# Patient Record
Sex: FEMALE | Race: WHITE | NOT HISPANIC OR LATINO | Employment: OTHER | URBAN - METROPOLITAN AREA
[De-identification: names, ages, dates, MRNs, and addresses within clinical notes are randomized per-mention and may not be internally consistent; named-entity substitution may affect disease eponyms.]

---

## 2019-07-12 ENCOUNTER — TRANSFERRED RECORDS (OUTPATIENT)
Dept: HEALTH INFORMATION MANAGEMENT | Facility: CLINIC | Age: 69
End: 2019-07-12

## 2019-07-18 ENCOUNTER — TRANSFERRED RECORDS (OUTPATIENT)
Dept: HEALTH INFORMATION MANAGEMENT | Facility: CLINIC | Age: 69
End: 2019-07-18

## 2019-07-20 ENCOUNTER — TRANSFERRED RECORDS (OUTPATIENT)
Dept: HEALTH INFORMATION MANAGEMENT | Facility: CLINIC | Age: 69
End: 2019-07-20

## 2019-07-24 ENCOUNTER — TRANSFERRED RECORDS (OUTPATIENT)
Dept: HEALTH INFORMATION MANAGEMENT | Facility: CLINIC | Age: 69
End: 2019-07-24

## 2019-07-26 ENCOUNTER — TRANSFERRED RECORDS (OUTPATIENT)
Dept: HEALTH INFORMATION MANAGEMENT | Facility: CLINIC | Age: 69
End: 2019-07-26

## 2019-07-26 ENCOUNTER — MEDICAL CORRESPONDENCE (OUTPATIENT)
Dept: HEALTH INFORMATION MANAGEMENT | Facility: CLINIC | Age: 69
End: 2019-07-26

## 2019-07-26 NOTE — TELEPHONE ENCOUNTER
ONCOLOGY INTAKE: Records Information      APPT INFORMATION:  Referring provider:  Dr. Nadeen Murphy MD  Referring provider s clinic:  [facility]  Reason for visit/diagnosis:  squamous cell carcinoma of anus  Has patient been notified of appointment date and time?: yes, per pt    RECORDS INFORMATION:  Were the records received with the referral (via [employer])? yes    Has patient been seen for any external appt for this diagnosis? yes    If yes, where? [facility] and [facility]    Has patient had any imaging or procedures outside of [facility] for this condition? yes      If Yes, where? [facility] and [facility]    ADDITIONAL INFORMATION:   [facility] referral faxed to [facility] team.  Notes also  in care everywhere

## 2019-07-29 NOTE — TELEPHONE ENCOUNTER
RECORDS STATUS - ALL OTHER DIAGNOSIS      RECORDS RECEIVED FROM: Epic/ [facility] and [facility]   DATE RECEIVED: 8/1/2019   NOTES STATUS DETAILS   OFFICE NOTE from referring provider Complete  Dr. Nadeen Murphy MD   OFFICE NOTE from medical oncologist N/A    DISCHARGE SUMMARY from hospital Complete  [facility] 7/24/2019 7/20/2019   DISCHARGE REPORT from the ER N/A    OPERATIVE REPORT N/A    MEDICATION LIST Complete     CLINICAL TRIAL TREATMENTS TO DATE N/A    LABS     PATHOLOGY REPORTS Request  [facility] 7/15/2019  Report in EPIC   ANYTHING RELATED TO DIAGNOSIS Complete  Recent Labs in [facility]/   GENONOMIC TESTING     TYPE:     IMAGING (NEED IMAGES & REPORT)     CT SCANS Request  [facility] 7/21/2019   MRI Request  [facility] 7/23/2019   MAMMO     ULTRASOUND     PET       Action    Action Taken 7/29/2019:43am     Urgently faxed MR ([facility] to [facility].     9:56am   Called [facility] to have IMG pushed to PACS. Sent a pathology request to [facility].     3:15pm   Resolved IMG in PACS.

## 2019-08-01 ENCOUNTER — PRE VISIT (OUTPATIENT)
Dept: ONCOLOGY | Facility: CLINIC | Age: 69
End: 2019-08-01

## 2019-08-01 ENCOUNTER — ONCOLOGY VISIT (OUTPATIENT)
Dept: ONCOLOGY | Facility: CLINIC | Age: 69
End: 2019-08-01
Payer: COMMERCIAL

## 2019-08-01 VITALS
SYSTOLIC BLOOD PRESSURE: 143 MMHG | DIASTOLIC BLOOD PRESSURE: 68 MMHG | HEART RATE: 72 BPM | WEIGHT: 188.3 LBS | RESPIRATION RATE: 18 BRPM | OXYGEN SATURATION: 97 % | TEMPERATURE: 97 F

## 2019-08-01 DIAGNOSIS — C21.0 SQUAMOUS CELL CARCINOMA OF ANUS (H): Primary | ICD-10-CM

## 2019-08-01 PROCEDURE — 99205 OFFICE O/P NEW HI 60 MIN: CPT | Performed by: INTERNAL MEDICINE

## 2019-08-01 PROCEDURE — G0463 HOSPITAL OUTPT CLINIC VISIT: HCPCS

## 2019-08-01 PROCEDURE — 00000346 ZZHCL STATISTIC REVIEW OUTSIDE SLIDES TC 88321: Performed by: INTERNAL MEDICINE

## 2019-08-01 RX ORDER — LISINOPRIL 40 MG/1
TABLET ORAL
Refills: 2 | COMMUNITY
Start: 2019-06-10 | End: 2020-09-08

## 2019-08-01 RX ORDER — OXYCODONE AND ACETAMINOPHEN 5; 325 MG/1; MG/1
1 TABLET ORAL EVERY 4 HOURS PRN
Refills: 0 | COMMUNITY
Start: 2019-07-18 | End: 2019-08-01

## 2019-08-01 RX ORDER — HYDROCHLOROTHIAZIDE 25 MG/1
25 TABLET ORAL DAILY
Refills: 1 | COMMUNITY
Start: 2019-06-25 | End: 2022-02-09

## 2019-08-01 RX ORDER — PREDNISONE 10 MG/1
TABLET ORAL
Refills: 0 | COMMUNITY
Start: 2019-06-20 | End: 2019-08-29

## 2019-08-01 RX ORDER — ATORVASTATIN CALCIUM 10 MG/1
10 TABLET, FILM COATED ORAL DAILY
Refills: 1 | COMMUNITY
Start: 2019-07-17

## 2019-08-01 RX ORDER — VENLAFAXINE HYDROCHLORIDE 75 MG/1
1 CAPSULE, EXTENDED RELEASE ORAL EVERY 24 HOURS
COMMUNITY
Start: 2019-06-27 | End: 2020-09-08

## 2019-08-01 RX ORDER — OXYCODONE AND ACETAMINOPHEN 5; 325 MG/1; MG/1
1 TABLET ORAL EVERY 4 HOURS PRN
Qty: 30 TABLET | Refills: 0 | Status: SHIPPED | OUTPATIENT
Start: 2019-08-01 | End: 2019-08-08

## 2019-08-01 ASSESSMENT — PAIN SCALES - GENERAL: PAINLEVEL: SEVERE PAIN (7)

## 2019-08-01 NOTE — PROGRESS NOTES
NEW PATIENT ONCOLOGY CONSULT      REQUESTING PROVIDER: Nadya Reid      REASON FOR CONSULTATION:    July 2019 at age 68 diagnosed with anal cancer      HISTORY OF PRESENT ILLNESS:    In 7/2019 at age 68, she presented to her PMD Dr. Reid's office for rectal pain 4/10  for a few weeks, she reports she felt a mass in her rectum, increase in rectal pressure after eating. She makes her self have a BM to relieve the pressure. She notes one instance of bright red blood in her stool. She reports progressive difficulty in moving her bowel.   She then saw GI Dr. Aguirre, who found perianal skin examination was normal. Digital examination revealed a mass at the anterior wall of rectum. It was a hard mass approximately 3-4 cm.   Colonoscopy 7/12/2019 found 8 mm mucosal abnormality just above the dentate line. This was at the anterior wall facing her vagina where the large mass was felt underneath. 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  She then saw Gyn Dr. Eduardo, 7/19/2019, exam found normal vulva. No vaginal polyp/growth appreciated arising from vaginal mucosa (which appears to be smooth) but there is a nodular 3-4cm mass palpated at rectovaginal area, which is not as well appreciated by digital rectal exam. Fixed mass, mildly tender to touch. Cervix grossly normal. Uterus 6 weeks size, non-tender. No adnexal mass/tenderness bilaterally.     7/2013/2019 pelvic MRI found 2.5 x 2.5 x 2.6 cm mass located between the distal rectum and vagina which appears to arise exophytically from the distal rectum. Single mildly enlarged mesorectal lymph node measuring 8 mm in short axis. No pelvic sidewall adenopathy. No other bony or soft tissue abnormalities identified.     She then saw colon rectal surgeon Dr. Nadeen Murphy 7/26/2019, who informed pt that she has anal cancer, not tx by surgery.       PAST MEDICAL HISTORY  HTN,  hyperlipidemia  depression      MEDICATIONS/ALLERY:  Reviewed in Epic system.        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       REVIEW OF SYSTEMS:   GENERAL: pt is in usual state of health.  No B symptoms  NEURO:   No headache, double vision, or focal weakness.  No neuropathy.   SKIN:  No chronic skin rash or skin infection.   CARDIOVASCULAR:  HTN, hyperlipidemia  PULMONARY:  No shortness of breath, no pleurisy, no cough, no hemoptysis.   GI: rectal pain 4/10  for a few weeks, she reports she felt a mass in her rectum, increase in rectal pressure after eating. She makes her self have a BM to relieve the pressure. She notes one instance of bright red blood in her stool. She reports progressive difficulty in moving her bowel.   :  No urgency, frequency.  No recurrent urinary tract infection.  No kidney problems.   RHEUMATOLOGY/MUSCULOSKELETAL SYSTEM:  no arthritic pain, or muscle pain. No muscle ache.   ENDOCRINE:  No history of diabetes or thyroid problem.  No complaints of hot flashes.   HEMATOLOGY:  No history of bleeding or thrombosis episode.   Oncology: no hx of cancers  IMMUNOLOGY:  No recurrent fever or chills episode.  No recurrent infectious episode.   BREASTS/GYN: No breast complains or vaginal dryness  PSYCHIATRY:  + depression.          PHYSICAL EXAMINATION:   VITAL SIGNS:Blood pressure (!) 143/68, pulse 72, temperature 97  F (36.1  C), temperature source Tympanic, resp. rate 18, weight 85.4 kg (188 lb 4.8 oz), SpO2 97 %.    ECO    GENERAL APPEARANCE:  looks like her stated age, very pleasant, not in acute distress.   HEENT: The patient is normocephalic, atraumatic. Pupils are equally reactive to light.  Sclerae are anicteric.  Moist oral mucosa.  Negative pharynx.  No oral thrush.   NECK:  Supple.  No jugular venous distention.   Thyroid is not palpable.   LYMPH NODES:  Superficial lymphadenopathy is not appreciable in the bilateral cervical, supraclavicular, axillary or inguinal areas.   CARDIOVASCULAR:  S1, S2 regular with no murmurs or gallops.  No carotid or abdominal bruits.   PULMONARY:  Lungs are clear to auscultation and percussion bilaterally.  There is no wheezing or rhonchi.   GASTROINTESTINAL:  Abdomen is soft, nontender.  No hepatosplenomegaly.  No signs of ascites.  No mass appreciable.   AL:  a mass at the anterior wall of rectum. It was a hard mass approximately 3-4 cm.   MUSCULOSKELETAL/EXTREMITIES:  No edema.  No cyanotic changes.  No signs of joint deformity.  No lymphedema.   NEUROLOGIC:  Cranial nerves II-XII are grossly intact.  Sensation intact.  Muscle strength and muscle tone symmetrical, 5/5 throughout.   BACK:  No spinal or paraspinal tenderness.  No CVA tenderness.   SKIN:  No petechiae.  No rash.  No signs of cellulitis.             CURRENT LAB DATA REVIEWED  7/2019 Baseline CMP is fine, cr 1.15  7/2019 Baseline cbc is fine with hb 11.7    7/2019 8 mm mucosal abnormality just above the dentate line. 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        CURRENT IMAGING REVIEWED  7/2013/2019 pelvic MRI found 2.5 x 2.5 x 2.6 cm mass located between the distal rectum and vagina which appears to arise exophytically from the distal rectum. Single mildly enlarged mesorectal lymph node measuring 8 mm in short axis. No pelvic sidewall adenopathy. No other bony or soft tissue abnormalities identified.          OLD DATA REVIEWED TODAY WITH SUMMARY:   Cr 1.03 in 7/2019    No baseline image        ASSESSMENT AND PLAN:    At age 68 in July 2019, he is diagnosed with low rectum/anal squamous cell carcinoma, months duration of anal pain pressure.    7/2013/2019 pelvic MRI found 2.5 x 2.5 x 2.6 cm mass located between the distal rectum and vagina which  appears to arise exophytically from the distal rectum. Single mildly enlarged mesorectal lymph node measuring 8 mm in short axis. No pelvic sidewall adenopathy. No other bony or soft tissue abnormalities identified.     We need to obtain colorectal surgeon Dr. Nadeen Murphy's clinic visit records.  She may perform another endoscope.    Recommend further PET scan to rule out distal disease.    Recommend radiation oncology consultation.    We discussed the multimodality treatment for anal cancer M0 disease.  The need of 5-FU and  mitomycin as chemo portion of the concurrent regimen.  We discussed the chemotherapy side effect, which include but not limited to GI toxicity nausea vomiting, lower immunity and bleeding risk from cytopenia, neurotoxicity, cardica toxicity,  infusion related reaction, mortality, etc.  We also discussed the slight possibility of diverting colostomy depending on the toxicity she may get from concurrent chemoradiation treatment.    She is informed this is a toxic regimen. has high complication rates she needs to be monitored closely during the whole course.    She will need a port placement for the treatment.  He has no prior history of adverse reaction to anesthesia or bleeding complications, carry average risk for port placement complications general population.    The patient has acknowledged the risks and consented to therapy, and work-up plan      All Qs are answered to pt's satisfaction.

## 2019-08-01 NOTE — PROGRESS NOTES
Oncology Rooming Note    August 1, 2019 10:18 AM   Sara Dominguez is a 68 year old female who presents for:    Chief Complaint   Patient presents with     Oncology Clinic Visit     New patient-Squamous cell carcinoma of anus     Initial Vitals: BP (!) 143/68 (BP Location: Right arm, Patient Position: Sitting, Cuff Size: Adult Regular)   Pulse 72   Temp 97  F (36.1  C) (Tympanic)   Resp 18   Wt 85.4 kg (188 lb 4.8 oz)   SpO2 97%  There is no height or weight on file to calculate BMI. There is no height or weight on file to calculate BSA.  Severe Pain (7) Comment: Data Unavailable   No LMP recorded.  Allergies reviewed: Yes  Medications reviewed: Yes    Medications: MEDICATION REFILLS NEEDED TODAY. Provider was notified.  Pharmacy name entered into [employer]: [facility] [address] [place] [address], [place] - [address]    Clinical concerns: New Pt, Anal Cancer. Pt requesting a refill of the Percocet.      Agata Renteria RN

## 2019-08-02 DIAGNOSIS — C21.0 SQUAMOUS CELL CARCINOMA OF ANUS (H): ICD-10-CM

## 2019-08-02 DIAGNOSIS — C21.1 MALIGNANT NEOPLASM OF ANAL CANAL (H): ICD-10-CM

## 2019-08-03 ENCOUNTER — HOSPITAL ENCOUNTER (OUTPATIENT)
Dept: PET IMAGING | Facility: CLINIC | Age: 69
Discharge: HOME | End: 2019-08-03
Attending: INTERNAL MEDICINE | Admitting: INTERNAL MEDICINE
Payer: COMMERCIAL

## 2019-08-03 DIAGNOSIS — C21.0 SQUAMOUS CELL CARCINOMA OF ANUS (H): ICD-10-CM

## 2019-08-03 PROCEDURE — A9552 F18 FDG: HCPCS | Performed by: INTERNAL MEDICINE

## 2019-08-03 PROCEDURE — 78816 PET IMAGE W/CT FULL BODY: CPT | Mod: PI

## 2019-08-03 PROCEDURE — 34300033 ZZH RX 343: Performed by: INTERNAL MEDICINE

## 2019-08-03 RX ADMIN — FLUDEOXYGLUCOSE F-18 10.3 MCI.: 500 INJECTION, SOLUTION INTRAVENOUS at 12:16

## 2019-08-05 LAB — COPATH REPORT: NORMAL

## 2019-08-08 ENCOUNTER — OFFICE VISIT (OUTPATIENT)
Dept: RADIATION THERAPY | Facility: OUTPATIENT CENTER | Age: 69
End: 2019-08-08
Payer: COMMERCIAL

## 2019-08-08 ENCOUNTER — PATIENT OUTREACH (OUTPATIENT)
Dept: ONCOLOGY | Facility: CLINIC | Age: 69
End: 2019-08-08

## 2019-08-08 VITALS
SYSTOLIC BLOOD PRESSURE: 122 MMHG | RESPIRATION RATE: 16 BRPM | DIASTOLIC BLOOD PRESSURE: 72 MMHG | BODY MASS INDEX: 31.25 KG/M2 | WEIGHT: 187.8 LBS | OXYGEN SATURATION: 99 % | HEART RATE: 75 BPM

## 2019-08-08 VITALS — HEIGHT: 65 IN | BODY MASS INDEX: 31.33 KG/M2

## 2019-08-08 DIAGNOSIS — C21.0 SQUAMOUS CELL CARCINOMA OF ANUS (H): ICD-10-CM

## 2019-08-08 DIAGNOSIS — C21.1 MALIGNANT NEOPLASM OF ANAL CANAL (H): Primary | ICD-10-CM

## 2019-08-08 DIAGNOSIS — C21.0 SQUAMOUS CELL CARCINOMA OF ANUS (H): Primary | ICD-10-CM

## 2019-08-08 PROBLEM — K21.00 REFLUX ESOPHAGITIS: Status: ACTIVE | Noted: 2019-08-08

## 2019-08-08 PROBLEM — N39.3 URINE, INCONTINENCE, STRESS FEMALE: Status: ACTIVE | Noted: 2019-08-08

## 2019-08-08 PROBLEM — E78.2 MIXED HYPERLIPIDEMIA: Status: ACTIVE | Noted: 2019-08-08

## 2019-08-08 PROBLEM — F33.1 MAJOR DEPRESSIVE DISORDER, RECURRENT EPISODE, MODERATE (H): Status: ACTIVE | Noted: 2017-11-24

## 2019-08-08 RX ORDER — OXYCODONE AND ACETAMINOPHEN 5; 325 MG/1; MG/1
1 TABLET ORAL EVERY 4 HOURS PRN
Qty: 60 TABLET | Refills: 0 | Status: SHIPPED | OUTPATIENT
Start: 2019-08-08 | End: 2019-08-22

## 2019-08-08 NOTE — PROGRESS NOTES
Department of Radiation Oncology  Radiation Therapy Center  [facility]  [address], [address]  [place] [address]  [phone]       Consultation Note    Name: Sara Dominguez MRN: [id number]   : 1950   Date of Service: 2019  Referring: Dr. Aquino     Reason for consultation: Squamous cell carcinoma of the anal canal, clinical [id number] (anna-rectal nodes). Evaluate role of definitive chemoradiation.    History of Present Illness   Ms. Dominguez is a 68 year old female diagnosed wquamous cell carcinoma of the anal canal, clinical [id number] (anna-rectal nodes).  She presents today to discuss the potential role of radiation therapy as a part of her treatment strategy.    The patient initially presented with symptoms of rectal pain pressure prompting further evaluation.  She was seen by gastroenterologist Dr. Aguirre who on digital rectal exam noticed a mass in the anterior wall of the anal rectal region, measuring 3.4 cm in size.  Patient eventually underwent colonoscopy on 2019 which demonstrated an 8 mm mucosal abnormality just above the dentate line.  The mass was noted to be in the anterior wall facing the vagina.  Biopsy of the mass demonstrated moderately differentiated squamous cell carcinoma.  The patient was referred to Gyn/onc who on exam palpated a 3 to 4 cm rectovaginal mass.  No vaginal or cervical mass was noted.  [id number] the patient underwent MRI of the pelvis.  Imaging demonstrated 2.6 2.5 x 2.5 cm mass located in the distal rectum/anal canal region.  The mass was noted to arise exophytically from the distal rectum.  There were suspicious mesorectal lymph nodes measuring subcentimeter in size.  No pelvic adenopathy was noted.  On 8/3/2019 the patient underwent a PET scan.  Imaging did not demonstrate any evidence of distant disease.  He did notice the known anal mass.  Per my review it also noted suspicious perirectal adenopathy.  The patient was  subsequent seen by Dr. Miles sutton we discussed definitive treatment with chemoradiation.f medical oncology patient subsequent presents today to discuss potential role of radiation therapy as a part of treatment strategy.    Patient is overall doing well.  He does have mild pelvic pain pressure for which she takes oral narcotic pain medication. She denies any incontinence.   No bleeding per rectum.  No vaginal bleeding.  No history of radiation.  No pacemaker.  She denies any incontinence.         Past Medical History:   Past Medical History:   Diagnosis Date     Disorder of bone and cartilage     osteopenia     Diverticulosis of colon     ,seen at colonoscopy     Major depressive disorder, recurrent episode, moderate (H)     No Comments Provided     Reflux esophagitis     No Comments Provided     Sciatica     right     Urge incontinence     No Comments Provided       Past Surgical History:   Past Surgical History:   Procedure Laterality Date     COLONOSCOPY      ,due 2018     EXTRACTION(S) DENTAL      No Comments Provided     OTHER SURGICAL HISTORY      21576.0,VT  DELIVERY ONLY     OTHER SURGICAL HISTORY      ,ENDOSCOPY ESOPHAGUS       Chemotherapy History:  none    Radiation History:  none    Pregnant: No  Implanted Cardiac Devices: No    Medications:  Current Outpatient Medications   Medication     aspirin (ASA) 81 MG tablet     atorvastatin (LIPITOR) 10 MG tablet     hydrochlorothiazide (HYDRODIURIL) 25 MG tablet     lisinopril (PRINIVIL/ZESTRIL) 40 MG tablet     oxyCODONE-acetaminophen (PERCOCET) 5-325 MG tablet     predniSONE (DELTASONE) 10 MG tablet     venlafaxine (EFFEXOR XR) 75 MG 24 hr capsule     No current facility-administered medications for this visit.          Allergies:   No Known Allergies      Family History:  Family History   Problem Relation Age of Onset     Other - See Comments Father         Stroke     Cancer Mother         Cancer,cervical-mets to brain     Heart Disease  Brother         Heart Disease,MI  at 59     Heart Disease Maternal Grandmother         Heart Disease,MI  at 58     Asthma Brother         Asthma     Asthma Brother         Asthma     Diabetes Paternal Grandmother         Diabetes     Osteoporosis Mother         Osteoporosis       Review of Systems   A 10-point review of systems was performed. Pertinent findings are noted in the HPI.    Physical Exam   ECOG Status: 1    Vitals:  There were no vitals taken for this visit.    Gen: Alert, in NAD  Head: NC/AT  Eyes: PERRL, EOMI, sclera anicteric  Ears: No external auricular lesions  Nose/sinus: No rhinorrhea or epistaxis  Oral cavity/oropharynx: MMM, no visible oral cavity lesions, FOM and BOT are soft to palpation  Neck: Full ROM, supple, no palpable adenopathy  Pulm: No wheezing, stridor or respiratory distress  CV: Extremities are warm and well-perfused, no cyanosis, no pedal edema  Abdominal: Normal bowel sounds, soft, nontender, no masses  Musculoskeletal: Normal bulk and tone  Skin: Normal color and turgor  Neuro: A/Ox3, CN II-XII intact, normal gait    Imaging/Path/Labs   Imagin/23/19  MRI pelvis  FINDINGS:  2.5 x 2.5 x 2.6 cm mass located between the distal rectum and vagina which appears to arise exophytically from the distal rectum. Single we were merged mesorectal lymph node measuring 8 mm in short axis.   No pelvic sidewall adenopathy. No other bony or soft tissue abnormalities identified.     Impression :  1. 2.6 cm enhancing mass which appears to arise from the distal rectum and is located between the distal rectum and vagina and is suspicious for a rectal cancer.   2. Single mildly enlarged mesorectal lymph node may represent yong metastatic disease.             8/3/19  PET  IMPRESSION:  1. Hypermetabolic 3.3 x 4.1 cm mass seen in the distal rectum/anus  compatible with underlying malignancy.   2. No evidence of distant hypermetabolic metastatic disease seen in  the chest, abdomen or  pelvis.      Path:   7/12/19    A) RECTUM, MUCOSAL ABNORMALITY, BIOPSY:  1. 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 Separate fragment of squamous mucosa with carcinoma in-situ (see comment)     Comment A) There is invasive squamous cell carcinoma involving rectal mucosa. Additionally, it appears that there is background squamous mucosa with probable in-situ disease. We understand that the patient has a vaginal mass, but given the in-situ disease, cannot exclude an origin in the anal canal. Therefore, we defer to the clinical impression regarding this tumor's site of origin: anal canal vs GYN tract (vulva, cervix, or vagina).    Dr. Curtis discussed the findings with Dr. Aguirre's nurse (Cindi) on 7/15/2019. Case seen in consultation with Dr. Enciso. Please contact us with any questions ([facility] GI Pathology Service [phone]).         Assessment      Ms. Dominguez is a 68 year old female diagnosed wquamous cell carcinoma of the anal canal, clinical [id number] (anna-rectal nodes).  She presents today to discuss the potential role of radiation therapy as a part of her treatment strategy.      Plan   1. Today we discussed the natural history of squamous cell carcinoma of the anal canal. There is some uncertainty in terms of origin of the tumor, but based upon imaging and examination the tumor appears to be originating from the anal canal with extension into the anna-vaginal tissues anteriorly.      2. Today we discussed the treatment treatment options with the patient including surgical resection with APR vs. Definitive chemo-radiation.  Definitive chemo-radiation is the standard of care per NCCN guidelines for anal carcinoma.  We are in agreement with pursuing concurrent chemoradiation to preserve sphincter function.      3. We discussed with the patient that treatment would consist of concurrent chemoradiation using 5-FU and mitomycin-C. Radiation treatment would  target the primary tumor and draining lymphatic regions including the groin and pelvic lymph nodes. Radiation treatment would consist of 30-33 fractions to 54 Gy to 60 Gy.     4. We plan to treat according to RTOG 0529 which used IMRT concurrently with 5-FU and mitomycin-C where they found that IMRT significantly reduced grade 2 hematologic and grade 3 dermatologic and GI toxicity.     5. We discussed that the risks from radiation therapy include, but are not limited to skin desquamation, fatigue, loose stools, abdominal cramping, painful urination, urinary frequency, decreased blood counts, bowel damage, bladder damage, rectal damage, rectal bleeding, bone necrosis, early menopause, vaginal stenosis, and secondary malignancies were explained to the patient. The patient has acknowledged.      6. Consent obtained. CT simulation will be scheduled. We will coordinate start of RT with chemotherapy.     Justin Khan MD  Department of Radiation Oncology  [facility]

## 2019-08-08 NOTE — NURSING NOTE
REASON FOR APPOINTMENT   Newly diagnosed anal cancer. Staging complete, seen by gyn, oncology and colorectal surgery  Here to discuss radiation therapy. See epic for details.     PERSONAL HISTORY OF CANCER   Previous Cancer ? no   Prior Radiation ? no   Prior Chemotherapy ? no   Prior Hormonal Therapy ? no     REFERRALS NEEDED  dietary    VITALS  /72 (BP Location: Left arm, Cuff Size: Adult Large)   Pulse 75   Resp 16   Wt 85.2 kg (187 lb 12.8 oz)   SpO2 99%   BMI 31.25 kg/m      PACEMAKER/IMPLANTED CARDIAC DEVICE : No    PAIN  Rectal pain/pressure. Takes percocet 4 tabs per day    PSYCHOSOCIAL  Marital Status:   Patient lives in [place], [place] with  Jonatan.  Working status: reception/registration [employer]  Do you feel safe in your home? Yes    REVIEW OF SYSTEMS  Skin: negative  Eyes: glasses  Ears/Nose/Throat: negative  Respiratory: No shortness of breath, dyspnea on exertion, cough, or hemoptysis  Cardiovascular: negative  Gastrointestinal: some constipation, on stool softener. Feels pain and fullness/pressure, worse w/BM's. No bleeding.  Genitourinary: more urinary frequency, but drinking more water, regular female incontinence.   Musculoskeletal: negative  Neurologic: negative  Psychiatric: depression stable  Hematologic/Lymphatic/Immunologic: negative  Endocrine: negative    WOMEN ONLY  Any chance you may be pregnant: No    Radiation Oncology Patient Teaching    Person involved with teaching: Patient  Patient asked Questions: Yes  Patient was cooperative: Yes  Patient was receptive (willing to accept information given): Yes    Education Assessment  Comprehension ability: High  Knowledge level: High  Factors affecting teaching: None    Education Materials Given  Radiation Therapy and You  Caring for Your Skin During Radiation ...  Disease Specific Booklet  Eating Hints  Diet and Nutrition Information    Educational Topics Discussed  Side effects, Medications, Pain management, Activity,  Nutrition and Community resources    Response To Teaching  Verbalizes understanding    GYN Only  Vaginal Dilator-given and educated: N/A    Do you have an advanced directive or living will? no  Are you DNR/DNI? no

## 2019-08-08 NOTE — PROGRESS NOTES
"Chemotherapy Education    Patient is a 68 year old female here today for chemotherapy education, accompanied by self.  Pt has a cancer diagnosis of   Encounter Diagnosis   Name Primary?     Squamous cell carcinoma of anus (H) Yes   .  Their Oncologist is Dr. Aquino, and PCP is No Ref-Primary, Physician  .    Reviewed the following with the patient and their support person:    Treatment Goal: Curative    Regimen: Mitomycin/5FU with RT    Duration: x 2 Cycles and rationale for strict adherence, specific supportive medication Compazine and Ativan and side effects (including long-term and short-term) and management; skin changes/hand-foot syndrome, anemia, neutropenia, thrombocytopenia, diarrhea/constipation, hair loss syndrome, memory changes/ \"chemobrain\", mouth sores, taste changes, neuropathy, fatigue, infertility, myelosuppression, and risk of extravasation or infiltration.    Infection prevention and monitoring of lab values, what lab tests and what changes of these values meant, along with the possibility of hydration or blood product transfusion, or the need to defer or hold treatment.      General Chemotherapy Information, including ways it is excreted from the body and cleaning and containment of vomitus or other bodily fluid, use of the bathroom, sexual health and intimacy, what to do if needing to miss a treatment, when to call a provider and the need for staff to wear protective equipment.    Oral Chemotherapy No  If Yes:  Add handling and waste.      No      Importance of Central line care (port) or IV site care.    Written Information: written information including the \"My Cancer Guidebook\" including \"Getting Ready for Chemotherapy: What to Expect, Before, During, and After your Treatment\" booklet, specific drug information guides printed from [employer], information on when to contact the provider, various programs offered at [facility], and our business card with contact information given; Oncology " Clinic, RN Case Manager, and the after-hours Nurse Advise Line.  No barriers to learning identified. Patient and family verbalized understanding of all written and verbal information. All questions answered to patients satisfaction.   General Orientation to the Medical Oncology department, Infusion Services department, Huc/scheduling, bathrooms and usual flow of the treatment day provided as well as introduction to the Infusion nurses.    Bottle Pump Infuser: N/A    Other Concerns: Pt wants to start ASAP.  Comments:will await RT scheudle to set up the start.      Pt instructed to call with further questions or concerns.  Patient states understanding and is in agreement with this plan.  Copy of appointments, and after visit summary (AVS) given to patient. Patient discharged 08.08.19.    Agata Renteria RN, BSN, OCN  Oncology Hematology   [facility]  Phone [phone]

## 2019-08-08 NOTE — PROGRESS NOTES
Pt also requesting a refill of her Percocet. This has been signed by Dr. Cosby on behalf of Dr. Aquino. Pt is taking 4 pills per day, increased quantity to #60. Plan to start Chemo RT in the next week or 2.     Agata Renteria RN on 8/8/2019 at 10:37 AM

## 2019-08-08 NOTE — LETTER
2019      RE: Sara Dominguez  [address]  [facility] [address]          Department of Radiation Oncology  Radiation Therapy Center  [facility]  [address], [address]  [place] [address] [phone]       Consultation Note    Name: Sara Dominguez MRN: [id number]   : 1950   Date of Service: 2019  Referring: Dr. Aquino     Reason for consultation: Squamous cell carcinoma of the anal canal, clinical [id number] (anna-rectal nodes). Evaluate role of definitive chemoradiation.    History of Present Illness   Ms. 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  She presents today to discuss the potential role of radiation therapy as a part of her treatment strategy.    The patient initially presented with symptoms of rectal pain pressure prompting further evaluation.  She was seen by gastroenterologist Dr. Aguirre who on digital rectal exam noticed a mass in the anterior wall of the anal rectal region, measuring 3.4 cm in size.  Patient eventually underwent colonoscopy on 2019 which demonstrated an 8 mm mucosal abnormality just above the dentate line.  The mass was noted to be in the anterior wall facing the vagina.  Biopsy of the mass demonstrated moderately differentiated squamous cell carcinoma.  The patient was referred to Gyn/onc who on exam palpated a 3 to 4 cm rectovaginal mass.  No vaginal or cervical mass was noted.  [id number] the patient underwent MRI of the pelvis.  Imaging demonstrated 2.6 2.5 x 2.5 cm mass located in the distal rectum/anal canal region.  The mass was noted to arise exophytically from the distal rectum.  There were suspicious mesorectal lymph nodes measuring subcentimeter in size.  No pelvic adenopathy was noted.  On 8/3/2019 the patient underwent a PET scan.  Imaging did not demonstrate any evidence of distant disease.  He did notice the known anal mass.   Per my review it also noted suspicious perirectal adenopathy.  The patient was subsequent seen by Dr. Miles sutton we discussed definitive treatment with chemoradiation.f medical oncology patient subsequent presents today to discuss potential role of radiation therapy as a part of treatment strategy.    Patient is overall doing well.  He does have mild pelvic pain pressure for which she takes oral narcotic pain medication. She denies any incontinence.   No bleeding per rectum.  No vaginal bleeding.  No history of radiation.  No pacemaker.  She denies any incontinence.         Past Medical History:   Past Medical History:   Diagnosis Date     Disorder of bone and cartilage     osteopenia     Diverticulosis of colon     ,seen at colonoscopy     Major depressive disorder, recurrent episode, moderate (H)     No Comments Provided     Reflux esophagitis     No Comments Provided     Sciatica     right     Urge incontinence     No Comments Provided       Past Surgical History:   Past Surgical History:   Procedure Laterality Date     COLONOSCOPY      ,due 2018     EXTRACTION(S) DENTAL      No Comments Provided     OTHER SURGICAL HISTORY      45377.0,AL  DELIVERY ONLY     OTHER SURGICAL HISTORY      ,ENDOSCOPY ESOPHAGUS       Chemotherapy History:  none    Radiation History:  none    Pregnant: No  Implanted Cardiac Devices: No    Medications:  Current Outpatient Medications   Medication     aspirin (ASA) 81 MG tablet     atorvastatin (LIPITOR) 10 MG tablet     hydrochlorothiazide (HYDRODIURIL) 25 MG tablet     lisinopril (PRINIVIL/ZESTRIL) 40 MG tablet     oxyCODONE-acetaminophen (PERCOCET) 5-325 MG tablet     predniSONE (DELTASONE) 10 MG tablet     venlafaxine (EFFEXOR XR) 75 MG 24 hr capsule     No current facility-administered medications for this visit.          Allergies:   No Known Allergies      Family History:  Family History   Problem Relation Age of Onset     Other - See Comments Father          Stroke     Cancer Mother         Cancer,cervical-mets to brain     Heart Disease Brother         Heart Disease,MI  at 59     Heart Disease Maternal Grandmother         Heart Disease,MI  at 58     Asthma Brother         Asthma     Asthma Brother         Asthma     Diabetes Paternal Grandmother         Diabetes     Osteoporosis Mother         Osteoporosis       Review of Systems   A 10-point review of systems was performed. Pertinent findings are noted in the HPI.    Physical Exam   ECOG Status: 1    Vitals:  There were no vitals taken for this visit.    Gen: Alert, in NAD  Head: NC/AT  Eyes: PERRL, EOMI, sclera anicteric  Ears: No external auricular lesions  Nose/sinus: No rhinorrhea or epistaxis  Oral cavity/oropharynx: MMM, no visible oral cavity lesions, FOM and BOT are soft to palpation  Neck: Full ROM, supple, no palpable adenopathy  Pulm: No wheezing, stridor or respiratory distress  CV: Extremities are warm and well-perfused, no cyanosis, no pedal edema  Abdominal: Normal bowel sounds, soft, nontender, no masses  Musculoskeletal: Normal bulk and tone  Skin: Normal color and turgor  Neuro: A/Ox3, CN II-XII intact, normal gait    Imaging/Path/Labs   Imagin/23/19  MRI pelvis  FINDINGS:  2.5 x 2.5 x 2.6 cm mass located between the distal rectum and vagina which appears to arise exophytically from the distal rectum. Single we were merged mesorectal lymph node measuring 8 mm in short axis.   No pelvic sidewall adenopathy. No other bony or soft tissue abnormalities identified.     Impression :  1. 2.6 cm enhancing mass which appears to arise from the distal rectum and is located between the distal rectum and vagina and is suspicious for a rectal cancer.   2. Single mildly enlarged mesorectal lymph node may represent yong metastatic disease.             8/3/19  PET  IMPRESSION:  1. Hypermetabolic 3.3 x 4.1 cm mass seen in the distal rectum/anus  compatible with underlying malignancy.   2. No evidence  of distant hypermetabolic metastatic disease seen in  the chest, abdomen or pelvis.      Path:   7/12/19    A) RECTUM, MUCOSAL ABNORMALITY, BIOPSY:  1. 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 Separate fragment of squamous mucosa with carcinoma in-situ (see comment)     Comment A) There is invasive squamous cell carcinoma involving rectal mucosa. Additionally, it appears that there is background squamous mucosa with probable in-situ disease. We understand that the patient has a vaginal mass, but given the in-situ disease, cannot exclude an origin in the anal canal. Therefore, we defer to the clinical impression regarding this tumor's site of origin: anal canal vs GYN tract (vulva, cervix, or vagina).    Dr. Curtis discussed the findings with Dr. Aguirre's nurse (Cindi) on 7/15/2019. Case seen in consultation with Dr. Enciso. Please contact us with any questions ([facility] GI Pathology Service [phone]).         Assessment      Ms. 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  She presents today to discuss the potential role of radiation therapy as a part of her treatment strategy.      Plan   1. Today we discussed the natural history of squamous cell carcinoma of the anal canal. There is some uncertainty in terms of origin of the tumor, but based upon imaging and examination the tumor appears to be originating from the anal canal with extension into the anna-vaginal tissues anteriorly.      2. Today we discussed the treatment treatment options with the patient including surgical resection with APR vs. Definitive chemo-radiation.  Definitive chemo-radiation is the standard of care per NCCN guidelines for anal carcinoma.  We are in agreement with pursuing concurrent chemoradiation to preserve sphincter function.      3. We discussed with the patient that treatment would consist of  concurrent chemoradiation using 5-FU and mitomycin-C. Radiation treatment would target the primary tumor and draining lymphatic regions including the groin and pelvic lymph nodes. Radiation treatment would consist of 30-33 fractions to 54 Gy to 60 Gy.     4. We plan to treat according to RTOG 0529 which used IMRT concurrently with 5-FU and mitomycin-C where they found that IMRT significantly reduced grade 2 hematologic and grade 3 dermatologic and GI toxicity.     5. We discussed that the risks from radiation therapy include, but are not limited to skin desquamation, fatigue, loose stools, abdominal cramping, painful urination, urinary frequency, decreased blood counts, bowel damage, bladder damage, rectal damage, rectal bleeding, bone necrosis, early menopause, vaginal stenosis, and secondary malignancies were explained to the patient. The patient has acknowledged.      6. Consent obtained. CT simulation will be scheduled. We will coordinate start of RT with chemotherapy.     Justin Khan MD  Department of Radiation Oncology  [facility]

## 2019-08-13 ENCOUNTER — APPOINTMENT (OUTPATIENT)
Dept: RADIATION THERAPY | Facility: OUTPATIENT CENTER | Age: 69
End: 2019-08-13
Payer: COMMERCIAL

## 2019-08-15 DIAGNOSIS — C21.0 SQUAMOUS CELL CARCINOMA OF ANUS (H): Primary | ICD-10-CM

## 2019-08-15 RX ORDER — SODIUM CHLORIDE 9 MG/ML
1000 INJECTION, SOLUTION INTRAVENOUS CONTINUOUS PRN
Status: CANCELLED
Start: 2019-08-22

## 2019-08-15 RX ORDER — ALBUTEROL SULFATE 0.83 MG/ML
2.5 SOLUTION RESPIRATORY (INHALATION)
Status: CANCELLED | OUTPATIENT
Start: 2019-08-22

## 2019-08-15 RX ORDER — MITOMYCIN 5 MG/10ML
10 INJECTION, POWDER, LYOPHILIZED, FOR SOLUTION INTRAVENOUS ONCE
Status: CANCELLED
Start: 2019-08-22

## 2019-08-15 RX ORDER — EPINEPHRINE 0.3 MG/.3ML
0.3 INJECTION SUBCUTANEOUS EVERY 5 MIN PRN
Status: CANCELLED | OUTPATIENT
Start: 2019-08-22

## 2019-08-15 RX ORDER — NALOXONE HYDROCHLORIDE 0.4 MG/ML
.1-.4 INJECTION, SOLUTION INTRAMUSCULAR; INTRAVENOUS; SUBCUTANEOUS
Status: CANCELLED | OUTPATIENT
Start: 2019-08-22

## 2019-08-15 RX ORDER — ALBUTEROL SULFATE 90 UG/1
1-2 AEROSOL, METERED RESPIRATORY (INHALATION)
Status: CANCELLED
Start: 2019-08-22

## 2019-08-15 RX ORDER — MEPERIDINE HYDROCHLORIDE 25 MG/ML
25 INJECTION INTRAMUSCULAR; INTRAVENOUS; SUBCUTANEOUS EVERY 30 MIN PRN
Status: CANCELLED | OUTPATIENT
Start: 2019-08-22

## 2019-08-15 RX ORDER — DIPHENHYDRAMINE HYDROCHLORIDE 50 MG/ML
50 INJECTION INTRAMUSCULAR; INTRAVENOUS
Status: CANCELLED
Start: 2019-08-22

## 2019-08-15 RX ORDER — EPINEPHRINE 1 MG/ML
0.3 INJECTION, SOLUTION, CONCENTRATE INTRAVENOUS EVERY 5 MIN PRN
Status: CANCELLED | OUTPATIENT
Start: 2019-08-22

## 2019-08-15 RX ORDER — METHYLPREDNISOLONE SODIUM SUCCINATE 125 MG/2ML
125 INJECTION, POWDER, LYOPHILIZED, FOR SOLUTION INTRAMUSCULAR; INTRAVENOUS
Status: CANCELLED
Start: 2019-08-22

## 2019-08-15 RX ORDER — LORAZEPAM 2 MG/ML
0.5 INJECTION INTRAMUSCULAR EVERY 4 HOURS PRN
Status: CANCELLED
Start: 2019-08-22

## 2019-08-16 RX ORDER — PROCHLORPERAZINE MALEATE 10 MG
5 TABLET ORAL EVERY 6 HOURS PRN
Qty: 30 TABLET | Refills: 1 | Status: SHIPPED | OUTPATIENT
Start: 2019-08-16 | End: 2019-10-03

## 2019-08-16 RX ORDER — LORAZEPAM 0.5 MG/1
0.5 TABLET ORAL EVERY 4 HOURS PRN
Qty: 30 TABLET | Refills: 1 | Status: SHIPPED | OUTPATIENT
Start: 2019-08-16 | End: 2019-08-29

## 2019-08-16 NOTE — PROGRESS NOTES
Called to update pt with schedule. Plan to start chemo on 08.22. PRN meds sent to the pharmacy and Called Ativan in.     LM for a return call.     Agata Renteria RN on 8/16/2019 at 12:27 PM

## 2019-08-21 ENCOUNTER — HOME INFUSION (PRE-WILLOW HOME INFUSION) (OUTPATIENT)
Dept: PHARMACY | Facility: CLINIC | Age: 69
End: 2019-08-21

## 2019-08-21 ENCOUNTER — APPOINTMENT (OUTPATIENT)
Dept: RADIATION THERAPY | Facility: OUTPATIENT CENTER | Age: 69
End: 2019-08-21
Payer: COMMERCIAL

## 2019-08-21 NOTE — PROGRESS NOTES
Therapy 5FU   Insurance: MEDICA   Ded: $2700  Met: $2700    Co-Insurance: 0  Max Out of Pocket: $0  Met: $0    Please contact Intake with any questions, [phone] or In Basket pool, [facility] ([phone]).  IN REFERENCE TO REFERRAL MADE ON 08/21/2019 TO CHECK 5FU COVERAGE

## 2019-08-22 ENCOUNTER — APPOINTMENT (OUTPATIENT)
Dept: RADIATION THERAPY | Facility: OUTPATIENT CENTER | Age: 69
End: 2019-08-22
Payer: COMMERCIAL

## 2019-08-22 ENCOUNTER — INFUSION THERAPY VISIT (OUTPATIENT)
Dept: INFUSION THERAPY | Facility: CLINIC | Age: 69
End: 2019-08-22
Attending: INTERNAL MEDICINE
Payer: COMMERCIAL

## 2019-08-22 ENCOUNTER — HOME INFUSION (PRE-WILLOW HOME INFUSION) (OUTPATIENT)
Dept: PHARMACY | Facility: CLINIC | Age: 69
End: 2019-08-22

## 2019-08-22 ENCOUNTER — HOSPITAL ENCOUNTER (OUTPATIENT)
Facility: CLINIC | Age: 69
Discharge: HOME | End: 2019-08-22
Attending: INTERNAL MEDICINE | Admitting: INTERNAL MEDICINE
Payer: COMMERCIAL

## 2019-08-22 VITALS
TEMPERATURE: 97.1 F | RESPIRATION RATE: 16 BRPM | HEART RATE: 68 BPM | DIASTOLIC BLOOD PRESSURE: 60 MMHG | SYSTOLIC BLOOD PRESSURE: 121 MMHG

## 2019-08-22 DIAGNOSIS — C21.0 SQUAMOUS CELL CARCINOMA OF ANUS (H): Primary | ICD-10-CM

## 2019-08-22 DIAGNOSIS — C21.0 SQUAMOUS CELL CARCINOMA OF ANUS (H): ICD-10-CM

## 2019-08-22 LAB
ALBUMIN SERPL-MCNC: 3.9 G/DL (ref 3.4–5)
ALP SERPL-CCNC: 102 U/L (ref 40–150)
ALT SERPL W P-5'-P-CCNC: 22 U/L (ref 0–50)
ANION GAP SERPL CALCULATED.3IONS-SCNC: 7 MMOL/L (ref 3–14)
AST SERPL W P-5'-P-CCNC: 19 U/L (ref 0–45)
BASOPHILS # BLD AUTO: 0 10E9/L (ref 0–0.2)
BASOPHILS NFR BLD AUTO: 0.4 %
BILIRUB SERPL-MCNC: 0.3 MG/DL (ref 0.2–1.3)
BUN SERPL-MCNC: 16 MG/DL (ref 7–30)
CALCIUM SERPL-MCNC: 8.7 MG/DL (ref 8.5–10.1)
CHLORIDE SERPL-SCNC: 108 MMOL/L (ref 94–109)
CO2 SERPL-SCNC: 24 MMOL/L (ref 20–32)
CREAT SERPL-MCNC: 0.89 MG/DL (ref 0.52–1.04)
DIFFERENTIAL METHOD BLD: ABNORMAL
EOSINOPHIL # BLD AUTO: 0.2 10E9/L (ref 0–0.7)
EOSINOPHIL NFR BLD AUTO: 2.1 %
ERYTHROCYTE [DISTWIDTH] IN BLOOD BY AUTOMATED COUNT: 13.2 % (ref 10–15)
GFR SERPL CREATININE-BSD FRML MDRD: 66 ML/MIN/{1.73_M2}
GLUCOSE SERPL-MCNC: 88 MG/DL (ref 70–99)
HCT VFR BLD AUTO: 35.6 % (ref 35–47)
HGB BLD-MCNC: 11.1 G/DL (ref 11.7–15.7)
IMM GRANULOCYTES # BLD: 0 10E9/L (ref 0–0.4)
IMM GRANULOCYTES NFR BLD: 0.4 %
LYMPHOCYTES # BLD AUTO: 1.6 10E9/L (ref 0.8–5.3)
LYMPHOCYTES NFR BLD AUTO: 21.1 %
MCH RBC QN AUTO: 28.8 PG (ref 26.5–33)
MCHC RBC AUTO-ENTMCNC: 31.2 G/DL (ref 31.5–36.5)
MCV RBC AUTO: 93 FL (ref 78–100)
MONOCYTES # BLD AUTO: 0.7 10E9/L (ref 0–1.3)
MONOCYTES NFR BLD AUTO: 8.9 %
NEUTROPHILS # BLD AUTO: 5.1 10E9/L (ref 1.6–8.3)
NEUTROPHILS NFR BLD AUTO: 67.1 %
NRBC # BLD AUTO: 0 10*3/UL
NRBC BLD AUTO-RTO: 0 /100
PLATELET # BLD AUTO: 241 10E9/L (ref 150–450)
POTASSIUM SERPL-SCNC: 4.1 MMOL/L (ref 3.4–5.3)
PROT SERPL-MCNC: 7.3 G/DL (ref 6.8–8.8)
RBC # BLD AUTO: 3.85 10E12/L (ref 3.8–5.2)
SODIUM SERPL-SCNC: 139 MMOL/L (ref 133–144)
WBC # BLD AUTO: 7.6 10E9/L (ref 4–11)

## 2019-08-22 PROCEDURE — 80053 COMPREHEN METABOLIC PANEL: CPT | Performed by: INTERNAL MEDICINE

## 2019-08-22 PROCEDURE — 25000128 H RX IP 250 OP 636: Performed by: INTERNAL MEDICINE

## 2019-08-22 PROCEDURE — 96409 CHEMO IV PUSH SNGL DRUG: CPT

## 2019-08-22 PROCEDURE — 85025 COMPLETE CBC W/AUTO DIFF WBC: CPT | Performed by: INTERNAL MEDICINE

## 2019-08-22 PROCEDURE — G0498 CHEMO EXTEND IV INFUS W/PUMP: HCPCS

## 2019-08-22 PROCEDURE — 25800030 ZZH RX IP 258 OP 636: Performed by: INTERNAL MEDICINE

## 2019-08-22 PROCEDURE — 96375 TX/PRO/DX INJ NEW DRUG ADDON: CPT

## 2019-08-22 RX ORDER — MITOMYCIN 5 MG/10ML
10 INJECTION, POWDER, LYOPHILIZED, FOR SOLUTION INTRAVENOUS ONCE
Status: COMPLETED | OUTPATIENT
Start: 2019-08-22 | End: 2019-08-22

## 2019-08-22 RX ORDER — OXYCODONE AND ACETAMINOPHEN 5; 325 MG/1; MG/1
1 TABLET ORAL EVERY 4 HOURS PRN
Qty: 60 TABLET | Refills: 0 | Status: SHIPPED | OUTPATIENT
Start: 2019-08-22 | End: 2019-09-04

## 2019-08-22 RX ADMIN — FAMOTIDINE 20 MG: 20 INJECTION, SOLUTION INTRAVENOUS at 11:53

## 2019-08-22 RX ADMIN — MITOMYCIN 20 MG: 20 INJECTION, POWDER, LYOPHILIZED, FOR SOLUTION INTRAVENOUS at 12:21

## 2019-08-22 RX ADMIN — DEXAMETHASONE SODIUM PHOSPHATE 12 MG: 10 INJECTION, SOLUTION INTRAMUSCULAR; INTRAVENOUS at 12:06

## 2019-08-22 RX ADMIN — SODIUM CHLORIDE 250 ML: 9 INJECTION, SOLUTION INTRAVENOUS at 11:45

## 2019-08-22 NOTE — PROGRESS NOTES
Infusion Nursing Note:  Sara Dominguez presents today for her initial Mitomycin /  5-FU.    Patient seen by provider today: No   present during visit today: Not Applicable.    Note: N/A.    Intravenous Access:  Implanted Port.    Treatment Conditions:  Results reviewed, labs MET treatment parameters, ok to proceed with treatment.      Post Infusion Assessment:  Patient tolerated infusion without incident.       Discharge Plan:   Patient discharged in stable condition accompanied by: . Continuous chemo infusing at time of discharge. [facility] to disconnect noon Mon. Pt has [facility] take home supply bag, their phone number, etc.     Chun Arrington, RN, RN

## 2019-08-22 NOTE — PROGRESS NOTES
This is a recent snapshot of the patient's [facility] medical record.  For current drug dose and complete information and questions, call [phone] or In Basket pool, [facility] ([id number])  CSN Number:  [id number]

## 2019-08-23 ENCOUNTER — APPOINTMENT (OUTPATIENT)
Dept: RADIATION THERAPY | Facility: OUTPATIENT CENTER | Age: 69
End: 2019-08-23
Payer: COMMERCIAL

## 2019-08-23 NOTE — PROGRESS NOTES
This is a recent snapshot of the patient's [facility] medical record.  For current drug dose and complete information and questions, call [phone] or In Basket pool, [facility] ([id number])  CSN Number:  [id number]

## 2019-08-26 ENCOUNTER — PATIENT OUTREACH (OUTPATIENT)
Dept: ONCOLOGY | Facility: CLINIC | Age: 69
End: 2019-08-26

## 2019-08-26 ENCOUNTER — APPOINTMENT (OUTPATIENT)
Dept: RADIATION THERAPY | Facility: OUTPATIENT CENTER | Age: 69
End: 2019-08-26
Payer: COMMERCIAL

## 2019-08-26 DIAGNOSIS — C21.0 SQUAMOUS CELL CARCINOMA OF ANUS (H): Primary | ICD-10-CM

## 2019-08-26 NOTE — PROGRESS NOTES
Called to complete a status check.  for a return call.     Agata Renteria RN on 8/26/2019 at 3:20 PM

## 2019-08-27 ENCOUNTER — APPOINTMENT (OUTPATIENT)
Dept: RADIATION THERAPY | Facility: OUTPATIENT CENTER | Age: 69
End: 2019-08-27
Payer: COMMERCIAL

## 2019-08-28 ENCOUNTER — OFFICE VISIT (OUTPATIENT)
Dept: RADIATION THERAPY | Facility: OUTPATIENT CENTER | Age: 69
End: 2019-08-28
Payer: COMMERCIAL

## 2019-08-28 ENCOUNTER — APPOINTMENT (OUTPATIENT)
Dept: RADIATION THERAPY | Facility: OUTPATIENT CENTER | Age: 69
End: 2019-08-28
Payer: COMMERCIAL

## 2019-08-28 VITALS
OXYGEN SATURATION: 97 % | BODY MASS INDEX: 30.39 KG/M2 | WEIGHT: 182.6 LBS | HEART RATE: 84 BPM | RESPIRATION RATE: 18 BRPM | DIASTOLIC BLOOD PRESSURE: 70 MMHG | SYSTOLIC BLOOD PRESSURE: 107 MMHG

## 2019-08-28 DIAGNOSIS — K12.33 MUCOSITIS DUE TO RADIATION THERAPY: Primary | ICD-10-CM

## 2019-08-28 RX ORDER — OXYCODONE HYDROCHLORIDE 5 MG/1
5 TABLET ORAL EVERY 4 HOURS PRN
COMMUNITY
Start: 2019-08-09 | End: 2019-08-29

## 2019-08-28 NOTE — LETTER
2019      RE: Sara Dominguez  [address]  [facility] [address]       [facility]  SPECIALIZING IN BREAKTHROUGHS  Radiation Oncology    On Treatment Visit Note      Sara Dominguez      Date: 2019   MRN: [id number]   : 1950  Diagnosis: Anal ca      Reason for Visit:  On Radiation Treatment Visit     Treatment Summary to Date  Treatment Site: anal/pelvis Current Dose: 900/5400 cGy Fractions: 30      Chemotherapy  Chemo concurrent with radx?: Yes  Oncologist: Dr. Aquino  Drug Name/Frequency 1: 5 FU  Drug Name/Frequency 1: mitomycin    Subjective:   Doing overall well. Tolerating chemo. Some increase in perianal pain since start of treatment. Managed with percocet x3-4 per day. No  trouble. Continuing skin care. Some oral sores since starting 5FU.    Nursing ROS:   Nutrition Alteration  Diet Type: Patient's Preference  Skin  Skin Reaction: 1 - Faint erythema or dry desquamation  Skin Note: reviewed options of aquaphor, proshield, sitz baths.     ENT and Mouth Exam  Mucositis - Current: 1 - Generalized erythema(secondary to chemo )  Cardiovascular  Respiratory effort: 1 - Normal - without distress  Gastrointestinal  Nausea: 1 - One to two episodes of nausea/24  Genitourinary  Urinary Status: 0 - Normal     Pain Assessment  0-10 Pain Scale: 3  Pain Note: has percocet uses occassionally/PRN in am and pm      Objective:   /70   Pulse 84   Resp 18   Wt 82.8 kg (182 lb 9.6 oz)   SpO2 97%   BMI 30.39 kg/m      Mild oral mucositis from chemo.   No skin erythema or desquamation.     Labs:  CBC RESULTS:   Recent Labs   Lab Test 19  1100   WBC 7.6   RBC 3.85   HGB 11.1*   HCT 35.6   MCV 93   MCH 28.8   MCHC 31.2*   RDW 13.2        ELECTROLYTES:  Recent Labs   Lab Test 19  1100      POTASSIUM 4.1   CHLORIDE 108   EMERALD 8.7   CO2 24   BUN 16   CR 0.89   GLC 88       Assessment:  Ms. Dominguez is a 68 year old female diagnosed wquamous cell  carcinoma of the anal canal, clinical [id number] (anna-rectal nodes).  She  is undergoing definitive CRT.    Tolerating radiation therapy well.  All questions and concerns addressed.    Plan:   1. Continue current therapy.    2. Cancer related pain. Percocet 5-325 PRN.   3. Skin care. Aquaphor, sitz baths, perianal squirt bottle, baby wipes.       Mosaiq chart and setup information reviewed  Ports checked    Medication Review  Med list reviewed with patient?: Yes    Educational Topic Discussed  Education Instructions: reviewed skin care      MD Justin Khan MD

## 2019-08-28 NOTE — PROGRESS NOTES
Called and left another message for a return call to complete status check.     Agata Renteria RN on 8/28/2019 at 9:01 AM

## 2019-08-28 NOTE — LETTER
2019      RE: Sara Dominguez  [address]  [facility] [address]       [facility] PHYSICIANS  SPECIALIZING IN BREAKTHROUGHS  Radiation Oncology    On Treatment Visit Note      Sara Dominguez      Date: 2019   MRN: [id number]   : 1950  Diagnosis: Anal ca      Reason for Visit:  On Radiation Treatment Visit     Treatment Summary to Date  Treatment Site: anal/pelvis Current Dose: 900/5400 cGy Fractions: 30      Chemotherapy  Chemo concurrent with radx?: Yes  Oncologist: Dr. Aquino  Drug Name/Frequency 1: 5 FU  Drug Name/Frequency 1: mitomycin    Subjective:   Doing overall well. Tolerating chemo. Some increase in perianal pain since start of treatment. Managed with percocet x3-4 per day. No  trouble. Continuing skin care. Some oral sores since starting 5FU.    Nursing ROS:   Nutrition Alteration  Diet Type: Patient's Preference  Skin  Skin Reaction: 1 - Faint erythema or dry desquamation  Skin Note: reviewed options of aquaphor, proshield, sitz baths.     ENT and Mouth Exam  Mucositis - Current: 1 - Generalized erythema(secondary to chemo )  Cardiovascular  Respiratory effort: 1 - Normal - without distress  Gastrointestinal  Nausea: 1 - One to two episodes of nausea/24  Genitourinary  Urinary Status: 0 - Normal     Pain Assessment  0-10 Pain Scale: 3  Pain Note: has percocet uses occassionally/PRN in am and pm      Objective:   /70   Pulse 84   Resp 18   Wt 82.8 kg (182 lb 9.6 oz)   SpO2 97%   BMI 30.39 kg/m      Mild oral mucositis from chemo.   No skin erythema or desquamation.     Labs:  CBC RESULTS:   Recent Labs   Lab Test 19  1100   WBC 7.6   RBC 3.85   HGB 11.1*   HCT 35.6   MCV 93   MCH 28.8   MCHC 31.2*   RDW 13.2        ELECTROLYTES:  Recent Labs   Lab Test 19  1100      POTASSIUM 4.1   CHLORIDE 108   EMERALD 8.7   CO2 24   BUN 16   CR 0.89   GLC 88       Assessment:  Ms. Dominguez is a 68 year old female diagnosed wquamous cell  carcinoma of the anal canal, clinical [id number] (anna-rectal nodes).  She  is undergoing definitive CRT.    Tolerating radiation therapy well.  All questions and concerns addressed.    Plan:   1. Continue current therapy.    2. Cancer related pain. Percocet 5-325 PRN.   3. Skin care. Aquaphor, sitz baths, perianal squirt bottle, baby wipes.       Mosaiq chart and setup information reviewed  Ports checked    Medication Review  Med list reviewed with patient?: Yes    Educational Topic Discussed  Education Instructions: reviewed skin care      Justin Khan MD

## 2019-08-28 NOTE — PROGRESS NOTES
[facility] PHYSICIANS  SPECIALIZING IN BREAKTHROUGHS  Radiation Oncology    On Treatment Visit Note      Sara Dominguez      Date: 2019   MRN: [id number]   : 1950  Diagnosis: Anal ca      Reason for Visit:  On Radiation Treatment Visit     Treatment Summary to Date  Treatment Site: anal/pelvis Current Dose: 900/5400 cGy Fractions:       Chemotherapy  Chemo concurrent with radx?: Yes  Oncologist: Dr. Aquino  Drug Name/Frequency 1: 5 FU  Drug Name/Frequency 1: mitomycin    Subjective:   Doing overall well. Tolerating chemo. Some increase in perianal pain since start of treatment. Managed with percocet x3-4 per day. No  trouble. Continuing skin care. Some oral sores since starting 5FU.    Nursing ROS:   Nutrition Alteration  Diet Type: Patient's Preference  Skin  Skin Reaction: 1 - Faint erythema or dry desquamation  Skin Note: reviewed options of aquaphor, proshield, sitz baths.     ENT and Mouth Exam  Mucositis - Current: 1 - Generalized erythema(secondary to chemo )  Cardiovascular  Respiratory effort: 1 - Normal - without distress  Gastrointestinal  Nausea: 1 - One to two episodes of nausea/24  Genitourinary  Urinary Status: 0 - Normal     Pain Assessment  0-10 Pain Scale: 3  Pain Note: has percocet uses occassionally/PRN in am and pm      Objective:   /70   Pulse 84   Resp 18   Wt 82.8 kg (182 lb 9.6 oz)   SpO2 97%   BMI 30.39 kg/m     Mild oral mucositis from chemo.   No skin erythema or desquamation.     Labs:  CBC RESULTS:   Recent Labs   Lab Test 19  1100   WBC 7.6   RBC 3.85   HGB 11.1*   HCT 35.6   MCV 93   MCH 28.8   MCHC 31.2*   RDW 13.2        ELECTROLYTES:  Recent Labs   Lab Test 19  1100      POTASSIUM 4.1   CHLORIDE 108   EMERALD 8.7   CO2 24   BUN 16   CR 0.89   GLC 88       Assessment:  Ms. 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  She is undergoing  definitive CRT.    Tolerating radiation therapy well.  All questions and concerns addressed.    Plan:   1. Continue current therapy.    2. Cancer related pain. Percocet 5-325 PRN.   3. Skin care. Aquaphor, sitz baths, perianal squirt bottle, baby wipes.       Mosaiq chart and setup information reviewed  Ports checked    Medication Review  Med list reviewed with patient?: Yes    Educational Topic Discussed  Education Instructions: reviewed skin care      Justin Khan MD

## 2019-08-29 ENCOUNTER — APPOINTMENT (OUTPATIENT)
Dept: RADIATION THERAPY | Facility: OUTPATIENT CENTER | Age: 69
End: 2019-08-29
Payer: COMMERCIAL

## 2019-08-29 ENCOUNTER — INFUSION THERAPY VISIT (OUTPATIENT)
Dept: INFUSION THERAPY | Facility: CLINIC | Age: 69
End: 2019-08-29
Attending: INTERNAL MEDICINE
Payer: COMMERCIAL

## 2019-08-29 ENCOUNTER — ONCOLOGY VISIT (OUTPATIENT)
Dept: ONCOLOGY | Facility: CLINIC | Age: 69
End: 2019-08-29
Attending: INTERNAL MEDICINE
Payer: COMMERCIAL

## 2019-08-29 VITALS
RESPIRATION RATE: 16 BRPM | TEMPERATURE: 97.6 F | DIASTOLIC BLOOD PRESSURE: 60 MMHG | OXYGEN SATURATION: 95 % | HEART RATE: 84 BPM | HEIGHT: 64 IN | WEIGHT: 184.3 LBS | SYSTOLIC BLOOD PRESSURE: 89 MMHG | BODY MASS INDEX: 31.47 KG/M2

## 2019-08-29 VITALS — SYSTOLIC BLOOD PRESSURE: 108 MMHG | HEART RATE: 81 BPM | DIASTOLIC BLOOD PRESSURE: 41 MMHG

## 2019-08-29 DIAGNOSIS — I95.9 HYPOTENSION, UNSPECIFIED HYPOTENSION TYPE: ICD-10-CM

## 2019-08-29 DIAGNOSIS — R11.0 NAUSEA: ICD-10-CM

## 2019-08-29 DIAGNOSIS — R19.7 DIARRHEA, UNSPECIFIED TYPE: ICD-10-CM

## 2019-08-29 DIAGNOSIS — C21.0 SQUAMOUS CELL CARCINOMA OF ANUS (H): Primary | ICD-10-CM

## 2019-08-29 DIAGNOSIS — R11.0 NAUSEA: Primary | ICD-10-CM

## 2019-08-29 PROCEDURE — 99215 OFFICE O/P EST HI 40 MIN: CPT | Performed by: INTERNAL MEDICINE

## 2019-08-29 PROCEDURE — 96365 THER/PROPH/DIAG IV INF INIT: CPT

## 2019-08-29 PROCEDURE — G0463 HOSPITAL OUTPT CLINIC VISIT: HCPCS | Mod: 25

## 2019-08-29 PROCEDURE — 96374 THER/PROPH/DIAG INJ IV PUSH: CPT

## 2019-08-29 PROCEDURE — 25000128 H RX IP 250 OP 636: Performed by: INTERNAL MEDICINE

## 2019-08-29 PROCEDURE — 96361 HYDRATE IV INFUSION ADD-ON: CPT

## 2019-08-29 PROCEDURE — 96375 TX/PRO/DX INJ NEW DRUG ADDON: CPT

## 2019-08-29 RX ORDER — DEXAMETHASONE SODIUM PHOSPHATE 4 MG/ML
4 INJECTION, SOLUTION INTRA-ARTICULAR; INTRALESIONAL; INTRAMUSCULAR; INTRAVENOUS; SOFT TISSUE ONCE
Status: CANCELLED
Start: 2019-08-29

## 2019-08-29 RX ORDER — DEXAMETHASONE SODIUM PHOSPHATE 4 MG/ML
4 INJECTION, SOLUTION INTRA-ARTICULAR; INTRALESIONAL; INTRAMUSCULAR; INTRAVENOUS; SOFT TISSUE ONCE
Status: COMPLETED | OUTPATIENT
Start: 2019-08-29 | End: 2019-08-29

## 2019-08-29 RX ORDER — SODIUM CHLORIDE AND POTASSIUM CHLORIDE 150; 900 MG/100ML; MG/100ML
INJECTION, SOLUTION INTRAVENOUS CONTINUOUS
Status: CANCELLED
Start: 2019-08-29

## 2019-08-29 RX ORDER — SODIUM CHLORIDE AND POTASSIUM CHLORIDE 150; 900 MG/100ML; MG/100ML
INJECTION, SOLUTION INTRAVENOUS CONTINUOUS
Status: DISCONTINUED | OUTPATIENT
Start: 2019-08-29 | End: 2019-08-29 | Stop reason: HOSPADM

## 2019-08-29 RX ORDER — HEPARIN SODIUM (PORCINE) LOCK FLUSH IV SOLN 100 UNIT/ML 100 UNIT/ML
5 SOLUTION INTRAVENOUS
Status: DISCONTINUED | OUTPATIENT
Start: 2019-08-29 | End: 2019-08-29 | Stop reason: HOSPADM

## 2019-08-29 RX ORDER — LORAZEPAM 0.5 MG/1
0.5 TABLET ORAL AT BEDTIME
Qty: 30 TABLET | Refills: 0 | Status: SHIPPED | OUTPATIENT
Start: 2019-08-29 | End: 2019-09-20

## 2019-08-29 RX ORDER — ONDANSETRON 2 MG/ML
4 INJECTION INTRAMUSCULAR; INTRAVENOUS ONCE
Status: COMPLETED | OUTPATIENT
Start: 2019-08-29 | End: 2019-08-29

## 2019-08-29 RX ORDER — ONDANSETRON 2 MG/ML
4 INJECTION INTRAMUSCULAR; INTRAVENOUS ONCE
Status: CANCELLED
Start: 2019-08-29

## 2019-08-29 RX ADMIN — Medication 5 ML: at 11:38

## 2019-08-29 RX ADMIN — FAMOTIDINE 20 MG: 20 INJECTION, SOLUTION INTRAVENOUS at 10:10

## 2019-08-29 RX ADMIN — POTASSIUM CHLORIDE AND SODIUM CHLORIDE: 900; 150 INJECTION, SOLUTION INTRAVENOUS at 10:08

## 2019-08-29 RX ADMIN — DEXAMETHASONE SODIUM PHOSPHATE 4 MG: 4 INJECTION, SOLUTION INTRAMUSCULAR; INTRAVENOUS at 10:02

## 2019-08-29 RX ADMIN — ONDANSETRON 4 MG: 2 INJECTION INTRAMUSCULAR; INTRAVENOUS at 10:08

## 2019-08-29 ASSESSMENT — PAIN SCALES - GENERAL: PAINLEVEL: SEVERE PAIN (7)

## 2019-08-29 ASSESSMENT — MIFFLIN-ST. JEOR: SCORE: 1344.95

## 2019-08-29 NOTE — LETTER
"    8/29/2019         RE: Sara Dominguez  [address]  [facility] [address]        Dear Colleague,    Thank you for referring your patient, Sara Dominguez, to the [facility]. Please see a copy of my visit note below.    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 No   BMI 31.39 kg/m    Estimated body mass index is 31.39 kg/m  as calculated from the following:    Height as of this encounter: 1.632 m (5' 4.25\").    Weight as of this encounter: 83.6 kg (184 lb 4.8 oz). Body surface area is 1.95 meters squared.  Severe Pain (7) Comment: Data Unavailable   No LMP recorded. Patient is postmenopausal.  Allergies reviewed: Yes  Medications reviewed: Yes    Medications: Would like refill on Lorazepam.   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 Yesterday felt light headed and dizzy when she went home. C/o nauseated, mouth sores and discomfort in her behind.     Mervat Tai, [facility]              ONCOLOGY FOLLOW UP VISIT      REASON FOR VISIT/CHIEF COMPLAIN:    July 2019 at age 68 diagnosed with anal cancer on concurrent chemo/RT      HISTORY OF PRESENT ILLNESS:    In 7/2019 at age 68, she presented to her PMD Dr. Reid's office for rectal pain 4/10  for a few weeks, she reports she felt a mass in her rectum, increase in rectal pressure after eating. She makes her self have a BM to relieve " the pressure. She notes one instance of bright red blood in her stool. She reports progressive difficulty in moving her bowel.   She then saw GI Dr. Aguirre, who found perianal skin examination was normal. Digital examination revealed a mass at the anterior wall of rectum. It was a hard mass approximately 3-4 cm.   Colonoscopy 7/12/2019 found 8 mm mucosal abnormality just above the dentate line. This was at the anterior wall facing her vagina where the large mass was felt underneath. 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  She then saw Gyn Dr. Eduardo, 7/19/2019, exam found normal vulva. No vaginal polyp/growth appreciated arising from vaginal mucosa (which appears to be smooth) but there is a nodular 3-4cm mass palpated at rectovaginal area, which is not as well appreciated by digital rectal exam. Fixed mass, mildly tender to touch. Cervix grossly normal. Uterus 6 weeks size, non-tender. No adnexal mass/tenderness bilaterally.     7/2013/2019 pelvic MRI found 2.5 x 2.5 x 2.6 cm mass located between the distal rectum and vagina which appears to arise exophytically from the distal rectum. Single mildly enlarged mesorectal lymph node measuring 8 mm in short axis. No pelvic sidewall adenopathy. No other bony or soft tissue abnormalities identified.     She then saw colon rectal surgeon Dr. Nadeen Murphy 7/26/2019, who informed pt that she has anal cancer, not tx by surgery. Recommended concurrent chemo/RT.    PET 8/2019 found Hypermetabolic 3.3 x 4.1 cm mass SUV 70 seen in the distal rectum/anus compatible with underlying malignancy. No evidence of distant hypermetabolic metastatic disease seen in  the chest, abdomen or pelvis.      She then saw us and Rad-Onc, and made informed decision to proceed with concurrent chemoradiation cycle 1 day 1 August 22, 2019.      PAST MEDICAL HISTORY  HTN,  "hyperlipidemia  depression      MEDICATIONS/ALLERY:  Reviewed in Epic system.        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       REVIEW OF SYSTEMS:   Reports nausea, no energy, loose BM not formed.        PHYSICAL EXAMINATION:   VITAL SIGNS:Blood pressure (!) 89/60, pulse 84, temperature 97.6  F (36.4  C), temperature source Tympanic, resp. rate 16, height 1.632 m (5' 4.25\"), weight 83.6 kg (184 lb 4.8 oz), SpO2 95 %, not currently breastfeeding.    ECO    GENERAL APPEARANCE:  looks like her stated age, very pleasant, not in acute distress.   HEENT: The patient is normocephalic, atraumatic. Pupils are equally reactive to light.  Sclerae are anicteric.  Moist oral mucosa.  Negative pharynx.  No oral thrush.   NECK:  Supple.  No jugular venous distention.  Thyroid is not palpable.   LYMPH NODES:  Superficial lymphadenopathy is not appreciable in the bilateral cervical, supraclavicular, axillary or inguinal areas.   CARDIOVASCULAR:  S1, S2 regular with no murmurs or gallops.  No carotid or abdominal bruits.   PULMONARY:  Lungs are clear to auscultation and percussion bilaterally.  There is no wheezing or rhonchi.   GASTROINTESTINAL:  Abdomen is soft, nontender.  No hepatosplenomegaly.  No signs of ascites.  No mass appreciable.   UT:  a mass at the anterior wall of rectum. It was a hard mass approximately 3-4 cm.   MUSCULOSKELETAL/EXTREMITIES:  No edema.  No cyanotic changes.  No signs of joint deformity.  No lymphedema.   NEUROLOGIC:  Cranial nerves II-XII are grossly intact.  Sensation intact.  Muscle strength and muscle tone symmetrical, 5/5 throughout.   BACK:  No spinal or paraspinal tenderness.  No CVA tenderness.   SKIN:  No petechiae.  No rash.  No signs of cellulitis.             CURRENT LAB DATA REVIEWED  2019 Baseline CMP is fine, " cr 1.15  7/2019 Baseline cbc is fine with hb 11.7    7/2019 8 mm mucosal abnormality just above the dentate line. 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        CURRENT IMAGING REVIEWED  PET 8/2019 found Hypermetabolic 3.3 x 4.1 cm mass SUV 70 seen in the distal rectum/anus compatible with underlying malignancy. No evidence of distant hypermetabolic metastatic disease seen in  the chest, abdomen or pelvis.      7/2013/2019 pelvic MRI found 2.5 x 2.5 x 2.6 cm mass located between the distal rectum and vagina which appears to arise exophytically from the distal rectum. Single mildly enlarged mesorectal lymph node measuring 8 mm in short axis. No pelvic sidewall adenopathy. No other bony or soft tissue abnormalities identified.          OLD DATA REVIEWED TODAY WITH SUMMARY:   Cr 1.03 in 7/2019    No baseline image        ASSESSMENT AND PLAN:    1. At age 68 in July 2019, she is diagnosed with low rectum/anal squamous cell carcinoma, months duration of anal pain pressure.    7/2013/2019 pelvic MRI found 2.5 x 2.5 x 2.6 cm mass located between the distal rectum and vagina which appears to arise exophytically from the distal rectum. Single mildly enlarged mesorectal lymph node measuring 8 mm in short axis. No pelvic sidewall adenopathy. No other bony or soft tissue abnormalities identified.     PET 8/2019 found Hypermetabolic 3.3 x 4.1 cm mass SUV 70 seen in the distal rectum/anus compatible with underlying malignancy. No evidence of distant hypermetabolic metastatic disease seen in  the chest, abdomen or pelvis.     She then saw us and Rad-Onc.  We discussed the multimodality treatment for anal cancer M0 disease.  The need of 5-FU and  mitomycin as chemo portion of the concurrent regimen.  We discussed the chemotherapy side effect, which include but not limited to GI toxicity nausea vomiting, lower immunity and bleeding risk from cytopenia,  neurotoxicity, cardica toxicity,  infusion related reaction, mortality, etc.  We also discussed the slight possibility of diverting colostomy depending on the toxicity she may get from concurrent chemoradiation treatment.    She is informed this is a toxic regimen. has high complication rates she needs to be monitored closely during the whole course.    She made informed decision to proceed with concurrent chemoradiation cycle 1 day 1 August 22, 2019.    She  needs to be monitored closely during this highly toxic regimen.      2. Low BP. Advice her to hold her home BP med if sBP less than 100.   Will offer her IVF prn.     3. Nausea. We went over her home regimen: compazine q 6hr, and ativan q hrs.   Add IV aloxi to future chemo.   Off her IVF with dex, zofran and pepcid today and prn.     4. Diarrhea.   It is better to have BM on the looser side due to anal RT and pain etc.   We went over in detail how to use imodium.         Again, thank you for allowing me to participate in the care of your patient.        Sincerely,        Whitney Aquino MD, MD

## 2019-08-29 NOTE — PROGRESS NOTES
Infusion Nursing Note:  Sara Dominguez presents today for port flush, IVFs after MD visit.    Patient seen by provider today: Yes   present during visit today: Not Applicable.    Note: N/A.    Intravenous Access:  Implanted Port.    Treatment Conditions:  Not Applicable.      Post Infusion Assessment:  Patient tolerated infusion without incident.  Blood return noted pre and post infusion.  Site patent and intact, free from redness, edema or discomfort.  No evidence of extravasations.  Access discontinued per protocol.       Discharge Plan:   Patient discharged in stable condition accompanied by: self.  Departure Mode: Ambulatory.    Jada Dejesus, RN, RN                        
no

## 2019-08-29 NOTE — PROGRESS NOTES
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 No   BMI 31.39 kg/m   Estimated body mass index is 31.39 kg/m  as calculated from the following:    Height as of this encounter: 1.632 m (5' 4.25\").    Weight as of this encounter: 83.6 kg (184 lb 4.8 oz). Body surface area is 1.95 meters squared.  Severe Pain (7) Comment: Data Unavailable   No LMP recorded. Patient is postmenopausal.  Allergies reviewed: Yes  Medications reviewed: Yes    Medications: Would like refill on Lorazepam.   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 Yesterday felt light headed and dizzy when she went home. C/o nauseated, mouth sores and discomfort in her behind.     Mervat Tai, [facility]            "

## 2019-08-29 NOTE — PROGRESS NOTES
ONCOLOGY FOLLOW UP VISIT      REASON FOR VISIT/CHIEF COMPLAIN:    July 2019 at age 68 diagnosed with anal cancer on concurrent chemo/RT      HISTORY OF PRESENT ILLNESS:    In 7/2019 at age 68, she presented to her PMD Dr. Reid's office for rectal pain 4/10  for a few weeks, she reports she felt a mass in her rectum, increase in rectal pressure after eating. She makes her self have a BM to relieve the pressure. She notes one instance of bright red blood in her stool. She reports progressive difficulty in moving her bowel.   She then saw GI Dr. Aguirre, who found perianal skin examination was normal. Digital examination revealed a mass at the anterior wall of rectum. It was a hard mass approximately 3-4 cm.   Colonoscopy 7/12/2019 found 8 mm mucosal abnormality just above the dentate line. This was at the anterior wall facing her vagina where the large mass was felt underneath. 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  She then saw Gyn Dr. Eduardo, 7/19/2019, exam found normal vulva. No vaginal polyp/growth appreciated arising from vaginal mucosa (which appears to be smooth) but there is a nodular 3-4cm mass palpated at rectovaginal area, which is not as well appreciated by digital rectal exam. Fixed mass, mildly tender to touch. Cervix grossly normal. Uterus 6 weeks size, non-tender. No adnexal mass/tenderness bilaterally.     7/2013/2019 pelvic MRI found 2.5 x 2.5 x 2.6 cm mass located between the distal rectum and vagina which appears to arise exophytically from the distal rectum. Single mildly enlarged mesorectal lymph node measuring 8 mm in short axis. No pelvic sidewall adenopathy. No other bony or soft tissue abnormalities identified.     She then saw colon rectal surgeon Dr. Nadeen Murphy 7/26/2019, who informed pt that she has anal cancer, not tx by surgery. Recommended concurrent chemo/RT.    PET 8/2019 found  "Hypermetabolic 3.3 x 4.1 cm mass SUV 70 seen in the distal rectum/anus compatible with underlying malignancy. No evidence of distant hypermetabolic metastatic disease seen in  the chest, abdomen or pelvis.      She then saw us and Rad-Onc, and made informed decision to proceed with concurrent chemoradiation cycle 1 day 1 2019.      PAST MEDICAL HISTORY  HTN, hyperlipidemia  depression      MEDICATIONS/ALLERY:  Reviewed in Epic system.        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       REVIEW OF SYSTEMS:   Reports nausea, no energy, loose BM not formed.        PHYSICAL EXAMINATION:   VITAL SIGNS:Blood pressure (!) 89/60, pulse 84, temperature 97.6  F (36.4  C), temperature source Tympanic, resp. rate 16, height 1.632 m (5' 4.25\"), weight 83.6 kg (184 lb 4.8 oz), SpO2 95 %, not currently breastfeeding.    ECO    GENERAL APPEARANCE:  looks like her stated age, very pleasant, not in acute distress.   HEENT: The patient is normocephalic, atraumatic. Pupils are equally reactive to light.  Sclerae are anicteric.  Moist oral mucosa.  Negative pharynx.  No oral thrush.   NECK:  Supple.  No jugular venous distention.  Thyroid is not palpable.   LYMPH NODES:  Superficial lymphadenopathy is not appreciable in the bilateral cervical, supraclavicular, axillary or inguinal areas.   CARDIOVASCULAR:  S1, S2 regular with no murmurs or gallops.  No carotid or abdominal bruits.   PULMONARY:  Lungs are clear to auscultation and percussion bilaterally.  There is no wheezing or rhonchi.   GASTROINTESTINAL:  Abdomen is soft, nontender.  No hepatosplenomegaly.  No signs of ascites.  No mass appreciable.   NM:  a mass at the anterior wall of rectum. It was a hard mass approximately 3-4 cm.   MUSCULOSKELETAL/EXTREMITIES:  No edema.  No cyanotic changes.  No " signs of joint deformity.  No lymphedema.   NEUROLOGIC:  Cranial nerves II-XII are grossly intact.  Sensation intact.  Muscle strength and muscle tone symmetrical, 5/5 throughout.   BACK:  No spinal or paraspinal tenderness.  No CVA tenderness.   SKIN:  No petechiae.  No rash.  No signs of cellulitis.             CURRENT LAB DATA REVIEWED  7/2019 Baseline CMP is fine, cr 1.15  7/2019 Baseline cbc is fine with hb 11.7    7/2019 8 mm mucosal abnormality just above the dentate line. 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        CURRENT IMAGING REVIEWED  PET 8/2019 found Hypermetabolic 3.3 x 4.1 cm mass SUV 70 seen in the distal rectum/anus compatible with underlying malignancy. No evidence of distant hypermetabolic metastatic disease seen in  the chest, abdomen or pelvis.      7/2013/2019 pelvic MRI found 2.5 x 2.5 x 2.6 cm mass located between the distal rectum and vagina which appears to arise exophytically from the distal rectum. Single mildly enlarged mesorectal lymph node measuring 8 mm in short axis. No pelvic sidewall adenopathy. No other bony or soft tissue abnormalities identified.          OLD DATA REVIEWED TODAY WITH SUMMARY:   Cr 1.03 in 7/2019    No baseline image        ASSESSMENT AND PLAN:    1. At age 68 in July 2019, she is diagnosed with low rectum/anal squamous cell carcinoma, months duration of anal pain pressure.    7/2013/2019 pelvic MRI found 2.5 x 2.5 x 2.6 cm mass located between the distal rectum and vagina which appears to arise exophytically from the distal rectum. Single mildly enlarged mesorectal lymph node measuring 8 mm in short axis. No pelvic sidewall adenopathy. No other bony or soft tissue abnormalities identified.     PET 8/2019 found Hypermetabolic 3.3 x 4.1 cm mass SUV 70 seen in the distal rectum/anus compatible with underlying malignancy. No evidence of distant hypermetabolic metastatic disease seen  in  the chest, abdomen or pelvis.     She then saw us and Rad-Onc.  We discussed the multimodality treatment for anal cancer M0 disease.  The need of 5-FU and  mitomycin as chemo portion of the concurrent regimen.  We discussed the chemotherapy side effect, which include but not limited to GI toxicity nausea vomiting, lower immunity and bleeding risk from cytopenia, neurotoxicity, cardica toxicity,  infusion related reaction, mortality, etc.  We also discussed the slight possibility of diverting colostomy depending on the toxicity she may get from concurrent chemoradiation treatment.    She is informed this is a toxic regimen. has high complication rates she needs to be monitored closely during the whole course.    She made informed decision to proceed with concurrent chemoradiation cycle 1 day 1 August 22, 2019.    She  needs to be monitored closely during this highly toxic regimen.      2. Low BP. Advice her to hold her home BP med if sBP less than 100.   Will offer her IVF prn.     3. Nausea. We went over her home regimen: compazine q 6hr, and ativan q hrs.   Add IV aloxi to future chemo.   Off her IVF with dex, zofran and pepcid today and prn.     4. Diarrhea.   It is better to have BM on the looser side due to anal RT and pain etc.   We went over in detail how to use imodium.

## 2019-08-30 ENCOUNTER — APPOINTMENT (OUTPATIENT)
Dept: RADIATION THERAPY | Facility: OUTPATIENT CENTER | Age: 69
End: 2019-08-30
Payer: COMMERCIAL

## 2019-08-30 NOTE — PROGRESS NOTES
ONCOLOGY FOLLOW UP VISIT      REASON FOR VISIT/CHIEF COMPLAIN:    July 2019 at age 68 diagnosed with anal cancer on concurrent chemo/RT      HISTORY OF ONCOLOGY ILLNESS:    In 7/2019 at age 68, she presented to her PMD Dr. Reid's office for rectal pain 4/10  for a few weeks, she reports she felt a mass in her rectum, increase in rectal pressure after eating. She makes her self have a BM to relieve the pressure. She notes one instance of bright red blood in her stool. She reports progressive difficulty in moving her bowel.   She then saw GI Dr. Aguirre, who found perianal skin examination was normal. Digital examination revealed a mass at the anterior wall of rectum. It was a hard mass approximately 3-4 cm.   Colonoscopy 7/12/2019 found 8 mm mucosal abnormality just above the dentate line. This was at the anterior wall facing her vagina where the large mass was felt underneath. 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  She then saw Gyn Dr. Eduardo, 7/19/2019, exam found normal vulva. No vaginal polyp/growth appreciated arising from vaginal mucosa (which appears to be smooth) but there is a nodular 3-4cm mass palpated at rectovaginal area, which is not as well appreciated by digital rectal exam. Fixed mass, mildly tender to touch. Cervix grossly normal. Uterus 6 weeks size, non-tender. No adnexal mass/tenderness bilaterally.     7/2013/2019 pelvic MRI found 2.5 x 2.5 x 2.6 cm mass located between the distal rectum and vagina which appears to arise exophytically from the distal rectum. Single mildly enlarged mesorectal lymph node measuring 8 mm in short axis. No pelvic sidewall adenopathy. No other bony or soft tissue abnormalities identified.     She then saw colon rectal surgeon Dr. Nadeen Murphy 7/26/2019, who informed pt that she has anal cancer, not tx by surgery. Recommended concurrent chemo/RT.    PET 8/2019 found  "Hypermetabolic 3.3 x 4.1 cm mass SUV 70 seen in the distal rectum/anus compatible with underlying malignancy. No evidence of distant hypermetabolic metastatic disease seen in the chest, abdomen or pelvis.        INTERVAL HISTORY:   She then saw us and Rad-Onc, and made informed decision to proceed with concurrent chemoradiation cycle 1 day 1 2019.      PAST MEDICAL HISTORY  HTN, hyperlipidemia  depression      MEDICATIONS/ALLERY:  Reviewed in Epic system.        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       REVIEW OF SYSTEMS:   Reports nausea, no energy, loose BM not formed.   She reports IVF helps her energy.        PHYSICAL EXAMINATION:   VITAL SIGNS:Blood pressure 139/71, pulse 88, temperature 98.1  F (36.7  C), temperature source Tympanic, resp. rate 18, height 1.632 m (5' 4.25\"), weight 84.4 kg (186 lb), SpO2 97 %, not currently breastfeeding.      ECO    GENERAL APPEARANCE:  looks like her stated age, very pleasant, not in acute distress.   HEENT: The patient is normocephalic, atraumatic. Pupils are equally reactive to light.  Sclerae are anicteric.  Moist oral mucosa.  Negative pharynx.  No oral thrush. + right lateral tongue ulceration.   NECK:  Supple.  No jugular venous distention.  Thyroid is not palpable.   LYMPH NODES:  Superficial lymphadenopathy is not appreciable in the bilateral cervical, supraclavicular, axillary or inguinal areas.   CARDIOVASCULAR:  S1, S2 regular with no murmurs or gallops.  No carotid or abdominal bruits.   PULMONARY:  Lungs are clear to auscultation and percussion bilaterally.  There is no wheezing or rhonchi.   GASTROINTESTINAL:  Abdomen is soft, nontender.  No hepatosplenomegaly.  No signs of ascites.  No mass appreciable.   MN:  a mass at the anterior wall of rectum. It was a hard mass " approximately 3-4 cm.   MUSCULOSKELETAL/EXTREMITIES:  No edema.  No cyanotic changes.  No signs of joint deformity.  No lymphedema.   NEUROLOGIC:  Cranial nerves II-XII are grossly intact.  Sensation intact.  Muscle strength and muscle tone symmetrical, 5/5 throughout.   BACK:  No spinal or paraspinal tenderness.  No CVA tenderness.   SKIN:  No petechiae.  No rash.  No signs of cellulitis.             CURRENT LAB DATA REVIEWED  7/2019 Baseline CMP is fine, cr 1.15  7/2019 Baseline cbc is fine with hb 11.7    7/2019 8 mm mucosal abnormality just above the dentate line. 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        CURRENT IMAGING REVIEWED  PET 8/2019 found Hypermetabolic 3.3 x 4.1 cm mass SUV 70 seen in the distal rectum/anus compatible with underlying malignancy. No evidence of distant hypermetabolic metastatic disease seen in  the chest, abdomen or pelvis.      7/2013/2019 pelvic MRI found 2.5 x 2.5 x 2.6 cm mass located between the distal rectum and vagina which appears to arise exophytically from the distal rectum. Single mildly enlarged mesorectal lymph node measuring 8 mm in short axis. No pelvic sidewall adenopathy. No other bony or soft tissue abnormalities identified.          OLD DATA REVIEWED TODAY WITH SUMMARY:   Cr 1.03 in 7/2019  ANC 0.9,     No baseline image        ASSESSMENT AND PLAN:    1. At age 68 in July 2019, she is diagnosed with low rectum/anal squamous cell carcinoma, months duration of anal pain pressure.    7/2013/2019 pelvic MRI found 2.5 x 2.5 x 2.6 cm mass located between the distal rectum and vagina which appears to arise exophytically from the distal rectum. Single mildly enlarged mesorectal lymph node measuring 8 mm in short axis. No pelvic sidewall adenopathy. No other bony or soft tissue abnormalities identified.     PET 8/2019 found Hypermetabolic 3.3 x 4.1 cm mass SUV 70 seen in the distal  rectum/anus compatible with underlying malignancy. No evidence of distant hypermetabolic metastatic disease seen in  the chest, abdomen or pelvis.     She then saw us and Rad-Onc.  We discussed the multimodality treatment for anal cancer M0 disease.  The need of 5-FU and  mitomycin as chemo portion of the concurrent regimen.  We discussed the chemotherapy side effect, which include but not limited to GI toxicity nausea vomiting, lower immunity and bleeding risk from cytopenia, neurotoxicity, cardica toxicity,  infusion related reaction, mortality, etc.  We also discussed the slight possibility of diverting colostomy depending on the toxicity she may get from concurrent chemoradiation treatment.    She is informed this is a toxic regimen. has high complication rates she needs to be monitored closely during the whole course.    She made informed decision to proceed with concurrent chemoradiation cycle 1 day 1 August 22, 2019.    She  needs to be monitored closely during this highly toxic regimen.      2. New oral ulceration - advice kenalog dental paste.     3. Nausea. We went over her home regimen: compazine q 6hr, and ativan q hrs.   Add IV aloxi to future chemo.   Offer her IVF with dex, zofran and pepcid today and prn.     4. Diarrhea.   It is better to have BM on the looser side due to anal RT and pain etc.   We went over in detail how to use imodium.     5. Moderate neutropenia - neutropenic precaution is advised. ANC 0.9, so will hold off on neupogen, also due to concurrent RT.

## 2019-09-03 ENCOUNTER — INFUSION THERAPY VISIT (OUTPATIENT)
Dept: INFUSION THERAPY | Facility: CLINIC | Age: 69
End: 2019-09-03
Attending: INTERNAL MEDICINE
Payer: COMMERCIAL

## 2019-09-03 ENCOUNTER — APPOINTMENT (OUTPATIENT)
Dept: RADIATION THERAPY | Facility: OUTPATIENT CENTER | Age: 69
End: 2019-09-03
Payer: COMMERCIAL

## 2019-09-03 ENCOUNTER — ONCOLOGY VISIT (OUTPATIENT)
Dept: ONCOLOGY | Facility: CLINIC | Age: 69
End: 2019-09-03
Attending: INTERNAL MEDICINE
Payer: COMMERCIAL

## 2019-09-03 VITALS
HEIGHT: 64 IN | OXYGEN SATURATION: 97 % | HEART RATE: 88 BPM | BODY MASS INDEX: 31.76 KG/M2 | DIASTOLIC BLOOD PRESSURE: 71 MMHG | RESPIRATION RATE: 18 BRPM | WEIGHT: 186 LBS | TEMPERATURE: 98.1 F | SYSTOLIC BLOOD PRESSURE: 139 MMHG

## 2019-09-03 DIAGNOSIS — C21.1 MALIGNANT NEOPLASM OF ANAL CANAL (H): Primary | ICD-10-CM

## 2019-09-03 DIAGNOSIS — R19.7 DIARRHEA, UNSPECIFIED TYPE: ICD-10-CM

## 2019-09-03 DIAGNOSIS — D70.9 NEUTROPENIA, UNSPECIFIED TYPE (H): ICD-10-CM

## 2019-09-03 DIAGNOSIS — K12.1 ORAL ULCER: ICD-10-CM

## 2019-09-03 DIAGNOSIS — R11.0 NAUSEA: ICD-10-CM

## 2019-09-03 DIAGNOSIS — C21.0 SQUAMOUS CELL CARCINOMA OF ANUS (H): Primary | ICD-10-CM

## 2019-09-03 LAB
ALBUMIN UR-MCNC: NEGATIVE MG/DL
APPEARANCE UR: CLEAR
BACTERIA #/AREA URNS HPF: ABNORMAL /HPF
BILIRUB UR QL STRIP: NEGATIVE
COLOR UR AUTO: YELLOW
GLUCOSE UR STRIP-MCNC: NEGATIVE MG/DL
HGB UR QL STRIP: NEGATIVE
HYALINE CASTS #/AREA URNS LPF: 5 /LPF (ref 0–2)
KETONES UR STRIP-MCNC: NEGATIVE MG/DL
LEUKOCYTE ESTERASE UR QL STRIP: NEGATIVE
MUCOUS THREADS #/AREA URNS LPF: PRESENT /LPF
NITRATE UR QL: NEGATIVE
PH UR STRIP: 5 PH (ref 5–7)
RBC #/AREA URNS AUTO: 0 /HPF (ref 0–2)
SOURCE: ABNORMAL
SP GR UR STRIP: 1.01 (ref 1–1.03)
SQUAMOUS #/AREA URNS AUTO: <1 /HPF (ref 0–1)
UROBILINOGEN UR STRIP-MCNC: 0 MG/DL (ref 0–2)
WBC #/AREA URNS AUTO: 1 /HPF (ref 0–5)

## 2019-09-03 PROCEDURE — 99214 OFFICE O/P EST MOD 30 MIN: CPT | Performed by: INTERNAL MEDICINE

## 2019-09-03 PROCEDURE — 25000128 H RX IP 250 OP 636

## 2019-09-03 PROCEDURE — G0463 HOSPITAL OUTPT CLINIC VISIT: HCPCS

## 2019-09-03 PROCEDURE — 81001 URINALYSIS AUTO W/SCOPE: CPT | Performed by: INTERNAL MEDICINE

## 2019-09-03 PROCEDURE — 36591 DRAW BLOOD OFF VENOUS DEVICE: CPT

## 2019-09-03 RX ORDER — HEPARIN SODIUM (PORCINE) LOCK FLUSH IV SOLN 100 UNIT/ML 100 UNIT/ML
SOLUTION INTRAVENOUS
Status: COMPLETED
Start: 2019-09-03 | End: 2019-09-03

## 2019-09-03 RX ORDER — HEPARIN SODIUM,PORCINE/PF 10 UNIT/ML
500 SYRINGE (ML) INTRAVENOUS ONCE
Status: DISCONTINUED | OUTPATIENT
Start: 2019-09-03 | End: 2019-09-03 | Stop reason: CLARIF

## 2019-09-03 RX ORDER — TRIAMCINOLONE ACETONIDE 0.1 %
PASTE (GRAM) DENTAL 2 TIMES DAILY
Qty: 3 G | Refills: 1 | Status: SHIPPED | OUTPATIENT
Start: 2019-09-03 | End: 2022-02-09

## 2019-09-03 RX ADMIN — Medication 500 UNITS: at 09:23

## 2019-09-03 ASSESSMENT — PAIN SCALES - GENERAL: PAINLEVEL: NO PAIN (0)

## 2019-09-03 ASSESSMENT — MIFFLIN-ST. JEOR: SCORE: 1352.69

## 2019-09-03 NOTE — PATIENT INSTRUCTIONS
Dr. Aquino would like you to go to RT and come back for lab results.         When you are in need of a refill, please call your pharmacy and they will send us a request.      Copy of appointments, and after visit summary (AVS) given to patient.      If you have any questions please call Agata Renteria RN, BSN Oncology Hematology  [facility] [phone]. For questions after business hours, or on holidays/weekends, please call our after hours Nurse Triage line [phone]. Thank you.         Pt is to come back post RT for results.

## 2019-09-03 NOTE — PROGRESS NOTES
"Oncology Rooming Note    September 3, 2019 9:33 AM   Sara Dominguez is a 68 year old female who presents for:    Chief Complaint   Patient presents with     Oncology Clinic Visit     Recheck Squamous cell carcinoma of anus, review labs     Initial Vitals: /71 (BP Location: Right arm, Patient Position: Sitting, Cuff Size: Adult Regular)   Pulse 88   Temp 98.1  F (36.7  C) (Tympanic)   Resp 18   Ht 1.632 m (5' 4.25\")   Wt 84.4 kg (186 lb)   SpO2 97%   BMI 31.68 kg/m   Estimated body mass index is 31.68 kg/m  as calculated from the following:    Height as of this encounter: 1.632 m (5' 4.25\").    Weight as of this encounter: 84.4 kg (186 lb). Body surface area is 1.96 meters squared.  No Pain (0) Comment: Data Unavailable   No LMP recorded. Patient is postmenopausal.  Allergies reviewed: Yes  Medications reviewed: Yes    Medications: Medication refills not needed today.  Pharmacy name entered into [facility]: [facility] [place], [place] - [address]    Clinical concerns: Recheck Squamous cell carcinoma of anus, review labs.   Patient reports having urgency, frequency and a burning with urination. UA collected & Sent to lab.       Cici Purvis CMA              "

## 2019-09-03 NOTE — LETTER
9/3/2019         RE: Sara Dominguez  [address]  [place] [address]        Dear Colleague,    Thank you for referring your patient, Sara Dominguez, to the [facility]. Please see a copy of my visit note below.    ONCOLOGY FOLLOW UP VISIT      REASON FOR VISIT/CHIEF COMPLAIN:    July 2019 at age 68 diagnosed with anal cancer on concurrent chemo/RT      HISTORY OF ONCOLOGY ILLNESS:    In 7/2019 at age 68, she presented to her PMD Dr. Reid's office for rectal pain 4/10  for a few weeks, she reports she felt a mass in her rectum, increase in rectal pressure after eating. She makes her self have a BM to relieve the pressure. She notes one instance of bright red blood in her stool. She reports progressive difficulty in moving her bowel.   She then saw GI Dr. Aguirre, who found perianal skin examination was normal. Digital examination revealed a mass at the anterior wall of rectum. It was a hard mass approximately 3-4 cm.   Colonoscopy 7/12/2019 found 8 mm mucosal abnormality just above the dentate line. This was at the anterior wall facing her vagina where the large mass was felt underneath. 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  She then saw Gyn Dr. Eduardo, 7/19/2019, exam found normal vulva. No vaginal polyp/growth appreciated arising from vaginal mucosa (which appears to be smooth) but there is a nodular 3-4cm mass palpated at rectovaginal area, which is not as well appreciated by digital rectal exam. Fixed mass, mildly tender to touch. Cervix grossly normal. Uterus 6 weeks size, non-tender. No adnexal mass/tenderness bilaterally.     7/2013/2019 pelvic MRI found 2.5 x 2.5 x 2.6 cm mass located between the distal rectum and vagina which appears to arise exophytically from the distal rectum. Single mildly enlarged mesorectal lymph node measuring 8 mm in short axis. No pelvic sidewall  "adenopathy. No other bony or soft tissue abnormalities identified.     She then saw colon rectal surgeon Dr. Nadeen Murphy 2019, who informed pt that she has anal cancer, not tx by surgery. Recommended concurrent chemo/RT.    PET 2019 found Hypermetabolic 3.3 x 4.1 cm mass SUV 70 seen in the distal rectum/anus compatible with underlying malignancy. No evidence of distant hypermetabolic metastatic disease seen in the chest, abdomen or pelvis.        INTERVAL HISTORY:   She then saw us and Rad-Onc, and made informed decision to proceed with concurrent chemoradiation cycle 1 day 1 2019.      PAST MEDICAL HISTORY  HTN, hyperlipidemia  depression      MEDICATIONS/ALLERY:  Reviewed in Epic system.        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       REVIEW OF SYSTEMS:   Reports nausea, no energy, loose BM not formed.   She reports IVF helps her energy.        PHYSICAL EXAMINATION:   VITAL SIGNS:Blood pressure 139/71, pulse 88, temperature 98.1  F (36.7  C), temperature source Tympanic, resp. rate 18, height 1.632 m (5' 4.25\"), weight 84.4 kg (186 lb), SpO2 97 %, not currently breastfeeding.      ECO    GENERAL APPEARANCE:  looks like her stated age, very pleasant, not in acute distress.   HEENT: The patient is normocephalic, atraumatic. Pupils are equally reactive to light.  Sclerae are anicteric.  Moist oral mucosa.  Negative pharynx.  No oral thrush. + right lateral tongue ulceration.   NECK:  Supple.  No jugular venous distention.  Thyroid is not palpable.   LYMPH NODES:  Superficial lymphadenopathy is not appreciable in the bilateral cervical, supraclavicular, axillary or inguinal areas.   CARDIOVASCULAR:  S1, S2 regular with no murmurs or gallops.  No carotid or abdominal bruits.   PULMONARY:  Lungs are clear to auscultation " and percussion bilaterally.  There is no wheezing or rhonchi.   GASTROINTESTINAL:  Abdomen is soft, nontender.  No hepatosplenomegaly.  No signs of ascites.  No mass appreciable.   ME:  a mass at the anterior wall of rectum. It was a hard mass approximately 3-4 cm.   MUSCULOSKELETAL/EXTREMITIES:  No edema.  No cyanotic changes.  No signs of joint deformity.  No lymphedema.   NEUROLOGIC:  Cranial nerves II-XII are grossly intact.  Sensation intact.  Muscle strength and muscle tone symmetrical, 5/5 throughout.   BACK:  No spinal or paraspinal tenderness.  No CVA tenderness.   SKIN:  No petechiae.  No rash.  No signs of cellulitis.             CURRENT LAB DATA REVIEWED  7/2019 Baseline CMP is fine, cr 1.15  7/2019 Baseline cbc is fine with hb 11.7    7/2019 8 mm mucosal abnormality just above the dentate line. 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        CURRENT IMAGING REVIEWED  PET 8/2019 found Hypermetabolic 3.3 x 4.1 cm mass SUV 70 seen in the distal rectum/anus compatible with underlying malignancy. No evidence of distant hypermetabolic metastatic disease seen in  the chest, abdomen or pelvis.      7/2013/2019 pelvic MRI found 2.5 x 2.5 x 2.6 cm mass located between the distal rectum and vagina which appears to arise exophytically from the distal rectum. Single mildly enlarged mesorectal lymph node measuring 8 mm in short axis. No pelvic sidewall adenopathy. No other bony or soft tissue abnormalities identified.          OLD DATA REVIEWED TODAY WITH SUMMARY:   Cr 1.03 in 7/2019  ANC 0.9,     No baseline image        ASSESSMENT AND PLAN:    1. At age 68 in July 2019, she is diagnosed with low rectum/anal squamous cell carcinoma, months duration of anal pain pressure.    7/2013/2019 pelvic MRI found 2.5 x 2.5 x 2.6 cm mass located between the distal rectum and vagina which appears to arise exophytically from the distal rectum.  Single mildly enlarged mesorectal lymph node measuring 8 mm in short axis. No pelvic sidewall adenopathy. No other bony or soft tissue abnormalities identified.     PET 8/2019 found Hypermetabolic 3.3 x 4.1 cm mass SUV 70 seen in the distal rectum/anus compatible with underlying malignancy. No evidence of distant hypermetabolic metastatic disease seen in  the chest, abdomen or pelvis.     She then saw us and Rad-Onc.  We discussed the multimodality treatment for anal cancer M0 disease.  The need of 5-FU and  mitomycin as chemo portion of the concurrent regimen.  We discussed the chemotherapy side effect, which include but not limited to GI toxicity nausea vomiting, lower immunity and bleeding risk from cytopenia, neurotoxicity, cardica toxicity,  infusion related reaction, mortality, etc.  We also discussed the slight possibility of diverting colostomy depending on the toxicity she may get from concurrent chemoradiation treatment.    She is informed this is a toxic regimen. has high complication rates she needs to be monitored closely during the whole course.    She made informed decision to proceed with concurrent chemoradiation cycle 1 day 1 August 22, 2019.    She  needs to be monitored closely during this highly toxic regimen.      2. New oral ulceration - advice kenalog dental paste.     3. Nausea. We went over her home regimen: compazine q 6hr, and ativan q hrs.   Add IV aloxi to future chemo.   Offer her IVF with dex, zofran and pepcid today and prn.     4. Diarrhea.   It is better to have BM on the looser side due to anal RT and pain etc.   We went over in detail how to use imodium.     5. Moderate neutropenia - neutropenic precaution is advised. ANC 0.9, so will hold off on neupogen, also due to concurrent RT.       Oncology Rooming Note    September 3, 2019 9:33 AM   Sara Dominguez is a 68 year old female who presents for:    Chief Complaint   Patient presents with     Oncology Clinic Visit      "Recheck Squamous cell carcinoma of anus, review labs     Initial Vitals: /71 (BP Location: Right arm, Patient Position: Sitting, Cuff Size: Adult Regular)   Pulse 88   Temp 98.1  F (36.7  C) (Tympanic)   Resp 18   Ht 1.632 m (5' 4.25\")   Wt 84.4 kg (186 lb)   SpO2 97%   BMI 31.68 kg/m    Estimated body mass index is 31.68 kg/m  as calculated from the following:    Height as of this encounter: 1.632 m (5' 4.25\").    Weight as of this encounter: 84.4 kg (186 lb). Body surface area is 1.96 meters squared.  No Pain (0) Comment: Data Unavailable   No LMP recorded. Patient is postmenopausal.  Allergies reviewed: Yes  Medications reviewed: Yes    Medications: Medication refills not needed today.  Pharmacy name entered into [employer]: [facility] [place], [place] - [address] [place] [address]    Clinical concerns: Recheck Squamous cell carcinoma of anus, review labs.   Patient reports having urgency, frequency and a burning with urination. UA collected & Sent to lab.       Cici Purvis CMA                Again, thank you for allowing me to participate in the care of your patient.        Sincerely,        Whitney Aquino MD, MD    "

## 2019-09-04 ENCOUNTER — OFFICE VISIT (OUTPATIENT)
Dept: RADIATION THERAPY | Facility: OUTPATIENT CENTER | Age: 69
End: 2019-09-04
Payer: COMMERCIAL

## 2019-09-04 ENCOUNTER — APPOINTMENT (OUTPATIENT)
Dept: RADIATION THERAPY | Facility: OUTPATIENT CENTER | Age: 69
End: 2019-09-04
Payer: COMMERCIAL

## 2019-09-04 VITALS
DIASTOLIC BLOOD PRESSURE: 61 MMHG | RESPIRATION RATE: 16 BRPM | BODY MASS INDEX: 31.44 KG/M2 | OXYGEN SATURATION: 98 % | WEIGHT: 184.6 LBS | SYSTOLIC BLOOD PRESSURE: 118 MMHG | HEART RATE: 88 BPM

## 2019-09-04 DIAGNOSIS — L58.9 RADIATION DERMATITIS: Primary | ICD-10-CM

## 2019-09-04 DIAGNOSIS — C21.0 SQUAMOUS CELL CARCINOMA OF ANUS (H): ICD-10-CM

## 2019-09-04 RX ORDER — SILVER SULFADIAZINE 10 MG/G
CREAM TOPICAL 2 TIMES DAILY
Qty: 50 G | Refills: 3 | Status: SHIPPED | OUTPATIENT
Start: 2019-09-04 | End: 2019-12-30

## 2019-09-04 RX ORDER — OXYCODONE AND ACETAMINOPHEN 5; 325 MG/1; MG/1
1 TABLET ORAL EVERY 4 HOURS PRN
Qty: 60 TABLET | Refills: 0 | Status: SHIPPED | OUTPATIENT
Start: 2019-09-04 | End: 2019-09-18 | Stop reason: DRUGHIGH

## 2019-09-04 ASSESSMENT — PAIN SCALES - GENERAL: PAINLEVEL: MILD PAIN (3)

## 2019-09-04 NOTE — LETTER
2019      RE: Sara Dominguez  [address]  [place] [address]       [facility] IN [facility]ROUG[facility]  Radiation Oncology    On Treatment Visit Note      Sara Dominguez      Date: 2019   MRN: [id number]   : 1950  Diagnosis: Anal ca      Reason for Visit:  On Radiation Treatment Visit     Treatment Summary to Date  Treatment Site: anal/pelvis Current Dose: 1620/5400 cGy Fractions:       Chemotherapy  Chemo concurrent with radx?: Yes  Oncologist: Dr. Aquino  Drug Name/Frequency 1: 5 FU  Drug Name/Frequency 1: mitomycin    Subjective:   Doing overall well. Tolerating chemo. Counts from 9/3/19 down slightly (WBC 1.6, ANC 0.9, Plt 118, Hgb 10.1).  Some increase in perianal pain since start of treatment. Managed with percocet x3-4 per day. Mild increase in urinary frequency and dysuria. UA ordered yesterday, final pending.  Continuing skin care. Using aquaphor. Will be picking up neosporin with pain relief. Some oral sores since starting 5FU, using salt/soda rinse with alleviation.    Nursing ROS:   Nutrition Alteration  Diet Type: Patient's Preference  Skin  Skin Reaction: 1 - Faint erythema or dry desquamation  Skin Note: reviewed options of aquaphor, proshield, sitz baths.     ENT and Mouth Exam  Mucositis - Current: 1 - Generalized erythema(secondary to chemo )  Cardiovascular  Respiratory effort: 1 - Normal - without distress  Gastrointestinal  Nausea: 1 - One to two episodes of nausea/24  Genitourinary  Urinary Status: 0 - Normal     Pain Assessment  0-10 Pain Scale: 3  Pain Note: has percocet uses occassionally/PRN in am and pm      Objective:   There were no vitals taken for this visit.   Mild oral mucositis from chemo.   Mild skin erythema in perianal area with small areas of dry desquamation. No groin or vaginal area desquamation.     Labs:  CBC RESULTS:   Lab Results   Component Value Date    WBC 1.6 2019     Lab Results   Component  Value Date    RBC 3.43 09/03/2019     Lab Results   Component Value Date    HGB 10.1 09/03/2019     Lab Results   Component Value Date    HCT 31.4 09/03/2019     No components found for: MCT  Lab Results   Component Value Date    MCV 92 09/03/2019     Lab Results   Component Value Date    MCH 29.4 09/03/2019     Lab Results   Component Value Date    MCHC 32.2 09/03/2019     Lab Results   Component Value Date    RDW 12.6 09/03/2019     Lab Results   Component Value Date     09/03/2019             Assessment:  Ms. 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  She is undergoing definitive CRT.    Tolerating radiation therapy well.  All questions and concerns addressed.    Plan:   1. Continue current therapy.    2. Cancer related pain. Percocet 5-325 PRN (refilled today).   3. Skin care. Aquaphor, sitz baths, perianal squirt bottle, baby wipes. Silvadene for area of desquamation.   4. Dysuria. UA/ Ucx pending. Could be related to RT change, discussed consideration of ibuprofen PRN to mitigate radiation induced inflammation.       Mosaiq chart and setup information reviewed  Ports checked    Medication Review  Med list reviewed with patient?: Yes    Educational Topic Discussed  Education Instructions: reviewed skin care      Justin Khan MD

## 2019-09-04 NOTE — PROGRESS NOTES
[facility] PHYSICIANS  SPECIALIZING IN BREAKTHROUGHS  Radiation Oncology    On Treatment Visit Note      Sara Dominguez      Date: 2019   MRN: [id number]   : 1950  Diagnosis: Anal ca      Reason for Visit:  On Radiation Treatment Visit     Treatment Summary to Date  Treatment Site: anal/pelvis Current Dose: 1620/5400 cGy Fractions:       Chemotherapy  Chemo concurrent with radx?: Yes  Oncologist: Dr. Aquino  Drug Name/Frequency 1: 5 FU  Drug Name/Frequency 1: mitomycin    Subjective:   Doing overall well. Tolerating chemo. Counts from 9/3/19 down slightly (WBC 1.6, ANC 0.9, Plt 118, Hgb 10.1).  Some increase in perianal pain since start of treatment. Managed with percocet x3-4 per day. Mild increase in urinary frequency and dysuria. UA ordered yesterday, final pending.  Continuing skin care. Using aquaphor. Will be picking up neosporin with pain relief. Some oral sores since starting 5FU, using salt/soda rinse with alleviation.    Nursing ROS:   Nutrition Alteration  Diet Type: Patient's Preference  Skin  Skin Reaction: 1 - Faint erythema or dry desquamation  Skin Note: reviewed options of aquaphor, proshield, sitz baths.     ENT and Mouth Exam  Mucositis - Current: 1 - Generalized erythema(secondary to chemo )  Cardiovascular  Respiratory effort: 1 - Normal - without distress  Gastrointestinal  Nausea: 1 - One to two episodes of nausea/24  Genitourinary  Urinary Status: 0 - Normal     Pain Assessment  0-10 Pain Scale: 3  Pain Note: has percocet uses occassionally/PRN in am and pm      Objective:   There were no vitals taken for this visit.   Mild oral mucositis from chemo.   Mild skin erythema in perianal area with small areas of dry desquamation. No groin or vaginal area desquamation.     Labs:  CBC RESULTS:   Lab Results   Component Value Date    WBC 1.6 2019     Lab Results   Component Value Date    RBC 3.43 2019     Lab Results   Component Value Date    HGB 10.1  09/03/2019     Lab Results   Component Value Date    HCT 31.4 09/03/2019     No components found for: MCT  Lab Results   Component Value Date    MCV 92 09/03/2019     Lab Results   Component Value Date    MCH 29.4 09/03/2019     Lab Results   Component Value Date    MCHC 32.2 09/03/2019     Lab Results   Component Value Date    RDW 12.6 09/03/2019     Lab Results   Component Value Date     09/03/2019             Assessment:  Ms. 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  She is undergoing definitive CRT.    Tolerating radiation therapy well.  All questions and concerns addressed.    Plan:   1. Continue current therapy.    2. Cancer related pain. Percocet 5-325 PRN (refilled today).   3. Skin care. Aquaphor, sitz baths, perianal squirt bottle, baby wipes. Silvadene for area of desquamation.   4. Dysuria. UA/ Ucx pending. Could be related to RT change, discussed consideration of ibuprofen PRN to mitigate radiation induced inflammation.       Mosaiq chart and setup information reviewed  Ports checked    Medication Review  Med list reviewed with patient?: Yes    Educational Topic Discussed  Education Instructions: reviewed skin care      Justin Khan MD

## 2019-09-05 ENCOUNTER — APPOINTMENT (OUTPATIENT)
Dept: RADIATION THERAPY | Facility: OUTPATIENT CENTER | Age: 69
End: 2019-09-05
Payer: COMMERCIAL

## 2019-09-06 ENCOUNTER — APPOINTMENT (OUTPATIENT)
Dept: RADIATION THERAPY | Facility: OUTPATIENT CENTER | Age: 69
End: 2019-09-06
Payer: COMMERCIAL

## 2019-09-06 ENCOUNTER — INFUSION THERAPY VISIT (OUTPATIENT)
Dept: INFUSION THERAPY | Facility: CLINIC | Age: 69
End: 2019-09-06
Attending: INTERNAL MEDICINE
Payer: COMMERCIAL

## 2019-09-06 VITALS — SYSTOLIC BLOOD PRESSURE: 115 MMHG | DIASTOLIC BLOOD PRESSURE: 57 MMHG | TEMPERATURE: 97.9 F | HEART RATE: 77 BPM

## 2019-09-06 DIAGNOSIS — I95.9 HYPOTENSION, UNSPECIFIED HYPOTENSION TYPE: ICD-10-CM

## 2019-09-06 DIAGNOSIS — R11.0 NAUSEA: Primary | ICD-10-CM

## 2019-09-06 PROCEDURE — 25000128 H RX IP 250 OP 636: Performed by: INTERNAL MEDICINE

## 2019-09-06 PROCEDURE — 96367 TX/PROPH/DG ADDL SEQ IV INF: CPT

## 2019-09-06 PROCEDURE — 96375 TX/PRO/DX INJ NEW DRUG ADDON: CPT

## 2019-09-06 PROCEDURE — 96365 THER/PROPH/DIAG IV INF INIT: CPT

## 2019-09-06 PROCEDURE — 96361 HYDRATE IV INFUSION ADD-ON: CPT

## 2019-09-06 RX ORDER — SODIUM CHLORIDE AND POTASSIUM CHLORIDE 150; 900 MG/100ML; MG/100ML
INJECTION, SOLUTION INTRAVENOUS CONTINUOUS
Status: DISCONTINUED | OUTPATIENT
Start: 2019-09-06 | End: 2019-09-06 | Stop reason: HOSPADM

## 2019-09-06 RX ORDER — ONDANSETRON 2 MG/ML
4 INJECTION INTRAMUSCULAR; INTRAVENOUS ONCE
Status: COMPLETED | OUTPATIENT
Start: 2019-09-06 | End: 2019-09-06

## 2019-09-06 RX ORDER — HEPARIN SODIUM (PORCINE) LOCK FLUSH IV SOLN 100 UNIT/ML 100 UNIT/ML
5 SOLUTION INTRAVENOUS
Status: DISCONTINUED | OUTPATIENT
Start: 2019-09-06 | End: 2019-09-06 | Stop reason: HOSPADM

## 2019-09-06 RX ORDER — ONDANSETRON 2 MG/ML
4 INJECTION INTRAMUSCULAR; INTRAVENOUS ONCE
Status: CANCELLED
Start: 2019-09-06

## 2019-09-06 RX ORDER — DEXAMETHASONE SODIUM PHOSPHATE 4 MG/ML
4 INJECTION, SOLUTION INTRA-ARTICULAR; INTRALESIONAL; INTRAMUSCULAR; INTRAVENOUS; SOFT TISSUE ONCE
Status: COMPLETED | OUTPATIENT
Start: 2019-09-06 | End: 2019-09-06

## 2019-09-06 RX ORDER — DEXAMETHASONE SODIUM PHOSPHATE 4 MG/ML
4 INJECTION, SOLUTION INTRA-ARTICULAR; INTRALESIONAL; INTRAMUSCULAR; INTRAVENOUS; SOFT TISSUE ONCE
Status: CANCELLED
Start: 2019-09-06

## 2019-09-06 RX ORDER — SODIUM CHLORIDE AND POTASSIUM CHLORIDE 150; 900 MG/100ML; MG/100ML
INJECTION, SOLUTION INTRAVENOUS CONTINUOUS
Status: CANCELLED
Start: 2019-09-06

## 2019-09-06 RX ADMIN — ONDANSETRON 4 MG: 2 INJECTION INTRAMUSCULAR; INTRAVENOUS at 10:50

## 2019-09-06 RX ADMIN — Medication 5 ML: at 12:08

## 2019-09-06 RX ADMIN — FAMOTIDINE 20 MG: 20 INJECTION, SOLUTION INTRAVENOUS at 10:36

## 2019-09-06 RX ADMIN — POTASSIUM CHLORIDE AND SODIUM CHLORIDE: 900; 150 INJECTION, SOLUTION INTRAVENOUS at 10:33

## 2019-09-06 RX ADMIN — DEXAMETHASONE SODIUM PHOSPHATE 4 MG: 4 INJECTION, SOLUTION INTRAMUSCULAR; INTRAVENOUS at 10:57

## 2019-09-06 NOTE — PROGRESS NOTES
Infusion Nursing Note:  Sara Dominguez presents today for IV Fluids and IV antinausea meds.    Patient seen by provider today: No   present during visit today: Not Applicable.    Note: IV Fluids infused over 90 minutes without complications. Via her port per [place] protocol. IV anti nausea medications infused via her port per [place] protocol. Pt. To return on 9/19 for labs and chemotherapy.    Intravenous Access:  No Intravenous access/labs at this visit.    Treatment Conditions:  Not Applicable.      Post Infusion Assessment:  Patient tolerated infusion without incident.  Blood return noted pre and post infusion.  Site patent and intact, free from redness, edema or discomfort.  No evidence of extravasations.  Access discontinued per protocol.       Discharge Plan:   Patient and/or family verbalized understanding of discharge instructions and all questions answered.  Patient discharged in stable condition accompanied by: self.  Departure Mode: Ambulatory.    Lorenza Hunt, RN, RN

## 2019-09-09 ENCOUNTER — PATIENT OUTREACH (OUTPATIENT)
Dept: ONCOLOGY | Facility: CLINIC | Age: 69
End: 2019-09-09

## 2019-09-09 ENCOUNTER — APPOINTMENT (OUTPATIENT)
Dept: RADIATION THERAPY | Facility: OUTPATIENT CENTER | Age: 69
End: 2019-09-09
Payer: COMMERCIAL

## 2019-09-09 NOTE — PROGRESS NOTES
FMLA forms have been completed and faxed in. Copy sent to scanning and original placed at the font desk for pt to .     Agata Renteria RN on 9/9/2019 at 4:00 PM

## 2019-09-10 ENCOUNTER — APPOINTMENT (OUTPATIENT)
Dept: RADIATION THERAPY | Facility: OUTPATIENT CENTER | Age: 69
End: 2019-09-10
Payer: COMMERCIAL

## 2019-09-11 ENCOUNTER — APPOINTMENT (OUTPATIENT)
Dept: RADIATION THERAPY | Facility: OUTPATIENT CENTER | Age: 69
End: 2019-09-11
Payer: COMMERCIAL

## 2019-09-11 ENCOUNTER — OFFICE VISIT (OUTPATIENT)
Dept: RADIATION THERAPY | Facility: OUTPATIENT CENTER | Age: 69
End: 2019-09-11
Payer: COMMERCIAL

## 2019-09-11 VITALS
HEART RATE: 80 BPM | SYSTOLIC BLOOD PRESSURE: 126 MMHG | WEIGHT: 182.2 LBS | BODY MASS INDEX: 31.03 KG/M2 | DIASTOLIC BLOOD PRESSURE: 85 MMHG

## 2019-09-11 DIAGNOSIS — G89.3 CANCER RELATED PAIN: Primary | ICD-10-CM

## 2019-09-11 RX ORDER — OXYCODONE HYDROCHLORIDE 15 MG/1
15 TABLET, FILM COATED, EXTENDED RELEASE ORAL EVERY 12 HOURS
Qty: 30 TABLET | Refills: 0 | Status: SHIPPED | OUTPATIENT
Start: 2019-09-11 | End: 2019-10-01 | Stop reason: DRUGHIGH

## 2019-09-11 RX ORDER — OXYCODONE AND ACETAMINOPHEN 10; 325 MG/1; MG/1
1 TABLET ORAL EVERY 6 HOURS PRN
Qty: 60 TABLET | Refills: 0 | Status: SHIPPED | OUTPATIENT
Start: 2019-09-11 | End: 2019-09-24

## 2019-09-11 NOTE — LETTER
2019      RE: Sara Dominguez  [address]  [facility] [address]       [facility] PHYSICIANS  SPECIALIZING IN BREAKTHROUGHS  Radiation Oncology    On Treatment Visit Note      Sara Dominguez      Date: 2019   MRN: [id number]   : 1950  Diagnosis: Anal ca      Reason for Visit:  On Radiation Treatment Visit     Treatment Summary to Date  Treatment Site: anal/pelvis Current Dose: 2520/5400 cGy Fractions:       Chemotherapy  Chemo concurrent with radx?: Yes  Oncologist: Dr. Aquino  Drug Name/Frequency 1: 5 FU  Drug Name/Frequency 1: mitomycin    Subjective:   Doing overall well. Tolerating chemo. Counts from 9/3/19 down slightly (WBC 1.6, ANC 0.9, Plt 118, Hgb 10.1).  Some increase in perianal pain since start of treatment. Using  percocet 5-325 mg x2 four times per day. Mild increase in urinary frequency and dysuria, particularly at night. Continuing skin care. Using aquaphor. Has neosporin with pain relief. More diarrhea this week, on low residue diet and using imodium PRN with alleviation. Receiving IVF on .      Nursing ROS:   Nutrition Alteration  Diet Type: Patient's Preference  Skin  Skin Reaction: 1 - Faint erythema or dry desquamation  Skin Note: reviewed options of aquaphor, proshield, sitz baths.     ENT and Mouth Exam  Mucositis - Current: 1 - Generalized erythema(secondary to chemo )  Cardiovascular  Respiratory effort: 1 - Normal - without distress  Gastrointestinal  Nausea: 1 - One to two episodes of nausea/24  Genitourinary  Urinary Status: 0 - Normal     Pain Assessment  0-10 Pain Scale: 3  Pain Note: has percocet uses occassionally/PRN in am and pm      Objective  /85   Pulse 80   Wt 82.6 kg (182 lb 3.2 oz)   BMI 31.03 kg/m       Mild skin erythema in perianal area with small areas of dry desquamation. No groin or vaginal area desquamation.     Labs:  CBC RESULTS:   Lab Results   Component Value Date    WBC 1.6 2019     Lab Results    Component Value Date    RBC 3.43 09/03/2019     Lab Results   Component Value Date    HGB 10.1 09/03/2019     Lab Results   Component Value Date    HCT 31.4 09/03/2019     No components found for: MCT  Lab Results   Component Value Date    MCV 92 09/03/2019     Lab Results   Component Value Date    MCH 29.4 09/03/2019     Lab Results   Component Value Date    MCHC 32.2 09/03/2019     Lab Results   Component Value Date    RDW 12.6 09/03/2019     Lab Results   Component Value Date     09/03/2019             Assessment:  Ms. 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  She is undergoing definitive CRT.    Tolerating radiation therapy well.  All questions and concerns addressed.    Plan:   1. Continue current therapy.    2. Cancer related pain. Will start percocet  mg q6 hrs PRN pain and start long acting pain med (oxycontin 15 mg at bedtime)  3. Skin care. Aquaphor, sitz baths, perianal squirt bottle, baby wipes. Silvadene for area of desquamation.   4. Dysuria. Could be related to RT change, discussed consideration of ibuprofen PRN to mitigate radiation induced inflammation.       Mosaiq chart and setup information reviewed  Ports checked    Medication Review  Med list reviewed with patient?: Yes    Educational Topic Discussed  Education Instructions: reviewed skin care      Justin Khan MD

## 2019-09-11 NOTE — PROGRESS NOTES
[facility] PHYSICIANS  SPECIALIZING IN BREAKTHROUGHS  Radiation Oncology    On Treatment Visit Note      Sara Dominguez      Date: 2019   MRN: [id number]   : 1950  Diagnosis: Anal ca      Reason for Visit:  On Radiation Treatment Visit     Treatment Summary to Date  Treatment Site: anal/pelvis Current Dose: 2520/5400 cGy Fractions:       Chemotherapy  Chemo concurrent with radx?: Yes  Oncologist: Dr. Aquino  Drug Name/Frequency 1: 5 FU  Drug Name/Frequency 1: mitomycin    Subjective:   Doing overall well. Tolerating chemo. Counts from 9/3/19 down slightly (WBC 1.6, ANC 0.9, Plt 118, Hgb 10.1).  Some increase in perianal pain since start of treatment. Using  percocet 5-325 mg x2 four times per day. Mild increase in urinary frequency and dysuria, particularly at night. Continuing skin care. Using aquaphor. Has neosporin with pain relief. More diarrhea this week, on low residue diet and using imodium PRN with alleviation. Receiving IVF on .      Nursing ROS:   Nutrition Alteration  Diet Type: Patient's Preference  Skin  Skin Reaction: 1 - Faint erythema or dry desquamation  Skin Note: reviewed options of aquaphor, proshield, sitz baths.     ENT and Mouth Exam  Mucositis - Current: 1 - Generalized erythema(secondary to chemo )  Cardiovascular  Respiratory effort: 1 - Normal - without distress  Gastrointestinal  Nausea: 1 - One to two episodes of nausea/24  Genitourinary  Urinary Status: 0 - Normal     Pain Assessment  0-10 Pain Scale: 3  Pain Note: has percocet uses occassionally/PRN in am and pm      Objective  /85   Pulse 80   Wt 82.6 kg (182 lb 3.2 oz)   BMI 31.03 kg/m      Mild skin erythema in perianal area with small areas of dry desquamation. No groin or vaginal area desquamation.     Labs:  CBC RESULTS:   Lab Results   Component Value Date    WBC 1.6 2019     Lab Results   Component Value Date    RBC 3.43 2019     Lab Results   Component Value Date     HGB 10.1 09/03/2019     Lab Results   Component Value Date    HCT 31.4 09/03/2019     No components found for: MCT  Lab Results   Component Value Date    MCV 92 09/03/2019     Lab Results   Component Value Date    MCH 29.4 09/03/2019     Lab Results   Component Value Date    MCHC 32.2 09/03/2019     Lab Results   Component Value Date    RDW 12.6 09/03/2019     Lab Results   Component Value Date     09/03/2019             Assessment:  Ms. 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  She is undergoing definitive CRT.    Tolerating radiation therapy well.  All questions and concerns addressed.    Plan:   1. Continue current therapy.    2. Cancer related pain. Will start percocet  mg q6 hrs PRN pain and start long acting pain med (oxycontin 15 mg at bedtime)  3. Skin care. Aquaphor, sitz baths, perianal squirt bottle, baby wipes. Silvadene for area of desquamation.   4. Dysuria. Could be related to RT change, discussed consideration of ibuprofen PRN to mitigate radiation induced inflammation.       Mosaiq chart and setup information reviewed  Ports checked    Medication Review  Med list reviewed with patient?: Yes    Educational Topic Discussed  Education Instructions: reviewed skin care      Justin Khan MD

## 2019-09-12 ENCOUNTER — APPOINTMENT (OUTPATIENT)
Dept: RADIATION THERAPY | Facility: OUTPATIENT CENTER | Age: 69
End: 2019-09-12
Payer: COMMERCIAL

## 2019-09-13 ENCOUNTER — INFUSION THERAPY VISIT (OUTPATIENT)
Dept: INFUSION THERAPY | Facility: CLINIC | Age: 69
End: 2019-09-13
Attending: INTERNAL MEDICINE
Payer: COMMERCIAL

## 2019-09-13 ENCOUNTER — APPOINTMENT (OUTPATIENT)
Dept: RADIATION THERAPY | Facility: OUTPATIENT CENTER | Age: 69
End: 2019-09-13
Payer: COMMERCIAL

## 2019-09-13 VITALS — DIASTOLIC BLOOD PRESSURE: 44 MMHG | HEART RATE: 81 BPM | SYSTOLIC BLOOD PRESSURE: 121 MMHG

## 2019-09-13 DIAGNOSIS — R11.0 NAUSEA: Primary | ICD-10-CM

## 2019-09-13 DIAGNOSIS — I95.9 HYPOTENSION, UNSPECIFIED HYPOTENSION TYPE: ICD-10-CM

## 2019-09-13 PROCEDURE — 96361 HYDRATE IV INFUSION ADD-ON: CPT

## 2019-09-13 PROCEDURE — 96374 THER/PROPH/DIAG INJ IV PUSH: CPT

## 2019-09-13 PROCEDURE — 96375 TX/PRO/DX INJ NEW DRUG ADDON: CPT

## 2019-09-13 PROCEDURE — 25000128 H RX IP 250 OP 636: Performed by: INTERNAL MEDICINE

## 2019-09-13 RX ORDER — ONDANSETRON 2 MG/ML
4 INJECTION INTRAMUSCULAR; INTRAVENOUS ONCE
Status: COMPLETED | OUTPATIENT
Start: 2019-09-13 | End: 2019-09-13

## 2019-09-13 RX ORDER — HEPARIN SODIUM (PORCINE) LOCK FLUSH IV SOLN 100 UNIT/ML 100 UNIT/ML
5 SOLUTION INTRAVENOUS
Status: DISCONTINUED | OUTPATIENT
Start: 2019-09-13 | End: 2019-09-13 | Stop reason: HOSPADM

## 2019-09-13 RX ORDER — ONDANSETRON 2 MG/ML
4 INJECTION INTRAMUSCULAR; INTRAVENOUS ONCE
Status: CANCELLED
Start: 2019-09-13

## 2019-09-13 RX ORDER — DEXAMETHASONE SODIUM PHOSPHATE 4 MG/ML
4 INJECTION, SOLUTION INTRA-ARTICULAR; INTRALESIONAL; INTRAMUSCULAR; INTRAVENOUS; SOFT TISSUE ONCE
Status: CANCELLED
Start: 2019-09-13

## 2019-09-13 RX ORDER — DEXAMETHASONE SODIUM PHOSPHATE 4 MG/ML
4 INJECTION, SOLUTION INTRA-ARTICULAR; INTRALESIONAL; INTRAMUSCULAR; INTRAVENOUS; SOFT TISSUE ONCE
Status: COMPLETED | OUTPATIENT
Start: 2019-09-13 | End: 2019-09-13

## 2019-09-13 RX ORDER — SODIUM CHLORIDE AND POTASSIUM CHLORIDE 150; 900 MG/100ML; MG/100ML
INJECTION, SOLUTION INTRAVENOUS CONTINUOUS
Status: CANCELLED
Start: 2019-09-13

## 2019-09-13 RX ORDER — SODIUM CHLORIDE AND POTASSIUM CHLORIDE 150; 900 MG/100ML; MG/100ML
INJECTION, SOLUTION INTRAVENOUS CONTINUOUS
Status: DISCONTINUED | OUTPATIENT
Start: 2019-09-13 | End: 2019-09-13 | Stop reason: HOSPADM

## 2019-09-13 RX ADMIN — Medication 5 ML: at 11:57

## 2019-09-13 RX ADMIN — DEXAMETHASONE SODIUM PHOSPHATE 4 MG: 4 INJECTION, SOLUTION INTRAMUSCULAR; INTRAVENOUS at 10:51

## 2019-09-13 RX ADMIN — ONDANSETRON 4 MG: 2 INJECTION INTRAMUSCULAR; INTRAVENOUS at 11:19

## 2019-09-13 RX ADMIN — POTASSIUM CHLORIDE AND SODIUM CHLORIDE: 900; 150 INJECTION, SOLUTION INTRAVENOUS at 10:37

## 2019-09-13 RX ADMIN — FAMOTIDINE 20 MG: 20 INJECTION, SOLUTION INTRAVENOUS at 10:38

## 2019-09-13 NOTE — PROGRESS NOTES
Infusion Nursing Note:  Sara DEE Juanitodharmesh presents today for IVF's, meds.    Patient seen by provider today: No   present during visit today: Not Applicable.    Note: N/A.    Intravenous Access:  Implanted Port.    Treatment Conditions:  Not Applicable.      Post Infusion Assessment:  Patient tolerated infusion without incident.  Blood return noted pre and post infusion.  Site patent and intact, free from redness, edema or discomfort.  No evidence of extravasations.  Access discontinued per protocol.       Discharge Plan:   Patient discharged in stable condition accompanied by: self.  Departure Mode: Ambulatory.    Janey Yao, RN, RN

## 2019-09-16 ENCOUNTER — INFUSION THERAPY VISIT (OUTPATIENT)
Dept: INFUSION THERAPY | Facility: CLINIC | Age: 69
End: 2019-09-16
Attending: INTERNAL MEDICINE
Payer: COMMERCIAL

## 2019-09-16 ENCOUNTER — APPOINTMENT (OUTPATIENT)
Dept: RADIATION THERAPY | Facility: OUTPATIENT CENTER | Age: 69
End: 2019-09-16
Payer: COMMERCIAL

## 2019-09-16 ENCOUNTER — PATIENT OUTREACH (OUTPATIENT)
Dept: ONCOLOGY | Facility: CLINIC | Age: 69
End: 2019-09-16

## 2019-09-16 VITALS — SYSTOLIC BLOOD PRESSURE: 115 MMHG | HEART RATE: 74 BPM | DIASTOLIC BLOOD PRESSURE: 56 MMHG

## 2019-09-16 DIAGNOSIS — R11.0 NAUSEA: Primary | ICD-10-CM

## 2019-09-16 DIAGNOSIS — I95.9 HYPOTENSION, UNSPECIFIED HYPOTENSION TYPE: ICD-10-CM

## 2019-09-16 PROCEDURE — 96375 TX/PRO/DX INJ NEW DRUG ADDON: CPT

## 2019-09-16 PROCEDURE — 96366 THER/PROPH/DIAG IV INF ADDON: CPT

## 2019-09-16 PROCEDURE — 25000128 H RX IP 250 OP 636: Performed by: INTERNAL MEDICINE

## 2019-09-16 PROCEDURE — 96365 THER/PROPH/DIAG IV INF INIT: CPT

## 2019-09-16 RX ORDER — ONDANSETRON 2 MG/ML
4 INJECTION INTRAMUSCULAR; INTRAVENOUS ONCE
Status: CANCELLED
Start: 2019-09-16

## 2019-09-16 RX ORDER — HEPARIN SODIUM (PORCINE) LOCK FLUSH IV SOLN 100 UNIT/ML 100 UNIT/ML
5 SOLUTION INTRAVENOUS
Status: DISCONTINUED | OUTPATIENT
Start: 2019-09-16 | End: 2019-09-16 | Stop reason: HOSPADM

## 2019-09-16 RX ORDER — SODIUM CHLORIDE AND POTASSIUM CHLORIDE 150; 900 MG/100ML; MG/100ML
INJECTION, SOLUTION INTRAVENOUS CONTINUOUS
Status: CANCELLED
Start: 2019-09-16

## 2019-09-16 RX ORDER — DEXAMETHASONE SODIUM PHOSPHATE 4 MG/ML
4 INJECTION, SOLUTION INTRA-ARTICULAR; INTRALESIONAL; INTRAMUSCULAR; INTRAVENOUS; SOFT TISSUE ONCE
Status: COMPLETED | OUTPATIENT
Start: 2019-09-16 | End: 2019-09-16

## 2019-09-16 RX ORDER — SODIUM CHLORIDE AND POTASSIUM CHLORIDE 150; 900 MG/100ML; MG/100ML
INJECTION, SOLUTION INTRAVENOUS CONTINUOUS
Status: DISCONTINUED | OUTPATIENT
Start: 2019-09-16 | End: 2019-09-16 | Stop reason: HOSPADM

## 2019-09-16 RX ORDER — DEXAMETHASONE SODIUM PHOSPHATE 4 MG/ML
4 INJECTION, SOLUTION INTRA-ARTICULAR; INTRALESIONAL; INTRAMUSCULAR; INTRAVENOUS; SOFT TISSUE ONCE
Status: CANCELLED
Start: 2019-09-16

## 2019-09-16 RX ORDER — ONDANSETRON 2 MG/ML
4 INJECTION INTRAMUSCULAR; INTRAVENOUS ONCE
Status: COMPLETED | OUTPATIENT
Start: 2019-09-16 | End: 2019-09-16

## 2019-09-16 RX ADMIN — Medication 5 ML: at 10:47

## 2019-09-16 RX ADMIN — ONDANSETRON 4 MG: 2 INJECTION INTRAMUSCULAR; INTRAVENOUS at 09:01

## 2019-09-16 RX ADMIN — DEXAMETHASONE SODIUM PHOSPHATE 4 MG: 4 INJECTION, SOLUTION INTRAMUSCULAR; INTRAVENOUS at 08:58

## 2019-09-16 RX ADMIN — FAMOTIDINE 20 MG: 20 INJECTION, SOLUTION INTRAVENOUS at 08:58

## 2019-09-16 RX ADMIN — POTASSIUM CHLORIDE AND SODIUM CHLORIDE: 900; 150 INJECTION, SOLUTION INTRAVENOUS at 08:54

## 2019-09-16 NOTE — PROGRESS NOTES
FMLA forms have been completed and faxed in. Copy sent to scanning and original placed at the font desk for pt to .     Agata Renteria RN on 9/16/2019 at 10:53 AM

## 2019-09-16 NOTE — PROGRESS NOTES
Infusion Nursing Note:  Sara DEE Angelica presents today for IVFs/antiemetics.    Patient seen by provider today: No   present during visit today: Not Applicable.    Note: N/A.    Intravenous Access:  Implanted Port.    Treatment Conditions:  Not Applicable.      Post Infusion Assessment:  Patient tolerated infusion without incident.  Blood return noted pre and post infusion.  Site patent and intact, free from redness, edema or discomfort.  No evidence of extravasations.  Access discontinued per protocol.       Discharge Plan:   Patient discharged in stable condition accompanied by: self.  Departure Mode: Ambulatory.    Jada Dejesus, RN, RN

## 2019-09-17 ENCOUNTER — APPOINTMENT (OUTPATIENT)
Dept: RADIATION THERAPY | Facility: OUTPATIENT CENTER | Age: 69
End: 2019-09-17
Payer: COMMERCIAL

## 2019-09-18 ENCOUNTER — APPOINTMENT (OUTPATIENT)
Dept: RADIATION THERAPY | Facility: OUTPATIENT CENTER | Age: 69
End: 2019-09-18
Payer: COMMERCIAL

## 2019-09-18 ENCOUNTER — OFFICE VISIT (OUTPATIENT)
Dept: RADIATION THERAPY | Facility: OUTPATIENT CENTER | Age: 69
End: 2019-09-18
Payer: COMMERCIAL

## 2019-09-18 VITALS
DIASTOLIC BLOOD PRESSURE: 68 MMHG | HEART RATE: 67 BPM | RESPIRATION RATE: 18 BRPM | BODY MASS INDEX: 31.34 KG/M2 | SYSTOLIC BLOOD PRESSURE: 119 MMHG | OXYGEN SATURATION: 99 % | WEIGHT: 184 LBS

## 2019-09-18 DIAGNOSIS — C21.1 MALIGNANT NEOPLASM OF ANAL CANAL (H): Primary | ICD-10-CM

## 2019-09-18 ASSESSMENT — PAIN SCALES - GENERAL: PAINLEVEL: MODERATE PAIN (4)

## 2019-09-18 NOTE — LETTER
2019      RE: Sara Dominguez  [address]  [facility] [address]       [facility] PHYSICIANS  SPECIALIZING IN BREAKTHROUGHS  Radiation Oncology    On Treatment Visit Note      Sara Dominguez      Date: 2019   MRN: [id number]   : 1950  Diagnosis: Anal ca      Reason for Visit:  On Radiation Treatment Visit     Treatment Summary to Date  Treatment Site: anal/pelvis Current Dose: 3420/5400 cGy Fractions:       Chemotherapy  Chemo concurrent with radx?: Yes  Oncologist: Dr. Aquino  Drug Name/Frequency 1: 5 FU  Drug Name/Frequency 1: mitomycin    Subjective:   Doing overall well. Tolerating chemo. Counts from 9/3/19 down slightly (WBC 1.6, ANC 0.9, Plt 118, Hgb 10.1).    Some increase in perianal pain since start of treatment, but better managed on percocet  mg x four times per day. Mild increase in urinary frequency and dysuria, particularly at night. Continuing skin care. Using aquaphor, silvadene. Has neosporin with pain relief.  Diarrhea better controlled on low residue diet and using imodium PRN with alleviation. Receiving IVF. Next cycle of chemo to start tomorrow.       Nursing ROS:   Nutrition Alteration  Diet Type: Patient's Preference  Skin  Skin Reaction: 1 - Faint erythema or dry desquamation  Skin Note: reviewed options of aquaphor, proshield, sitz baths.     ENT and Mouth Exam  Mucositis - Current: 1 - Generalized erythema(secondary to chemo )  Cardiovascular  Respiratory effort: 1 - Normal - without distress  Gastrointestinal  Nausea: 1 - One to two episodes of nausea/24  Genitourinary  Urinary Status: 0 - Normal     Pain Assessment  0-10 Pain Scale: 3  Pain Note: has percocet uses occassionally/PRN in am and pm      Objective  /85   Pulse 80   Wt 82.6 kg (182 lb 3.2 oz)   BMI 31.03 kg/m      Mild skin erythema in perianal area with small areas of dry desquamation. No groin or vaginal area desquamation.     Labs:  CBC RESULTS:   Lab Results    Component Value Date    WBC 1.6 09/03/2019     Lab Results   Component Value Date    RBC 3.43 09/03/2019     Lab Results   Component Value Date    HGB 10.1 09/03/2019     Lab Results   Component Value Date    HCT 31.4 09/03/2019     No components found for: MCT  Lab Results   Component Value Date    MCV 92 09/03/2019     Lab Results   Component Value Date    MCH 29.4 09/03/2019     Lab Results   Component Value Date    MCHC 32.2 09/03/2019     Lab Results   Component Value Date    RDW 12.6 09/03/2019     Lab Results   Component Value Date     09/03/2019             Assessment:  Ms. 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  She is undergoing definitive CRT.    Tolerating radiation therapy well.  All questions and concerns addressed.    Plan:   1. Continue current therapy.    2. Cancer related pain. Percocet  mg q6 hrs PRN pain, can consider different long acting pain med if necessary (didn't tolerate oxycontin well).  3. Skin care. Aquaphor, sitz baths, perianal squirt bottle, baby wipes. Silvadene for area of desquamation.   4. Dysuria. Could be related to RT change, discussed consideration of ibuprofen PRN to mitigate radiation induced inflammation. However, discuss to hydrate well to help flush kidney.       Mosaiq chart and setup information reviewed  Ports checked    Medication Review  Med list reviewed with patient?: Yes    Educational Topic Discussed  Education Instructions: reviewed skin care      Justin Khan MD

## 2019-09-18 NOTE — PROGRESS NOTES
[facility] PHYSICIANS  SPECIALIZING IN BREAKTHROUGHS  Radiation Oncology    On Treatment Visit Note      Sara Dominguez      Date: 2019   MRN: [id number]   : 1950  Diagnosis: Anal ca      Reason for Visit:  On Radiation Treatment Visit     Treatment Summary to Date  Treatment Site: anal/pelvis Current Dose: 3420/5400 cGy Fractions:       Chemotherapy  Chemo concurrent with radx?: Yes  Oncologist: Dr. Aquino  Drug Name/Frequency 1: 5 FU  Drug Name/Frequency 1: mitomycin    Subjective:   Doing overall well. Tolerating chemo. Counts from 9/3/19 down slightly (WBC 1.6, ANC 0.9, Plt 118, Hgb 10.1).    Some increase in perianal pain since start of treatment, but better managed on percocet  mg x four times per day. Mild increase in urinary frequency and dysuria, particularly at night. Continuing skin care. Using aquaphor, silvadene. Has neosporin with pain relief.  Diarrhea better controlled on low residue diet and using imodium PRN with alleviation. Receiving IVF. Next cycle of chemo to start tomorrow.       Nursing ROS:   Nutrition Alteration  Diet Type: Patient's Preference  Skin  Skin Reaction: 1 - Faint erythema or dry desquamation  Skin Note: reviewed options of aquaphor, proshield, sitz baths.     ENT and Mouth Exam  Mucositis - Current: 1 - Generalized erythema(secondary to chemo )  Cardiovascular  Respiratory effort: 1 - Normal - without distress  Gastrointestinal  Nausea: 1 - One to two episodes of nausea/24  Genitourinary  Urinary Status: 0 - Normal     Pain Assessment  0-10 Pain Scale: 3  Pain Note: has percocet uses occassionally/PRN in am and pm      Objective  /85   Pulse 80   Wt 82.6 kg (182 lb 3.2 oz)   BMI 31.03 kg/m      Mild skin erythema in perianal area with small areas of dry desquamation. No groin or vaginal area desquamation.     Labs:  CBC RESULTS:   Lab Results   Component Value Date    WBC 1.6 2019     Lab Results   Component Value Date     RBC 3.43 09/03/2019     Lab Results   Component Value Date    HGB 10.1 09/03/2019     Lab Results   Component Value Date    HCT 31.4 09/03/2019     No components found for: MCT  Lab Results   Component Value Date    MCV 92 09/03/2019     Lab Results   Component Value Date    MCH 29.4 09/03/2019     Lab Results   Component Value Date    MCHC 32.2 09/03/2019     Lab Results   Component Value Date    RDW 12.6 09/03/2019     Lab Results   Component Value Date     09/03/2019             Assessment:  Ms. 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  She is undergoing definitive CRT.    Tolerating radiation therapy well.  All questions and concerns addressed.    Plan:   1. Continue current therapy.    2. Cancer related pain. Percocet  mg q6 hrs PRN pain, can consider different long acting pain med if necessary (didn't tolerate oxycontin well).  3. Skin care. Aquaphor, sitz baths, perianal squirt bottle, baby wipes. Silvadene for area of desquamation.   4. Dysuria. Could be related to RT change, discussed consideration of ibuprofen PRN to mitigate radiation induced inflammation. However, discuss to hydrate well to help flush kidney.       Mosaiq chart and setup information reviewed  Ports checked    Medication Review  Med list reviewed with patient?: Yes    Educational Topic Discussed  Education Instructions: reviewed skin care      Justin Khan MD

## 2019-09-19 ENCOUNTER — HOSPITAL ENCOUNTER (OUTPATIENT)
Facility: CLINIC | Age: 69
Discharge: HOME | End: 2019-09-19
Attending: INTERNAL MEDICINE | Admitting: INTERNAL MEDICINE
Payer: COMMERCIAL

## 2019-09-19 ENCOUNTER — HOME INFUSION (PRE-WILLOW HOME INFUSION) (OUTPATIENT)
Dept: PHARMACY | Facility: CLINIC | Age: 69
End: 2019-09-19

## 2019-09-19 ENCOUNTER — ONCOLOGY VISIT (OUTPATIENT)
Dept: ONCOLOGY | Facility: CLINIC | Age: 69
End: 2019-09-19
Attending: INTERNAL MEDICINE
Payer: COMMERCIAL

## 2019-09-19 ENCOUNTER — INFUSION THERAPY VISIT (OUTPATIENT)
Dept: INFUSION THERAPY | Facility: CLINIC | Age: 69
End: 2019-09-19
Attending: INTERNAL MEDICINE
Payer: COMMERCIAL

## 2019-09-19 ENCOUNTER — APPOINTMENT (OUTPATIENT)
Dept: RADIATION THERAPY | Facility: OUTPATIENT CENTER | Age: 69
End: 2019-09-19
Payer: COMMERCIAL

## 2019-09-19 VITALS
BODY MASS INDEX: 31.8 KG/M2 | TEMPERATURE: 97.8 F | WEIGHT: 186.3 LBS | SYSTOLIC BLOOD PRESSURE: 119 MMHG | DIASTOLIC BLOOD PRESSURE: 43 MMHG | RESPIRATION RATE: 16 BRPM | HEIGHT: 64 IN | OXYGEN SATURATION: 99 % | HEART RATE: 74 BPM

## 2019-09-19 VITALS
DIASTOLIC BLOOD PRESSURE: 43 MMHG | TEMPERATURE: 97.8 F | RESPIRATION RATE: 16 BRPM | HEART RATE: 74 BPM | SYSTOLIC BLOOD PRESSURE: 119 MMHG

## 2019-09-19 DIAGNOSIS — R11.0 NAUSEA: Primary | ICD-10-CM

## 2019-09-19 DIAGNOSIS — R19.7 DIARRHEA, UNSPECIFIED TYPE: ICD-10-CM

## 2019-09-19 DIAGNOSIS — C21.0 SQUAMOUS CELL CARCINOMA OF ANUS (H): Primary | ICD-10-CM

## 2019-09-19 DIAGNOSIS — C21.0 SQUAMOUS CELL CARCINOMA OF ANUS (H): ICD-10-CM

## 2019-09-19 LAB
ALBUMIN SERPL-MCNC: 3.5 G/DL (ref 3.4–5)
ALP SERPL-CCNC: 139 U/L (ref 40–150)
ALT SERPL W P-5'-P-CCNC: 49 U/L (ref 0–50)
ANION GAP SERPL CALCULATED.3IONS-SCNC: 4 MMOL/L (ref 3–14)
AST SERPL W P-5'-P-CCNC: 28 U/L (ref 0–45)
BASOPHILS # BLD AUTO: 0 10E9/L (ref 0–0.2)
BASOPHILS NFR BLD AUTO: 0.4 %
BILIRUB SERPL-MCNC: 0.3 MG/DL (ref 0.2–1.3)
BUN SERPL-MCNC: 15 MG/DL (ref 7–30)
CALCIUM SERPL-MCNC: 8.5 MG/DL (ref 8.5–10.1)
CHLORIDE SERPL-SCNC: 105 MMOL/L (ref 94–109)
CO2 SERPL-SCNC: 27 MMOL/L (ref 20–32)
CREAT SERPL-MCNC: 0.78 MG/DL (ref 0.52–1.04)
DIFFERENTIAL METHOD BLD: ABNORMAL
EOSINOPHIL # BLD AUTO: 0 10E9/L (ref 0–0.7)
EOSINOPHIL NFR BLD AUTO: 0.8 %
ERYTHROCYTE [DISTWIDTH] IN BLOOD BY AUTOMATED COUNT: 14.4 % (ref 10–15)
GFR SERPL CREATININE-BSD FRML MDRD: 76 ML/MIN/{1.73_M2}
GLUCOSE SERPL-MCNC: 96 MG/DL (ref 70–99)
HCT VFR BLD AUTO: 32.8 % (ref 35–47)
HGB BLD-MCNC: 10.6 G/DL (ref 11.7–15.7)
IMM GRANULOCYTES # BLD: 0.1 10E9/L (ref 0–0.4)
IMM GRANULOCYTES NFR BLD: 1 %
LYMPHOCYTES # BLD AUTO: 0.3 10E9/L (ref 0.8–5.3)
LYMPHOCYTES NFR BLD AUTO: 5.7 %
MCH RBC QN AUTO: 29.9 PG (ref 26.5–33)
MCHC RBC AUTO-ENTMCNC: 32.3 G/DL (ref 31.5–36.5)
MCV RBC AUTO: 93 FL (ref 78–100)
MONOCYTES # BLD AUTO: 0.7 10E9/L (ref 0–1.3)
MONOCYTES NFR BLD AUTO: 13.6 %
NEUTROPHILS # BLD AUTO: 4.1 10E9/L (ref 1.6–8.3)
NEUTROPHILS NFR BLD AUTO: 78.5 %
NRBC # BLD AUTO: 0 10*3/UL
NRBC BLD AUTO-RTO: 0 /100
PLATELET # BLD AUTO: 278 10E9/L (ref 150–450)
POTASSIUM SERPL-SCNC: 4.1 MMOL/L (ref 3.4–5.3)
PROT SERPL-MCNC: 7.1 G/DL (ref 6.8–8.8)
RBC # BLD AUTO: 3.54 10E12/L (ref 3.8–5.2)
SODIUM SERPL-SCNC: 136 MMOL/L (ref 133–144)
WBC # BLD AUTO: 5.2 10E9/L (ref 4–11)

## 2019-09-19 PROCEDURE — 85025 COMPLETE CBC W/AUTO DIFF WBC: CPT | Performed by: INTERNAL MEDICINE

## 2019-09-19 PROCEDURE — 25800030 ZZH RX IP 258 OP 636: Performed by: INTERNAL MEDICINE

## 2019-09-19 PROCEDURE — G0498 CHEMO EXTEND IV INFUS W/PUMP: HCPCS

## 2019-09-19 PROCEDURE — 96409 CHEMO IV PUSH SNGL DRUG: CPT

## 2019-09-19 PROCEDURE — 96367 TX/PROPH/DG ADDL SEQ IV INF: CPT

## 2019-09-19 PROCEDURE — 80053 COMPREHEN METABOLIC PANEL: CPT | Performed by: INTERNAL MEDICINE

## 2019-09-19 PROCEDURE — 96375 TX/PRO/DX INJ NEW DRUG ADDON: CPT

## 2019-09-19 PROCEDURE — G0463 HOSPITAL OUTPT CLINIC VISIT: HCPCS | Mod: 25

## 2019-09-19 PROCEDURE — 25000128 H RX IP 250 OP 636: Performed by: INTERNAL MEDICINE

## 2019-09-19 PROCEDURE — 99214 OFFICE O/P EST MOD 30 MIN: CPT | Performed by: INTERNAL MEDICINE

## 2019-09-19 RX ORDER — EPINEPHRINE 0.3 MG/.3ML
0.3 INJECTION SUBCUTANEOUS EVERY 5 MIN PRN
Status: CANCELLED | OUTPATIENT
Start: 2019-09-19

## 2019-09-19 RX ORDER — SODIUM CHLORIDE 9 MG/ML
1000 INJECTION, SOLUTION INTRAVENOUS CONTINUOUS PRN
Status: CANCELLED
Start: 2019-09-19

## 2019-09-19 RX ORDER — METHYLPREDNISOLONE SODIUM SUCCINATE 125 MG/2ML
125 INJECTION, POWDER, LYOPHILIZED, FOR SOLUTION INTRAMUSCULAR; INTRAVENOUS
Status: CANCELLED
Start: 2019-09-19

## 2019-09-19 RX ORDER — ALBUTEROL SULFATE 90 UG/1
1-2 AEROSOL, METERED RESPIRATORY (INHALATION)
Status: CANCELLED
Start: 2019-09-19

## 2019-09-19 RX ORDER — ALBUTEROL SULFATE 0.83 MG/ML
2.5 SOLUTION RESPIRATORY (INHALATION)
Status: CANCELLED | OUTPATIENT
Start: 2019-09-19

## 2019-09-19 RX ORDER — NALOXONE HYDROCHLORIDE 0.4 MG/ML
.1-.4 INJECTION, SOLUTION INTRAMUSCULAR; INTRAVENOUS; SUBCUTANEOUS
Status: CANCELLED | OUTPATIENT
Start: 2019-09-19

## 2019-09-19 RX ORDER — LORAZEPAM 0.5 MG/1
0.5 TABLET ORAL AT BEDTIME
Qty: 30 TABLET | Refills: 0 | Status: CANCELLED | OUTPATIENT
Start: 2019-09-19

## 2019-09-19 RX ORDER — MITOMYCIN 5 MG/10ML
10 INJECTION, POWDER, LYOPHILIZED, FOR SOLUTION INTRAVENOUS ONCE
Status: COMPLETED | OUTPATIENT
Start: 2019-09-19 | End: 2019-09-19

## 2019-09-19 RX ORDER — MEPERIDINE HYDROCHLORIDE 25 MG/ML
25 INJECTION INTRAMUSCULAR; INTRAVENOUS; SUBCUTANEOUS EVERY 30 MIN PRN
Status: CANCELLED | OUTPATIENT
Start: 2019-09-19

## 2019-09-19 RX ORDER — EPINEPHRINE 1 MG/ML
0.3 INJECTION, SOLUTION, CONCENTRATE INTRAVENOUS EVERY 5 MIN PRN
Status: CANCELLED | OUTPATIENT
Start: 2019-09-19

## 2019-09-19 RX ORDER — MITOMYCIN 5 MG/10ML
10 INJECTION, POWDER, LYOPHILIZED, FOR SOLUTION INTRAVENOUS ONCE
Status: CANCELLED
Start: 2019-09-19

## 2019-09-19 RX ORDER — LORAZEPAM 2 MG/ML
0.5 INJECTION INTRAMUSCULAR EVERY 4 HOURS PRN
Status: CANCELLED
Start: 2019-09-19

## 2019-09-19 RX ORDER — DIPHENHYDRAMINE HYDROCHLORIDE 50 MG/ML
50 INJECTION INTRAMUSCULAR; INTRAVENOUS
Status: CANCELLED
Start: 2019-09-19

## 2019-09-19 RX ADMIN — SODIUM CHLORIDE 250 ML: 9 INJECTION, SOLUTION INTRAVENOUS at 11:52

## 2019-09-19 RX ADMIN — MITOMYCIN 20 MG: 20 INJECTION, POWDER, LYOPHILIZED, FOR SOLUTION INTRAVENOUS at 12:40

## 2019-09-19 RX ADMIN — DEXAMETHASONE SODIUM PHOSPHATE 12 MG: 10 INJECTION, SOLUTION INTRAMUSCULAR; INTRAVENOUS at 11:53

## 2019-09-19 RX ADMIN — FAMOTIDINE 20 MG: 20 INJECTION, SOLUTION INTRAVENOUS at 12:09

## 2019-09-19 ASSESSMENT — PAIN SCALES - GENERAL: PAINLEVEL: NO PAIN (0)

## 2019-09-19 ASSESSMENT — MIFFLIN-ST. JEOR: SCORE: 1349.05

## 2019-09-19 NOTE — PROGRESS NOTES
ONCOLOGY FOLLOW UP VISIT      REASON FOR VISIT/CHIEF COMPLAIN:    July 2019 at age 68 diagnosed with anal cancer on concurrent chemo/RT      HISTORY OF ONCOLOGY ILLNESS:    In 7/2019 at age 68, she presented to her PMD Dr. Reid's office for rectal pain 4/10  for a few weeks, she reports she felt a mass in her rectum, increase in rectal pressure after eating. She makes her self have a BM to relieve the pressure. She notes one instance of bright red blood in her stool. She reports progressive difficulty in moving her bowel.   She then saw GI Dr. Aguirre, who found perianal skin examination was normal. Digital examination revealed a mass at the anterior wall of rectum. It was a hard mass approximately 3-4 cm.   Colonoscopy 7/12/2019 found 8 mm mucosal abnormality just above the dentate line. This was at the anterior wall facing her vagina where the large mass was felt underneath. 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  She then saw Gyn Dr. Eduardo, 7/19/2019, exam found normal vulva. No vaginal polyp/growth appreciated arising from vaginal mucosa (which appears to be smooth) but there is a nodular 3-4cm mass palpated at rectovaginal area, which is not as well appreciated by digital rectal exam. Fixed mass, mildly tender to touch. Cervix grossly normal. Uterus 6 weeks size, non-tender. No adnexal mass/tenderness bilaterally.     7/2013/2019 pelvic MRI found 2.5 x 2.5 x 2.6 cm mass located between the distal rectum and vagina which appears to arise exophytically from the distal rectum. Single mildly enlarged mesorectal lymph node measuring 8 mm in short axis. No pelvic sidewall adenopathy. No other bony or soft tissue abnormalities identified.     She then saw colon rectal surgeon Dr. Nadeen Murphy 7/26/2019, who informed pt that she has anal cancer, not tx by surgery. Recommended concurrent chemo/RT.    PET 8/2019 found  "Hypermetabolic 3.3 x 4.1 cm mass SUV 70 seen in the distal rectum/anus compatible with underlying malignancy. No evidence of distant hypermetabolic metastatic disease seen in the chest, abdomen or pelvis.    She then saw us and Rad-Onc, and made informed decision to proceed with concurrent chemoradiation cycle 1 day 1 2019.      INTERVAL HISTORY:   She is been managing through concurrent chemoradiation needing IVF support      PAST MEDICAL HISTORY  HTN, hyperlipidemia  depression      MEDICATIONS/ALLERY:  Reviewed in Epic system.        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       REVIEW OF SYSTEMS:   Reports nausea is on and off,    Energy is fair. She reports IVF helps her energy.   Loose BM easier to pass through         PHYSICAL EXAMINATION:   VITAL SIGNS:Blood pressure 119/43, pulse 74, temperature 97.8  F (36.6  C), temperature source Tympanic, resp. rate 16, height 1.632 m (5' 4.25\"), weight 84.5 kg (186 lb 4.8 oz), SpO2 99 %, not currently breastfeeding.      ECO    GENERAL APPEARANCE:  looks like her stated age, very pleasant, not in acute distress.   HEENT: The patient is normocephalic, atraumatic. Pupils are equally reactive to light.  Sclerae are anicteric.  Moist oral mucosa.  Negative pharynx.  No oral thrush. - ulcer.  NECK:  Supple.  No jugular venous distention.  Thyroid is not palpable.   LYMPH NODES:  Superficial lymphadenopathy is not appreciable in the bilateral cervical, supraclavicular, axillary or inguinal areas.   CARDIOVASCULAR:  S1, S2 regular with no murmurs or gallops.  No carotid or abdominal bruits.   PULMONARY:  Lungs are clear to auscultation and percussion bilaterally.  There is no wheezing or rhonchi.   GASTROINTESTINAL:  Abdomen is soft, nontender.  No hepatosplenomegaly.  No signs of ascites.  No " mass appreciable.   OR:  a mass at the anterior wall of rectum. It was a hard mass approximately 3-4 cm.   MUSCULOSKELETAL/EXTREMITIES:  No edema.  No cyanotic changes.  No signs of joint deformity.  No lymphedema.   NEUROLOGIC:  Cranial nerves II-XII are grossly intact.  Sensation intact.  Muscle strength and muscle tone symmetrical, 5/5 throughout.   BACK:  No spinal or paraspinal tenderness.  No CVA tenderness.   SKIN:  No petechiae.  No rash.  No signs of cellulitis.             CURRENT LAB DATA REVIEWED  Today 9/19 cbc diff/CMP are fine    7/2019 Baseline CMP is fine, cr 1.15  7/2019 Baseline cbc is fine with hb 11.7    7/2019 8 mm mucosal abnormality just above the dentate line. 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        CURRENT IMAGING REVIEWED: none    OLD DATA REVIEWED TODAY WITH SUMMARY:   PET 8/2019 found Hypermetabolic 3.3 x 4.1 cm mass SUV 70 seen in the distal rectum/anus compatible with underlying malignancy. No evidence of distant hypermetabolic metastatic disease seen in  the chest, abdomen or pelvis.      7/2013/2019 pelvic MRI found 2.5 x 2.5 x 2.6 cm mass located between the distal rectum and vagina which appears to arise exophytically from the distal rectum. Single mildly enlarged mesorectal lymph node measuring 8 mm in short axis. No pelvic sidewall adenopathy. No other bony or soft tissue abnormalities identified.           ASSESSMENT AND PLAN:    1. At age 68 in July 2019, she is diagnosed with low rectum/anal squamous cell carcinoma, months duration of anal pain pressure.      She made informed decision to proceed with concurrent chemoradiation cycle 1 day 1 August 22, 2019 with 5 follow-up and mitomycin.     She needs to be monitored closely during this highly toxic regimen.    She is having a rough time.    2. Nausea.   Advice compazine q 6hr, and ativan q hrs.   Added IV aloxi to future chemo.   Offer her  IVF with dex, zofran and pepcid  prn.     3. Diarrhea.   It is better to have BM on the looser side due to anal RT and pain etc.   We went over in detail how to use imodium.

## 2019-09-19 NOTE — PATIENT INSTRUCTIONS
Dr. Aquino would like you to continue with chemotherapy today.     We would like to see you back in 1-2 weeks for a follow up appointment.     When you are in need of a refill, please call your pharmacy and they will send us a request.      Copy of appointments, and after visit summary (AVS) given to patient.      If you have any questions please call Agata Renteria RN, BSN Oncology Hematology  [facility] [phone]. For questions after business hours, or on holidays/weekends, please call our after hours Nurse Triage line [phone]. Thank you.         Chem today.   F/u in 1-2 wks.

## 2019-09-19 NOTE — PROGRESS NOTES
Infusion Nursing Note:  Sara Dominguez presents today for MNitomycin, 5-FU.    Patient seen by provider today: Yes: Dr. Aquino   present during visit today: Not Applicable.    Note: N/A.    Intravenous Access:  Implanted Port.    Treatment Conditions:  Results reviewed, labs MET treatment parameters, ok to proceed with treatment.      Post Infusion Assessment:  Patient tolerated infusion without incident.       Discharge Plan:   Patient discharged in stable condition accompanied by: self. Chemo pump intact at discharge. Pt scheduled to return Mon for pump discontinue.    Chun Arrington, RN, RN

## 2019-09-19 NOTE — PROGRESS NOTES
"Oncology Rooming Note    September 19, 2019 11:29 AM   Sara Dominguez is a 69 year old female who presents for:    Chief Complaint   Patient presents with     Oncology Clinic Visit     Recheck Squamous cell carcinoma of anus, Labs & Chemo today     Initial Vitals: /43 (BP Location: Right arm, Patient Position: Sitting, Cuff Size: Adult Regular)   Pulse 74   Temp 97.8  F (36.6  C) (Tympanic)   Resp 16   Ht 1.632 m (5' 4.25\")   Wt 84.5 kg (186 lb 4.8 oz)   SpO2 99%   BMI 31.73 kg/m   Estimated body mass index is 31.73 kg/m  as calculated from the following:    Height as of this encounter: 1.632 m (5' 4.25\").    Weight as of this encounter: 84.5 kg (186 lb 4.8 oz). Body surface area is 1.96 meters squared.  No Pain (0) Comment: Data Unavailable   No LMP recorded. Patient is postmenopausal.  Allergies reviewed: Yes  Medications reviewed: Yes    Medications: MEDICATION REFILLS NEEDED TODAY. Provider was notified.  Pharmacy name entered into [facility]: [facility] [place], [place] - [address]    Clinical concerns:Recheck Squamous cell carcinoma of anus, Labs & Chemo today      Cici Purvis CMA              "

## 2019-09-19 NOTE — LETTER
"    9/19/2019         RE: Sara Dominguez  [address]  [place] [address]        Dear Colleague,    Thank you for referring your patient, Sara Dominguez, to the [facility]. Please see a copy of my visit note below.    Oncology Rooming Note    September 19, 2019 11:29 AM   Sara Dominguez is a 69 year old female who presents for:    Chief Complaint   Patient presents with     Oncology Clinic Visit     Recheck Squamous cell carcinoma of anus, Labs & Chemo today     Initial Vitals: /43 (BP Location: Right arm, Patient Position: Sitting, Cuff Size: Adult Regular)   Pulse 74   Temp 97.8  F (36.6  C) (Tympanic)   Resp 16   Ht 1.632 m (5' 4.25\")   Wt 84.5 kg (186 lb 4.8 oz)   SpO2 99%   BMI 31.73 kg/m    Estimated body mass index is 31.73 kg/m  as calculated from the following:    Height as of this encounter: 1.632 m (5' 4.25\").    Weight as of this encounter: 84.5 kg (186 lb 4.8 oz). Body surface area is 1.96 meters squared.  No Pain (0) Comment: Data Unavailable   No LMP recorded. Patient is postmenopausal.  Allergies reviewed: Yes  Medications reviewed: Yes    Medications: MEDICATION REFILLS NEEDED TODAY. Provider was notified.  Pharmacy name entered into [facility]: [facility] [place], [place] - [address]    Clinical concerns:Recheck Squamous cell carcinoma of anus, Labs & Chemo today      Cici Purvis CMA                ONCOLOGY FOLLOW UP VISIT      REASON FOR VISIT/CHIEF COMPLAIN:    July 2019 at age 68 diagnosed with anal cancer on concurrent chemo/RT      HISTORY OF ONCOLOGY ILLNESS:    In 7/2019 at age 68, she presented to her PMD Dr. Reid's office for rectal pain 4/10  for a few weeks, she reports she felt a mass in her rectum, increase in rectal pressure after eating. She makes her self have a BM to relieve the pressure. She notes one instance of bright red blood in her stool. She reports progressive difficulty in moving her bowel.   She then " saw GI Dr. Aguirre, who found perianal skin examination was normal. Digital examination revealed a mass at the anterior wall of rectum. It was a hard mass approximately 3-4 cm.   Colonoscopy 7/12/2019 found 8 mm mucosal abnormality just above the dentate line. This was at the anterior wall facing her vagina where the large mass was felt underneath. 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  She then saw Gyn Dr. Eduardo, 7/19/2019, exam found normal vulva. No vaginal polyp/growth appreciated arising from vaginal mucosa (which appears to be smooth) but there is a nodular 3-4cm mass palpated at rectovaginal area, which is not as well appreciated by digital rectal exam. Fixed mass, mildly tender to touch. Cervix grossly normal. Uterus 6 weeks size, non-tender. No adnexal mass/tenderness bilaterally.     7/2013/2019 pelvic MRI found 2.5 x 2.5 x 2.6 cm mass located between the distal rectum and vagina which appears to arise exophytically from the distal rectum. Single mildly enlarged mesorectal lymph node measuring 8 mm in short axis. No pelvic sidewall adenopathy. No other bony or soft tissue abnormalities identified.     She then saw colon rectal surgeon Dr. Nadeen Murphy 7/26/2019, who informed pt that she has anal cancer, not tx by surgery. Recommended concurrent chemo/RT.    PET 8/2019 found Hypermetabolic 3.3 x 4.1 cm mass SUV 70 seen in the distal rectum/anus compatible with underlying malignancy. No evidence of distant hypermetabolic metastatic disease seen in the chest, abdomen or pelvis.    She then saw us and Rad-Onc, and made informed decision to proceed with concurrent chemoradiation cycle 1 day 1 August 22, 2019.      INTERVAL HISTORY:   She is been managing through concurrent chemoradiation needing IVF support      PAST MEDICAL HISTORY  HTN, hyperlipidemia  depression      MEDICATIONS/ALLERY:  Reviewed in Epic system.  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       REVIEW OF SYSTEMS:   Reports nausea is on and off,    Energy is fair. She reports IVF helps her energy.   Loose BM easier to pass through         PHYSICAL EXAMINATION:   VITAL SIGNS:Blood pressure 119/43, pulse 74, temperature 97.8  F (36.6  C), temperature source Tympanic, resp. rate 16, height 1.632 m (5' 4.25\"), weight 84.5 kg (186 lb 4.8 oz), SpO2 99 %, not currently breastfeeding.      ECO    GENERAL APPEARANCE:  looks like her stated age, very pleasant, not in acute distress.   HEENT: The patient is normocephalic, atraumatic. Pupils are equally reactive to light.  Sclerae are anicteric.  Moist oral mucosa.  Negative pharynx.  No oral thrush. - ulcer.  NECK:  Supple.  No jugular venous distention.  Thyroid is not palpable.   LYMPH NODES:  Superficial lymphadenopathy is not appreciable in the bilateral cervical, supraclavicular, axillary or inguinal areas.   CARDIOVASCULAR:  S1, S2 regular with no murmurs or gallops.  No carotid or abdominal bruits.   PULMONARY:  Lungs are clear to auscultation and percussion bilaterally.  There is no wheezing or rhonchi.   GASTROINTESTINAL:  Abdomen is soft, nontender.  No hepatosplenomegaly.  No signs of ascites.  No mass appreciable.   NH:  a mass at the anterior wall of rectum. It was a hard mass approximately 3-4 cm.   MUSCULOSKELETAL/EXTREMITIES:  No edema.  No cyanotic changes.  No signs of joint deformity.  No lymphedema.   NEUROLOGIC:  Cranial nerves II-XII are grossly intact.  Sensation intact.  Muscle strength and muscle tone symmetrical, 5/5 throughout.   BACK:  No spinal or paraspinal tenderness.  No CVA tenderness.   SKIN:  No petechiae.  No rash.  No signs of cellulitis.             CURRENT LAB DATA REVIEWED  Today  cbc diff/CMP are " fine    7/2019 Baseline CMP is fine, cr 1.15  7/2019 Baseline cbc is fine with hb 11.7    7/2019 8 mm mucosal abnormality just above the dentate line. 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        CURRENT IMAGING REVIEWED: none    OLD DATA REVIEWED TODAY WITH SUMMARY:   PET 8/2019 found Hypermetabolic 3.3 x 4.1 cm mass SUV 70 seen in the distal rectum/anus compatible with underlying malignancy. No evidence of distant hypermetabolic metastatic disease seen in  the chest, abdomen or pelvis.      7/2013/2019 pelvic MRI found 2.5 x 2.5 x 2.6 cm mass located between the distal rectum and vagina which appears to arise exophytically from the distal rectum. Single mildly enlarged mesorectal lymph node measuring 8 mm in short axis. No pelvic sidewall adenopathy. No other bony or soft tissue abnormalities identified.           ASSESSMENT AND PLAN:    1. At age 68 in July 2019, she is diagnosed with low rectum/anal squamous cell carcinoma, months duration of anal pain pressure.      She made informed decision to proceed with concurrent chemoradiation cycle 1 day 1 August 22, 2019 with 5 follow-up and mitomycin.     She needs to be monitored closely during this highly toxic regimen.    She is having a rough time.    2. Nausea.   Advice compazine q 6hr, and ativan q hrs.   Added IV aloxi to future chemo.   Offer her IVF with dex, zofran and pepcid  prn.     3. Diarrhea.   It is better to have BM on the looser side due to anal RT and pain etc.   We went over in detail how to use imodium.         Again, thank you for allowing me to participate in the care of your patient.        Sincerely,        Whitney Aquino MD, MD

## 2019-09-20 ENCOUNTER — APPOINTMENT (OUTPATIENT)
Dept: RADIATION THERAPY | Facility: OUTPATIENT CENTER | Age: 69
End: 2019-09-20
Payer: COMMERCIAL

## 2019-09-20 RX ORDER — LORAZEPAM 0.5 MG/1
0.5 TABLET ORAL AT BEDTIME
Qty: 30 TABLET | Refills: 0 | Status: SHIPPED | OUTPATIENT
Start: 2019-09-20 | End: 2019-10-08

## 2019-09-20 NOTE — PROGRESS NOTES
This is a recent snapshot of the patient's [facility] medical record.  For current drug dose and complete information and questions, call [phone] or In Basket pool, [facility] home infusion ([id number])  CSN Number:  [id number]

## 2019-09-23 ENCOUNTER — INFUSION THERAPY VISIT (OUTPATIENT)
Dept: INFUSION THERAPY | Facility: CLINIC | Age: 69
End: 2019-09-23
Attending: INTERNAL MEDICINE
Payer: COMMERCIAL

## 2019-09-23 ENCOUNTER — APPOINTMENT (OUTPATIENT)
Dept: RADIATION THERAPY | Facility: OUTPATIENT CENTER | Age: 69
End: 2019-09-23
Payer: COMMERCIAL

## 2019-09-23 VITALS
HEART RATE: 81 BPM | TEMPERATURE: 98.3 F | RESPIRATION RATE: 16 BRPM | SYSTOLIC BLOOD PRESSURE: 118 MMHG | DIASTOLIC BLOOD PRESSURE: 39 MMHG

## 2019-09-23 DIAGNOSIS — C21.0 SQUAMOUS CELL CARCINOMA OF ANUS (H): Primary | ICD-10-CM

## 2019-09-23 DIAGNOSIS — I95.9 HYPOTENSION, UNSPECIFIED HYPOTENSION TYPE: ICD-10-CM

## 2019-09-23 DIAGNOSIS — R11.0 NAUSEA: ICD-10-CM

## 2019-09-23 PROCEDURE — 25000128 H RX IP 250 OP 636

## 2019-09-23 PROCEDURE — 25000128 H RX IP 250 OP 636: Performed by: INTERNAL MEDICINE

## 2019-09-23 PROCEDURE — 96366 THER/PROPH/DIAG IV INF ADDON: CPT

## 2019-09-23 PROCEDURE — 96365 THER/PROPH/DIAG IV INF INIT: CPT

## 2019-09-23 PROCEDURE — 96368 THER/DIAG CONCURRENT INF: CPT

## 2019-09-23 PROCEDURE — 96375 TX/PRO/DX INJ NEW DRUG ADDON: CPT

## 2019-09-23 RX ORDER — LORAZEPAM 2 MG/ML
0.5 INJECTION INTRAMUSCULAR ONCE
Status: CANCELLED
Start: 2019-09-23

## 2019-09-23 RX ORDER — ONDANSETRON 2 MG/ML
4 INJECTION INTRAMUSCULAR; INTRAVENOUS ONCE
Status: CANCELLED
Start: 2019-09-23

## 2019-09-23 RX ORDER — SODIUM CHLORIDE AND POTASSIUM CHLORIDE 150; 900 MG/100ML; MG/100ML
INJECTION, SOLUTION INTRAVENOUS CONTINUOUS
Status: DISCONTINUED | OUTPATIENT
Start: 2019-09-23 | End: 2019-09-23 | Stop reason: HOSPADM

## 2019-09-23 RX ORDER — DEXAMETHASONE SODIUM PHOSPHATE 4 MG/ML
2 INJECTION, SOLUTION INTRA-ARTICULAR; INTRALESIONAL; INTRAMUSCULAR; INTRAVENOUS; SOFT TISSUE ONCE
Status: CANCELLED
Start: 2019-09-23

## 2019-09-23 RX ORDER — ONDANSETRON 2 MG/ML
4 INJECTION INTRAMUSCULAR; INTRAVENOUS EVERY 6 HOURS PRN
Status: CANCELLED
Start: 2019-09-23

## 2019-09-23 RX ORDER — DEXAMETHASONE SODIUM PHOSPHATE 4 MG/ML
4 INJECTION, SOLUTION INTRA-ARTICULAR; INTRALESIONAL; INTRAMUSCULAR; INTRAVENOUS; SOFT TISSUE ONCE
Status: COMPLETED | OUTPATIENT
Start: 2019-09-23 | End: 2019-09-23

## 2019-09-23 RX ORDER — DEXAMETHASONE SODIUM PHOSPHATE 4 MG/ML
4 INJECTION, SOLUTION INTRA-ARTICULAR; INTRALESIONAL; INTRAMUSCULAR; INTRAVENOUS; SOFT TISSUE ONCE
Status: CANCELLED
Start: 2019-09-23

## 2019-09-23 RX ORDER — ONDANSETRON 2 MG/ML
4 INJECTION INTRAMUSCULAR; INTRAVENOUS ONCE
Status: COMPLETED | OUTPATIENT
Start: 2019-09-23 | End: 2019-09-23

## 2019-09-23 RX ORDER — SODIUM CHLORIDE AND POTASSIUM CHLORIDE 150; 900 MG/100ML; MG/100ML
INJECTION, SOLUTION INTRAVENOUS CONTINUOUS
Status: CANCELLED
Start: 2019-09-23

## 2019-09-23 RX ORDER — HEPARIN SODIUM (PORCINE) LOCK FLUSH IV SOLN 100 UNIT/ML 100 UNIT/ML
SOLUTION INTRAVENOUS
Status: COMPLETED
Start: 2019-09-23 | End: 2019-09-23

## 2019-09-23 RX ADMIN — FAMOTIDINE 20 MG: 20 INJECTION, SOLUTION INTRAVENOUS at 10:58

## 2019-09-23 RX ADMIN — Medication 500 UNITS: at 12:39

## 2019-09-23 RX ADMIN — SODIUM CHLORIDE 1000 ML: 9 INJECTION, SOLUTION INTRAVENOUS at 10:33

## 2019-09-23 RX ADMIN — ONDANSETRON 4 MG: 2 INJECTION INTRAMUSCULAR; INTRAVENOUS at 11:09

## 2019-09-23 RX ADMIN — POTASSIUM CHLORIDE AND SODIUM CHLORIDE: 900; 150 INJECTION, SOLUTION INTRAVENOUS at 10:57

## 2019-09-23 RX ADMIN — DEXAMETHASONE SODIUM PHOSPHATE 4 MG: 4 INJECTION, SOLUTION INTRAMUSCULAR; INTRAVENOUS at 11:12

## 2019-09-23 NOTE — PROGRESS NOTES
"Infusion Nursing Note:  Sara Dominguez presents today for IVF    Patient seen by provider today: No   present during visit today: Not Applicable.    Note: Pt presents early for pump discharge. She is about 4hrs short of a full 96 hr infusion but infusor is empty. Pt reports she had a \"really rough weekend.\" Oral fluid intake acceptable but has been very nauseated all weekend.    Intravenous Access:  Implanted Port.    Treatment Conditions:  Not Applicable.      Post Infusion Assessment:  Patient tolerated infusion without incident.       Discharge Plan:   Patient discharged in stable condition accompanied by: self. Scheduled to return next Mon for one more IVF support visit.    Chun Arrington, RN, RN                        "

## 2019-09-24 ENCOUNTER — APPOINTMENT (OUTPATIENT)
Dept: RADIATION THERAPY | Facility: OUTPATIENT CENTER | Age: 69
End: 2019-09-24
Payer: COMMERCIAL

## 2019-09-24 DIAGNOSIS — G89.3 CANCER RELATED PAIN: ICD-10-CM

## 2019-09-24 PROBLEM — C21.0 SQUAMOUS CELL CARCINOMA OF ANUS (H): Chronic | Status: ACTIVE | Noted: 2019-08-08

## 2019-09-24 RX ORDER — OXYCODONE AND ACETAMINOPHEN 10; 325 MG/1; MG/1
1 TABLET ORAL EVERY 4 HOURS PRN
Qty: 90 TABLET | Refills: 0 | Status: SHIPPED | OUTPATIENT
Start: 2019-09-24 | End: 2019-10-07

## 2019-09-25 ENCOUNTER — APPOINTMENT (OUTPATIENT)
Dept: RADIATION THERAPY | Facility: OUTPATIENT CENTER | Age: 69
End: 2019-09-25
Payer: COMMERCIAL

## 2019-09-25 ENCOUNTER — PATIENT OUTREACH (OUTPATIENT)
Dept: ONCOLOGY | Facility: CLINIC | Age: 69
End: 2019-09-25

## 2019-09-25 ENCOUNTER — OFFICE VISIT (OUTPATIENT)
Dept: RADIATION THERAPY | Facility: OUTPATIENT CENTER | Age: 69
End: 2019-09-25
Payer: COMMERCIAL

## 2019-09-25 VITALS
HEART RATE: 77 BPM | BODY MASS INDEX: 30.28 KG/M2 | DIASTOLIC BLOOD PRESSURE: 55 MMHG | SYSTOLIC BLOOD PRESSURE: 104 MMHG | WEIGHT: 177.8 LBS | RESPIRATION RATE: 18 BRPM | OXYGEN SATURATION: 95 %

## 2019-09-25 DIAGNOSIS — G89.3 CANCER RELATED PAIN: Primary | ICD-10-CM

## 2019-09-25 RX ORDER — MORPHINE SULFATE 15 MG/1
15 TABLET, FILM COATED, EXTENDED RELEASE ORAL EVERY 12 HOURS
Qty: 60 TABLET | Refills: 0 | Status: SHIPPED | OUTPATIENT
Start: 2019-09-25 | End: 2019-10-22

## 2019-09-25 RX ORDER — MORPHINE SULFATE 15 MG/1
15 TABLET, FILM COATED, EXTENDED RELEASE ORAL EVERY 12 HOURS
Qty: 60 TABLET | Refills: 0 | Status: SHIPPED | OUTPATIENT
Start: 2019-09-25 | End: 2019-09-25

## 2019-09-25 NOTE — PROGRESS NOTES
FMLA forms have been completed for [employer] and faxed in. Copy sent to scanning and original placed at the font desk for pt to .     Agata Renteria RN on 9/25/2019 at 11:46 AM

## 2019-09-25 NOTE — LETTER
2019      RE: Sara Dominguez  [address]  [place] [address]       [facility] PHYSICI[facility]  SPECIALIZING IN BREAKTHROUGHS  Radiation Oncology    On Treatment Visit Note      Sara Dominguez      Date: 2019   MRN: [id number]   : 1950  Diagnosis: Anal ca      Reason for Visit:  On Radiation Treatment Visit     Treatment Summary to Date  Treatment Site: anal/pelvis Current Dose: 4320/5400 cGy Fractions:       Chemotherapy  Chemo concurrent with radx?: Yes  Oncologist: Dr. Aquino  Drug Name/Frequency 1: 5 FU  Drug Name/Frequency 1: mitomycin    Subjective:   Doing overall well. Tolerating chemo. Counts from 19 down slightly (WBC 5.2, ANC 4.1, Plt 278, Hgb 10.6).    Some increase in perianal pain since start of treatment, but better managed on percocet  mg x four times per day. Mild increase in urinary frequency and dysuria, particularly at night. Continuing skin care. Using aquaphor, silvadene. Has neosporin with pain relief.  Diarrhea better controlled on low residue diet and using imodium PRN with alleviation. Receiving IVF.         Nursing ROS:   Nutrition Alteration  Diet Type: Patient's Preference  Skin  Skin Reaction: 1 - Faint erythema or dry desquamation  Skin Note: reviewed options of aquaphor, proshield, sitz baths.     ENT and Mouth Exam  Mucositis - Current: 1 - Generalized erythema(secondary to chemo )  Cardiovascular  Respiratory effort: 1 - Normal - without distress  Gastrointestinal  Nausea: 1 - One to two episodes of nausea/24  Genitourinary  Urinary Status: 0 - Normal     Pain Assessment  0-10 Pain Scale: 3  Pain Note: has percocet uses occassionally/PRN in am and pm      Objective  /55   Pulse 77   Resp 18   Wt 80.6 kg (177 lb 12.8 oz)   SpO2 95%   BMI 30.28 kg/m       Moderate erythema in perianal area with small areas of dry desquamation. No groin or vaginal area desquamation.     Labs:  CBC RESULTS:   Lab Results   Component  Value Date    WBC 1.6 09/03/2019     Lab Results   Component Value Date    RBC 3.43 09/03/2019     Lab Results   Component Value Date    HGB 10.1 09/03/2019     Lab Results   Component Value Date    HCT 31.4 09/03/2019     No components found for: MCT  Lab Results   Component Value Date    MCV 92 09/03/2019     Lab Results   Component Value Date    MCH 29.4 09/03/2019     Lab Results   Component Value Date    MCHC 32.2 09/03/2019     Lab Results   Component Value Date    RDW 12.6 09/03/2019     Lab Results   Component Value Date     09/03/2019             Assessment:  Ms. 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  She is undergoing definitive CRT.    Tolerating radiation therapy well.  All questions and concerns addressed.    Plan:   1. Continue current therapy.    2. Cancer related pain. Percocet  mg q6 hrs PRN pain,  Morphine 15 mg ER (prescribed today, didn't tolerate oxycontin well).  3. Skin care. Aquaphor, sitz baths, perianal squirt bottle, baby wipes. Silvadene for area of desquamation.   4. Dysuria. Could be related to RT change, discussed consideration of ibuprofen PRN to mitigate radiation induced inflammation. However, discuss to hydrate well to help flush kidney.       Mosaiq chart and setup information reviewed  Ports checked    Medication Review  Med list reviewed with patient?: Yes    Educational Topic Discussed  Education Instructions: reviewed skin care            Justin Khan MD

## 2019-09-25 NOTE — PROGRESS NOTES
[facility] PHYSICIANS  SPECIALIZING IN BREAKTHROUGHS  Radiation Oncology    On Treatment Visit Note      Sara Dominguez      Date: 2019   MRN: [id number]   : 1950  Diagnosis: Anal ca      Reason for Visit:  On Radiation Treatment Visit     Treatment Summary to Date  Treatment Site: anal/pelvis Current Dose: 4320/5400 cGy Fractions:       Chemotherapy  Chemo concurrent with radx?: Yes  Oncologist: Dr. Aquino  Drug Name/Frequency 1: 5 FU  Drug Name/Frequency 1: mitomycin    Subjective:   Doing overall well. Tolerating chemo. Counts from 19 down slightly (WBC 5.2, ANC 4.1, Plt 278, Hgb 10.6).    Some increase in perianal pain since start of treatment, but better managed on percocet  mg x four times per day. Mild increase in urinary frequency and dysuria, particularly at night. Continuing skin care. Using aquaphor, silvadene. Has neosporin with pain relief.  Diarrhea better controlled on low residue diet and using imodium PRN with alleviation. Receiving IVF.         Nursing ROS:   Nutrition Alteration  Diet Type: Patient's Preference  Skin  Skin Reaction: 1 - Faint erythema or dry desquamation  Skin Note: reviewed options of aquaphor, proshield, sitz baths.     ENT and Mouth Exam  Mucositis - Current: 1 - Generalized erythema(secondary to chemo )  Cardiovascular  Respiratory effort: 1 - Normal - without distress  Gastrointestinal  Nausea: 1 - One to two episodes of nausea/24  Genitourinary  Urinary Status: 0 - Normal     Pain Assessment  0-10 Pain Scale: 3  Pain Note: has percocet uses occassionally/PRN in am and pm      Objective  /55   Pulse 77   Resp 18   Wt 80.6 kg (177 lb 12.8 oz)   SpO2 95%   BMI 30.28 kg/m      Moderate erythema in perianal area with small areas of dry desquamation. No groin or vaginal area desquamation.     Labs:  CBC RESULTS:   Lab Results   Component Value Date    WBC 1.6 2019     Lab Results   Component Value Date    RBC 3.43  09/03/2019     Lab Results   Component Value Date    HGB 10.1 09/03/2019     Lab Results   Component Value Date    HCT 31.4 09/03/2019     No components found for: MCT  Lab Results   Component Value Date    MCV 92 09/03/2019     Lab Results   Component Value Date    MCH 29.4 09/03/2019     Lab Results   Component Value Date    MCHC 32.2 09/03/2019     Lab Results   Component Value Date    RDW 12.6 09/03/2019     Lab Results   Component Value Date     09/03/2019             Assessment:  Ms. 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  She is undergoing definitive CRT.    Tolerating radiation therapy well.  All questions and concerns addressed.    Plan:   1. Continue current therapy.    2. Cancer related pain. Percocet  mg q6 hrs PRN pain,  Morphine 15 mg ER (prescribed today, didn't tolerate oxycontin well).  3. Skin care. Aquaphor, sitz baths, perianal squirt bottle, baby wipes. Silvadene for area of desquamation.   4. Dysuria. Could be related to RT change, discussed consideration of ibuprofen PRN to mitigate radiation induced inflammation. However, discuss to hydrate well to help flush kidney.       Mosaiq chart and setup information reviewed  Ports checked    Medication Review  Med list reviewed with patient?: Yes    Educational Topic Discussed  Education Instructions: reviewed skin care      Justin Khan MD

## 2019-09-26 ENCOUNTER — APPOINTMENT (OUTPATIENT)
Dept: RADIATION THERAPY | Facility: OUTPATIENT CENTER | Age: 69
End: 2019-09-26
Payer: COMMERCIAL

## 2019-09-26 ENCOUNTER — INFUSION THERAPY VISIT (OUTPATIENT)
Dept: INFUSION THERAPY | Facility: CLINIC | Age: 69
End: 2019-09-26
Attending: INTERNAL MEDICINE
Payer: COMMERCIAL

## 2019-09-26 VITALS — HEART RATE: 90 BPM | TEMPERATURE: 96.8 F | SYSTOLIC BLOOD PRESSURE: 124 MMHG | DIASTOLIC BLOOD PRESSURE: 47 MMHG

## 2019-09-26 DIAGNOSIS — I95.9 HYPOTENSION, UNSPECIFIED HYPOTENSION TYPE: ICD-10-CM

## 2019-09-26 DIAGNOSIS — C21.0 SQUAMOUS CELL CARCINOMA OF ANUS (H): ICD-10-CM

## 2019-09-26 DIAGNOSIS — R11.0 NAUSEA: Primary | ICD-10-CM

## 2019-09-26 DIAGNOSIS — C21.1 MALIGNANT NEOPLASM OF ANAL CANAL (H): ICD-10-CM

## 2019-09-26 PROCEDURE — 96365 THER/PROPH/DIAG IV INF INIT: CPT

## 2019-09-26 PROCEDURE — 96375 TX/PRO/DX INJ NEW DRUG ADDON: CPT

## 2019-09-26 PROCEDURE — 96368 THER/DIAG CONCURRENT INF: CPT

## 2019-09-26 PROCEDURE — 25000128 H RX IP 250 OP 636: Performed by: INTERNAL MEDICINE

## 2019-09-26 RX ORDER — DEXAMETHASONE SODIUM PHOSPHATE 4 MG/ML
2 INJECTION, SOLUTION INTRA-ARTICULAR; INTRALESIONAL; INTRAMUSCULAR; INTRAVENOUS; SOFT TISSUE ONCE
Status: CANCELLED
Start: 2019-09-26

## 2019-09-26 RX ORDER — DEXAMETHASONE SODIUM PHOSPHATE 4 MG/ML
4 INJECTION, SOLUTION INTRA-ARTICULAR; INTRALESIONAL; INTRAMUSCULAR; INTRAVENOUS; SOFT TISSUE ONCE
Status: CANCELLED
Start: 2019-09-26

## 2019-09-26 RX ORDER — ONDANSETRON 2 MG/ML
4 INJECTION INTRAMUSCULAR; INTRAVENOUS ONCE
Status: CANCELLED
Start: 2019-09-26

## 2019-09-26 RX ORDER — DEXAMETHASONE SODIUM PHOSPHATE 4 MG/ML
2 INJECTION, SOLUTION INTRA-ARTICULAR; INTRALESIONAL; INTRAMUSCULAR; INTRAVENOUS; SOFT TISSUE ONCE
Status: COMPLETED | OUTPATIENT
Start: 2019-09-26 | End: 2019-09-26

## 2019-09-26 RX ORDER — LORAZEPAM 2 MG/ML
0.5 INJECTION INTRAMUSCULAR ONCE
Status: CANCELLED
Start: 2019-09-26

## 2019-09-26 RX ORDER — ONDANSETRON 2 MG/ML
4 INJECTION INTRAMUSCULAR; INTRAVENOUS EVERY 6 HOURS PRN
Status: CANCELLED
Start: 2019-09-26

## 2019-09-26 RX ORDER — ONDANSETRON 2 MG/ML
4 INJECTION INTRAMUSCULAR; INTRAVENOUS EVERY 6 HOURS PRN
Status: DISCONTINUED | OUTPATIENT
Start: 2019-09-26 | End: 2019-09-26 | Stop reason: HOSPADM

## 2019-09-26 RX ORDER — SODIUM CHLORIDE AND POTASSIUM CHLORIDE 150; 900 MG/100ML; MG/100ML
INJECTION, SOLUTION INTRAVENOUS CONTINUOUS
Status: CANCELLED
Start: 2019-09-26

## 2019-09-26 RX ORDER — SODIUM CHLORIDE AND POTASSIUM CHLORIDE 150; 900 MG/100ML; MG/100ML
INJECTION, SOLUTION INTRAVENOUS CONTINUOUS
Status: DISCONTINUED | OUTPATIENT
Start: 2019-09-26 | End: 2019-09-26 | Stop reason: HOSPADM

## 2019-09-26 RX ORDER — HEPARIN SODIUM (PORCINE) LOCK FLUSH IV SOLN 100 UNIT/ML 100 UNIT/ML
5 SOLUTION INTRAVENOUS
Status: DISCONTINUED | OUTPATIENT
Start: 2019-09-26 | End: 2019-09-26 | Stop reason: HOSPADM

## 2019-09-26 RX ADMIN — Medication 5 ML: at 11:35

## 2019-09-26 RX ADMIN — ONDANSETRON 4 MG: 2 INJECTION INTRAMUSCULAR; INTRAVENOUS at 10:23

## 2019-09-26 RX ADMIN — DEXAMETHASONE SODIUM PHOSPHATE 2 MG: 4 INJECTION, SOLUTION INTRAMUSCULAR; INTRAVENOUS at 10:19

## 2019-09-26 RX ADMIN — FAMOTIDINE 20 MG: 20 INJECTION, SOLUTION INTRAVENOUS at 10:26

## 2019-09-26 RX ADMIN — POTASSIUM CHLORIDE AND SODIUM CHLORIDE: 900; 150 INJECTION, SOLUTION INTRAVENOUS at 10:17

## 2019-09-26 NOTE — PROGRESS NOTES
Infusion Nursing Note:  Saraguille Dominguez presents today for meds, IVF's.    Patient seen by provider today: No   present during visit today: Not Applicable.    Note: N/A.    Intravenous Access:  Implanted Port.    Treatment Conditions:  Not Applicable.      Post Infusion Assessment:  Patient tolerated infusion without incident.  Blood return noted pre and post infusion.  Site patent and intact, free from redness, edema or discomfort.  No evidence of extravasations.  Access discontinued per protocol.       Discharge Plan:   Patient discharged in stable condition accompanied by: self.  Departure Mode: Ambulatory.    Janey Yao, RN, RN

## 2019-09-27 ENCOUNTER — INFUSION THERAPY VISIT (OUTPATIENT)
Dept: INFUSION THERAPY | Facility: CLINIC | Age: 69
End: 2019-09-27
Attending: INTERNAL MEDICINE
Payer: COMMERCIAL

## 2019-09-27 ENCOUNTER — APPOINTMENT (OUTPATIENT)
Dept: RADIATION THERAPY | Facility: OUTPATIENT CENTER | Age: 69
End: 2019-09-27
Payer: COMMERCIAL

## 2019-09-27 VITALS
HEART RATE: 82 BPM | OXYGEN SATURATION: 98 % | RESPIRATION RATE: 18 BRPM | DIASTOLIC BLOOD PRESSURE: 50 MMHG | SYSTOLIC BLOOD PRESSURE: 130 MMHG

## 2019-09-27 DIAGNOSIS — I95.9 HYPOTENSION, UNSPECIFIED HYPOTENSION TYPE: ICD-10-CM

## 2019-09-27 DIAGNOSIS — R11.0 NAUSEA: Primary | ICD-10-CM

## 2019-09-27 PROCEDURE — 96365 THER/PROPH/DIAG IV INF INIT: CPT

## 2019-09-27 PROCEDURE — 25000128 H RX IP 250 OP 636: Performed by: INTERNAL MEDICINE

## 2019-09-27 PROCEDURE — 96368 THER/DIAG CONCURRENT INF: CPT

## 2019-09-27 PROCEDURE — 96375 TX/PRO/DX INJ NEW DRUG ADDON: CPT

## 2019-09-27 RX ORDER — ONDANSETRON 2 MG/ML
4 INJECTION INTRAMUSCULAR; INTRAVENOUS ONCE
Status: CANCELLED
Start: 2019-09-27

## 2019-09-27 RX ORDER — DEXAMETHASONE SODIUM PHOSPHATE 4 MG/ML
2 INJECTION, SOLUTION INTRA-ARTICULAR; INTRALESIONAL; INTRAMUSCULAR; INTRAVENOUS; SOFT TISSUE ONCE
Status: CANCELLED
Start: 2019-09-27

## 2019-09-27 RX ORDER — SODIUM CHLORIDE AND POTASSIUM CHLORIDE 150; 900 MG/100ML; MG/100ML
INJECTION, SOLUTION INTRAVENOUS CONTINUOUS
Status: CANCELLED
Start: 2019-09-27

## 2019-09-27 RX ORDER — HEPARIN SODIUM (PORCINE) LOCK FLUSH IV SOLN 100 UNIT/ML 100 UNIT/ML
5 SOLUTION INTRAVENOUS
Status: DISCONTINUED | OUTPATIENT
Start: 2019-09-27 | End: 2019-09-27 | Stop reason: HOSPADM

## 2019-09-27 RX ORDER — ONDANSETRON 2 MG/ML
4 INJECTION INTRAMUSCULAR; INTRAVENOUS EVERY 6 HOURS PRN
Status: CANCELLED
Start: 2019-09-27

## 2019-09-27 RX ORDER — DEXAMETHASONE SODIUM PHOSPHATE 4 MG/ML
4 INJECTION, SOLUTION INTRA-ARTICULAR; INTRALESIONAL; INTRAMUSCULAR; INTRAVENOUS; SOFT TISSUE ONCE
Status: CANCELLED
Start: 2019-09-27

## 2019-09-27 RX ORDER — DEXAMETHASONE SODIUM PHOSPHATE 4 MG/ML
4 INJECTION, SOLUTION INTRA-ARTICULAR; INTRALESIONAL; INTRAMUSCULAR; INTRAVENOUS; SOFT TISSUE ONCE
Status: COMPLETED | OUTPATIENT
Start: 2019-09-27 | End: 2019-09-27

## 2019-09-27 RX ORDER — ONDANSETRON 2 MG/ML
4 INJECTION INTRAMUSCULAR; INTRAVENOUS ONCE
Status: COMPLETED | OUTPATIENT
Start: 2019-09-27 | End: 2019-09-27

## 2019-09-27 RX ORDER — LORAZEPAM 2 MG/ML
0.5 INJECTION INTRAMUSCULAR ONCE
Status: CANCELLED
Start: 2019-09-27

## 2019-09-27 RX ORDER — SODIUM CHLORIDE AND POTASSIUM CHLORIDE 150; 900 MG/100ML; MG/100ML
INJECTION, SOLUTION INTRAVENOUS CONTINUOUS
Status: DISCONTINUED | OUTPATIENT
Start: 2019-09-27 | End: 2019-09-27 | Stop reason: HOSPADM

## 2019-09-27 RX ADMIN — Medication 5 ML: at 11:24

## 2019-09-27 RX ADMIN — POTASSIUM CHLORIDE AND SODIUM CHLORIDE: 900; 150 INJECTION, SOLUTION INTRAVENOUS at 10:14

## 2019-09-27 RX ADMIN — ONDANSETRON 4 MG: 2 INJECTION INTRAMUSCULAR; INTRAVENOUS at 10:15

## 2019-09-27 RX ADMIN — FAMOTIDINE 20 MG: 20 INJECTION, SOLUTION INTRAVENOUS at 10:14

## 2019-09-27 RX ADMIN — DEXAMETHASONE SODIUM PHOSPHATE 4 MG: 4 INJECTION, SOLUTION INTRAMUSCULAR; INTRAVENOUS at 10:17

## 2019-09-27 ASSESSMENT — PAIN SCALES - GENERAL: PAINLEVEL: SEVERE PAIN (6)

## 2019-09-27 NOTE — PROGRESS NOTES
Infusion Nursing Note:  Sara DEE Angelica presents today for IVFs/ antiemetics.    Patient seen by provider today: No   present during visit today: Not Applicable.    Note: N/A.    Intravenous Access:  Implanted Port.    Treatment Conditions:  Not Applicable.      Post Infusion Assessment:  Patient tolerated infusion without incident.  Blood return noted pre and post infusion.  Site patent and intact, free from redness, edema or discomfort.  No evidence of extravasations.  Access discontinued per protocol.       Discharge Plan:   Patient discharged in stable condition accompanied by: self.  Departure Mode: Ambulatory.    Jada Dejesus, RN, RN

## 2019-09-30 ENCOUNTER — INFUSION THERAPY VISIT (OUTPATIENT)
Dept: INFUSION THERAPY | Facility: CLINIC | Age: 69
End: 2019-09-30
Attending: INTERNAL MEDICINE
Payer: COMMERCIAL

## 2019-09-30 VITALS — HEART RATE: 104 BPM | TEMPERATURE: 99.6 F | SYSTOLIC BLOOD PRESSURE: 117 MMHG | DIASTOLIC BLOOD PRESSURE: 58 MMHG

## 2019-09-30 DIAGNOSIS — R11.0 NAUSEA: Primary | ICD-10-CM

## 2019-09-30 DIAGNOSIS — I95.9 HYPOTENSION, UNSPECIFIED HYPOTENSION TYPE: ICD-10-CM

## 2019-09-30 PROCEDURE — 25000128 H RX IP 250 OP 636: Performed by: INTERNAL MEDICINE

## 2019-09-30 PROCEDURE — 96375 TX/PRO/DX INJ NEW DRUG ADDON: CPT

## 2019-09-30 PROCEDURE — 96366 THER/PROPH/DIAG IV INF ADDON: CPT

## 2019-09-30 PROCEDURE — 96368 THER/DIAG CONCURRENT INF: CPT

## 2019-09-30 PROCEDURE — 96365 THER/PROPH/DIAG IV INF INIT: CPT

## 2019-09-30 RX ORDER — ONDANSETRON 2 MG/ML
4 INJECTION INTRAMUSCULAR; INTRAVENOUS EVERY 6 HOURS PRN
Status: CANCELLED
Start: 2019-09-30

## 2019-09-30 RX ORDER — SODIUM CHLORIDE AND POTASSIUM CHLORIDE 150; 900 MG/100ML; MG/100ML
INJECTION, SOLUTION INTRAVENOUS CONTINUOUS
Status: DISCONTINUED | OUTPATIENT
Start: 2019-09-30 | End: 2019-09-30 | Stop reason: HOSPADM

## 2019-09-30 RX ORDER — SODIUM CHLORIDE AND POTASSIUM CHLORIDE 150; 900 MG/100ML; MG/100ML
INJECTION, SOLUTION INTRAVENOUS CONTINUOUS
Status: CANCELLED
Start: 2019-09-30

## 2019-09-30 RX ORDER — DEXAMETHASONE SODIUM PHOSPHATE 4 MG/ML
4 INJECTION, SOLUTION INTRA-ARTICULAR; INTRALESIONAL; INTRAMUSCULAR; INTRAVENOUS; SOFT TISSUE ONCE
Status: CANCELLED
Start: 2019-09-30

## 2019-09-30 RX ORDER — HEPARIN SODIUM (PORCINE) LOCK FLUSH IV SOLN 100 UNIT/ML 100 UNIT/ML
500 SOLUTION INTRAVENOUS ONCE
Status: COMPLETED | OUTPATIENT
Start: 2019-09-30 | End: 2019-09-30

## 2019-09-30 RX ORDER — ONDANSETRON 2 MG/ML
4 INJECTION INTRAMUSCULAR; INTRAVENOUS ONCE
Status: CANCELLED
Start: 2019-09-30

## 2019-09-30 RX ORDER — LORAZEPAM 2 MG/ML
0.5 INJECTION INTRAMUSCULAR ONCE
Status: CANCELLED
Start: 2019-09-30

## 2019-09-30 RX ORDER — DEXAMETHASONE SODIUM PHOSPHATE 4 MG/ML
2 INJECTION, SOLUTION INTRA-ARTICULAR; INTRALESIONAL; INTRAMUSCULAR; INTRAVENOUS; SOFT TISSUE ONCE
Status: CANCELLED
Start: 2019-09-30

## 2019-09-30 RX ORDER — DEXAMETHASONE SODIUM PHOSPHATE 4 MG/ML
4 INJECTION, SOLUTION INTRA-ARTICULAR; INTRALESIONAL; INTRAMUSCULAR; INTRAVENOUS; SOFT TISSUE ONCE
Status: COMPLETED | OUTPATIENT
Start: 2019-09-30 | End: 2019-09-30

## 2019-09-30 RX ORDER — HEPARIN SODIUM (PORCINE) LOCK FLUSH IV SOLN 100 UNIT/ML 100 UNIT/ML
500 SOLUTION INTRAVENOUS ONCE
Status: CANCELLED
Start: 2019-09-30

## 2019-09-30 RX ORDER — ONDANSETRON 2 MG/ML
4 INJECTION INTRAMUSCULAR; INTRAVENOUS ONCE
Status: COMPLETED | OUTPATIENT
Start: 2019-09-30 | End: 2019-09-30

## 2019-09-30 RX ADMIN — Medication 500 UNITS: at 12:03

## 2019-09-30 RX ADMIN — ONDANSETRON 4 MG: 2 INJECTION INTRAMUSCULAR; INTRAVENOUS at 10:25

## 2019-09-30 RX ADMIN — POTASSIUM CHLORIDE AND SODIUM CHLORIDE: 900; 150 INJECTION, SOLUTION INTRAVENOUS at 10:20

## 2019-09-30 RX ADMIN — DEXAMETHASONE SODIUM PHOSPHATE 4 MG: 4 INJECTION, SOLUTION INTRAMUSCULAR; INTRAVENOUS at 10:28

## 2019-09-30 RX ADMIN — FAMOTIDINE 20 MG: 20 INJECTION, SOLUTION INTRAVENOUS at 10:23

## 2019-09-30 NOTE — PROGRESS NOTES
Infusion Nursing Note:  Sara Dominguez presents today for IV fluids and antiemetics   Patient seen by provider today: No   present during visit today: Not Applicable.    Note: Offered to leave pt's port accessed for the rest of this week's infusions and pt preferred to have port deaccessed.    Intravenous Access:  Implanted Port.    Treatment Conditions:  Not Applicable.      Post Infusion Assessment:  Patient tolerated infusion without incident.  Blood return noted pre and post infusion.  Site patent and intact, free from redness, edema or discomfort.  No evidence of extravasations.  Access discontinued per protocol.       Discharge Plan:   Patient discharged in stable condition accompanied by: self.  Departure Mode: Ambulatory.  Pt to return tomorrow at 8:00 am for clinic appt with Dr. Aquino and IV fluids at 8:30 am.     Mayelin Hope, RN, RN

## 2019-10-01 ENCOUNTER — ONCOLOGY VISIT (OUTPATIENT)
Dept: ONCOLOGY | Facility: CLINIC | Age: 69
End: 2019-10-01
Attending: INTERNAL MEDICINE
Payer: COMMERCIAL

## 2019-10-01 ENCOUNTER — APPOINTMENT (OUTPATIENT)
Dept: RADIATION THERAPY | Facility: OUTPATIENT CENTER | Age: 69
End: 2019-10-01
Payer: COMMERCIAL

## 2019-10-01 ENCOUNTER — HOSPITAL ENCOUNTER (OUTPATIENT)
Facility: CLINIC | Age: 69
Discharge: HOME | End: 2019-10-01
Attending: INTERNAL MEDICINE | Admitting: INTERNAL MEDICINE
Payer: COMMERCIAL

## 2019-10-01 ENCOUNTER — INFUSION THERAPY VISIT (OUTPATIENT)
Dept: INFUSION THERAPY | Facility: CLINIC | Age: 69
End: 2019-10-01
Attending: INTERNAL MEDICINE
Payer: COMMERCIAL

## 2019-10-01 VITALS
OXYGEN SATURATION: 97 % | BODY MASS INDEX: 30.52 KG/M2 | HEART RATE: 91 BPM | DIASTOLIC BLOOD PRESSURE: 59 MMHG | WEIGHT: 178.8 LBS | HEIGHT: 64 IN | TEMPERATURE: 97.8 F | SYSTOLIC BLOOD PRESSURE: 105 MMHG | RESPIRATION RATE: 16 BRPM

## 2019-10-01 DIAGNOSIS — K12.30 MUCOSITIS: ICD-10-CM

## 2019-10-01 DIAGNOSIS — R19.7 DIARRHEA, UNSPECIFIED TYPE: ICD-10-CM

## 2019-10-01 DIAGNOSIS — R11.0 NAUSEA: ICD-10-CM

## 2019-10-01 DIAGNOSIS — I95.9 HYPOTENSION, UNSPECIFIED HYPOTENSION TYPE: ICD-10-CM

## 2019-10-01 DIAGNOSIS — R11.0 NAUSEA: Primary | ICD-10-CM

## 2019-10-01 DIAGNOSIS — N32.89 BLADDER SPASM: ICD-10-CM

## 2019-10-01 DIAGNOSIS — C21.0 SQUAMOUS CELL CARCINOMA OF ANUS (H): Primary | ICD-10-CM

## 2019-10-01 DIAGNOSIS — L58.9 RADIATION DERMATITIS: Primary | ICD-10-CM

## 2019-10-01 LAB
ALBUMIN SERPL-MCNC: 2.8 G/DL (ref 3.4–5)
ALP SERPL-CCNC: 193 U/L (ref 40–150)
ALT SERPL W P-5'-P-CCNC: 26 U/L (ref 0–50)
ANION GAP SERPL CALCULATED.3IONS-SCNC: 7 MMOL/L (ref 3–14)
AST SERPL W P-5'-P-CCNC: 15 U/L (ref 0–45)
BILIRUB SERPL-MCNC: 0.4 MG/DL (ref 0.2–1.3)
BUN SERPL-MCNC: 13 MG/DL (ref 7–30)
CALCIUM SERPL-MCNC: 8.2 MG/DL (ref 8.5–10.1)
CHLORIDE SERPL-SCNC: 102 MMOL/L (ref 94–109)
CO2 SERPL-SCNC: 24 MMOL/L (ref 20–32)
CREAT SERPL-MCNC: 0.96 MG/DL (ref 0.52–1.04)
GFR SERPL CREATININE-BSD FRML MDRD: 60 ML/MIN/{1.73_M2}
GLUCOSE SERPL-MCNC: 152 MG/DL (ref 70–99)
POTASSIUM SERPL-SCNC: 3.5 MMOL/L (ref 3.4–5.3)
PROT SERPL-MCNC: 6.7 G/DL (ref 6.8–8.8)
SODIUM SERPL-SCNC: 133 MMOL/L (ref 133–144)

## 2019-10-01 PROCEDURE — 99214 OFFICE O/P EST MOD 30 MIN: CPT | Performed by: INTERNAL MEDICINE

## 2019-10-01 PROCEDURE — 80053 COMPREHEN METABOLIC PANEL: CPT | Performed by: INTERNAL MEDICINE

## 2019-10-01 PROCEDURE — 25000128 H RX IP 250 OP 636: Performed by: INTERNAL MEDICINE

## 2019-10-01 PROCEDURE — 96361 HYDRATE IV INFUSION ADD-ON: CPT

## 2019-10-01 PROCEDURE — 96375 TX/PRO/DX INJ NEW DRUG ADDON: CPT

## 2019-10-01 PROCEDURE — G0463 HOSPITAL OUTPT CLINIC VISIT: HCPCS | Mod: 25

## 2019-10-01 PROCEDURE — 96374 THER/PROPH/DIAG INJ IV PUSH: CPT

## 2019-10-01 RX ORDER — HEPARIN SODIUM (PORCINE) LOCK FLUSH IV SOLN 100 UNIT/ML 100 UNIT/ML
500 SOLUTION INTRAVENOUS ONCE
Status: CANCELLED
Start: 2019-10-01

## 2019-10-01 RX ORDER — DEXAMETHASONE SODIUM PHOSPHATE 4 MG/ML
4 INJECTION, SOLUTION INTRA-ARTICULAR; INTRALESIONAL; INTRAMUSCULAR; INTRAVENOUS; SOFT TISSUE ONCE
Status: CANCELLED
Start: 2019-10-01

## 2019-10-01 RX ORDER — OXYBUTYNIN CHLORIDE 10 MG/1
10 TABLET, EXTENDED RELEASE ORAL DAILY
Qty: 30 TABLET | Refills: 1 | Status: SHIPPED | OUTPATIENT
Start: 2019-10-01 | End: 2021-07-14

## 2019-10-01 RX ORDER — ONDANSETRON 2 MG/ML
4 INJECTION INTRAMUSCULAR; INTRAVENOUS ONCE
Status: COMPLETED | OUTPATIENT
Start: 2019-10-01 | End: 2019-10-01

## 2019-10-01 RX ORDER — DEXAMETHASONE SODIUM PHOSPHATE 4 MG/ML
4 INJECTION, SOLUTION INTRA-ARTICULAR; INTRALESIONAL; INTRAMUSCULAR; INTRAVENOUS; SOFT TISSUE ONCE
Status: COMPLETED | OUTPATIENT
Start: 2019-10-01 | End: 2019-10-01

## 2019-10-01 RX ORDER — DOCUSATE SODIUM 100 MG/1
100 CAPSULE, LIQUID FILLED ORAL EVERY OTHER DAY
COMMUNITY
End: 2020-09-08

## 2019-10-01 RX ORDER — ONDANSETRON 2 MG/ML
4 INJECTION INTRAMUSCULAR; INTRAVENOUS ONCE
Status: CANCELLED
Start: 2019-10-01

## 2019-10-01 RX ORDER — SODIUM CHLORIDE AND POTASSIUM CHLORIDE 150; 900 MG/100ML; MG/100ML
INJECTION, SOLUTION INTRAVENOUS CONTINUOUS
Status: CANCELLED
Start: 2019-10-01

## 2019-10-01 RX ORDER — HEPARIN SODIUM (PORCINE) LOCK FLUSH IV SOLN 100 UNIT/ML 100 UNIT/ML
500 SOLUTION INTRAVENOUS ONCE
Status: COMPLETED | OUTPATIENT
Start: 2019-10-01 | End: 2019-10-01

## 2019-10-01 RX ORDER — DEXAMETHASONE SODIUM PHOSPHATE 4 MG/ML
2 INJECTION, SOLUTION INTRA-ARTICULAR; INTRALESIONAL; INTRAMUSCULAR; INTRAVENOUS; SOFT TISSUE ONCE
Status: CANCELLED
Start: 2019-10-01

## 2019-10-01 RX ORDER — SODIUM CHLORIDE AND POTASSIUM CHLORIDE 150; 900 MG/100ML; MG/100ML
INJECTION, SOLUTION INTRAVENOUS CONTINUOUS
Status: DISCONTINUED | OUTPATIENT
Start: 2019-10-01 | End: 2019-10-01 | Stop reason: HOSPADM

## 2019-10-01 RX ORDER — LIDOCAINE HYDROCHLORIDE 20 MG/ML
SOLUTION OROPHARYNGEAL
Qty: 100 ML | Refills: 1 | Status: SHIPPED | OUTPATIENT
Start: 2019-10-01 | End: 2019-12-30

## 2019-10-01 RX ORDER — LORAZEPAM 2 MG/ML
0.5 INJECTION INTRAMUSCULAR ONCE
Status: CANCELLED
Start: 2019-10-01

## 2019-10-01 RX ORDER — ONDANSETRON 2 MG/ML
4 INJECTION INTRAMUSCULAR; INTRAVENOUS EVERY 6 HOURS PRN
Status: CANCELLED
Start: 2019-10-01

## 2019-10-01 RX ORDER — LOPERAMIDE HCL 2 MG
2 CAPSULE ORAL PRN
COMMUNITY
End: 2020-04-27

## 2019-10-01 RX ADMIN — POTASSIUM CHLORIDE AND SODIUM CHLORIDE: 900; 150 INJECTION, SOLUTION INTRAVENOUS at 08:15

## 2019-10-01 RX ADMIN — FAMOTIDINE 20 MG: 20 INJECTION, SOLUTION INTRAVENOUS at 08:55

## 2019-10-01 RX ADMIN — DEXAMETHASONE SODIUM PHOSPHATE 4 MG: 4 INJECTION, SOLUTION INTRAMUSCULAR; INTRAVENOUS at 08:59

## 2019-10-01 RX ADMIN — ONDANSETRON 4 MG: 2 INJECTION INTRAMUSCULAR; INTRAVENOUS at 08:56

## 2019-10-01 RX ADMIN — Medication 500 UNITS: at 09:33

## 2019-10-01 ASSESSMENT — PAIN SCALES - GENERAL: PAINLEVEL: SEVERE PAIN (7)

## 2019-10-01 ASSESSMENT — MIFFLIN-ST. JEOR: SCORE: 1315.03

## 2019-10-01 NOTE — LETTER
"    10/1/2019         RE: Sara Dominguez  [address]  [place] [address]        Dear Colleague,    Thank you for referring your patient, Sara Dominguez, to the [facility]. Please see a copy of my visit note below.    Oncology Rooming Note    October 1, 2019 8:32 AM   Sara Dominguez is a 69 year old female who presents for:    Chief Complaint   Patient presents with     Oncology Clinic Visit     Recheck Squamous cell carcinoma of anus, post RT, Pac fluids to follow     Initial Vitals: /59 (BP Location: Right arm, Patient Position: Sitting, Cuff Size: Adult Regular)   Pulse 91   Temp 97.8  F (36.6  C) (Tympanic)   Resp 16   Ht 1.632 m (5' 4.25\")   Wt 81.1 kg (178 lb 12.8 oz)   SpO2 97%   BMI 30.45 kg/m    Estimated body mass index is 30.45 kg/m  as calculated from the following:    Height as of this encounter: 1.632 m (5' 4.25\").    Weight as of this encounter: 81.1 kg (178 lb 12.8 oz). Body surface area is 1.92 meters squared.  Severe Pain (7) Comment: Data Unavailable   No LMP recorded. Patient is postmenopausal.  Allergies reviewed: Yes  Medications reviewed: Yes    Medications: Medication refills not needed today.  Pharmacy name entered into [employer]: [facility] [place], [place] - [address]    Clinical concerns: Recheck Squamous cell carcinoma of anus, post RT, Pac fluids to follow.   Patient reports diarrhea using Imodium, stool softeners.  Cramps in abdomen, rectal pain with labial skin irrataion.     Cici Purvis [facility]                ONCOLOGY FOLLOW UP VISIT      REASON FOR VISIT/CHIEF COMPLAIN:    July 2019 at age 68 diagnosed with anal cancer on concurrent chemo/RT      HISTORY OF ONCOLOGY ILLNESS:    In 7/2019 at age 68, she presented to her PMD Dr. Reid's office for rectal pain 4/10  for a few weeks, she reports she felt a mass in her rectum, increase in rectal pressure after eating. She makes her self have a BM to relieve the " pressure. She notes one instance of bright red blood in her stool. She reports progressive difficulty in moving her bowel.   She then saw GI Dr. Aguirre, who found perianal skin examination was normal. Digital examination revealed a mass at the anterior wall of rectum. It was a hard mass approximately 3-4 cm.   Colonoscopy 7/12/2019 found 8 mm mucosal abnormality just above the dentate line. This was at the anterior wall facing her vagina where the large mass was felt underneath. 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  She then saw Gyn Dr. Eduardo, 7/19/2019, exam found normal vulva. No vaginal polyp/growth appreciated arising from vaginal mucosa (which appears to be smooth) but there is a nodular 3-4cm mass palpated at rectovaginal area, which is not as well appreciated by digital rectal exam. Fixed mass, mildly tender to touch. Cervix grossly normal. Uterus 6 weeks size, non-tender. No adnexal mass/tenderness bilaterally.     7/2013/2019 pelvic MRI found 2.5 x 2.5 x 2.6 cm mass located between the distal rectum and vagina which appears to arise exophytically from the distal rectum. Single mildly enlarged mesorectal lymph node measuring 8 mm in short axis. No pelvic sidewall adenopathy. No other bony or soft tissue abnormalities identified.     She then saw colon rectal surgeon Dr. Nadeen Murphy 7/26/2019, who informed pt that she has anal cancer, not tx by surgery. Recommended concurrent chemo/RT.    PET 8/2019 found Hypermetabolic 3.3 x 4.1 cm mass SUV 70 seen in the distal rectum/anus compatible with underlying malignancy. No evidence of distant hypermetabolic metastatic disease seen in the chest, abdomen or pelvis.    She then saw us and Rad-Onc, and made informed decision to proceed with concurrent chemoradiation cycle 1 day 1 August 22, 2019.      INTERVAL HISTORY:   She is on going delayed nausea, diarrhea and  "needing IVF support.      PAST MEDICAL HISTORY  HTN, hyperlipidemia  depression      MEDICATIONS/ALLERY:  Reviewed in Epic system.        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       REVIEW OF SYSTEMS:   Reports nausea is on and off,    Energy is fair. She reports IVF helps her energy.   Loose BM easier to pass through.  She also has mouth soar. Heart burn at home.   She has 5-6 days of RT to go.          PHYSICAL EXAMINATION:   VITAL SIGNS:Blood pressure 105/59, pulse 91, temperature 97.8  F (36.6  C), temperature source Tympanic, resp. rate 16, height 1.632 m (5' 4.25\"), weight 81.1 kg (178 lb 12.8 oz), SpO2 97 %, not currently breastfeeding.      ECO    GENERAL APPEARANCE:  looks like her stated age, very pleasant, not in acute distress.   HEENT: The patient is normocephalic, atraumatic. Pupils are equally reactive to light.  Sclerae are anicteric.  Moist oral mucosa.  Mild erythematous changes on pharynx.  No oral thrush. - ulcer.  NECK:  Supple.  No jugular venous distention.  Thyroid is not palpable.   LYMPH NODES:  Superficial lymphadenopathy is not appreciable in the bilateral cervical, supraclavicular, axillary or inguinal areas.   CARDIOVASCULAR:  S1, S2 regular with no murmurs or gallops.  No carotid or abdominal bruits.   PULMONARY:  Lungs are clear to auscultation and percussion bilaterally.  There is no wheezing or rhonchi.   GASTROINTESTINAL:  Abdomen is soft, nontender.  No hepatosplenomegaly.  No signs of ascites.  No mass appreciable.   OK:  a mass at the anterior wall of rectum. It was a hard mass approximately 3-4 cm.   MUSCULOSKELETAL/EXTREMITIES:  No edema.  No cyanotic changes.  No signs of joint deformity.  No lymphedema.   NEUROLOGIC:  Cranial nerves II-XII are grossly intact.  Sensation intact.  Muscle strength and " muscle tone symmetrical, 5/5 throughout.   BACK:  No spinal or paraspinal tenderness.  No CVA tenderness.   SKIN:  No petechiae.  No rash.  No signs of cellulitis.             CURRENT LAB DATA REVIEWED  CMP is fine    7/2019 Baseline CMP is fine, cr 1.15  7/2019 Baseline cbc is fine with hb 11.7    7/2019 8 mm mucosal abnormality just above the dentate line. 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        CURRENT IMAGING REVIEWED: none    OLD DATA REVIEWED TODAY WITH SUMMARY:   PET 8/2019 found Hypermetabolic 3.3 x 4.1 cm mass SUV 70 seen in the distal rectum/anus compatible with underlying malignancy. No evidence of distant hypermetabolic metastatic disease seen in  the chest, abdomen or pelvis.      7/2013/2019 pelvic MRI found 2.5 x 2.5 x 2.6 cm mass located between the distal rectum and vagina which appears to arise exophytically from the distal rectum. Single mildly enlarged mesorectal lymph node measuring 8 mm in short axis. No pelvic sidewall adenopathy. No other bony or soft tissue abnormalities identified.           ASSESSMENT AND PLAN:    1. At age 68 in July 2019, she is diagnosed with low rectum/anal squamous cell carcinoma, months duration of anal pain pressure.      She made informed decision to proceed with concurrent chemoradiation cycle 1 day 1 August 22, 2019 with 5 FU and mitomycin.     She needs to be monitored closely during this highly toxic regimen.    She is having a rough time. She has 5 days of RT to go.       2. Nausea is constant   Advice compazine q 6hr, and ativan q hrs.   Offer her IVF with zofran and pepcid  prn. She feels this helps a lot.       3. Diarrhea is on and off  It is better to have BM on the looser side due to anal RT and pain etc.   We went over in detail how to use imodium.       4. Mucositis.   Advice magic mouth rinse.         Again, thank you for allowing me to participate in the care of your  patient.        Sincerely,        Whitney Aquino MD, MD

## 2019-10-01 NOTE — PROGRESS NOTES
"Oncology Rooming Note    October 1, 2019 8:32 AM   Sara Dominguez is a 69 year old female who presents for:    Chief Complaint   Patient presents with     Oncology Clinic Visit     Recheck Squamous cell carcinoma of anus, post RT, Pac fluids to follow     Initial Vitals: /59 (BP Location: Right arm, Patient Position: Sitting, Cuff Size: Adult Regular)   Pulse 91   Temp 97.8  F (36.6  C) (Tympanic)   Resp 16   Ht 1.632 m (5' 4.25\")   Wt 81.1 kg (178 lb 12.8 oz)   SpO2 97%   BMI 30.45 kg/m   Estimated body mass index is 30.45 kg/m  as calculated from the following:    Height as of this encounter: 1.632 m (5' 4.25\").    Weight as of this encounter: 81.1 kg (178 lb 12.8 oz). Body surface area is 1.92 meters squared.  Severe Pain (7) Comment: Data Unavailable   No LMP recorded. Patient is postmenopausal.  Allergies reviewed: Yes  Medications reviewed: Yes    Medications: Medication refills not needed today.  Pharmacy name entered into [employer]: [facility] [place], [place] - [address]    Clinical concerns: Recheck Squamous cell carcinoma of anus, post RT, Pac fluids to follow.   Patient reports diarrhea using Imodium, stool softeners.  Cramps in abdomen, rectal pain with labial skin irrataion.     Cici Purvis, ESTHELA              "

## 2019-10-01 NOTE — PROGRESS NOTES
Infusion Nursing Note:  Sara Dominguez presents today for IV Fluids and anti nausea medication.    Patient seen by provider today: Yes: Dr. Aquino.   present during visit today: Not Applicable.    Note: IV antinausea medication infused via her port per [place] protocol. IV Fluids infused via her port per [place] protocol. Port flushed per [place] protocol. Pt. To return tomorrow for IV Fluids.    Intravenous Access:  No Intravenous access/labs at this visit.    Treatment Conditions:  Not Applicable.      Post Infusion Assessment:  Patient tolerated infusion without incident.  Blood return noted pre and post infusion.  Site patent and intact, free from redness, edema or discomfort.  No evidence of extravasations.  Access discontinued per protocol.       Discharge Plan:   Patient and/or family verbalized understanding of discharge instructions and all questions answered.  Patient discharged in stable condition accompanied by: self.  Departure Mode: Ambulatory.    Lorenza Hunt, RN, RN

## 2019-10-01 NOTE — PATIENT INSTRUCTIONS
Dr. Aquino would like you to continue IV Fluids as needed.     We would like to see you back in 2 weeks for a follow up appointment.     When you are in need of a refill, please call your pharmacy and they will send us a request.      Copy of appointments, and after visit summary (AVS) given to patient.      If you have any questions please call Agata Renteria RN, BSN Oncology Hematology  [facility] [phone]. For questions after business hours, or on holidays/weekends, please call our after hours Nurse Triage line [phone]. Thank you.         Continue IVF prn. F/u in 2 wks.

## 2019-10-01 NOTE — PROGRESS NOTES
ONCOLOGY FOLLOW UP VISIT      REASON FOR VISIT/CHIEF COMPLAIN:    July 2019 at age 68 diagnosed with anal cancer on concurrent chemo/RT      HISTORY OF ONCOLOGY ILLNESS:    In 7/2019 at age 68, she presented to her PMD Dr. Reid's office for rectal pain 4/10  for a few weeks, she reports she felt a mass in her rectum, increase in rectal pressure after eating. She makes her self have a BM to relieve the pressure. She notes one instance of bright red blood in her stool. She reports progressive difficulty in moving her bowel.   She then saw GI Dr. Aguirre, who found perianal skin examination was normal. Digital examination revealed a mass at the anterior wall of rectum. It was a hard mass approximately 3-4 cm.   Colonoscopy 7/12/2019 found 8 mm mucosal abnormality just above the dentate line. This was at the anterior wall facing her vagina where the large mass was felt underneath. 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  She then saw Gyn Dr. Eduardo, 7/19/2019, exam found normal vulva. No vaginal polyp/growth appreciated arising from vaginal mucosa (which appears to be smooth) but there is a nodular 3-4cm mass palpated at rectovaginal area, which is not as well appreciated by digital rectal exam. Fixed mass, mildly tender to touch. Cervix grossly normal. Uterus 6 weeks size, non-tender. No adnexal mass/tenderness bilaterally.     7/2013/2019 pelvic MRI found 2.5 x 2.5 x 2.6 cm mass located between the distal rectum and vagina which appears to arise exophytically from the distal rectum. Single mildly enlarged mesorectal lymph node measuring 8 mm in short axis. No pelvic sidewall adenopathy. No other bony or soft tissue abnormalities identified.     She then saw colon rectal surgeon Dr. Nadeen Murphy 7/26/2019, who informed pt that she has anal cancer, not tx by surgery. Recommended concurrent chemo/RT.    PET 8/2019 found  "Hypermetabolic 3.3 x 4.1 cm mass SUV 70 seen in the distal rectum/anus compatible with underlying malignancy. No evidence of distant hypermetabolic metastatic disease seen in the chest, abdomen or pelvis.    She then saw us and Rad-Onc, and made informed decision to proceed with concurrent chemoradiation cycle 1 day 1 2019.      INTERVAL HISTORY:   She is on going delayed nausea, diarrhea and needing IVF support.      PAST MEDICAL HISTORY  HTN, hyperlipidemia  depression      MEDICATIONS/ALLERY:  Reviewed in Epic system.        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       REVIEW OF SYSTEMS:   Reports nausea is on and off,    Energy is fair. She reports IVF helps her energy.   Loose BM easier to pass through.  She also has mouth soar. Heart burn at home.   She has 5-6 days of RT to go.          PHYSICAL EXAMINATION:   VITAL SIGNS:Blood pressure 105/59, pulse 91, temperature 97.8  F (36.6  C), temperature source Tympanic, resp. rate 16, height 1.632 m (5' 4.25\"), weight 81.1 kg (178 lb 12.8 oz), SpO2 97 %, not currently breastfeeding.      ECO    GENERAL APPEARANCE:  looks like her stated age, very pleasant, not in acute distress.   HEENT: The patient is normocephalic, atraumatic. Pupils are equally reactive to light.  Sclerae are anicteric.  Moist oral mucosa.  Mild erythematous changes on pharynx.  No oral thrush. - ulcer.  NECK:  Supple.  No jugular venous distention.  Thyroid is not palpable.   LYMPH NODES:  Superficial lymphadenopathy is not appreciable in the bilateral cervical, supraclavicular, axillary or inguinal areas.   CARDIOVASCULAR:  S1, S2 regular with no murmurs or gallops.  No carotid or abdominal bruits.   PULMONARY:  Lungs are clear to auscultation and percussion bilaterally.  There is no wheezing or rhonchi. "   GASTROINTESTINAL:  Abdomen is soft, nontender.  No hepatosplenomegaly.  No signs of ascites.  No mass appreciable.   MA:  a mass at the anterior wall of rectum. It was a hard mass approximately 3-4 cm.   MUSCULOSKELETAL/EXTREMITIES:  No edema.  No cyanotic changes.  No signs of joint deformity.  No lymphedema.   NEUROLOGIC:  Cranial nerves II-XII are grossly intact.  Sensation intact.  Muscle strength and muscle tone symmetrical, 5/5 throughout.   BACK:  No spinal or paraspinal tenderness.  No CVA tenderness.   SKIN:  No petechiae.  No rash.  No signs of cellulitis.             CURRENT LAB DATA REVIEWED  CMP is fine    7/2019 Baseline CMP is fine, cr 1.15  7/2019 Baseline cbc is fine with hb 11.7    7/2019 8 mm mucosal abnormality just above the dentate line. 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        CURRENT IMAGING REVIEWED: none    OLD DATA REVIEWED TODAY WITH SUMMARY:   PET 8/2019 found Hypermetabolic 3.3 x 4.1 cm mass SUV 70 seen in the distal rectum/anus compatible with underlying malignancy. No evidence of distant hypermetabolic metastatic disease seen in  the chest, abdomen or pelvis.      7/2013/2019 pelvic MRI found 2.5 x 2.5 x 2.6 cm mass located between the distal rectum and vagina which appears to arise exophytically from the distal rectum. Single mildly enlarged mesorectal lymph node measuring 8 mm in short axis. No pelvic sidewall adenopathy. No other bony or soft tissue abnormalities identified.           ASSESSMENT AND PLAN:    1. At age 68 in July 2019, she is diagnosed with low rectum/anal squamous cell carcinoma, months duration of anal pain pressure.      She made informed decision to proceed with concurrent chemoradiation cycle 1 day 1 August 22, 2019 with 5 FU and mitomycin.     She needs to be monitored closely during this highly toxic regimen.    She is having a rough time. She has 5 days of RT to go.        2. Nausea is constant   Advice compazine q 6hr, and ativan q hrs.   Offer her IVF with zofran and pepcid  prn. She feels this helps a lot.       3. Diarrhea is on and off  It is better to have BM on the looser side due to anal RT and pain etc.   We went over in detail how to use imodium.       4. Mucositis.   Advice magic mouth rinse.

## 2019-10-02 ENCOUNTER — APPOINTMENT (OUTPATIENT)
Dept: RADIATION THERAPY | Facility: OUTPATIENT CENTER | Age: 69
End: 2019-10-02
Payer: COMMERCIAL

## 2019-10-02 ENCOUNTER — INFUSION THERAPY VISIT (OUTPATIENT)
Dept: INFUSION THERAPY | Facility: CLINIC | Age: 69
End: 2019-10-02
Attending: INTERNAL MEDICINE
Payer: COMMERCIAL

## 2019-10-02 ENCOUNTER — OFFICE VISIT (OUTPATIENT)
Dept: RADIATION THERAPY | Facility: OUTPATIENT CENTER | Age: 69
End: 2019-10-02
Payer: COMMERCIAL

## 2019-10-02 VITALS
DIASTOLIC BLOOD PRESSURE: 52 MMHG | TEMPERATURE: 98.5 F | HEART RATE: 87 BPM | SYSTOLIC BLOOD PRESSURE: 122 MMHG | RESPIRATION RATE: 18 BRPM

## 2019-10-02 DIAGNOSIS — I95.9 HYPOTENSION, UNSPECIFIED HYPOTENSION TYPE: ICD-10-CM

## 2019-10-02 DIAGNOSIS — C21.0 SQUAMOUS CELL CARCINOMA OF ANUS (H): ICD-10-CM

## 2019-10-02 DIAGNOSIS — R11.0 NAUSEA: Primary | ICD-10-CM

## 2019-10-02 DIAGNOSIS — C21.1 MALIGNANT NEOPLASM OF ANAL CANAL (H): Primary | ICD-10-CM

## 2019-10-02 PROCEDURE — 25000128 H RX IP 250 OP 636: Performed by: INTERNAL MEDICINE

## 2019-10-02 PROCEDURE — 96375 TX/PRO/DX INJ NEW DRUG ADDON: CPT

## 2019-10-02 PROCEDURE — 96361 HYDRATE IV INFUSION ADD-ON: CPT

## 2019-10-02 PROCEDURE — 96374 THER/PROPH/DIAG INJ IV PUSH: CPT

## 2019-10-02 RX ORDER — DEXAMETHASONE SODIUM PHOSPHATE 4 MG/ML
2 INJECTION, SOLUTION INTRA-ARTICULAR; INTRALESIONAL; INTRAMUSCULAR; INTRAVENOUS; SOFT TISSUE ONCE
Status: CANCELLED
Start: 2019-10-02

## 2019-10-02 RX ORDER — HEPARIN SODIUM (PORCINE) LOCK FLUSH IV SOLN 100 UNIT/ML 100 UNIT/ML
500 SOLUTION INTRAVENOUS ONCE
Status: CANCELLED
Start: 2019-10-02

## 2019-10-02 RX ORDER — SODIUM CHLORIDE AND POTASSIUM CHLORIDE 150; 900 MG/100ML; MG/100ML
INJECTION, SOLUTION INTRAVENOUS CONTINUOUS
Status: CANCELLED
Start: 2019-10-02

## 2019-10-02 RX ORDER — LORAZEPAM 2 MG/ML
0.5 INJECTION INTRAMUSCULAR ONCE
Status: CANCELLED
Start: 2019-10-02

## 2019-10-02 RX ORDER — ONDANSETRON 2 MG/ML
4 INJECTION INTRAMUSCULAR; INTRAVENOUS ONCE
Status: CANCELLED
Start: 2019-10-02

## 2019-10-02 RX ORDER — SODIUM CHLORIDE AND POTASSIUM CHLORIDE 150; 900 MG/100ML; MG/100ML
INJECTION, SOLUTION INTRAVENOUS CONTINUOUS
Status: DISCONTINUED | OUTPATIENT
Start: 2019-10-02 | End: 2019-10-02 | Stop reason: HOSPADM

## 2019-10-02 RX ORDER — DEXAMETHASONE SODIUM PHOSPHATE 4 MG/ML
2 INJECTION, SOLUTION INTRA-ARTICULAR; INTRALESIONAL; INTRAMUSCULAR; INTRAVENOUS; SOFT TISSUE ONCE
Status: COMPLETED | OUTPATIENT
Start: 2019-10-02 | End: 2019-10-02

## 2019-10-02 RX ORDER — HEPARIN SODIUM (PORCINE) LOCK FLUSH IV SOLN 100 UNIT/ML 100 UNIT/ML
500 SOLUTION INTRAVENOUS ONCE
Status: COMPLETED | OUTPATIENT
Start: 2019-10-02 | End: 2019-10-02

## 2019-10-02 RX ORDER — ONDANSETRON 2 MG/ML
4 INJECTION INTRAMUSCULAR; INTRAVENOUS EVERY 6 HOURS PRN
Status: CANCELLED
Start: 2019-10-02

## 2019-10-02 RX ORDER — ONDANSETRON 2 MG/ML
4 INJECTION INTRAMUSCULAR; INTRAVENOUS ONCE
Status: COMPLETED | OUTPATIENT
Start: 2019-10-02 | End: 2019-10-02

## 2019-10-02 RX ADMIN — POTASSIUM CHLORIDE AND SODIUM CHLORIDE: 900; 150 INJECTION, SOLUTION INTRAVENOUS at 08:48

## 2019-10-02 RX ADMIN — Medication 500 UNITS: at 09:48

## 2019-10-02 RX ADMIN — ONDANSETRON 4 MG: 2 INJECTION INTRAMUSCULAR; INTRAVENOUS at 08:52

## 2019-10-02 RX ADMIN — DEXAMETHASONE SODIUM PHOSPHATE 2 MG: 4 INJECTION, SOLUTION INTRAMUSCULAR; INTRAVENOUS at 08:50

## 2019-10-02 RX ADMIN — FAMOTIDINE 20 MG: 20 INJECTION, SOLUTION INTRAVENOUS at 08:49

## 2019-10-02 ASSESSMENT — PAIN SCALES - GENERAL: PAINLEVEL: SEVERE PAIN (7)

## 2019-10-02 NOTE — PROGRESS NOTES
[facility] PHYSICIANS  SPECIALIZING IN BREAKTHROUGHS  Radiation Oncology    On Treatment Visit Note      Sara DEE       Date: 10/2/2019   MRN: [id number]   : 1950  Diagnosis: Anal ca      Reason for Visit:  On Radiation Treatment Visit     Treatment Summary to Date  Treatment Site: anal/pelvis Current Dose: 4860/5400 cGy Fractions:       Chemotherapy  Chemo concurrent with radx?: Yes  Oncologist: Dr. Aquino  Drug Name/Frequency 1: 5 FU  Drug Name/Frequency 1: mitomycin    Subjective:   Doing overall well. Tolerating chemo. Counts from 19 down slightly (WBC 5.2, ANC 4.1, Plt 278, Hgb 10.6).    Increase in perianal pain since last week, but better managed on percocet  mg x four times per day. Mild increase in urinary frequency and dysuria, particularly at night.  Started on ditropan. Continuing skin care. Using aquaphor, silvadene. Has neosporin with pain relief.  Diarrhea better controlled on low residue diet and using imodium PRN with alleviation. Receiving IVF.       Nursing ROS:   Nutrition Alteration  Diet Type: Patient's Preference  Skin  Skin Reaction: 1 - Faint erythema or dry desquamation  Skin Note: reviewed options of aquaphor, proshield, sitz baths.     ENT and Mouth Exam  Mucositis - Current: 1 - Generalized erythema(secondary to chemo )  Cardiovascular  Respiratory effort: 1 - Normal - without distress  Gastrointestinal  Nausea: 1 - One to two episodes of nausea/24  Genitourinary  Urinary Status: 0 - Normal     Pain Assessment  0-10 Pain Scale: 3  Pain Note: has percocet uses occassionally/PRN in am and pm      Objective  There were no vitals taken for this visit.    Did not examine today as patient deferred. Last week with moderate erythema in perianal area with small areas of dry desquamation. No groin or vaginal area desquamation.     Labs:  Lab Results   Component Value Date    WBC 5.2 2019     Lab Results   Component Value Date    RBC 3.54 2019      Lab Results   Component Value Date    HGB 10.6 09/19/2019     Lab Results   Component Value Date    HCT 32.8 09/19/2019     No components found for: MCT  Lab Results   Component Value Date    MCV 93 09/19/2019     Lab Results   Component Value Date    MCH 29.9 09/19/2019     Lab Results   Component Value Date    MCHC 32.3 09/19/2019     Lab Results   Component Value Date    RDW 14.4 09/19/2019     Lab Results   Component Value Date     09/19/2019             Assessment:  Ms. 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  She is undergoing definitive CRT.    Tolerating radiation therapy well.  All questions and concerns addressed.    Plan:   1. Continue current therapy.    2. Cancer related pain. Percocet  mg q6 hrs PRN pain,  Morphine 15 mg ER (prescribed today, didn't tolerate oxycontin well).  3. Skin care. Aquaphor, sitz baths, perianal squirt bottle, baby wipes. Silvadene for area of desquamation.   4. Dysuria. Could be related to RT change, discussed consideration of ibuprofen PRN to mitigate radiation induced inflammation. However, discuss to hydrate well to help flush kidney.       Mosaiq chart and setup information reviewed  Ports checked    Medication Review  Med list reviewed with patient?: Yes    Educational Topic Discussed  Education Instructions: reviewed skin care      Justin Khan MD

## 2019-10-02 NOTE — LETTER
10/2/2019      RE: Sara Solomonjassalycia  [address]  [facility] [address]       [facility] PHYSICIANS  SPECIALIZING IN BREAKTHROUGHS  Radiation Oncology    On Treatment Visit Note      Sara Dominguez      Date: 10/2/2019   MRN: [id number]   : 1950  Diagnosis: Anal ca      Reason for Visit:  On Radiation Treatment Visit     Treatment Summary to Date  Treatment Site: anal/pelvis Current Dose: 4860/5400 cGy Fractions:       Chemotherapy  Chemo concurrent with radx?: Yes  Oncologist: Dr. Aquino  Drug Name/Frequency 1: 5 FU  Drug Name/Frequency 1: mitomycin    Subjective:   Doing overall well. Tolerating chemo. Counts from 19 down slightly (WBC 5.2, ANC 4.1, Plt 278, Hgb 10.6).    Increase in perianal pain since last week, but better managed on percocet  mg x four times per day. Mild increase in urinary frequency and dysuria, particularly at night.  Started on ditropan. Continuing skin care. Using aquaphor, silvadene. Has neosporin with pain relief.  Diarrhea better controlled on low residue diet and using imodium PRN with alleviation. Receiving IVF.       Nursing ROS:   Nutrition Alteration  Diet Type: Patient's Preference  Skin  Skin Reaction: 1 - Faint erythema or dry desquamation  Skin Note: reviewed options of aquaphor, proshield, sitz baths.     ENT and Mouth Exam  Mucositis - Current: 1 - Generalized erythema(secondary to chemo )  Cardiovascular  Respiratory effort: 1 - Normal - without distress  Gastrointestinal  Nausea: 1 - One to two episodes of nausea/24  Genitourinary  Urinary Status: 0 - Normal     Pain Assessment  0-10 Pain Scale: 3  Pain Note: has percocet uses occassionally/PRN in am and pm      Objective  There were no vitals taken for this visit.    Did not examine today as patient deferred. Last week with moderate erythema in perianal area with small areas of dry desquamation. No groin or vaginal area desquamation.     Labs:  Lab Results   Component Value  Date    WBC 5.2 09/19/2019     Lab Results   Component Value Date    RBC 3.54 09/19/2019     Lab Results   Component Value Date    HGB 10.6 09/19/2019     Lab Results   Component Value Date    HCT 32.8 09/19/2019     No components found for: MCT  Lab Results   Component Value Date    MCV 93 09/19/2019     Lab Results   Component Value Date    MCH 29.9 09/19/2019     Lab Results   Component Value Date    MCHC 32.3 09/19/2019     Lab Results   Component Value Date    RDW 14.4 09/19/2019     Lab Results   Component Value Date     09/19/2019             Assessment:  Ms. 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  She is undergoing definitive CRT.    Tolerating radiation therapy well.  All questions and concerns addressed.    Plan:   1. Continue current therapy.    2. Cancer related pain. Percocet  mg q6 hrs PRN pain,  Morphine 15 mg ER (prescribed today, didn't tolerate oxycontin well).  3. Skin care. Aquaphor, sitz baths, perianal squirt bottle, baby wipes. Silvadene for area of desquamation.   4. Dysuria. Could be related to RT change, discussed consideration of ibuprofen PRN to mitigate radiation induced inflammation. However, discuss to hydrate well to help flush kidney.       Mosaiq chart and setup information reviewed  Ports checked    Medication Review  Med list reviewed with patient?: Yes    Educational Topic Discussed  Education Instructions: reviewed skin care      Justin Khan MD

## 2019-10-02 NOTE — PROGRESS NOTES
"Infusion Nursing Note:  Sara Dominguez presents today for IVFs/antiemetics.    Patient seen by provider today: No   present during visit today: Not Applicable.    Note: Pt. C/o abd cramping \"7\"/10 that started today. Pt. Reports poor appetite, denies passing flatus. Abd soft, hypo active BS in all quadrants except LL.    Intravenous Access:  Implanted Port.    Treatment Conditions:  Not Applicable.      Post Infusion Assessment:  Patient tolerated infusion without incident.  Blood return noted pre and post infusion.  Site patent and intact, free from redness, edema or discomfort.  No evidence of extravasations.  Access discontinued per protocol.       Discharge Plan:   Patient discharged in stable condition accompanied by: self.  Departure Mode: Ambulatory to RT.    Jada Dejesus, ANA, RN                          "

## 2019-10-03 DIAGNOSIS — C21.0 SQUAMOUS CELL CARCINOMA OF ANUS (H): ICD-10-CM

## 2019-10-03 RX ORDER — PROCHLORPERAZINE MALEATE 10 MG
5 TABLET ORAL EVERY 6 HOURS PRN
Qty: 30 TABLET | Refills: 1 | Status: SHIPPED | OUTPATIENT
Start: 2019-10-03 | End: 2020-01-17

## 2019-10-04 ENCOUNTER — INFUSION THERAPY VISIT (OUTPATIENT)
Dept: INFUSION THERAPY | Facility: CLINIC | Age: 69
End: 2019-10-04
Attending: INTERNAL MEDICINE
Payer: COMMERCIAL

## 2019-10-04 ENCOUNTER — APPOINTMENT (OUTPATIENT)
Dept: RADIATION THERAPY | Facility: OUTPATIENT CENTER | Age: 69
End: 2019-10-04
Payer: COMMERCIAL

## 2019-10-04 VITALS — TEMPERATURE: 97.5 F | HEART RATE: 88 BPM | DIASTOLIC BLOOD PRESSURE: 43 MMHG | SYSTOLIC BLOOD PRESSURE: 98 MMHG

## 2019-10-04 DIAGNOSIS — C21.0 SQUAMOUS CELL CARCINOMA OF ANUS (H): ICD-10-CM

## 2019-10-04 DIAGNOSIS — I95.9 HYPOTENSION, UNSPECIFIED HYPOTENSION TYPE: ICD-10-CM

## 2019-10-04 DIAGNOSIS — R11.0 NAUSEA: Primary | ICD-10-CM

## 2019-10-04 PROCEDURE — 96375 TX/PRO/DX INJ NEW DRUG ADDON: CPT

## 2019-10-04 PROCEDURE — 96365 THER/PROPH/DIAG IV INF INIT: CPT

## 2019-10-04 PROCEDURE — 96361 HYDRATE IV INFUSION ADD-ON: CPT

## 2019-10-04 PROCEDURE — 25000128 H RX IP 250 OP 636: Performed by: INTERNAL MEDICINE

## 2019-10-04 PROCEDURE — 96374 THER/PROPH/DIAG INJ IV PUSH: CPT

## 2019-10-04 RX ORDER — DEXAMETHASONE SODIUM PHOSPHATE 4 MG/ML
2 INJECTION, SOLUTION INTRA-ARTICULAR; INTRALESIONAL; INTRAMUSCULAR; INTRAVENOUS; SOFT TISSUE ONCE
Status: CANCELLED
Start: 2019-10-04

## 2019-10-04 RX ORDER — HEPARIN SODIUM (PORCINE) LOCK FLUSH IV SOLN 100 UNIT/ML 100 UNIT/ML
500 SOLUTION INTRAVENOUS ONCE
Status: COMPLETED | OUTPATIENT
Start: 2019-10-04 | End: 2019-10-04

## 2019-10-04 RX ORDER — ONDANSETRON 2 MG/ML
4 INJECTION INTRAMUSCULAR; INTRAVENOUS ONCE
Status: COMPLETED | OUTPATIENT
Start: 2019-10-04 | End: 2019-10-04

## 2019-10-04 RX ORDER — SODIUM CHLORIDE AND POTASSIUM CHLORIDE 150; 900 MG/100ML; MG/100ML
INJECTION, SOLUTION INTRAVENOUS CONTINUOUS
Status: DISCONTINUED | OUTPATIENT
Start: 2019-10-04 | End: 2019-10-04 | Stop reason: HOSPADM

## 2019-10-04 RX ORDER — ONDANSETRON 2 MG/ML
4 INJECTION INTRAMUSCULAR; INTRAVENOUS ONCE
Status: CANCELLED
Start: 2019-10-04

## 2019-10-04 RX ORDER — ONDANSETRON 2 MG/ML
4 INJECTION INTRAMUSCULAR; INTRAVENOUS EVERY 6 HOURS PRN
Status: CANCELLED
Start: 2019-10-04

## 2019-10-04 RX ORDER — LORAZEPAM 2 MG/ML
0.5 INJECTION INTRAMUSCULAR ONCE
Status: CANCELLED
Start: 2019-10-04

## 2019-10-04 RX ORDER — HEPARIN SODIUM (PORCINE) LOCK FLUSH IV SOLN 100 UNIT/ML 100 UNIT/ML
500 SOLUTION INTRAVENOUS ONCE
Status: CANCELLED
Start: 2019-10-04

## 2019-10-04 RX ORDER — SODIUM CHLORIDE AND POTASSIUM CHLORIDE 150; 900 MG/100ML; MG/100ML
INJECTION, SOLUTION INTRAVENOUS CONTINUOUS
Status: CANCELLED
Start: 2019-10-04

## 2019-10-04 RX ORDER — DEXAMETHASONE SODIUM PHOSPHATE 4 MG/ML
2 INJECTION, SOLUTION INTRA-ARTICULAR; INTRALESIONAL; INTRAMUSCULAR; INTRAVENOUS; SOFT TISSUE ONCE
Status: COMPLETED | OUTPATIENT
Start: 2019-10-04 | End: 2019-10-04

## 2019-10-04 RX ADMIN — ONDANSETRON 4 MG: 2 INJECTION INTRAMUSCULAR; INTRAVENOUS at 09:40

## 2019-10-04 RX ADMIN — Medication 500 UNITS: at 11:08

## 2019-10-04 RX ADMIN — DEXAMETHASONE SODIUM PHOSPHATE 2 MG: 4 INJECTION, SOLUTION INTRAMUSCULAR; INTRAVENOUS at 09:37

## 2019-10-04 RX ADMIN — POTASSIUM CHLORIDE AND SODIUM CHLORIDE: 900; 150 INJECTION, SOLUTION INTRAVENOUS at 09:29

## 2019-10-04 RX ADMIN — FAMOTIDINE 20 MG: 20 INJECTION, SOLUTION INTRAVENOUS at 09:35

## 2019-10-04 NOTE — PROGRESS NOTES
Infusion Nursing Note:  Sara DEE Angelica presents today for IV fluids .    Patient seen by provider today: No   present during visit today: Not Applicable.    Note: N/A.    Intravenous Access:  Implanted Port.    Treatment Conditions:  Not Applicable.      Post Infusion Assessment:  Patient tolerated infusion without incident.  Blood return noted pre and post infusion.  Site patent and intact, free from redness, edema or discomfort.  No evidence of extravasations.  Access discontinued per protocol.       Discharge Plan:   Patient discharged in stable condition accompanied by: self.  Departure Mode: Ambulatory.  Pt to return on 10/7/19 at 8:00 am for next IV fluids.    Mayelin Hope, RN, RN

## 2019-10-07 ENCOUNTER — PATIENT OUTREACH (OUTPATIENT)
Dept: ONCOLOGY | Facility: CLINIC | Age: 69
End: 2019-10-07

## 2019-10-07 DIAGNOSIS — G89.3 CANCER RELATED PAIN: ICD-10-CM

## 2019-10-07 RX ORDER — OXYCODONE AND ACETAMINOPHEN 10; 325 MG/1; MG/1
1 TABLET ORAL EVERY 4 HOURS PRN
Qty: 90 TABLET | Refills: 0 | Status: SHIPPED | OUTPATIENT
Start: 2019-10-07 | End: 2019-10-21

## 2019-10-07 NOTE — PROGRESS NOTES
"Pt called to report she is in ER in [place] after a rough night of throwing up and feeling dehydrated, she will not make it to her RT appt today and is requesting a Fluid appt after her RT tomorrow, percocet refill.  RX brought to pharmacy.     Appt made for fluids tomorrow at 10:00.    She also would like a refill from RT of the \"Burn cream\" and lidocaine.     Will forward to RT.     Agata Renteria RN on 10/7/2019 at 9:18 AM    "

## 2019-10-08 ENCOUNTER — APPOINTMENT (OUTPATIENT)
Dept: RADIATION THERAPY | Facility: OUTPATIENT CENTER | Age: 69
End: 2019-10-08
Payer: COMMERCIAL

## 2019-10-08 ENCOUNTER — INFUSION THERAPY VISIT (OUTPATIENT)
Dept: INFUSION THERAPY | Facility: CLINIC | Age: 69
End: 2019-10-08
Attending: INTERNAL MEDICINE
Payer: COMMERCIAL

## 2019-10-08 VITALS — DIASTOLIC BLOOD PRESSURE: 61 MMHG | HEART RATE: 92 BPM | SYSTOLIC BLOOD PRESSURE: 120 MMHG | TEMPERATURE: 98.2 F

## 2019-10-08 DIAGNOSIS — C21.0 SQUAMOUS CELL CARCINOMA OF ANUS (H): ICD-10-CM

## 2019-10-08 DIAGNOSIS — I95.9 HYPOTENSION, UNSPECIFIED HYPOTENSION TYPE: ICD-10-CM

## 2019-10-08 DIAGNOSIS — R11.0 NAUSEA: Primary | ICD-10-CM

## 2019-10-08 PROCEDURE — 25000128 H RX IP 250 OP 636: Performed by: INTERNAL MEDICINE

## 2019-10-08 PROCEDURE — 96375 TX/PRO/DX INJ NEW DRUG ADDON: CPT

## 2019-10-08 PROCEDURE — 96365 THER/PROPH/DIAG IV INF INIT: CPT

## 2019-10-08 RX ORDER — HEPARIN SODIUM (PORCINE) LOCK FLUSH IV SOLN 100 UNIT/ML 100 UNIT/ML
500 SOLUTION INTRAVENOUS ONCE
Status: COMPLETED | OUTPATIENT
Start: 2019-10-08 | End: 2019-10-08

## 2019-10-08 RX ORDER — HEPARIN SODIUM (PORCINE) LOCK FLUSH IV SOLN 100 UNIT/ML 100 UNIT/ML
500 SOLUTION INTRAVENOUS ONCE
Status: CANCELLED
Start: 2019-10-08

## 2019-10-08 RX ORDER — DEXAMETHASONE SODIUM PHOSPHATE 4 MG/ML
2 INJECTION, SOLUTION INTRA-ARTICULAR; INTRALESIONAL; INTRAMUSCULAR; INTRAVENOUS; SOFT TISSUE ONCE
Status: COMPLETED | OUTPATIENT
Start: 2019-10-08 | End: 2019-10-08

## 2019-10-08 RX ORDER — ONDANSETRON 2 MG/ML
4 INJECTION INTRAMUSCULAR; INTRAVENOUS EVERY 6 HOURS PRN
Status: CANCELLED
Start: 2019-10-08

## 2019-10-08 RX ORDER — ONDANSETRON 2 MG/ML
4 INJECTION INTRAMUSCULAR; INTRAVENOUS ONCE
Status: CANCELLED
Start: 2019-10-08

## 2019-10-08 RX ORDER — LORAZEPAM 0.5 MG/1
0.5 TABLET ORAL AT BEDTIME
Qty: 30 TABLET | Refills: 0 | Status: SHIPPED | OUTPATIENT
Start: 2019-10-08 | End: 2019-10-21

## 2019-10-08 RX ORDER — DEXAMETHASONE SODIUM PHOSPHATE 4 MG/ML
2 INJECTION, SOLUTION INTRA-ARTICULAR; INTRALESIONAL; INTRAMUSCULAR; INTRAVENOUS; SOFT TISSUE ONCE
Status: CANCELLED
Start: 2019-10-08

## 2019-10-08 RX ORDER — LORAZEPAM 2 MG/ML
0.5 INJECTION INTRAMUSCULAR ONCE
Status: CANCELLED
Start: 2019-10-08

## 2019-10-08 RX ORDER — SODIUM CHLORIDE AND POTASSIUM CHLORIDE 150; 900 MG/100ML; MG/100ML
INJECTION, SOLUTION INTRAVENOUS CONTINUOUS
Status: CANCELLED
Start: 2019-10-08

## 2019-10-08 RX ORDER — SODIUM CHLORIDE AND POTASSIUM CHLORIDE 150; 900 MG/100ML; MG/100ML
INJECTION, SOLUTION INTRAVENOUS CONTINUOUS
Status: DISCONTINUED | OUTPATIENT
Start: 2019-10-08 | End: 2019-10-08 | Stop reason: HOSPADM

## 2019-10-08 RX ORDER — ONDANSETRON 2 MG/ML
4 INJECTION INTRAMUSCULAR; INTRAVENOUS ONCE
Status: COMPLETED | OUTPATIENT
Start: 2019-10-08 | End: 2019-10-08

## 2019-10-08 RX ADMIN — FAMOTIDINE 20 MG: 20 INJECTION, SOLUTION INTRAVENOUS at 10:38

## 2019-10-08 RX ADMIN — POTASSIUM CHLORIDE AND SODIUM CHLORIDE: 900; 150 INJECTION, SOLUTION INTRAVENOUS at 10:30

## 2019-10-08 RX ADMIN — DEXAMETHASONE SODIUM PHOSPHATE 2 MG: 4 INJECTION, SOLUTION INTRAMUSCULAR; INTRAVENOUS at 10:52

## 2019-10-08 RX ADMIN — ONDANSETRON 4 MG: 2 INJECTION INTRAMUSCULAR; INTRAVENOUS at 10:55

## 2019-10-08 RX ADMIN — Medication 500 UNITS: at 11:56

## 2019-10-09 ENCOUNTER — OFFICE VISIT (OUTPATIENT)
Dept: RADIATION THERAPY | Facility: OUTPATIENT CENTER | Age: 69
End: 2019-10-09
Payer: COMMERCIAL

## 2019-10-09 ENCOUNTER — APPOINTMENT (OUTPATIENT)
Dept: RADIATION THERAPY | Facility: OUTPATIENT CENTER | Age: 69
End: 2019-10-09
Payer: COMMERCIAL

## 2019-10-09 ENCOUNTER — INFUSION THERAPY VISIT (OUTPATIENT)
Dept: INFUSION THERAPY | Facility: CLINIC | Age: 69
End: 2019-10-09
Attending: INTERNAL MEDICINE
Payer: COMMERCIAL

## 2019-10-09 VITALS
HEART RATE: 66 BPM | WEIGHT: 175.4 LBS | DIASTOLIC BLOOD PRESSURE: 67 MMHG | OXYGEN SATURATION: 99 % | RESPIRATION RATE: 18 BRPM | BODY MASS INDEX: 29.87 KG/M2 | SYSTOLIC BLOOD PRESSURE: 131 MMHG

## 2019-10-09 VITALS — HEART RATE: 67 BPM | DIASTOLIC BLOOD PRESSURE: 47 MMHG | SYSTOLIC BLOOD PRESSURE: 121 MMHG

## 2019-10-09 DIAGNOSIS — C21.0 SQUAMOUS CELL CARCINOMA OF ANUS (H): ICD-10-CM

## 2019-10-09 DIAGNOSIS — C21.1 MALIGNANT NEOPLASM OF ANAL CANAL (H): Primary | ICD-10-CM

## 2019-10-09 DIAGNOSIS — I95.9 HYPOTENSION, UNSPECIFIED HYPOTENSION TYPE: ICD-10-CM

## 2019-10-09 DIAGNOSIS — R11.0 NAUSEA: Primary | ICD-10-CM

## 2019-10-09 PROCEDURE — 25000128 H RX IP 250 OP 636: Performed by: INTERNAL MEDICINE

## 2019-10-09 PROCEDURE — 96375 TX/PRO/DX INJ NEW DRUG ADDON: CPT

## 2019-10-09 PROCEDURE — 96365 THER/PROPH/DIAG IV INF INIT: CPT

## 2019-10-09 RX ORDER — LORAZEPAM 2 MG/ML
0.5 INJECTION INTRAMUSCULAR ONCE
Status: CANCELLED
Start: 2019-10-09

## 2019-10-09 RX ORDER — DEXAMETHASONE SODIUM PHOSPHATE 4 MG/ML
2 INJECTION, SOLUTION INTRA-ARTICULAR; INTRALESIONAL; INTRAMUSCULAR; INTRAVENOUS; SOFT TISSUE ONCE
Status: COMPLETED | OUTPATIENT
Start: 2019-10-09 | End: 2019-10-09

## 2019-10-09 RX ORDER — SODIUM CHLORIDE AND POTASSIUM CHLORIDE 150; 900 MG/100ML; MG/100ML
INJECTION, SOLUTION INTRAVENOUS CONTINUOUS
Status: CANCELLED
Start: 2019-10-09

## 2019-10-09 RX ORDER — HEPARIN SODIUM (PORCINE) LOCK FLUSH IV SOLN 100 UNIT/ML 100 UNIT/ML
500 SOLUTION INTRAVENOUS ONCE
Status: CANCELLED
Start: 2019-10-09

## 2019-10-09 RX ORDER — DEXAMETHASONE SODIUM PHOSPHATE 4 MG/ML
2 INJECTION, SOLUTION INTRA-ARTICULAR; INTRALESIONAL; INTRAMUSCULAR; INTRAVENOUS; SOFT TISSUE ONCE
Status: CANCELLED
Start: 2019-10-09

## 2019-10-09 RX ORDER — SODIUM CHLORIDE AND POTASSIUM CHLORIDE 150; 900 MG/100ML; MG/100ML
INJECTION, SOLUTION INTRAVENOUS CONTINUOUS
Status: DISCONTINUED | OUTPATIENT
Start: 2019-10-09 | End: 2019-10-09 | Stop reason: HOSPADM

## 2019-10-09 RX ORDER — HEPARIN SODIUM (PORCINE) LOCK FLUSH IV SOLN 100 UNIT/ML 100 UNIT/ML
500 SOLUTION INTRAVENOUS ONCE
Status: COMPLETED | OUTPATIENT
Start: 2019-10-09 | End: 2019-10-09

## 2019-10-09 RX ORDER — ONDANSETRON 2 MG/ML
4 INJECTION INTRAMUSCULAR; INTRAVENOUS ONCE
Status: CANCELLED
Start: 2019-10-09

## 2019-10-09 RX ORDER — ONDANSETRON 2 MG/ML
4 INJECTION INTRAMUSCULAR; INTRAVENOUS EVERY 6 HOURS PRN
Status: CANCELLED
Start: 2019-10-09

## 2019-10-09 RX ORDER — ONDANSETRON 2 MG/ML
4 INJECTION INTRAMUSCULAR; INTRAVENOUS ONCE
Status: COMPLETED | OUTPATIENT
Start: 2019-10-09 | End: 2019-10-09

## 2019-10-09 RX ADMIN — DEXAMETHASONE SODIUM PHOSPHATE 2 MG: 4 INJECTION, SOLUTION INTRAMUSCULAR; INTRAVENOUS at 10:49

## 2019-10-09 RX ADMIN — Medication 500 UNITS: at 11:49

## 2019-10-09 RX ADMIN — POTASSIUM CHLORIDE AND SODIUM CHLORIDE: 900; 150 INJECTION, SOLUTION INTRAVENOUS at 10:45

## 2019-10-09 RX ADMIN — FAMOTIDINE 20 MG: 20 INJECTION, SOLUTION INTRAVENOUS at 10:45

## 2019-10-09 RX ADMIN — ONDANSETRON 4 MG: 2 INJECTION INTRAMUSCULAR; INTRAVENOUS at 10:49

## 2019-10-09 ASSESSMENT — PAIN SCALES - GENERAL: PAINLEVEL: MODERATE PAIN (5)

## 2019-10-09 NOTE — LETTER
10/9/2019      RE: Sara Dominguez  [address]  [facility] [address]       [facility] PHYSICIANS  SPECIALIZING IN BREAKTHROUGHS  Radiation Oncology    On Treatment Visit Note      Sara Dominguez      Date: 10/9/2019   MRN: [id number]   : 1950  Diagnosis: Anal ca      Reason for Visit:  On Radiation Treatment Visit     Treatment Summary to Date  Treatment Site: anal/pelvis Current Dose: 5400/5400 cGy Fractions: 30/30      Chemotherapy  Chemo concurrent with radx?: Yes  Oncologist: Dr. Aquino  Drug Name/Frequency 1: 5 FU  Drug Name/Frequency 1: mitomycin    Subjective:   Doing overall well. Tolerating chemo. Counts from 19 down slightly (WBC 5.2, ANC 4.1, Plt 278, Hgb 10.6).    In the interval, was seen by in the ED in [place] for nausea and dehydration. Since receiving IVF and doing better considering she is at end of treatment course. Also found to have UTI, on Abx.      Stable perianal pain since last week, but better managed on percocet  mg x four times per day. Mild increase in urinary frequency and dysuria, particularly at night. Improving on Abx. Started on ditropan. Continuing skin care. Using aquaphor, silvadene. Has neosporin with pain relief.  Diarrhea better controlled on low residue diet and using imodium PRN with alleviation. Receiving IVF.       Nursing ROS:   Nutrition Alteration  Diet Type: Patient's Preference  Skin  Skin Reaction: 1 - Faint erythema or dry desquamation  Skin Note: reviewed options of aquaphor, proshield, sitz baths.     ENT and Mouth Exam  Mucositis - Current: 1 - Generalized erythema(secondary to chemo )  Cardiovascular  Respiratory effort: 1 - Normal - without distress  Gastrointestinal  Nausea: 1 - One to two episodes of nausea/24  Genitourinary  Urinary Status: 0 - Normal     Pain Assessment  0-10 Pain Scale: 3  Pain Note: has percocet uses occassionally/PRN in am and pm      Objective  /67   Pulse 66   Resp 18   Wt 79.6 kg  (175 lb 6.4 oz)   SpO2 99%   BMI 29.87 kg/m        Perianal area with areas of wet desquamation. R groin dry desquamation.  Vaginal area with moderate erythema without desquamation.     Labs:  Lab Results   Component Value Date    WBC 5.2 09/19/2019     Lab Results   Component Value Date    RBC 3.54 09/19/2019     Lab Results   Component Value Date    HGB 10.6 09/19/2019     Lab Results   Component Value Date    HCT 32.8 09/19/2019     No components found for: MCT  Lab Results   Component Value Date    MCV 93 09/19/2019     Lab Results   Component Value Date    MCH 29.9 09/19/2019     Lab Results   Component Value Date    MCHC 32.3 09/19/2019     Lab Results   Component Value Date    RDW 14.4 09/19/2019     Lab Results   Component Value Date     09/19/2019             Assessment:  Ms. 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  She is undergoing definitive CRT.    Tolerating radiation therapy well.  All questions and concerns addressed.    Plan:   1. Continue current therapy.  EOT today. RTC in 7-10 days for acute toxicity check.   2. Cancer related pain. Percocet  mg q6 hrs PRN pain,  Morphine 15 mg ER (didn't tolerate oxycontin well).  3. Skin care. Aquaphor, sitz baths, perianal squirt bottle, baby wipes. Silvadene for area of desquamation.   4. Dysuria. Could be related to RT change, discussed consideration of ibuprofen PRN to mitigate radiation induced inflammation. However, discuss to hydrate well to help flush kidney. Also on Abx for UTI.   5. Discussed would consider post-treatment scope and scans (MRI/PET) 6-8 weeks post RT. Would defer scans to medical oncology.       Mosaiq chart and setup information reviewed  Ports checked    Medication Review  Med list reviewed with patient?: Yes    Educational Topic Discussed  Education Instructions: reviewed skin care      MD Justin Khan MD

## 2019-10-09 NOTE — PROGRESS NOTES
[facility] PHYSICIANS  SPECIALIZING IN BREAKTHROUGHS  Radiation Oncology    On Treatment Visit Note      Sara Dominguez      Date: 10/9/2019   MRN: [id number]   : 1950  Diagnosis: Anal ca      Reason for Visit:  On Radiation Treatment Visit     Treatment Summary to Date  Treatment Site: anal/pelvis Current Dose: 5400/5400 cGy Fractions: 30/30      Chemotherapy  Chemo concurrent with radx?: Yes  Oncologist: Dr. Aquino  Drug Name/Frequency 1: 5 FU  Drug Name/Frequency 1: mitomycin    Subjective:   Doing overall well. Tolerating chemo. Counts from 19 down slightly (WBC 5.2, ANC 4.1, Plt 278, Hgb 10.6).    In the interval, was seen by in the ED in [place] for nausea and dehydration. Since receiving IVF and doing better considering she is at end of treatment course. Also found to have UTI, on Abx.      Stable perianal pain since last week, but better managed on percocet  mg x four times per day. Mild increase in urinary frequency and dysuria, particularly at night. Improving on Abx. Started on ditropan. Continuing skin care. Using aquaphor, silvadene. Has neosporin with pain relief.  Diarrhea better controlled on low residue diet and using imodium PRN with alleviation. Receiving IVF.       Nursing ROS:   Nutrition Alteration  Diet Type: Patient's Preference  Skin  Skin Reaction: 1 - Faint erythema or dry desquamation  Skin Note: reviewed options of aquaphor, proshield, sitz baths.     ENT and Mouth Exam  Mucositis - Current: 1 - Generalized erythema(secondary to chemo )  Cardiovascular  Respiratory effort: 1 - Normal - without distress  Gastrointestinal  Nausea: 1 - One to two episodes of nausea/24  Genitourinary  Urinary Status: 0 - Normal     Pain Assessment  0-10 Pain Scale: 3  Pain Note: has percocet uses occassionally/PRN in am and pm      Objective  /67   Pulse 66   Resp 18   Wt 79.6 kg (175 lb 6.4 oz)   SpO2 99%   BMI 29.87 kg/m       Perianal area with areas of wet  desquamation. R groin dry desquamation.  Vaginal area with moderate erythema without desquamation.     Labs:  Lab Results   Component Value Date    WBC 5.2 09/19/2019     Lab Results   Component Value Date    RBC 3.54 09/19/2019     Lab Results   Component Value Date    HGB 10.6 09/19/2019     Lab Results   Component Value Date    HCT 32.8 09/19/2019     No components found for: MCT  Lab Results   Component Value Date    MCV 93 09/19/2019     Lab Results   Component Value Date    MCH 29.9 09/19/2019     Lab Results   Component Value Date    MCHC 32.3 09/19/2019     Lab Results   Component Value Date    RDW 14.4 09/19/2019     Lab Results   Component Value Date     09/19/2019             Assessment:  Ms. 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  She is undergoing definitive CRT.    Tolerating radiation therapy well.  All questions and concerns addressed.    Plan:   1. Continue current therapy.  EOT today. RTC in 7-10 days for acute toxicity check.   2. Cancer related pain. Percocet  mg q6 hrs PRN pain,  Morphine 15 mg ER (didn't tolerate oxycontin well).  3. Skin care. Aquaphor, sitz baths, perianal squirt bottle, baby wipes. Silvadene for area of desquamation.   4. Dysuria. Could be related to RT change, discussed consideration of ibuprofen PRN to mitigate radiation induced inflammation. However, discuss to hydrate well to help flush kidney. Also on Abx for UTI.   5. Discussed would consider post-treatment scope and scans (MRI/PET) 6-8 weeks post RT. Would defer scans to medical oncology.       Mosaiq chart and setup information reviewed  Ports checked    Medication Review  Med list reviewed with patient?: Yes    Educational Topic Discussed  Education Instructions: reviewed skin care      Justin Khan MD

## 2019-10-15 ENCOUNTER — ONCOLOGY VISIT (OUTPATIENT)
Dept: ONCOLOGY | Facility: CLINIC | Age: 69
End: 2019-10-15
Attending: INTERNAL MEDICINE
Payer: COMMERCIAL

## 2019-10-15 VITALS
RESPIRATION RATE: 16 BRPM | SYSTOLIC BLOOD PRESSURE: 124 MMHG | DIASTOLIC BLOOD PRESSURE: 59 MMHG | BODY MASS INDEX: 29.94 KG/M2 | WEIGHT: 175.4 LBS | TEMPERATURE: 97.3 F | OXYGEN SATURATION: 100 % | HEIGHT: 64 IN | HEART RATE: 82 BPM

## 2019-10-15 DIAGNOSIS — K12.30 MUCOSITIS: ICD-10-CM

## 2019-10-15 DIAGNOSIS — R11.0 NAUSEA: ICD-10-CM

## 2019-10-15 DIAGNOSIS — C21.0 SQUAMOUS CELL CARCINOMA OF ANUS (H): Primary | ICD-10-CM

## 2019-10-15 PROCEDURE — G0463 HOSPITAL OUTPT CLINIC VISIT: HCPCS

## 2019-10-15 PROCEDURE — 99214 OFFICE O/P EST MOD 30 MIN: CPT | Performed by: INTERNAL MEDICINE

## 2019-10-15 ASSESSMENT — MIFFLIN-ST. JEOR: SCORE: 1299.61

## 2019-10-15 ASSESSMENT — PAIN SCALES - GENERAL: PAINLEVEL: SEVERE PAIN (7)

## 2019-10-15 NOTE — PROGRESS NOTES
"Oncology Rooming Note    October 15, 2019 2:33 PM   Sara Dominguez is a 69 year old female who presents for:    Chief Complaint   Patient presents with     Oncology Clinic Visit     2 week follow up anal cancer.      Initial Vitals: /59 (BP Location: Right arm, Patient Position: Sitting, Cuff Size: Adult Regular)   Pulse 82   Temp 97.3  F (36.3  C) (Tympanic)   Resp 16   Ht 1.632 m (5' 4.25\")   Wt 79.6 kg (175 lb 6.4 oz)   SpO2 100%   BMI 29.87 kg/m   Estimated body mass index is 29.87 kg/m  as calculated from the following:    Height as of this encounter: 1.632 m (5' 4.25\").    Weight as of this encounter: 79.6 kg (175 lb 6.4 oz). Body surface area is 1.9 meters squared.  Severe Pain (7) Comment: Data Unavailable   No LMP recorded. Patient is postmenopausal.  Allergies reviewed: Yes  Medications reviewed: Yes    Medications: Medication refills not needed today.  Pharmacy name entered into [employer]: [facility] [address] [place] [address], [place] - [address]    Clinical concerns: 2 week follow up anal cancer.       Cici Purvis [facility]              "

## 2019-10-15 NOTE — PROGRESS NOTES
ONCOLOGY FOLLOW UP VISIT      REASON FOR VISIT/CHIEF COMPLAIN:    July 2019 at age 68 diagnosed with anal cancer on concurrent chemo/RT      HISTORY OF ONCOLOGY ILLNESS:    In 7/2019 at age 68, she presented to her PMD Dr. Reid's office for rectal pain 4/10  for a few weeks, she reports she felt a mass in her rectum, increase in rectal pressure after eating. She makes her self have a BM to relieve the pressure. She notes one instance of bright red blood in her stool. She reports progressive difficulty in moving her bowel.   She then saw GI Dr. Aguirre, who found perianal skin examination was normal. Digital examination revealed a mass at the anterior wall of rectum. It was a hard mass approximately 3-4 cm.   Colonoscopy 7/12/2019 found 8 mm mucosal abnormality just above the dentate line. This was at the anterior wall facing her vagina where the large mass was felt underneath. 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  She then saw Gyn Dr. Eduardo, 7/19/2019, exam found normal vulva. No vaginal polyp/growth appreciated arising from vaginal mucosa (which appears to be smooth) but there is a nodular 3-4cm mass palpated at rectovaginal area, which is not as well appreciated by digital rectal exam. Fixed mass, mildly tender to touch. Cervix grossly normal. Uterus 6 weeks size, non-tender. No adnexal mass/tenderness bilaterally.     7/2013/2019 pelvic MRI found 2.5 x 2.5 x 2.6 cm mass located between the distal rectum and vagina which appears to arise exophytically from the distal rectum. Single mildly enlarged mesorectal lymph node measuring 8 mm in short axis. No pelvic sidewall adenopathy. No other bony or soft tissue abnormalities identified.     She then saw colon rectal surgeon Dr. Nadeen Murphy 7/26/2019, who informed pt that she has anal cancer, not tx by surgery. Recommended concurrent chemo/RT.    PET 8/2019 found  "Hypermetabolic 3.3 x 4.1 cm mass SUV 70 seen in the distal rectum/anus compatible with underlying malignancy. No evidence of distant hypermetabolic metastatic disease seen in the chest, abdomen or pelvis.    She then saw us and Rad-Onc, and made informed decision to proceed with concurrent chemoradiation cycle 1 day 1 2019. She finished the tx early Oct.       INTERVAL HISTORY:   She was in ED  for dehydration nausea vomiting.  She still needs IVF 3 x per day.       PAST MEDICAL HISTORY  HTN, hyperlipidemia  depression      MEDICATIONS/ALLERY:  Reviewed in Epic system.        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       REVIEW OF SYSTEMS:   She was in ED  for dehydration nausea vomiting.  She still needs IVF 3 x per day.   She reports chemo brain.          PHYSICAL EXAMINATION:   VITAL SIGNS:Blood pressure 124/59, pulse 82, temperature 97.3  F (36.3  C), temperature source Tympanic, resp. rate 16, height 1.632 m (5' 4.25\"), weight 79.6 kg (175 lb 6.4 oz), SpO2 100 %, not currently breastfeeding.      ECO    GENERAL APPEARANCE:  looks like her stated age, very pleasant, not in acute distress.   HEENT: The patient is normocephalic, atraumatic. Pupils are equally reactive to light.  Sclerae are anicteric.  Moist oral mucosa.  Mild erythematous changes on pharynx.  No oral thrush. + left buccal mucosa ulcer.  NECK:  Supple.  No jugular venous distention.  Thyroid is not palpable.   LYMPH NODES:  Superficial lymphadenopathy is not appreciable in the bilateral cervical, supraclavicular, axillary or inguinal areas.   CARDIOVASCULAR:  S1, S2 regular with no murmurs or gallops.  No carotid or abdominal bruits.   PULMONARY:  Lungs are clear to auscultation and percussion bilaterally.  There is no wheezing or rhonchi. "   GASTROINTESTINAL:  Abdomen is soft, nontender.  No hepatosplenomegaly.  No signs of ascites.  No mass appreciable.   MUSCULOSKELETAL/EXTREMITIES:  No edema.  No cyanotic changes.  No signs of joint deformity.  No lymphedema.   NEUROLOGIC:  Cranial nerves II-XII are grossly intact.  Sensation intact.  Muscle strength and muscle tone symmetrical, 5/5 throughout.   BACK:  No spinal or paraspinal tenderness.  No CVA tenderness.   SKIN:  No petechiae.  No rash.  No signs of cellulitis.             CURRENT LAB DATA REVIEWED  Platelets 71 hemoglobin 10.1 white count normal,  Creatinine 0.96    7/2019 Baseline CMP is fine, cr 1.15  7/2019 Baseline cbc is fine with hb 11.7    7/2019 8 mm mucosal abnormality just above the dentate line. 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        CURRENT IMAGING REVIEWED: none    OLD DATA REVIEWED TODAY WITH SUMMARY:   PET 8/2019 found Hypermetabolic 3.3 x 4.1 cm mass SUV 70 seen in the distal rectum/anus compatible with underlying malignancy. No evidence of distant hypermetabolic metastatic disease seen in  the chest, abdomen or pelvis.      7/2013/2019 pelvic MRI found 2.5 x 2.5 x 2.6 cm mass located between the distal rectum and vagina which appears to arise exophytically from the distal rectum. Single mildly enlarged mesorectal lymph node measuring 8 mm in short axis. No pelvic sidewall adenopathy. No other bony or soft tissue abnormalities identified.           ASSESSMENT AND PLAN:    1. At age 68 in July 2019, she is diagnosed with low rectum/anal squamous cell carcinoma, months duration of anal pain pressure.    She made informed decision to proceed with concurrent chemoradiation cycle 1 day 1 August 22, 2019 with 5 FU and mitomycin. She finished chemo/RT early Oct. 2019.     She is eager to get back to work.   She is advised on ongoing response in the next months.   She is advised on on going colon  rectal surgeon local exam.     F/u in 2 months.       2. Nausea is constant   Advice compazine q 6hr, and ativan q hrs.   Offer her IVF with zofran and pepcid  prn.   She feels this helps a lot.       3. Mucositis.   Advice on going magic mouth rinse.

## 2019-10-15 NOTE — PATIENT INSTRUCTIONS
Dr. Aquino would like you to continue IV fluids as needed in [place].     We would like to see you back in 2 months for a follow up appointment with labs prior.     When you are in need of a refill, please call your pharmacy and they will send us a request.      Copy of appointments, and after visit summary (AVS) given to patient.      If you have any questions please call Agata Renteria RN, BSN Oncology Hematology  [facility] [phone]. For questions after business hours, or on holidays/weekends, please call our after hours Nurse Triage line [phone]. Thank you.         IVF prn at [place].   2 months f/u with labs.

## 2019-10-15 NOTE — LETTER
"    10/15/2019         RE: Sara Dominguez  [address]  [facility] [address]        Dear Colleague,    Thank you for referring your patient, Sara Dominguez, to the [facility]. Please see a copy of my visit note below.    Oncology Rooming Note    October 15, 2019 2:33 PM   Sara Dominguez is a 69 year old female who presents for:    Chief Complaint   Patient presents with     Oncology Clinic Visit     2 week follow up anal cancer.      Initial Vitals: /59 (BP Location: Right arm, Patient Position: Sitting, Cuff Size: Adult Regular)   Pulse 82   Temp 97.3  F (36.3  C) (Tympanic)   Resp 16   Ht 1.632 m (5' 4.25\")   Wt 79.6 kg (175 lb 6.4 oz)   SpO2 100%   BMI 29.87 kg/m    Estimated body mass index is 29.87 kg/m  as calculated from the following:    Height as of this encounter: 1.632 m (5' 4.25\").    Weight as of this encounter: 79.6 kg (175 lb 6.4 oz). Body surface area is 1.9 meters squared.  Severe Pain (7) Comment: Data Unavailable   No LMP recorded. Patient is postmenopausal.  Allergies reviewed: Yes  Medications reviewed: Yes    Medications: Medication refills not needed today.  Pharmacy name entered into [employer]: [facility] [address] [place] [address], [place] - [address]    Clinical concerns: 2 week follow up anal cancer.       Cici Purvis CMA                ONCOLOGY FOLLOW UP VISIT      REASON FOR VISIT/CHIEF COMPLAIN:    July 2019 at age 68 diagnosed with anal cancer on concurrent chemo/RT      HISTORY OF ONCOLOGY ILLNESS:    In 7/2019 at age 68, she presented to her PMD Dr. Reid's office for rectal pain 4/10  for a few weeks, she reports she felt a mass in her rectum, increase in rectal pressure after eating. She makes her self have a BM to relieve the pressure. She notes one instance of bright red blood in her stool. She reports progressive difficulty in moving her bowel.   She then saw GI Dr. Aguirre, who found perianal skin examination was normal. " Digital examination revealed a mass at the anterior wall of rectum. It was a hard mass approximately 3-4 cm.   Colonoscopy 7/12/2019 found 8 mm mucosal abnormality just above the dentate line. This was at the anterior wall facing her vagina where the large mass was felt underneath. 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  She then saw Gyn Dr. Eduardo, 7/19/2019, exam found normal vulva. No vaginal polyp/growth appreciated arising from vaginal mucosa (which appears to be smooth) but there is a nodular 3-4cm mass palpated at rectovaginal area, which is not as well appreciated by digital rectal exam. Fixed mass, mildly tender to touch. Cervix grossly normal. Uterus 6 weeks size, non-tender. No adnexal mass/tenderness bilaterally.     7/2013/2019 pelvic MRI found 2.5 x 2.5 x 2.6 cm mass located between the distal rectum and vagina which appears to arise exophytically from the distal rectum. Single mildly enlarged mesorectal lymph node measuring 8 mm in short axis. No pelvic sidewall adenopathy. No other bony or soft tissue abnormalities identified.     She then saw colon rectal surgeon Dr. Nadeen Murphy 7/26/2019, who informed pt that she has anal cancer, not tx by surgery. Recommended concurrent chemo/RT.    PET 8/2019 found Hypermetabolic 3.3 x 4.1 cm mass SUV 70 seen in the distal rectum/anus compatible with underlying malignancy. No evidence of distant hypermetabolic metastatic disease seen in the chest, abdomen or pelvis.    She then saw us and Rad-Onc, and made informed decision to proceed with concurrent chemoradiation cycle 1 day 1 August 22, 2019. She finished the tx early Oct.       INTERVAL HISTORY:   She was in ED October 13 for dehydration nausea vomiting.  She still needs IVF 3 x per day.       PAST MEDICAL HISTORY  HTN, hyperlipidemia  depression      MEDICATIONS/ALLERY:  Reviewed in Epic system.        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       REVIEW OF SYSTEMS:   She was in ED  for dehydration nausea vomiting.  She still needs IVF 3 x per day.   She reports chemo brain.          PHYSICAL EXAMINATION:   VITAL SIGNS:Blood pressure 124/59, pulse 82, temperature 97.3  F (36.3  C), temperature source Tympanic, resp. rate 16, height 1.632 m (5' 4.25\"), weight 79.6 kg (175 lb 6.4 oz), SpO2 100 %, not currently breastfeeding.      ECO    GENERAL APPEARANCE:  looks like her stated age, very pleasant, not in acute distress.   HEENT: The patient is normocephalic, atraumatic. Pupils are equally reactive to light.  Sclerae are anicteric.  Moist oral mucosa.  Mild erythematous changes on pharynx.  No oral thrush. + left buccal mucosa ulcer.  NECK:  Supple.  No jugular venous distention.  Thyroid is not palpable.   LYMPH NODES:  Superficial lymphadenopathy is not appreciable in the bilateral cervical, supraclavicular, axillary or inguinal areas.   CARDIOVASCULAR:  S1, S2 regular with no murmurs or gallops.  No carotid or abdominal bruits.   PULMONARY:  Lungs are clear to auscultation and percussion bilaterally.  There is no wheezing or rhonchi.   GASTROINTESTINAL:  Abdomen is soft, nontender.  No hepatosplenomegaly.  No signs of ascites.  No mass appreciable.   MUSCULOSKELETAL/EXTREMITIES:  No edema.  No cyanotic changes.  No signs of joint deformity.  No lymphedema.   NEUROLOGIC:  Cranial nerves II-XII are grossly intact.  Sensation intact.  Muscle strength and muscle tone symmetrical, 5/5 throughout.   BACK:  No spinal or paraspinal tenderness.  No CVA tenderness.   SKIN:  No petechiae.  No rash.  No signs of cellulitis.             CURRENT LAB DATA REVIEWED  Platelets 71 hemoglobin 10.1 white count normal,  Creatinine 0.96    2019 Baseline CMP " is fine, cr 1.15  7/2019 Baseline cbc is fine with hb 11.7    7/2019 8 mm mucosal abnormality just above the dentate line. 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        CURRENT IMAGING REVIEWED: none    OLD DATA REVIEWED TODAY WITH SUMMARY:   PET 8/2019 found Hypermetabolic 3.3 x 4.1 cm mass SUV 70 seen in the distal rectum/anus compatible with underlying malignancy. No evidence of distant hypermetabolic metastatic disease seen in  the chest, abdomen or pelvis.      7/2013/2019 pelvic MRI found 2.5 x 2.5 x 2.6 cm mass located between the distal rectum and vagina which appears to arise exophytically from the distal rectum. Single mildly enlarged mesorectal lymph node measuring 8 mm in short axis. No pelvic sidewall adenopathy. No other bony or soft tissue abnormalities identified.           ASSESSMENT AND PLAN:    1. At age 68 in July 2019, she is diagnosed with low rectum/anal squamous cell carcinoma, months duration of anal pain pressure.    She made informed decision to proceed with concurrent chemoradiation cycle 1 day 1 August 22, 2019 with 5 FU and mitomycin. She finished chemo/RT early Oct. 2019.     She is eager to get back to work.   She is advised on ongoing response in the next months.   She is advised on on going colon rectal surgeon local exam.     F/u in 2 months.       2. Nausea is constant   Advice compazine q 6hr, and ativan q hrs.   Offer her IVF with zofran and pepcid  prn.   She feels this helps a lot.       3. Mucositis.   Advice on going magic mouth rinse.         Again, thank you for allowing me to participate in the care of your patient.        Sincerely,        Whitney Aquino MD, MD

## 2019-10-18 ENCOUNTER — DOCUMENTATION ONLY (OUTPATIENT)
Dept: RADIATION THERAPY | Facility: OUTPATIENT CENTER | Age: 69
End: 2019-10-18

## 2019-10-18 NOTE — PROGRESS NOTES
Department of Radiation Oncology  Radiation Therapy Center  [facility] [place] [address]  [phone]       Radiotherapy Treatment Summary          2019    PATIENT: Sara Dominguez  MEDICAL RECORD NO: [id number]   : 1950    DIAGNOSIS: squamous cell carcinoma of the anal canal  INTENT OF RADIOTHERAPY: definitive CRT.  PATHOLOGY:  moderately differentiated squamous cell carcinoma                              STAGE:  clinical [id number] (anna-rectal nodes).    CONCURRENT SYSTEMIC THERAPY: 5 FU and mitomycin    ONCOLOGIC HISTORY:    Ms. Dominguez is a 69 year old female diagnosed wquamous cell carcinoma of the anal canal, clinical [id number] (anna-rectal nodes).       The patient initially presented with symptoms of rectal pain pressure prompting further evaluation.  She was seen by gastroenterologist Dr. Aguirre who on digital rectal exam noticed a mass in the anterior wall of the anal rectal region, measuring 3.4 cm in size.  Patient eventually underwent colonoscopy on 2019 which demonstrated an 8 mm mucosal abnormality just above the dentate line.  The mass was noted to be in the anterior wall facing the vagina.  Biopsy of the mass demonstrated moderately differentiated squamous cell carcinoma.  The patient was referred to Gyn/onc who on exam palpated a 3 to 4 cm rectovaginal mass.  No vaginal or cervical mass was noted.  2019 the patient underwent MRI of the pelvis.  Imaging demonstrated 2.6 x 2.5 x 2.5 cm mass located in the distal rectum/anal canal region.  The mass was noted to arise exophytically from the distal rectum.  There were suspicious mesorectal lymph nodes measuring subcentimeter in size. No pelvic adenopathy was noted.  On 8/3/2019 the patient underwent a PET scan.  Imaging did not demonstrate any evidence of distant disease. Hypermetabolic 3.3 x 4.1 cm mass seen in the distal rectum/anus compatible with underlying malignancy. Per Dr. Khan review  of imaging, also noted suspicious perirectal adenopathy.  The patient was subsequent seen by Dr. Aquino who discussed definitive treatment with chemoradiation.     SITE OF TREATMENT: anal/pelvis    DATES  OF TREATMENT: 8/22/19-10/09/19    TOTAL DOSE OF TREATMENT: 5400 cGy    DOSE PER FRACTION OF TREATMENT: 180 cGy x 30 fractions       COMMENT/TOXICITY:    Perianal area with areas of wet desquamation. R groin dry desquamation.  Vaginal area with moderate erythema without desquamation.      Stable perianal pain, better managed on percocet  mg x four times per day. Mild increase in urinary frequency and dysuria, particularly at night. Improving on Abx (UTI). Started on ditropan. Continuing skin care. Using aquaphor, silvadene. Has neosporin with pain relief.  Diarrhea better controlled on low residue diet and using imodium PRN with alleviation. Receiving IVF.          PAIN MANAGEMENT:    Percocet  mg q6 hrs PRN pain,  Morphine 15 mg ER                   FOLLOW UP PLAN:  1.  RTC in 7-10 days for acute toxicity check.   2. Cancer related pain. Percocet  mg q6 hrs PRN pain,  Morphine 15 mg ER (didn't tolerate oxycontin well).  3. Skin care. Aquaphor, sitz baths, perianal squirt bottle, baby wipes. Silvadene for area of desquamation.   4. Dysuria. Could be related to RT change, discussed consideration of ibuprofen PRN to mitigate radiation induced inflammation. However, discuss to hydrate well to help flush kidney. Also on Abx for UTI.   5. Discussed would consider post-treatment scope and scans (MRI/PET) 6-8 weeks post RT. Would defer scans to medical oncology.     Radiation Oncologist: Justin Khan M.D.  Department of Radiation Oncology  [facility]

## 2019-10-21 DIAGNOSIS — G89.3 CANCER RELATED PAIN: ICD-10-CM

## 2019-10-21 DIAGNOSIS — C21.0 SQUAMOUS CELL CARCINOMA OF ANUS (H): ICD-10-CM

## 2019-10-21 RX ORDER — LORAZEPAM 0.5 MG/1
0.5 TABLET ORAL AT BEDTIME
Qty: 30 TABLET | Refills: 0 | Status: SHIPPED | OUTPATIENT
Start: 2019-10-21 | End: 2019-11-05

## 2019-10-21 RX ORDER — OXYCODONE AND ACETAMINOPHEN 10; 325 MG/1; MG/1
1 TABLET ORAL EVERY 4 HOURS PRN
Qty: 90 TABLET | Refills: 0 | Status: SHIPPED | OUTPATIENT
Start: 2019-10-21 | End: 2019-11-18

## 2019-10-21 NOTE — PROGRESS NOTES
Call received from pt requesting a refill of Percocet and Ativan on behalf of self.  Percocet: Last refill: 10.07.19  # 90 with 0 refills.  Ativan: Last refill: 10.08.19  # 30   Last office visit:  10.15.19  Next office visit:  12.23.19    This is an appropriate refill, and has been walked to the pharmacy. Agata Renteria RN, BSN, OCN

## 2019-10-22 ENCOUNTER — OFFICE VISIT (OUTPATIENT)
Dept: RADIATION THERAPY | Facility: OUTPATIENT CENTER | Age: 69
End: 2019-10-22
Payer: COMMERCIAL

## 2019-10-22 VITALS
RESPIRATION RATE: 16 BRPM | HEART RATE: 87 BPM | WEIGHT: 176.6 LBS | SYSTOLIC BLOOD PRESSURE: 111 MMHG | DIASTOLIC BLOOD PRESSURE: 58 MMHG | BODY MASS INDEX: 30.08 KG/M2

## 2019-10-22 DIAGNOSIS — C21.1 MALIGNANT NEOPLASM OF ANAL CANAL (H): Primary | ICD-10-CM

## 2019-10-22 NOTE — NURSING NOTE
FOLLOW-UP VISIT    Patient Name: Sara Dominguez      : 1950     Age: 69 year old        ______________________________________________________________________________     Chief Complaint   Patient presents with     Radiation Therapy     Return visit, anal cancer     /58 (BP Location: Left arm, Cuff Size: Adult Large)   Pulse 87   Resp 16   Wt 80.1 kg (176 lb 9.6 oz)   BMI 30.08 kg/m       Date Radiation Completed: 10/9/19    Pain  Getting better, still using percocet 10/325, 4 tabs per day and 1 during the night    Meds  Current Med List Reviewed: Yes  Medication Note: does not need refills    Imaging  None    On Chemo?: No  Bowel: more regular, taking stool softener  Bladder:  Some burning w/urination. Feels due to RT. Recently had been treated for UTI.  Skin: healing, still using silvadene, aquaphor and lidocaine  Energy Level: improving     Other Appointments:     Date  Oncologist: Mlies Has upcoming apt   Surgeon:      Other Notes:

## 2019-10-22 NOTE — PROGRESS NOTES
Department of Radiation Oncology  Radiation Therapy Center  [facility] [place] [address]  [phone]       Radiation Oncology Follow-up Visit  2019      Sara Dominguez  MRN: [id number]   : 1950     DIAGNOSIS: squamous cell carcinoma of the anal canal  INTENT OF RADIOTHERAPY: definitive CRT.  PATHOLOGY:  moderately differentiated squamous cell carcinoma                              STAGE:  clinical [id number] (anna-rectal nodes).    CONCURRENT SYSTEMIC THERAPY: 5 FU and mitomycin     ONCOLOGIC HISTORY:    Ms. 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       The patient initially presented with symptoms of rectal pain pressure prompting further evaluation.  She was seen by gastroenterologist Dr. Aguirre who on digital rectal exam noticed a mass in the anterior wall of the anal rectal region, measuring 3.4 cm in size.  Patient eventually underwent colonoscopy on 2019 which demonstrated an 8 mm mucosal abnormality just above the dentate line.  The mass was noted to be in the anterior wall facing the vagina.  Biopsy of the mass demonstrated moderately differentiated squamous cell carcinoma.  The patient was referred to Gyn/onc who on exam palpated a 3 to 4 cm rectovaginal mass.  No vaginal or cervical mass was noted.  2019 the patient underwent MRI of the pelvis.  Imaging demonstrated 2.6 x 2.5 x 2.5 cm mass located in the distal rectum/anal canal region.  The mass was noted to arise exophytically from the distal rectum.  There were suspicious mesorectal lymph nodes measuring subcentimeter in size. No pelvic adenopathy was noted.  On 8/3/2019 the patient underwent a PET scan.  Imaging did not demonstrate any evidence of distant disease. Hypermetabolic 3.3 x 4.1 cm mass seen in the distal rectum/anus compatible with underlying malignancy. Per Dr. Khan review of imaging, also  noted suspicious perirectal adenopathy.  The patient was subsequent seen by Dr. Aquino who discussed definitive treatment with chemoradiation.      SITE OF TREATMENT: anal/pelvis     DATES  OF TREATMENT: 8/22/19-10/09/19     TOTAL DOSE OF TREATMENT: 5400 cGy     DOSE PER FRACTION OF TREATMENT: 180 cGy x 30 fractions    INTERVAL SINCE COMPLETION OF RADIATION THERAPY:   2 weeks    SUBJECTIVE:   The patient returns for follow up.     She is overall doing much better. Skin has continued to heal appropriately. Mild residual pain, requiring only short acting pain medication at this time. Using percocet  q6 hours. No longer needing long acting pain medication. Continues skin care. Receiving IVF 3 x per week. Denies diarrhea. No blood in stool. Mild dysuria, primarily during urinary stream. Previously treated for UTI, recent UCx on 10/13/19 negative for bacteria.  Improved with ibuprofen use.     Will see Dr. Murphy in November 2019 for scope/ exam. Will see Dr. Aquino on 12/23/19.       PHYSICAL EXAM:  /58 (BP Location: Left arm, Cuff Size: Adult Large)   Pulse 87   Resp 16   Wt 80.1 kg (176 lb 9.6 oz)   BMI 30.08 kg/m    Gen: Alert, in NAD  Eyes: PERRL, EOMI, sclera anicteric  Pulm: No wheezing, stridor or respiratory distress  CV: Well-perfused, no cyanosis, no pedal edema  Abdominal: Soft, nontender, nondistended, no hepatomegaly  Back: No step-offs or pain to palpation along the thoracolumbar spine, no CVA tenderness  Musculoskeletal: Normal bulk and tone   Skin: Mild residual dry desquamation in perianal area. No moist desquamation. Mild residua hyperpigmentation without desquamation in groin folds bilaterally and genitalia region.   Neurologic: A/Ox3, CN II-XII intact  Psychiatric: Appropriate mood and affect    LABS AND IMAGING:  Reviewed.    IMPRESSION:   Ms. 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 She completed definitive  CRT to a total dose of 54 Gy in 30 fractions on 10/9/19.    PLAN:   1.  Acute toxicities improving. Will continue to improve with time.     2. Cancer related pain. Percocet  mg q6 hrs PRN pain,  no longer using long acting pain medication.     3. Skin care. Aquaphor, sitz baths, perianal squirt bottle, baby wipes. Silvadene for area of desquamation.     4. Dysuria. Could be related to RT change, and recent UCx on 10/13/19 was negative.  Discussed consideration of ibuprofen PRN to mitigate possible radiation induced inflammation, which I suspect to be urethritis.     5. Discussed would consider post-treatment scope and scans (MRI/PET) 8 weeks post RT.     6. Continue follow up surgery. Will see Dr. Murphy in Novmeber 2019.     7. Continue follow up with medical oncology. Will see Dr. Aquino on 12/23/19.     8. RTC in 4 weeks.     Justin Khan M.D.  Department of Radiation Oncology  [facility]

## 2019-10-22 NOTE — LETTER
10/22/2019      RE: Sara Dominguez  [address]  [facility] [address]          Department of Radiation Oncology  Radiation Therapy Center  [facility] [place] [address]  [phone]       Radiation Oncology Follow-up Visit  2019      Sara Dominguez  MRN: [id number]   : 1950     DIAGNOSIS: squamous cell carcinoma of the anal canal  INTENT OF RADIOTHERAPY: definitive CRT.  PATHOLOGY:  moderately differentiated squamous cell carcinoma                              STAGE:  clinical [id number] (anna-rectal nodes).    CONCURRENT SYSTEMIC THERAPY: 5 FU and mitomycin     ONCOLOGIC HISTORY:    Ms. 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       The patient initially presented with symptoms of rectal pain pressure prompting further evaluation.  She was seen by gastroenterologist Dr. Aguirre who on digital rectal exam noticed a mass in the anterior wall of the anal rectal region, measuring 3.4 cm in size.  Patient eventually underwent colonoscopy on 2019 which demonstrated an 8 mm mucosal abnormality just above the dentate line.  The mass was noted to be in the anterior wall facing the vagina.  Biopsy of the mass demonstrated moderately differentiated squamous cell carcinoma.  The patient was referred to Gyn/onc who on exam palpated a 3 to 4 cm rectovaginal mass.  No vaginal or cervical mass was noted.  2019 the patient underwent MRI of the pelvis.  Imaging demonstrated 2.6 x 2.5 x 2.5 cm mass located in the distal rectum/anal canal region.  The mass was noted to arise exophytically from the distal rectum.  There were suspicious mesorectal lymph nodes measuring subcentimeter in size. No pelvic adenopathy was noted.  On 8/3/2019 the patient underwent a PET scan.  Imaging did not demonstrate any evidence of distant disease. Hypermetabolic 3.3 x 4.1 cm mass seen in the distal  rectum/anus compatible with underlying malignancy. Per Dr. Khan review of imaging, also noted suspicious perirectal adenopathy.  The patient was subsequent seen by Dr. Aquino who discussed definitive treatment with chemoradiation.      SITE OF TREATMENT: anal/pelvis     DATES  OF TREATMENT: 8/22/19-10/09/19     TOTAL DOSE OF TREATMENT: 5400 cGy     DOSE PER FRACTION OF TREATMENT: 180 cGy x 30 fractions    INTERVAL SINCE COMPLETION OF RADIATION THERAPY:   2 weeks    SUBJECTIVE:   The patient returns for follow up.     She is overall doing much better. Skin has continued to heal appropriately. Mild residual pain, requiring only short acting pain medication at this time. Using percocet  q6 hours. No longer needing long acting pain medication. Continues skin care. Receiving IVF 3 x per week. Denies diarrhea. No blood in stool. Mild dysuria, primarily during urinary stream. Previously treated for UTI, recent UCx on 10/13/19 negative for bacteria.  Improved with ibuprofen use.     Will see Dr. Murphy in November 2019 for scope/ exam. Will see Dr. Aquino on 12/23/19.       PHYSICAL EXAM:  /58 (BP Location: Left arm, Cuff Size: Adult Large)   Pulse 87   Resp 16   Wt 80.1 kg (176 lb 9.6 oz)   BMI 30.08 kg/m     Gen: Alert, in NAD  Eyes: PERRL, EOMI, sclera anicteric  Pulm: No wheezing, stridor or respiratory distress  CV: Well-perfused, no cyanosis, no pedal edema  Abdominal: Soft, nontender, nondistended, no hepatomegaly  Back: No step-offs or pain to palpation along the thoracolumbar spine, no CVA tenderness  Musculoskeletal: Normal bulk and tone   Skin: Mild residual dry desquamation in perianal area. No moist desquamation. Mild residua hyperpigmentation without desquamation in groin folds bilaterally and genitalia region.   Neurologic: A/Ox3, CN II-XII intact  Psychiatric: Appropriate mood and affect    LABS AND IMAGING:  Reviewed.    IMPRESSION:   Ms. Dominguez is a 69 year old female diagnosed wquamous cell  carcinoma of the anal canal, clinical [id number] (anna-rectal nodes). She completed definitive CRT to a total dose of 54 Gy in 30 fractions on 10/9/19.    PLAN:   1.  Acute toxicities improving. Will continue to improve with time.     2. Cancer related pain. Percocet  mg q6 hrs PRN pain,  no longer using long acting pain medication.     3. Skin care. Aquaphor, sitz baths, perianal squirt bottle, baby wipes. Silvadene for area of desquamation.     4. Dysuria. Could be related to RT change, and recent UCx on 10/13/19 was negative.  Discussed consideration of ibuprofen PRN to mitigate possible radiation induced inflammation, which I suspect to be urethritis.     5. Discussed would consider post-treatment scope and scans (MRI/PET) 8 weeks post RT.     6. Continue follow up surgery. Will see Dr. Murphy in Novmeber 2019.     7. Continue follow up with medical oncology. Will see Dr. Aquino on 12/23/19.     8. RTC in 4 weeks.     Justin Khan M.D.  Department of Radiation Oncology  [facility]

## 2019-10-25 ENCOUNTER — HOME INFUSION (PRE-WILLOW HOME INFUSION) (OUTPATIENT)
Dept: PHARMACY | Facility: CLINIC | Age: 69
End: 2019-10-25

## 2019-10-28 NOTE — PROGRESS NOTES
This is a recent snapshot of the patient's [facility] medical record.  For current drug dose and complete information and questions, call [phone] or In Basket pool, [facility] home infusion ([id number])  CSN Number:  [id number]

## 2019-10-30 DIAGNOSIS — N32.89 BLADDER SPASM: Primary | ICD-10-CM

## 2019-10-30 RX ORDER — OXYBUTYNIN CHLORIDE 5 MG/1
5 TABLET ORAL 3 TIMES DAILY
Qty: 30 TABLET | Refills: 1 | Status: SHIPPED | OUTPATIENT
Start: 2019-10-30 | End: 2021-07-14

## 2019-11-01 ENCOUNTER — HOME INFUSION (PRE-WILLOW HOME INFUSION) (OUTPATIENT)
Dept: PHARMACY | Facility: CLINIC | Age: 69
End: 2019-11-01

## 2019-11-04 ENCOUNTER — PATIENT OUTREACH (OUTPATIENT)
Dept: ONCOLOGY | Facility: CLINIC | Age: 69
End: 2019-11-04

## 2019-11-04 DIAGNOSIS — C21.0 SQUAMOUS CELL CARCINOMA OF ANUS (H): Primary | ICD-10-CM

## 2019-11-04 NOTE — PROGRESS NOTES
This is a recent snapshot of the patient's [facility] medical record.  For current drug dose and complete information and questions, call [phone] or In Basket pool, [facility] home infusion ([id number])  CSN Number:  [id number]

## 2019-11-04 NOTE — PROGRESS NOTES
"Pt called stating she has been so anxious and stressed out with her illness and now having to take her  in for a upper GI bleed last week. She is not able to sleep and is feeling really down, she is hoping this is going to get better soon. In the ER last week at first light they told her to take Trazadone at bedtime and ativan as needed. She thinks this is helping a little bit but only has 3 trazadone left. She says she does not want to be on anything long term but she needs something to help her get thru this. \"I have never felt like this, it is so horrible\". Pt is tearful on the phone and does not have a PCP. She is wondering what Dr. Aquino recommends.     Agata Renteria RN on 11/4/2019 at 1:20 PM    "

## 2019-11-05 ENCOUNTER — TELEPHONE (OUTPATIENT)
Dept: ONCOLOGY | Facility: CLINIC | Age: 69
End: 2019-11-05

## 2019-11-05 RX ORDER — LORAZEPAM 0.5 MG/1
0.5 TABLET ORAL EVERY 6 HOURS PRN
Qty: 30 TABLET | Refills: 0 | Status: SHIPPED | OUTPATIENT
Start: 2019-11-05 | End: 2019-11-25

## 2019-11-05 RX ORDER — TRAZODONE HYDROCHLORIDE 50 MG/1
50 TABLET, FILM COATED ORAL AT BEDTIME
Qty: 30 TABLET | Refills: 0 | Status: SHIPPED | OUTPATIENT
Start: 2019-11-05 | End: 2022-02-09

## 2019-11-05 NOTE — PROGRESS NOTES
RX order placed and referral placed. LM for pt with direct line.     Agata Renteria RN on 11/5/2019 at 11:13 AM

## 2019-11-05 NOTE — TELEPHONE ENCOUNTER
ONCOLOGY INTAKE: Records Information      APPT INFORMATION:  Referring provider:  Dr. Whitney Aquino  Referring provider s clinic:  [facility]  Reason for visit/diagnosis:  Emotional stress/anxiety  Has patient been notified of appointment date and time?: NA    RECORDS INFORMATION:  Were the records received with the referral (via [employer])? Internal Referral    ADDITIONAL INFORMATION:  Left VM with hours and phone. Letter sent for follow up. Referral in Epic

## 2019-11-18 ENCOUNTER — TRANSFERRED RECORDS (OUTPATIENT)
Dept: HEALTH INFORMATION MANAGEMENT | Facility: CLINIC | Age: 69
End: 2019-11-18

## 2019-11-18 DIAGNOSIS — G89.3 CANCER RELATED PAIN: ICD-10-CM

## 2019-11-18 RX ORDER — OXYCODONE AND ACETAMINOPHEN 10; 325 MG/1; MG/1
1 TABLET ORAL EVERY 4 HOURS PRN
Qty: 90 TABLET | Refills: 0 | Status: SHIPPED | OUTPATIENT
Start: 2019-11-18 | End: 2019-12-16

## 2019-11-18 NOTE — PROGRESS NOTES
Call received from pt requesting a refill of Percocet on behalf of self.  Last refill: 10.21.19  # 90 with 0 refills at via hand carry.  Last office visit:  10.15.19  Next office visit:  12.23.19    This is an appropriate refill, and has been walked to the pharmacy. Agata Renteria RN, BSN, OCN

## 2019-11-19 ENCOUNTER — HOME INFUSION (PRE-WILLOW HOME INFUSION) (OUTPATIENT)
Dept: PHARMACY | Facility: CLINIC | Age: 69
End: 2019-11-19

## 2019-11-25 ENCOUNTER — OFFICE VISIT (OUTPATIENT)
Dept: RADIATION THERAPY | Facility: OUTPATIENT CENTER | Age: 69
End: 2019-11-25
Payer: COMMERCIAL

## 2019-11-25 VITALS
DIASTOLIC BLOOD PRESSURE: 70 MMHG | SYSTOLIC BLOOD PRESSURE: 127 MMHG | BODY MASS INDEX: 29.91 KG/M2 | HEART RATE: 89 BPM | RESPIRATION RATE: 16 BRPM | WEIGHT: 175.6 LBS

## 2019-11-25 DIAGNOSIS — C21.0 SQUAMOUS CELL CARCINOMA OF ANUS (H): ICD-10-CM

## 2019-11-25 RX ORDER — LORAZEPAM 0.5 MG/1
0.5 TABLET ORAL EVERY 6 HOURS PRN
Qty: 30 TABLET | Refills: 0 | Status: SHIPPED | OUTPATIENT
Start: 2019-11-25 | End: 2020-04-27

## 2019-11-25 NOTE — NURSING NOTE
FOLLOW-UP VISIT    Patient Name: Sara Dominguez      : 1950     Age: 69 year old        ______________________________________________________________________________     Chief Complaint   Patient presents with     Radiation Therapy     Return visit, anal cancer     /70 (BP Location: Left arm, Cuff Size: Adult Large)   Pulse 89   Resp 16   Wt 79.7 kg (175 lb 9.6 oz)   BMI 29.91 kg/m       Pain  Still using percocet 3-4 times per day    Meds  Current Med List Reviewed: Yes  Medication Note: ativan prn    Imaging  None    On Chemo?: No  Nausea:no  Bowel:  Some constipation, but improved  Bladder: improving, was treated recently for 3rd UTI. Completed abx 2 days ago  Skin: healing well, using aquaphor and rare silvadene  Energy Level: still very fatigued, starting to feel better, more energy. Hoping to return to work soon.    Other Appointments:     Date  Oncologist: darlin F/u    Surgeon: Jeffrey Sees every 6 weeks     Other Notes:

## 2019-11-25 NOTE — LETTER
2019    RE: Sara Dominguez  [address]  [facility] [address]          Department of Radiation Oncology  Radiation Therapy Center  [facility] [place] [address]  [phone]       Radiation Oncology Follow-up Visit  2019      Sara Dominguez  MRN: [id number]   : 1950     DIAGNOSIS: squamous cell carcinoma of the anal canal  INTENT OF RADIOTHERAPY: definitive CRT.  PATHOLOGY:  moderately differentiated squamous cell carcinoma                              STAGE:  clinical [id number] (anna-rectal nodes).    CONCURRENT SYSTEMIC THERAPY: 5 FU and mitomycin     ONCOLOGIC HISTORY:    Ms. 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       The patient initially presented with symptoms of rectal pain pressure prompting further evaluation.  She was seen by gastroenterologist Dr. Aguirre who on digital rectal exam noticed a mass in the anterior wall of the anal rectal region, measuring 3.4 cm in size.  Patient eventually underwent colonoscopy on 2019 which demonstrated an 8 mm mucosal abnormality just above the dentate line.  The mass was noted to be in the anterior wall facing the vagina.  Biopsy of the mass demonstrated moderately differentiated squamous cell carcinoma.  The patient was referred to Gyn/onc who on exam palpated a 3 to 4 cm rectovaginal mass.  No vaginal or cervical mass was noted.  2019 the patient underwent MRI of the pelvis.  Imaging demonstrated 2.6 x 2.5 x 2.5 cm mass located in the distal rectum/anal canal region.  The mass was noted to arise exophytically from the distal rectum.  There were suspicious mesorectal lymph nodes measuring subcentimeter in size. No pelvic adenopathy was noted.  On 8/3/2019 the patient underwent a PET scan.  Imaging did not demonstrate any evidence of distant disease. Hypermetabolic 3.3 x 4.1 cm mass seen in the distal  rectum/anus compatible with underlying malignancy. Per Dr. Khan review of imaging, also noted suspicious perirectal adenopathy.  The patient was subsequent seen by Dr. Aquino who discussed definitive treatment with chemoradiation.      SITE OF TREATMENT: anal/pelvis     DATES  OF TREATMENT: 8/22/19-10/09/19     TOTAL DOSE OF TREATMENT: 5400 cGy     DOSE PER FRACTION OF TREATMENT: 180 cGy x 30 fractions    INTERVAL SINCE COMPLETION OF RADIATION THERAPY:   6 weeks    SUBJECTIVE:   The patient returns for follow up.     In the interval, the patient was seen by Dr. Murphy on 11/18/19. On examination, there was noted slight firmness in the rectovaginal septum, but no definitive identifiable mass.     She otherwise continues to recovery from treatment. Skin has continued to heal appropriately. Mild residual pain, requiring only short acting pain medication at this time. Using percocet  q6 hours. No longer needing long acting pain medication. Continues skin care. Receiving IVF 2-3x per week. Denies diarrhea. No blood in stool. Single episode of incontinence, since resolved. Dysuria resolved. Has had recurrent UTI treated x 3.     Will see Dr. Aquino on 12/23/19.       PHYSICAL EXAM:  There were no vitals taken for this visit.  Gen: Alert, in NAD  Eyes: PERRL, EOMI, sclera anicteric  Pulm: No wheezing, stridor or respiratory distress  CV: Well-perfused, no cyanosis, no pedal edema  Abdominal: Soft, nontender, nondistended, no hepatomegaly  Back: No step-offs or pain to palpation along the thoracolumbar spine, no CVA tenderness  Musculoskeletal: Normal bulk and tone   Skin: Deferred examination today.  Neurologic: A/Ox3, CN II-XII intact  Psychiatric: Appropriate mood and affect    LABS AND IMAGING:  Reviewed.    IMPRESSION:   Ms. 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 She completed definitive CRT to a total dose of 54 Gy in 30 fractions on  10/9/19.    PLAN:   1.  Acute toxicities improving. Will continue to improve with time.     2. Cancer related pain. Percocet  mg q6 hrs PRN pain,  no longer using long acting pain medication.     3. Skin care. Aquaphor, sitz baths, perianal squirt bottle, baby wipes. Silvadene for area of desquamation.     4. Dysuria. Improved. Has had treated recurrent UTIs.    5. Discussed would consider post-treatment scope and scans (MRI/PET) post CRT.  Would defer to medical oncology.     6. Continue follow up for surveillance with surgery (Dr. Murphy).    7. Continue follow up with medical oncology. Will see Dr. Aquino on 12/23/19.     8. RTC in 8 weeks.     Justin Khan M.D.  Department of Radiation Oncology  [facility]

## 2019-11-25 NOTE — PROGRESS NOTES
Department of Radiation Oncology  Radiation Therapy Center  [facility] [place] [address]  [phone]       Radiation Oncology Follow-up Visit  2019      Sara Dominguez  MRN: [id number]   : 1950     DIAGNOSIS: squamous cell carcinoma of the anal canal  INTENT OF RADIOTHERAPY: definitive CRT.  PATHOLOGY:  moderately differentiated squamous cell carcinoma                              STAGE:  clinical [id number] (anna-rectal nodes).    CONCURRENT SYSTEMIC THERAPY: 5 FU and mitomycin     ONCOLOGIC HISTORY:    Ms. 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       The patient initially presented with symptoms of rectal pain pressure prompting further evaluation.  She was seen by gastroenterologist Dr. Aguirre who on digital rectal exam noticed a mass in the anterior wall of the anal rectal region, measuring 3.4 cm in size.  Patient eventually underwent colonoscopy on 2019 which demonstrated an 8 mm mucosal abnormality just above the dentate line.  The mass was noted to be in the anterior wall facing the vagina.  Biopsy of the mass demonstrated moderately differentiated squamous cell carcinoma.  The patient was referred to Gyn/onc who on exam palpated a 3 to 4 cm rectovaginal mass.  No vaginal or cervical mass was noted.  2019 the patient underwent MRI of the pelvis.  Imaging demonstrated 2.6 x 2.5 x 2.5 cm mass located in the distal rectum/anal canal region.  The mass was noted to arise exophytically from the distal rectum.  There were suspicious mesorectal lymph nodes measuring subcentimeter in size. No pelvic adenopathy was noted.  On 8/3/2019 the patient underwent a PET scan.  Imaging did not demonstrate any evidence of distant disease. Hypermetabolic 3.3 x 4.1 cm mass seen in the distal rectum/anus compatible with underlying malignancy. Per Dr. Khan review of imaging, also  noted suspicious perirectal adenopathy.  The patient was subsequent seen by Dr. Aquino who discussed definitive treatment with chemoradiation.      SITE OF TREATMENT: anal/pelvis     DATES  OF TREATMENT: 8/22/19-10/09/19     TOTAL DOSE OF TREATMENT: 5400 cGy     DOSE PER FRACTION OF TREATMENT: 180 cGy x 30 fractions    INTERVAL SINCE COMPLETION OF RADIATION THERAPY:   6 weeks    SUBJECTIVE:   The patient returns for follow up.     In the interval, the patient was seen by Dr. Murphy on 11/18/19. On examination, there was noted slight firmness in the rectovaginal septum, but no definitive identifiable mass.     She otherwise continues to recovery from treatment. Skin has continued to heal appropriately. Mild residual pain, requiring only short acting pain medication at this time. Using percocet  q6 hours. No longer needing long acting pain medication. Continues skin care. Receiving IVF 2-3x per week. Denies diarrhea. No blood in stool. Single episode of incontinence, since resolved. Dysuria resolved. Has had recurrent UTI treated x 3.     Will see Dr. Aquino on 12/23/19.       PHYSICAL EXAM:  There were no vitals taken for this visit.  Gen: Alert, in NAD  Eyes: PERRL, EOMI, sclera anicteric  Pulm: No wheezing, stridor or respiratory distress  CV: Well-perfused, no cyanosis, no pedal edema  Abdominal: Soft, nontender, nondistended, no hepatomegaly  Back: No step-offs or pain to palpation along the thoracolumbar spine, no CVA tenderness  Musculoskeletal: Normal bulk and tone   Skin: Deferred examination today.  Neurologic: A/Ox3, CN II-XII intact  Psychiatric: Appropriate mood and affect    LABS AND IMAGING:  Reviewed.    IMPRESSION:   Ms. 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 She completed definitive CRT to a total dose of 54 Gy in 30 fractions on 10/9/19.    PLAN:   1.  Acute toxicities improving. Will continue to improve with time.      2. Cancer related pain. Percocet  mg q6 hrs PRN pain,  no longer using long acting pain medication.     3. Skin care. Aquaphor, sitz baths, perianal squirt bottle, baby wipes. Silvadene for area of desquamation.     4. Dysuria. Improved. Has had treated recurrent UTIs.    5. Discussed would consider post-treatment scope and scans (MRI/PET) post CRT.  Would defer to medical oncology.     6. Continue follow up for surveillance with surgery (Dr. Murphy).    7. Continue follow up with medical oncology. Will see Dr. Aquino on 12/23/19.     8. RTC in 8 weeks.     Justin Khan M.D.  Department of Radiation Oncology  [facility]

## 2019-12-03 NOTE — PROGRESS NOTES
This is a recent snapshot of the patient's [facility] medical record.  For current drug dose and complete information and questions, call [phone] or In Basket pool, [facility] home infusion ([id number])  CSN Number:  [id number]

## 2019-12-16 DIAGNOSIS — G89.3 CANCER RELATED PAIN: ICD-10-CM

## 2019-12-16 RX ORDER — OXYCODONE AND ACETAMINOPHEN 10; 325 MG/1; MG/1
1 TABLET ORAL EVERY 4 HOURS PRN
Qty: 90 TABLET | Refills: 0 | Status: SHIPPED | OUTPATIENT
Start: 2019-12-16 | End: 2020-01-13

## 2019-12-16 NOTE — PROGRESS NOTES
Call received from pt requesting a refill of Percocet on behalf of self.  Last refill: 11.18.19  # 90 with 0 refills at via hand carry.  Last office visit:  10.15.19  Next office visit:  12.23.19    This is an appropriate refill, and has been placed at the  for . Agata Renteria RN, BSN, OCN

## 2019-12-23 ENCOUNTER — INFUSION THERAPY VISIT (OUTPATIENT)
Dept: INFUSION THERAPY | Facility: CLINIC | Age: 69
End: 2019-12-23
Attending: INTERNAL MEDICINE
Payer: MEDICARE

## 2019-12-23 ENCOUNTER — ONCOLOGY VISIT (OUTPATIENT)
Dept: ONCOLOGY | Facility: CLINIC | Age: 69
End: 2019-12-23
Attending: INTERNAL MEDICINE
Payer: MEDICARE

## 2019-12-23 ENCOUNTER — HOSPITAL ENCOUNTER (OUTPATIENT)
Facility: CLINIC | Age: 69
Discharge: HOME | End: 2019-12-23
Attending: INTERNAL MEDICINE | Admitting: INTERNAL MEDICINE
Payer: MEDICARE

## 2019-12-23 VITALS
DIASTOLIC BLOOD PRESSURE: 69 MMHG | HEART RATE: 87 BPM | SYSTOLIC BLOOD PRESSURE: 134 MMHG | OXYGEN SATURATION: 98 % | WEIGHT: 169.9 LBS | BODY MASS INDEX: 28.31 KG/M2 | TEMPERATURE: 98.3 F | HEIGHT: 65 IN | RESPIRATION RATE: 18 BRPM

## 2019-12-23 DIAGNOSIS — C21.0 SQUAMOUS CELL CARCINOMA OF ANUS (H): ICD-10-CM

## 2019-12-23 DIAGNOSIS — D75.89 MACROCYTOSIS: ICD-10-CM

## 2019-12-23 DIAGNOSIS — R11.0 NAUSEA: Primary | ICD-10-CM

## 2019-12-23 DIAGNOSIS — Z85.048 PERSONAL HISTORY OF OTHER MALIGNANT NEOPLASM OF RECTUM, RECTOSIGMOID JUNCTION, AND ANUS: ICD-10-CM

## 2019-12-23 DIAGNOSIS — I95.9 HYPOTENSION, UNSPECIFIED HYPOTENSION TYPE: ICD-10-CM

## 2019-12-23 LAB
ALBUMIN SERPL-MCNC: 3.4 G/DL (ref 3.4–5)
ALP SERPL-CCNC: 91 U/L (ref 40–150)
ALT SERPL W P-5'-P-CCNC: 23 U/L (ref 0–50)
ANION GAP SERPL CALCULATED.3IONS-SCNC: 5 MMOL/L (ref 3–14)
AST SERPL W P-5'-P-CCNC: 15 U/L (ref 0–45)
BASOPHILS # BLD AUTO: 0 10E9/L (ref 0–0.2)
BASOPHILS NFR BLD AUTO: 0.4 %
BILIRUB SERPL-MCNC: 0.3 MG/DL (ref 0.2–1.3)
BUN SERPL-MCNC: 18 MG/DL (ref 7–30)
CALCIUM SERPL-MCNC: 8.8 MG/DL (ref 8.5–10.1)
CHLORIDE SERPL-SCNC: 109 MMOL/L (ref 94–109)
CO2 SERPL-SCNC: 27 MMOL/L (ref 20–32)
CREAT SERPL-MCNC: 0.89 MG/DL (ref 0.52–1.04)
DIFFERENTIAL METHOD BLD: ABNORMAL
EOSINOPHIL # BLD AUTO: 0.1 10E9/L (ref 0–0.7)
EOSINOPHIL NFR BLD AUTO: 1.4 %
ERYTHROCYTE [DISTWIDTH] IN BLOOD BY AUTOMATED COUNT: 13.9 % (ref 10–15)
GFR SERPL CREATININE-BSD FRML MDRD: 65 ML/MIN/{1.73_M2}
GLUCOSE SERPL-MCNC: 87 MG/DL (ref 70–99)
HCT VFR BLD AUTO: 38.3 % (ref 35–47)
HGB BLD-MCNC: 12 G/DL (ref 11.7–15.7)
IMM GRANULOCYTES # BLD: 0.1 10E9/L (ref 0–0.4)
IMM GRANULOCYTES NFR BLD: 1.2 %
LYMPHOCYTES # BLD AUTO: 0.8 10E9/L (ref 0.8–5.3)
LYMPHOCYTES NFR BLD AUTO: 14.8 %
MCH RBC QN AUTO: 31.9 PG (ref 26.5–33)
MCHC RBC AUTO-ENTMCNC: 31.3 G/DL (ref 31.5–36.5)
MCV RBC AUTO: 102 FL (ref 78–100)
MONOCYTES # BLD AUTO: 0.7 10E9/L (ref 0–1.3)
MONOCYTES NFR BLD AUTO: 12.1 %
NEUTROPHILS # BLD AUTO: 3.9 10E9/L (ref 1.6–8.3)
NEUTROPHILS NFR BLD AUTO: 70.1 %
NRBC # BLD AUTO: 0 10*3/UL
NRBC BLD AUTO-RTO: 0 /100
PLATELET # BLD AUTO: 223 10E9/L (ref 150–450)
POTASSIUM SERPL-SCNC: 4.1 MMOL/L (ref 3.4–5.3)
PROT SERPL-MCNC: 6.8 G/DL (ref 6.8–8.8)
RBC # BLD AUTO: 3.76 10E12/L (ref 3.8–5.2)
SODIUM SERPL-SCNC: 141 MMOL/L (ref 133–144)
WBC # BLD AUTO: 5.6 10E9/L (ref 4–11)

## 2019-12-23 PROCEDURE — 36591 DRAW BLOOD OFF VENOUS DEVICE: CPT

## 2019-12-23 PROCEDURE — 80053 COMPREHEN METABOLIC PANEL: CPT | Performed by: INTERNAL MEDICINE

## 2019-12-23 PROCEDURE — 99214 OFFICE O/P EST MOD 30 MIN: CPT | Performed by: INTERNAL MEDICINE

## 2019-12-23 PROCEDURE — G0463 HOSPITAL OUTPT CLINIC VISIT: HCPCS

## 2019-12-23 PROCEDURE — 25000128 H RX IP 250 OP 636: Performed by: INTERNAL MEDICINE

## 2019-12-23 PROCEDURE — 85025 COMPLETE CBC W/AUTO DIFF WBC: CPT | Performed by: INTERNAL MEDICINE

## 2019-12-23 RX ORDER — DEXAMETHASONE SODIUM PHOSPHATE 4 MG/ML
2 INJECTION, SOLUTION INTRA-ARTICULAR; INTRALESIONAL; INTRAMUSCULAR; INTRAVENOUS; SOFT TISSUE ONCE
Status: CANCELLED
Start: 2019-12-23

## 2019-12-23 RX ORDER — ONDANSETRON 2 MG/ML
4 INJECTION INTRAMUSCULAR; INTRAVENOUS EVERY 6 HOURS PRN
Status: CANCELLED
Start: 2019-12-23

## 2019-12-23 RX ORDER — ONDANSETRON 2 MG/ML
4 INJECTION INTRAMUSCULAR; INTRAVENOUS ONCE
Status: CANCELLED
Start: 2019-12-23

## 2019-12-23 RX ORDER — HEPARIN SODIUM (PORCINE) LOCK FLUSH IV SOLN 100 UNIT/ML 100 UNIT/ML
500 SOLUTION INTRAVENOUS ONCE
Status: COMPLETED | OUTPATIENT
Start: 2019-12-23 | End: 2019-12-23

## 2019-12-23 RX ORDER — HEPARIN SODIUM (PORCINE) LOCK FLUSH IV SOLN 100 UNIT/ML 100 UNIT/ML
500 SOLUTION INTRAVENOUS ONCE
Status: CANCELLED
Start: 2019-12-23

## 2019-12-23 RX ORDER — LORAZEPAM 2 MG/ML
0.5 INJECTION INTRAMUSCULAR ONCE
Status: CANCELLED
Start: 2019-12-23

## 2019-12-23 RX ORDER — SODIUM CHLORIDE AND POTASSIUM CHLORIDE 150; 900 MG/100ML; MG/100ML
INJECTION, SOLUTION INTRAVENOUS CONTINUOUS
Status: CANCELLED
Start: 2019-12-23

## 2019-12-23 RX ADMIN — Medication 500 UNITS: at 09:50

## 2019-12-23 ASSESSMENT — PAIN SCALES - GENERAL: PAINLEVEL: MODERATE PAIN (5)

## 2019-12-23 ASSESSMENT — MIFFLIN-ST. JEOR: SCORE: 1278.6

## 2019-12-23 NOTE — PATIENT INSTRUCTIONS
We would like to see you back in early April for a follow up appointment with labs and PET scan prior.     When you are in need of a refill, please call your pharmacy and they will send us a request.      Copy of appointments, and after visit summary (AVS) given to patient.      If you have any questions please call Agata Renteria RN, BSN Oncology Hematology  [facility] [phone]. For questions after business hours, or on holidays/weekends, please call our after hours Nurse Triage line [phone]. Thank you.           Follow-up in early April with labs and PET

## 2019-12-23 NOTE — PROGRESS NOTES
Infusion Nursing Note:  Sara Dominguez presents today for Port Labs.    Patient seen by provider today: Yes: Dr. Aquino   present during visit today: Not Applicable.    Note: N/A.    Intravenous Access:  Implanted Port.    Treatment Conditions:  Not Applicable.    Post Infusion Assessment:  Blood return noted.  Site patent and intact, free from redness, edema or discomfort.  No evidence of extravasations.  Access discontinued per protocol.     Discharge Plan:   Discharge instructions reviewed with: Patient.  Patient and/or family verbalized understanding of discharge instructions and all questions answered.  AVS to patient via ReplenishT.  Patient will return as directed by MD for next appointment.   Patient discharged in stable condition accompanied by: self.  Departure Mode: Ambulatory.    Linn Xiong RN

## 2019-12-23 NOTE — LETTER
"    12/23/2019         RE: Sara Dominguez  [address]  [place] [address]        Dear Colleague,    Thank you for referring your patient, Sara Dominguez, to the [facility]. Please see a copy of my visit note below.    Oncology Rooming Note    December 23, 2019 10:17 AM   Sara Dominguez is a 69 year old female who presents for:    Chief Complaint   Patient presents with     Oncology Clinic Visit     2 month recheck Squamous cell carcinoma of anus      Initial Vitals: /69 (BP Location: Right arm, Patient Position: Sitting, Cuff Size: Adult Regular)   Pulse 87   Temp 98.3  F (36.8  C) (Tympanic)   Resp 18   Ht 1.638 m (5' 4.5\")   Wt 77.1 kg (169 lb 14.4 oz)   SpO2 98%   BMI 28.71 kg/m    Estimated body mass index is 28.71 kg/m  as calculated from the following:    Height as of this encounter: 1.638 m (5' 4.5\").    Weight as of this encounter: 77.1 kg (169 lb 14.4 oz). Body surface area is 1.87 meters squared.  Moderate Pain (5) Comment: Data Unavailable   No LMP recorded. Patient is postmenopausal.  Allergies reviewed: Yes  Medications reviewed: Yes    Medications: MEDICATION REFILLS NEEDED TODAY. Provider was notified.  Pharmacy name entered into [employer]: [facility], [place] - [address]    Clinical concerns: 2 month recheck Squamous cell carcinoma of anus.       Cici Purvis [facility]                ONCOLOGY FOLLOW UP VISIT      REASON FOR VISIT/CHIEF COMPLAIN:    July 2019 at age 68 diagnosed with anal cancer,  S/p concurrent chemo/RT      HISTORY OF ONCOLOGY ILLNESS:    In 7/2019 at age 68, she presented to her PMD Dr. Reid's office for rectal pain 4/10  for a few weeks, she reports she felt a mass in her rectum, increase in rectal pressure after eating. She makes her self have a BM to relieve the pressure. She notes one instance of bright red blood in her stool. She reports progressive difficulty in moving her bowel.   She then saw GI " Dr. Aguirre, who found perianal skin examination was normal. Digital examination revealed a mass at the anterior wall of rectum. It was a hard mass approximately 3-4 cm.   Colonoscopy 7/12/2019 found 8 mm mucosal abnormality just above the dentate line. This was at the anterior wall facing her vagina where the large mass was felt underneath. 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  She then saw Gyn Dr. Eduardo, 7/19/2019, exam found normal vulva. No vaginal polyp/growth appreciated arising from vaginal mucosa (which appears to be smooth) but there is a nodular 3-4cm mass palpated at rectovaginal area, which is not as well appreciated by digital rectal exam. Fixed mass, mildly tender to touch. Cervix grossly normal. Uterus 6 weeks size, non-tender. No adnexal mass/tenderness bilaterally.     7/2013/2019 pelvic MRI found 2.5 x 2.5 x 2.6 cm mass located between the distal rectum and vagina which appears to arise exophytically from the distal rectum. Single mildly enlarged mesorectal lymph node measuring 8 mm in short axis. No pelvic sidewall adenopathy. No other bony or soft tissue abnormalities identified.     She then saw colon rectal surgeon Dr. Nadeen Murphy 7/26/2019, who informed pt that she has anal cancer, not tx by surgery. Recommended concurrent chemo/RT.    PET 8/2019 found Hypermetabolic 3.3 x 4.1 cm mass SUV 70 seen in the distal rectum/anus compatible with underlying malignancy. No evidence of distant hypermetabolic metastatic disease seen in the chest, abdomen or pelvis.      She then saw us and Rad-Onc, and made informed decision to proceed with concurrent chemoradiation cycle 1 day 1 August 22, 2019. She finished the tx early Oct.       INTERVAL HISTORY:   She still needs IVF at local hospital 1-2 times per wk and anti nausea med.      PAST MEDICAL HISTORY  HTN,  "hyperlipidemia  depression      MEDICATIONS/ALLERY:  Reviewed in Epic system.        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       REVIEW OF SYSTEMS:   She has ongoing nausea and ill feeling required still 1-2 times IV fluid which helps her a lot.       PHYSICAL EXAMINATION:   VITAL SIGNS:Blood pressure 134/69, pulse 87, temperature 98.3  F (36.8  C), temperature source Tympanic, resp. rate 18, height 1.638 m (5' 4.5\"), weight 77.1 kg (169 lb 14.4 oz), SpO2 98 %, not currently breastfeeding.      ECO    GENERAL APPEARANCE:  looks like her stated age, very pleasant, not in acute distress.   HEENT: The patient is normocephalic, atraumatic. Pupils are equally reactive to light.  Sclerae are anicteric.  Moist oral mucosa.  Mild erythematous changes on pharynx.  No oral thrush. + left buccal mucosa ulcer.  NECK:  Supple.  No jugular venous distention.  Thyroid is not palpable.   LYMPH NODES:  Superficial lymphadenopathy is not appreciable in the bilateral cervical, supraclavicular, axillary or inguinal areas.   CARDIOVASCULAR:  S1, S2 regular with no murmurs or gallops.  No carotid or abdominal bruits.   PULMONARY:  Lungs are clear to auscultation and percussion bilaterally.  There is no wheezing or rhonchi.   GASTROINTESTINAL:  Abdomen is soft, nontender.  No hepatosplenomegaly.  No signs of ascites.  No mass appreciable.   MUSCULOSKELETAL/EXTREMITIES:  No edema.  No cyanotic changes.  No signs of joint deformity.  No lymphedema.   NEUROLOGIC:  Cranial nerves II-XII are grossly intact.  Sensation intact.  Muscle strength and muscle tone symmetrical, 5/5 throughout.   BACK:  No spinal or paraspinal tenderness.  No CVA tenderness.   SKIN:  No petechiae.  No rash.  No signs of cellulitis.             CURRENT LAB DATA REVIEWED TODAY  Cbc diff/CMP are " fine, mcv > 100      OLD DATA REVIEWED TODAY WITH SUMMARY:   7/2019 Baseline CMP is fine, cr 1.15  7/2019 Baseline cbc is fine with hb 11.7    7/2019 8 mm mucosal abnormality just above the dentate line. 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    PET 8/2019 found Hypermetabolic 3.3 x 4.1 cm mass SUV 70 seen in the distal rectum/anus compatible with underlying malignancy. No evidence of distant hypermetabolic metastatic disease seen in  the chest, abdomen or pelvis.      7/2013/2019 pelvic MRI found 2.5 x 2.5 x 2.6 cm mass located between the distal rectum and vagina which appears to arise exophytically from the distal rectum. Single mildly enlarged mesorectal lymph node measuring 8 mm in short axis. No pelvic sidewall adenopathy. No other bony or soft tissue abnormalities identified.           ASSESSMENT AND PLAN:    1. At age 68 in July 2019, she is diagnosed with low rectum/anal squamous cell carcinoma, months duration of anal pain pressure.    She made informed decision to proceed with concurrent chemoradiation cycle 1 day 1 August 22, 2019 with 5 FU and mitomycin. She finished chemo/RT early Oct. 2019.       She is advised on ongoing response in the next months.   She is advised on on going colon rectal surgeon local exam.     F/u in in 4/2020 with labs and PET.        2. Nausea is constant   Advice compazine q 6hr prn.   Offer her ongoing IVF with zofran and pepcid  prn.   She feels this helps a lot.     3.  Recovering counts yet developed new macrocytosis.  We discussed the year the proper diet, check B12 folate level along with her follow-up.    3. Mucositis.   Advice on going magic mouth rinse.         FMLA forms have been completed and faxed in. Copy sent to scanning and original placed at the  for .     Agata Renteria RN on 12/23/2019 at 3:13 PM      Again, thank you for allowing me to participate in the care of  your patient.        Sincerely,        Whitney Aquino MD, MD

## 2019-12-23 NOTE — PROGRESS NOTES
ONCOLOGY FOLLOW UP VISIT      REASON FOR VISIT/CHIEF COMPLAIN:    July 2019 at age 68 diagnosed with anal cancer,  S/p concurrent chemo/RT      HISTORY OF ONCOLOGY ILLNESS:    In 7/2019 at age 68, she presented to her PMD Dr. Reid's office for rectal pain 4/10  for a few weeks, she reports she felt a mass in her rectum, increase in rectal pressure after eating. She makes her self have a BM to relieve the pressure. She notes one instance of bright red blood in her stool. She reports progressive difficulty in moving her bowel.   She then saw GI Dr. Aguirre, who found perianal skin examination was normal. Digital examination revealed a mass at the anterior wall of rectum. It was a hard mass approximately 3-4 cm.   Colonoscopy 7/12/2019 found 8 mm mucosal abnormality just above the dentate line. This was at the anterior wall facing her vagina where the large mass was felt underneath. 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  She then saw Gyn Dr. Eduardo, 7/19/2019, exam found normal vulva. No vaginal polyp/growth appreciated arising from vaginal mucosa (which appears to be smooth) but there is a nodular 3-4cm mass palpated at rectovaginal area, which is not as well appreciated by digital rectal exam. Fixed mass, mildly tender to touch. Cervix grossly normal. Uterus 6 weeks size, non-tender. No adnexal mass/tenderness bilaterally.     7/2013/2019 pelvic MRI found 2.5 x 2.5 x 2.6 cm mass located between the distal rectum and vagina which appears to arise exophytically from the distal rectum. Single mildly enlarged mesorectal lymph node measuring 8 mm in short axis. No pelvic sidewall adenopathy. No other bony or soft tissue abnormalities identified.     She then saw colon rectal surgeon Dr. Nadeen Murphy 7/26/2019, who informed pt that she has anal cancer, not tx by surgery. Recommended concurrent chemo/RT.    PET 8/2019 found  "Hypermetabolic 3.3 x 4.1 cm mass SUV 70 seen in the distal rectum/anus compatible with underlying malignancy. No evidence of distant hypermetabolic metastatic disease seen in the chest, abdomen or pelvis.      She then saw us and Rad-Onc, and made informed decision to proceed with concurrent chemoradiation cycle 1 day 1 2019. She finished the tx early Oct.       INTERVAL HISTORY:   She still needs IVF at local hospital 1-2 times per wk and anti nausea med.      PAST MEDICAL HISTORY  HTN, hyperlipidemia  depression      MEDICATIONS/ALLERY:  Reviewed in Epic system.        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       REVIEW OF SYSTEMS:   She has ongoing nausea and ill feeling required still 1-2 times IV fluid which helps her a lot.       PHYSICAL EXAMINATION:   VITAL SIGNS:Blood pressure 134/69, pulse 87, temperature 98.3  F (36.8  C), temperature source Tympanic, resp. rate 18, height 1.638 m (5' 4.5\"), weight 77.1 kg (169 lb 14.4 oz), SpO2 98 %, not currently breastfeeding.      ECO    GENERAL APPEARANCE:  looks like her stated age, very pleasant, not in acute distress.   HEENT: The patient is normocephalic, atraumatic. Pupils are equally reactive to light.  Sclerae are anicteric.  Moist oral mucosa.  Mild erythematous changes on pharynx.  No oral thrush. + left buccal mucosa ulcer.  NECK:  Supple.  No jugular venous distention.  Thyroid is not palpable.   LYMPH NODES:  Superficial lymphadenopathy is not appreciable in the bilateral cervical, supraclavicular, axillary or inguinal areas.   CARDIOVASCULAR:  S1, S2 regular with no murmurs or gallops.  No carotid or abdominal bruits.   PULMONARY:  Lungs are clear to auscultation and percussion bilaterally.  There is no wheezing or rhonchi.   GASTROINTESTINAL:  Abdomen is soft, " nontender.  No hepatosplenomegaly.  No signs of ascites.  No mass appreciable.   MUSCULOSKELETAL/EXTREMITIES:  No edema.  No cyanotic changes.  No signs of joint deformity.  No lymphedema.   NEUROLOGIC:  Cranial nerves II-XII are grossly intact.  Sensation intact.  Muscle strength and muscle tone symmetrical, 5/5 throughout.   BACK:  No spinal or paraspinal tenderness.  No CVA tenderness.   SKIN:  No petechiae.  No rash.  No signs of cellulitis.             CURRENT LAB DATA REVIEWED TODAY  Cbc diff/CMP are fine, mcv > 100      OLD DATA REVIEWED TODAY WITH SUMMARY:   7/2019 Baseline CMP is fine, cr 1.15  7/2019 Baseline cbc is fine with hb 11.7    7/2019 8 mm mucosal abnormality just above the dentate line. 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    PET 8/2019 found Hypermetabolic 3.3 x 4.1 cm mass SUV 70 seen in the distal rectum/anus compatible with underlying malignancy. No evidence of distant hypermetabolic metastatic disease seen in  the chest, abdomen or pelvis.      7/2013/2019 pelvic MRI found 2.5 x 2.5 x 2.6 cm mass located between the distal rectum and vagina which appears to arise exophytically from the distal rectum. Single mildly enlarged mesorectal lymph node measuring 8 mm in short axis. No pelvic sidewall adenopathy. No other bony or soft tissue abnormalities identified.           ASSESSMENT AND PLAN:    1. At age 68 in July 2019, she is diagnosed with low rectum/anal squamous cell carcinoma, months duration of anal pain pressure.    She made informed decision to proceed with concurrent chemoradiation cycle 1 day 1 August 22, 2019 with 5 FU and mitomycin. She finished chemo/RT early Oct. 2019.       She is advised on ongoing response in the next months.   She is advised on on going colon rectal surgeon local exam.     F/u in in 4/2020 with labs and PET.        2. Nausea is constant   Advice compazine q 6hr prn.   Offer her  ongoing IVF with zofran and pepcid  prn.   She feels this helps a lot.     3.  Recovering counts yet developed new macrocytosis.  We discussed the year the proper diet, check B12 folate level along with her follow-up.    3. Mucositis.   Advice on going magic mouth rinse.

## 2019-12-23 NOTE — PROGRESS NOTES
"Oncology Rooming Note    December 23, 2019 10:17 AM   Sara Dominguez is a 69 year old female who presents for:    Chief Complaint   Patient presents with     Oncology Clinic Visit     2 month recheck Squamous cell carcinoma of anus      Initial Vitals: /69 (BP Location: Right arm, Patient Position: Sitting, Cuff Size: Adult Regular)   Pulse 87   Temp 98.3  F (36.8  C) (Tympanic)   Resp 18   Ht 1.638 m (5' 4.5\")   Wt 77.1 kg (169 lb 14.4 oz)   SpO2 98%   BMI 28.71 kg/m   Estimated body mass index is 28.71 kg/m  as calculated from the following:    Height as of this encounter: 1.638 m (5' 4.5\").    Weight as of this encounter: 77.1 kg (169 lb 14.4 oz). Body surface area is 1.87 meters squared.  Moderate Pain (5) Comment: Data Unavailable   No LMP recorded. Patient is postmenopausal.  Allergies reviewed: Yes  Medications reviewed: Yes    Medications: MEDICATION REFILLS NEEDED TODAY. Provider was notified.  Pharmacy name entered into [employer]: [facility] [place] - [place], [place] - [facility] [address]    Clinical concerns: 2 month recheck Squamous cell carcinoma of anus.       Cici Purvis CMA              "

## 2019-12-23 NOTE — PROGRESS NOTES
FMLA forms have been completed and faxed in. Copy sent to scanning and original placed at the  for .     Agata Renteria RN on 12/23/2019 at 3:13 PM

## 2019-12-30 ENCOUNTER — MYC REFILL (OUTPATIENT)
Dept: RADIATION THERAPY | Facility: OUTPATIENT CENTER | Age: 69
End: 2019-12-30

## 2019-12-30 ENCOUNTER — MYC REFILL (OUTPATIENT)
Dept: RADIATION ONCOLOGY | Facility: CLINIC | Age: 69
End: 2019-12-30

## 2019-12-30 DIAGNOSIS — L58.9 RADIATION DERMATITIS: ICD-10-CM

## 2019-12-30 RX ORDER — LIDOCAINE HYDROCHLORIDE 20 MG/ML
SOLUTION OROPHARYNGEAL
Qty: 100 ML | Refills: 1 | Status: SHIPPED | OUTPATIENT
Start: 2019-12-30 | End: 2022-02-09

## 2019-12-30 RX ORDER — SILVER SULFADIAZINE 10 MG/G
CREAM TOPICAL 2 TIMES DAILY
Qty: 50 G | Refills: 3 | Status: SHIPPED | OUTPATIENT
Start: 2019-12-30 | End: 2020-05-04

## 2020-01-13 DIAGNOSIS — G89.3 CANCER RELATED PAIN: ICD-10-CM

## 2020-01-13 RX ORDER — OXYCODONE AND ACETAMINOPHEN 10; 325 MG/1; MG/1
1 TABLET ORAL EVERY 4 HOURS PRN
Qty: 90 TABLET | Refills: 0 | Status: SHIPPED | OUTPATIENT
Start: 2020-01-13 | End: 2020-02-10

## 2020-01-13 NOTE — PROGRESS NOTES
Call received from pt requesting a refill of Percocet on behalf of self.  Last refill: 12.16.19  # 90 with 0 refills at [facility].  Last office visit:  12.23.19  Next office visit:  04.27.20    This is an appropriate refill, and has been walks to the pharmacy.  Agata Renteria RN, BSN, OCN

## 2020-01-17 ENCOUNTER — MYC REFILL (OUTPATIENT)
Dept: ONCOLOGY | Facility: CLINIC | Age: 70
End: 2020-01-17

## 2020-01-17 DIAGNOSIS — C21.0 SQUAMOUS CELL CARCINOMA OF ANUS (H): ICD-10-CM

## 2020-01-17 RX ORDER — PROCHLORPERAZINE MALEATE 10 MG
5 TABLET ORAL EVERY 6 HOURS PRN
Qty: 30 TABLET | Refills: 1 | Status: SHIPPED | OUTPATIENT
Start: 2020-01-17 | End: 2020-04-27

## 2020-01-26 ENCOUNTER — MYC MEDICAL ADVICE (OUTPATIENT)
Dept: ONCOLOGY | Facility: CLINIC | Age: 70
End: 2020-01-26

## 2020-01-31 ENCOUNTER — TRANSFERRED RECORDS (OUTPATIENT)
Dept: HEALTH INFORMATION MANAGEMENT | Facility: CLINIC | Age: 70
End: 2020-01-31

## 2020-02-07 PROBLEM — C21.1 MALIGNANT NEOPLASM OF ANAL CANAL (H): Status: ACTIVE | Noted: 2019-07-12

## 2020-02-07 PROBLEM — C21.0 SQUAMOUS CELL CARCINOMA OF ANUS (H): Status: ACTIVE | Noted: 2019-07-12

## 2020-02-10 ENCOUNTER — OFFICE VISIT (OUTPATIENT)
Dept: RADIATION THERAPY | Facility: OUTPATIENT CENTER | Age: 70
End: 2020-02-10
Payer: COMMERCIAL

## 2020-02-10 VITALS
SYSTOLIC BLOOD PRESSURE: 123 MMHG | HEART RATE: 88 BPM | BODY MASS INDEX: 29.37 KG/M2 | DIASTOLIC BLOOD PRESSURE: 81 MMHG | RESPIRATION RATE: 16 BRPM | WEIGHT: 173.8 LBS

## 2020-02-10 DIAGNOSIS — R10.32 BILATERAL GROIN PAIN: Primary | ICD-10-CM

## 2020-02-10 DIAGNOSIS — C21.1 MALIGNANT NEOPLASM OF ANAL CANAL (H): ICD-10-CM

## 2020-02-10 DIAGNOSIS — G89.3 CANCER RELATED PAIN: ICD-10-CM

## 2020-02-10 DIAGNOSIS — R10.31 BILATERAL GROIN PAIN: Primary | ICD-10-CM

## 2020-02-10 RX ORDER — OXYCODONE AND ACETAMINOPHEN 10; 325 MG/1; MG/1
1 TABLET ORAL EVERY 6 HOURS PRN
Qty: 90 TABLET | Refills: 0 | Status: SHIPPED | OUTPATIENT
Start: 2020-02-10 | End: 2020-03-09

## 2020-02-10 NOTE — PROGRESS NOTES
Department of Radiation Oncology  Radiation Therapy Center  [facility] [place] [address]  [phone]       Radiation Oncology Follow-up Visit  February 10, 2020    Sara Dominguez  MRN: [id number]   : 1950     DIAGNOSIS: squamous cell carcinoma of the anal canal  INTENT OF RADIOTHERAPY: definitive CRT.  PATHOLOGY:  moderately differentiated squamous cell carcinoma                              STAGE:  clinical [id number] (anna-rectal nodes).    CONCURRENT SYSTEMIC THERAPY: 5 FU and mitomycin     ONCOLOGIC HISTORY:    Ms. Dominguez is a 69 year old female diagnosed wquamous cell carcinoma of the anal canal, clinical [id number] (anna-rectal nodes).       The patient initially presented with symptoms of rectal pain pressure prompting further evaluation.  She was seen by gastroenterologist Dr. Aguirre who on digital rectal exam noticed a mass in the anterior wall of the anal rectal region, measuring 3.4 cm in size.  Patient eventually underwent colonoscopy on 2019 which demonstrated an 8 mm mucosal abnormality just above the dentate line.  The mass was noted to be in the anterior wall facing the vagina.  Biopsy of the mass demonstrated moderately differentiated squamous cell carcinoma.  The patient was referred to Gyn/onc who on exam palpated a 3 to 4 cm rectovaginal mass.  No vaginal or cervical mass was noted.  2019 the patient underwent MRI of the pelvis.  Imaging demonstrated 2.6 x 2.5 x 2.5 cm mass located in the distal rectum/anal canal region.  The mass was noted to arise exophytically from the distal rectum.  There were suspicious mesorectal lymph nodes measuring subcentimeter in size. No pelvic adenopathy was noted.  On 8/3/2019 the patient underwent a PET scan.  Imaging did not demonstrate any evidence of distant disease. Hypermetabolic 3.3 x 4.1 cm mass seen in the distal rectum/anus compatible with underlying malignancy. Per Dr. Khan review of imaging, also  noted suspicious perirectal adenopathy.  The patient was subsequent seen by Dr. Aquino who discussed definitive treatment with chemoradiation.      SITE OF TREATMENT: anal/pelvis     DATES  OF TREATMENT: 8/22/19-10/09/19     TOTAL DOSE OF TREATMENT: 5400 cGy     DOSE PER FRACTION OF TREATMENT: 180 cGy x 30 fractions    INTERVAL SINCE COMPLETION OF RADIATION THERAPY:   4 months     SUBJECTIVE:   The patient returns for follow up.     In the interval, the patient was seen by Dr. Murphy on 2/4/20. On examination, there was no clear evidence of recurrent/ residual disease.     She otherwise continues to recovery from treatment. Skin has continued to heal appropriately. Mild residual pain, requiring only short acting pain medication at this time. Using percocet  PRN. Continues skin care.  Denies diarrhea. No blood in stool. Single episode of incontinence, since resolved. Dysuria resolved. Has had recurrent UTI treated x 3 in past. She has noticed slight increase in bilateral groin discomfort and lower extremity edema, likely secondary to lymphedema.     PET scheduled for 4/9/20. Will see Dr. Aquino on 4/27/20.      PHYSICAL EXAM:  There were no vitals taken for this visit.  Gen: Alert, in NAD  Eyes: PERRL, EOMI, sclera anicteric  Pulm: No wheezing, stridor or respiratory distress  CV: Well-perfused, no cyanosis, no pedal edema  Abdominal: Soft, nontender, nondistended, no hepatomegaly  Back: No step-offs or pain to palpation along the thoracolumbar spine, no CVA tenderness  Lymphedema: Mild bilateral LE and groin edema.   Skin: Deferred examination today.  Neurologic: A/Ox3, CN II-XII intact  Psychiatric: Appropriate mood and affect    LABS AND IMAGING:  Reviewed.    IMPRESSION:   Ms. Dominguez is a 69 year old female diagnosed wquamous cell carcinoma of the anal canal, clinical [id number] (anna-rectal nodes). She completed definitive CRT to a total dose of 54 Gy in 30 fractions on 10/9/19.    PLAN:   1.  Clinically on exam as  no clear evidence of residual/recurrent disease. Post treatment PET scheduled for 4/9/20.     2. Late toxicity. Has developed mild bilateral groin and lower extremity edema likely secondary to RT. Will send referral to lymphedema.  Discussed use of vaginal dilators to minimize risk of vaginal stenosis.     3. Cancer related pain. Percocet  mg PRN pain.     4. Continue follow up for surveillance with surgery (Dr. Murphy). Exam on 2/4/20 did not demonstrate any clear evidence of residual/recurrent disease. Planned to see patient in 6 weeks.    5. Continue follow up with medical oncology. Will see Dr. Aquino on 4/27/20.     6. RTC in 3 months.     Justin Khan M.D.  Department of Radiation Oncology  [facility]

## 2020-02-10 NOTE — LETTER
2/10/2020      RE: Sara Dominguez  [address]  [facility] [address]          Department of Radiation Oncology  Radiation Therapy Center  [facility] [place] [address]  [phone]       Radiation Oncology Follow-up Visit  February 10, 2020    Sara Dominguez  MRN: [id number]   : 1950     DIAGNOSIS: squamous cell carcinoma of the anal canal  INTENT OF RADIOTHERAPY: definitive CRT.  PATHOLOGY:  moderately differentiated squamous cell carcinoma                              STAGE:  clinical [id number] (anna-rectal nodes).    CONCURRENT SYSTEMIC THERAPY: 5 FU and mitomycin     ONCOLOGIC HISTORY:    Ms. Dominguez is a 69 year old female diagnosed wquamous cell carcinoma of the anal canal, clinical [id number] (anna-rectal nodes).       The patient initially presented with symptoms of rectal pain pressure prompting further evaluation.  She was seen by gastroenterologist Dr. Aguirre who on digital rectal exam noticed a mass in the anterior wall of the anal rectal region, measuring 3.4 cm in size.  Patient eventually underwent colonoscopy on 2019 which demonstrated an 8 mm mucosal abnormality just above the dentate line.  The mass was noted to be in the anterior wall facing the vagina.  Biopsy of the mass demonstrated moderately differentiated squamous cell carcinoma.  The patient was referred to Gyn/onc who on exam palpated a 3 to 4 cm rectovaginal mass.  No vaginal or cervical mass was noted.  2019 the patient underwent MRI of the pelvis.  Imaging demonstrated 2.6 x 2.5 x 2.5 cm mass located in the distal rectum/anal canal region.  The mass was noted to arise exophytically from the distal rectum.  There were suspicious mesorectal lymph nodes measuring subcentimeter in size. No pelvic adenopathy was noted.  On 8/3/2019 the patient underwent a PET scan.  Imaging did not demonstrate any evidence of distant disease. Hypermetabolic 3.3 x 4.1 cm mass seen in the distal  rectum/anus compatible with underlying malignancy. Per Dr. Khan review of imaging, also noted suspicious perirectal adenopathy.  The patient was subsequent seen by Dr. Aquino who discussed definitive treatment with chemoradiation.      SITE OF TREATMENT: anal/pelvis     DATES  OF TREATMENT: 8/22/19-10/09/19     TOTAL DOSE OF TREATMENT: 5400 cGy     DOSE PER FRACTION OF TREATMENT: 180 cGy x 30 fractions    INTERVAL SINCE COMPLETION OF RADIATION THERAPY:   4 months     SUBJECTIVE:   The patient returns for follow up.     In the interval, the patient was seen by Dr. Murphy on 2/4/20. On examination, there was no clear evidence of recurrent/ residual disease.     She otherwise continues to recovery from treatment. Skin has continued to heal appropriately. Mild residual pain, requiring only short acting pain medication at this time. Using percocet  PRN. Continues skin care.  Denies diarrhea. No blood in stool. Single episode of incontinence, since resolved. Dysuria resolved. Has had recurrent UTI treated x 3 in past. She has noticed slight increase in bilateral groin discomfort and lower extremity edema, likely secondary to lymphedema.     PET scheduled for 4/9/20. Will see Dr. Aquino on 4/27/20.      PHYSICAL EXAM:  There were no vitals taken for this visit.  Gen: Alert, in NAD  Eyes: PERRL, EOMI, sclera anicteric  Pulm: No wheezing, stridor or respiratory distress  CV: Well-perfused, no cyanosis, no pedal edema  Abdominal: Soft, nontender, nondistended, no hepatomegaly  Back: No step-offs or pain to palpation along the thoracolumbar spine, no CVA tenderness  Lymphedema: Mild bilateral LE and groin edema.   Skin: Deferred examination today.  Neurologic: A/Ox3, CN II-XII intact  Psychiatric: Appropriate mood and affect    LABS AND IMAGING:  Reviewed.    IMPRESSION:   Ms. Dominguez is a 69 year old female diagnosed wquamous cell carcinoma of the anal canal, clinical [id number] (anna-rectal nodes). She completed definitive CRT to  a total dose of 54 Gy in 30 fractions on 10/9/19.    PLAN:   1.  Clinically on exam as no clear evidence of residual/recurrent disease. Post treatment PET scheduled for 4/9/20.     2. Late toxicity. Has developed mild bilateral groin and lower extremity edema likely secondary to RT. Will send referral to lymphedema.  Discussed use of vaginal dilators to minimize risk of vaginal stenosis.     3. Cancer related pain. Percocet  mg PRN pain.     4. Continue follow up for surveillance with surgery (Dr. Murphy). Exam on 2/4/20 did not demonstrate any clear evidence of residual/recurrent disease. Planned to see patient in 6 weeks.    5. Continue follow up with medical oncology. Will see Dr. Aquino on 4/27/20.     6. RTC in 3 months.     Justin Khan M.D.  Department of Radiation Oncology  [facility]           Justin Khan MD

## 2020-02-10 NOTE — NURSING NOTE
FOLLOW-UP VISIT    Patient Name: Sara Dominguez      : 1950     Age: 69 year old        ______________________________________________________________________________     Chief Complaint   Patient presents with     Radiation Therapy     return visit     /81 (BP Location: Right arm, Cuff Size: Adult Large)   Pulse 88   Resp 16   Wt 78.8 kg (173 lb 12.8 oz)   BMI 29.37 kg/m       Date Radiation Completed: 10/9/2019    Pain  Bilateral groin pain, ?due to lymphedema  Some mild pain with BM's    Meds  Current Med List Reviewed: Yes  Medication Note: needs percocet refill    Imaging  Upcoming PET in May    On Chemo?: No  Nausea: occasional, but improved  Bowel:  Better, but using miralax every other day  Bladder: mild dysuria at the end of urination, plans to drop off UA sample with PCP to check for UTI  Skin: healing, still using aquaphor and occasional lidocaine gel  Energy Level: still fatigued, but back to work    Other Appointments:     Date  Oncologist: Miles  Sees  after PET   Surgeon: Jeffrey Sees regularly next in 6 weeks     Other Notes:

## 2020-03-05 ENCOUNTER — MYC REFILL (OUTPATIENT)
Dept: RADIATION THERAPY | Facility: OUTPATIENT CENTER | Age: 70
End: 2020-03-05

## 2020-03-05 DIAGNOSIS — G89.3 CANCER RELATED PAIN: ICD-10-CM

## 2020-03-09 DIAGNOSIS — G89.3 CANCER RELATED PAIN: ICD-10-CM

## 2020-03-09 RX ORDER — OXYCODONE AND ACETAMINOPHEN 10; 325 MG/1; MG/1
1 TABLET ORAL EVERY 6 HOURS PRN
Qty: 90 TABLET | Refills: 0 | Status: SHIPPED | OUTPATIENT
Start: 2020-03-09 | End: 2020-04-06

## 2020-03-09 NOTE — PROGRESS NOTES
Call received from pt requesting a refill of Percocet on behalf of self.  Last refill: 02.10.20  # 90 with 0 refills at [facility].  Last office visit:  12.23.20  Next office visit:  04.27.20    This is an appropriate refill, and has been walked to the pharmacy. Agata Renteria RN, BSN, OCN

## 2020-03-11 ENCOUNTER — HEALTH MAINTENANCE LETTER (OUTPATIENT)
Age: 70
End: 2020-03-11

## 2020-03-13 ENCOUNTER — TRANSFERRED RECORDS (OUTPATIENT)
Dept: HEALTH INFORMATION MANAGEMENT | Facility: CLINIC | Age: 70
End: 2020-03-13

## 2020-04-06 DIAGNOSIS — G89.3 CANCER RELATED PAIN: ICD-10-CM

## 2020-04-06 RX ORDER — OXYCODONE AND ACETAMINOPHEN 10; 325 MG/1; MG/1
1 TABLET ORAL EVERY 6 HOURS PRN
Qty: 90 TABLET | Refills: 0 | Status: SHIPPED | OUTPATIENT
Start: 2020-04-06 | End: 2020-04-27

## 2020-04-06 NOTE — PROGRESS NOTES
Call received from pt requesting a refill of Percocet on behalf of self.  Last refill: 03.09.2020  # 90 with 0 refills at [facility].  Last office visit:  12.23.19  Next office visit:  04.27.20    This is an appropriate refill  . Agata Renteria RN, BSN, OCN

## 2020-04-09 ENCOUNTER — HOSPITAL ENCOUNTER (OUTPATIENT)
Dept: PET IMAGING | Facility: CLINIC | Age: 70
Discharge: HOME | End: 2020-04-09
Attending: INTERNAL MEDICINE | Admitting: INTERNAL MEDICINE
Payer: MEDICARE

## 2020-04-09 DIAGNOSIS — C21.0 SQUAMOUS CELL CARCINOMA OF ANUS (H): ICD-10-CM

## 2020-04-09 DIAGNOSIS — Z85.048 PERSONAL HISTORY OF OTHER MALIGNANT NEOPLASM OF RECTUM, RECTOSIGMOID JUNCTION, AND ANUS: ICD-10-CM

## 2020-04-09 PROCEDURE — A9552 F18 FDG: HCPCS | Performed by: INTERNAL MEDICINE

## 2020-04-09 PROCEDURE — 78816 PET IMAGE W/CT FULL BODY: CPT | Mod: PS

## 2020-04-09 PROCEDURE — 25000128 H RX IP 250 OP 636: Performed by: INTERNAL MEDICINE

## 2020-04-09 PROCEDURE — 34300033 ZZH RX 343: Performed by: INTERNAL MEDICINE

## 2020-04-09 RX ORDER — HEPARIN SODIUM (PORCINE) LOCK FLUSH IV SOLN 100 UNIT/ML 100 UNIT/ML
500 SOLUTION INTRAVENOUS ONCE
Status: COMPLETED | OUTPATIENT
Start: 2020-04-09 | End: 2020-04-09

## 2020-04-09 RX ADMIN — FLUDEOXYGLUCOSE F-18 14.3 MCI.: 500 INJECTION, SOLUTION INTRAVENOUS at 10:06

## 2020-04-09 RX ADMIN — Medication 500 UNITS: at 10:06

## 2020-04-27 ENCOUNTER — HOSPITAL ENCOUNTER (OUTPATIENT)
Dept: LAB | Facility: CLINIC | Age: 70
Discharge: HOME | End: 2020-04-27
Attending: INTERNAL MEDICINE | Admitting: INTERNAL MEDICINE
Payer: MEDICARE

## 2020-04-27 ENCOUNTER — VIRTUAL VISIT (OUTPATIENT)
Dept: ONCOLOGY | Facility: CLINIC | Age: 70
End: 2020-04-27
Attending: INTERNAL MEDICINE
Payer: COMMERCIAL

## 2020-04-27 DIAGNOSIS — D75.89 MACROCYTOSIS: ICD-10-CM

## 2020-04-27 DIAGNOSIS — G89.3 CANCER RELATED PAIN: ICD-10-CM

## 2020-04-27 DIAGNOSIS — R60.9 EDEMA, UNSPECIFIED TYPE: ICD-10-CM

## 2020-04-27 DIAGNOSIS — C21.0 SQUAMOUS CELL CARCINOMA OF ANUS (H): ICD-10-CM

## 2020-04-27 DIAGNOSIS — Z85.048 PERSONAL HISTORY OF OTHER MALIGNANT NEOPLASM OF RECTUM, RECTOSIGMOID JUNCTION, AND ANUS: Primary | ICD-10-CM

## 2020-04-27 LAB
ALBUMIN SERPL-MCNC: 4 G/DL (ref 3.4–5)
ALP SERPL-CCNC: 135 U/L (ref 40–150)
ALT SERPL W P-5'-P-CCNC: 20 U/L (ref 0–50)
ANION GAP SERPL CALCULATED.3IONS-SCNC: 5 MMOL/L (ref 3–14)
AST SERPL W P-5'-P-CCNC: 17 U/L (ref 0–45)
BASOPHILS # BLD AUTO: 0 10E9/L (ref 0–0.2)
BASOPHILS NFR BLD AUTO: 0.7 %
BILIRUB SERPL-MCNC: 0.5 MG/DL (ref 0.2–1.3)
BUN SERPL-MCNC: 23 MG/DL (ref 7–30)
CALCIUM SERPL-MCNC: 8.8 MG/DL (ref 8.5–10.1)
CHLORIDE SERPL-SCNC: 108 MMOL/L (ref 94–109)
CO2 SERPL-SCNC: 28 MMOL/L (ref 20–32)
CREAT SERPL-MCNC: 0.95 MG/DL (ref 0.52–1.04)
DIFFERENTIAL METHOD BLD: ABNORMAL
EOSINOPHIL # BLD AUTO: 0.1 10E9/L (ref 0–0.7)
EOSINOPHIL NFR BLD AUTO: 3 %
ERYTHROCYTE [DISTWIDTH] IN BLOOD BY AUTOMATED COUNT: 13.7 % (ref 10–15)
FOLATE SERPL-MCNC: 11.8 NG/ML
GFR SERPL CREATININE-BSD FRML MDRD: 60 ML/MIN/{1.73_M2}
GLUCOSE SERPL-MCNC: 95 MG/DL (ref 70–99)
HCT VFR BLD AUTO: 35.1 % (ref 35–47)
HGB BLD-MCNC: 11.4 G/DL (ref 11.7–15.7)
IMM GRANULOCYTES # BLD: 0 10E9/L (ref 0–0.4)
IMM GRANULOCYTES NFR BLD: 0.2 %
LYMPHOCYTES # BLD AUTO: 1.1 10E9/L (ref 0.8–5.3)
LYMPHOCYTES NFR BLD AUTO: 25.7 %
MCH RBC QN AUTO: 30.8 PG (ref 26.5–33)
MCHC RBC AUTO-ENTMCNC: 32.5 G/DL (ref 31.5–36.5)
MCV RBC AUTO: 95 FL (ref 78–100)
MONOCYTES # BLD AUTO: 0.4 10E9/L (ref 0–1.3)
MONOCYTES NFR BLD AUTO: 8.7 %
NEUTROPHILS # BLD AUTO: 2.7 10E9/L (ref 1.6–8.3)
NEUTROPHILS NFR BLD AUTO: 61.7 %
NRBC # BLD AUTO: 0 10*3/UL
NRBC BLD AUTO-RTO: 0 /100
PLATELET # BLD AUTO: 245 10E9/L (ref 150–450)
POTASSIUM SERPL-SCNC: 4.2 MMOL/L (ref 3.4–5.3)
PROT SERPL-MCNC: 7.4 G/DL (ref 6.8–8.8)
RBC # BLD AUTO: 3.7 10E12/L (ref 3.8–5.2)
SODIUM SERPL-SCNC: 141 MMOL/L (ref 133–144)
VIT B12 SERPL-MCNC: 233 PG/ML (ref 193–986)
WBC # BLD AUTO: 4.4 10E9/L (ref 4–11)

## 2020-04-27 PROCEDURE — 82746 ASSAY OF FOLIC ACID SERUM: CPT | Performed by: INTERNAL MEDICINE

## 2020-04-27 PROCEDURE — 82607 VITAMIN B-12: CPT | Performed by: INTERNAL MEDICINE

## 2020-04-27 PROCEDURE — 85025 COMPLETE CBC W/AUTO DIFF WBC: CPT | Performed by: INTERNAL MEDICINE

## 2020-04-27 PROCEDURE — 99214 OFFICE O/P EST MOD 30 MIN: CPT | Mod: 95 | Performed by: INTERNAL MEDICINE

## 2020-04-27 PROCEDURE — 80053 COMPREHEN METABOLIC PANEL: CPT | Performed by: INTERNAL MEDICINE

## 2020-04-27 PROCEDURE — 36415 COLL VENOUS BLD VENIPUNCTURE: CPT | Performed by: INTERNAL MEDICINE

## 2020-04-27 RX ORDER — OXYCODONE AND ACETAMINOPHEN 10; 325 MG/1; MG/1
1 TABLET ORAL EVERY 6 HOURS PRN
Qty: 100 TABLET | Refills: 0 | Status: SHIPPED | OUTPATIENT
Start: 2020-04-27 | End: 2020-05-04

## 2020-04-27 NOTE — PROGRESS NOTES
ONCOLOGY FOLLOW UP VISIT      VIDEO VISIT      REASON FOR VISIT/CHIEF COMPLAIN:    July 2019 at age 68 diagnosed with anal cancer,  S/p concurrent chemo/RT      HISTORY OF ONCOLOGY ILLNESS:    In 7/2019 at age 68, she presented to her PMD Dr. Reid's office for rectal pain 4/10  for a few weeks, she reports she felt a mass in her rectum, increase in rectal pressure after eating. She makes her self have a BM to relieve the pressure. She notes one instance of bright red blood in her stool. She reports progressive difficulty in moving her bowel.   She then saw GI Dr. Aguirre, who found perianal skin examination was normal. Digital examination revealed a mass at the anterior wall of rectum. It was a hard mass approximately 3-4 cm.   Colonoscopy 7/12/2019 found 8 mm mucosal abnormality just above the dentate line. This was at the anterior wall facing her vagina where the large mass was felt underneath. 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  She then saw Gyn Dr. Eduardo, 7/19/2019, exam found normal vulva. No vaginal polyp/growth appreciated arising from vaginal mucosa (which appears to be smooth) but there is a nodular 3-4cm mass palpated at rectovaginal area, which is not as well appreciated by digital rectal exam. Fixed mass, mildly tender to touch. Cervix grossly normal. Uterus 6 weeks size, non-tender. No adnexal mass/tenderness bilaterally.     7/2013/2019 pelvic MRI found 2.5 x 2.5 x 2.6 cm mass located between the distal rectum and vagina which appears to arise exophytically from the distal rectum. Single mildly enlarged mesorectal lymph node measuring 8 mm in short axis. No pelvic sidewall adenopathy. No other bony or soft tissue abnormalities identified.     She then saw colon rectal surgeon Dr. Nadeen Murphy 7/26/2019, who informed pt that she has anal cancer, not tx by surgery. Recommended concurrent  chemo/RT.    PET 8/2019 found Hypermetabolic 3.3 x 4.1 cm mass SUV 70 seen in the distal rectum/anus compatible with underlying malignancy. No evidence of distant hypermetabolic metastatic disease seen in the chest, abdomen or pelvis.      She then saw us and Rad-Onc, and made informed decision to proceed with concurrent chemoradiation with mitomycin and 5 FU from August to early Oct. 2019      INTERVAL HISTORY:   Since the patient's last visit with us, there is no hospital stay/surgery/new diagnosis made.      PAST MEDICAL HISTORY  HTN, hyperlipidemia  depression      MEDICATIONS/ALLERY:  Reviewed in Epic system.        SOCIAL HISTORY:    She got laid off.  Denes smoking and ETOH      FAMILY HISTORY:  Mother had cervical cancer at age 29, she survived, who passed from brain tumor.       REVIEW OF SYSTEMS:   She has ongoing nausea.  Reports lymphedema right leg worse than left leg, and also in her groin area. She is in on PT.        PHYSICAL EXAMINATION ATTAINABLE DURING VIDEO VISIT:  CONSTITUTIONAL - Pt looks like stated age, pleasant, not in acute distress. Not obese.  NEURO: Oriented to time, person, and places. No tremor.  ENT, MOUTH: Pupils are equal.  Sclerae are anicteric.  Moist oral mucosa. No oral thrush.   NECK:  No jugular venous distention.  No thyroid enlargement.   RESPIRATORY: talk nl, no sob during conversation, no cough.   MUSCULOSKELETAL/EXTREMITIES:  + edema on legs.   No joint deformity. Normal range of motion  SKIN:  No petechiae.  No rash.  No signs of cellulitis.   PSYCHIATRIC: Normal mood and affect. Good memory. Proper insight and judgement.   THE REST OF A COMPREHENSIVE PHYSICIAL EXAM IS DEFERRED DUE TO COVID-19 PUBLIC HEALTH EMERGENCY RELATED VIDEO VISIT RESCTRICTION.      CURRENT LAB DATA REVIEWED TODAY WITH PATIENT DURING THE VIRTUAL VISIT:  Cbc diff/CMP are fine, mcv 95 from > 100, B12 233      CURRENT IMAGE DATA REVIEWED TODAY WITH PATIENT DURING THE VIRTUAL VISIT:  PET 4/2020 - no  mets      OLD DATA REVIEWED TODAY WITH SUMMARY:   7/2019 Baseline CMP is fine, cr 1.15  7/2019 Baseline cbc is fine with hb 11.7    7/2019 8 mm mucosal abnormality just above the dentate line. 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    PET 8/2019 found Hypermetabolic 3.3 x 4.1 cm mass SUV 70 seen in the distal rectum/anus compatible with underlying malignancy. No evidence of distant hypermetabolic metastatic disease seen in  the chest, abdomen or pelvis.      7/2013/2019 pelvic MRI found 2.5 x 2.5 x 2.6 cm mass located between the distal rectum and vagina which appears to arise exophytically from the distal rectum. Single mildly enlarged mesorectal lymph node measuring 8 mm in short axis. No pelvic sidewall adenopathy. No other bony or soft tissue abnormalities identified.           ASSESSMENT AND PLAN DISCUSSED WITH PATIENT TODAY DURING THE VIRTUAL VISIT:    1. At age 68 in July 2019, she is diagnosed with low rectum/anal squamous cell carcinoma, months duration of anal pain pressure.    She made informed decision to proceed with concurrent chemoradiation cycle 1 day 1 August 22, 2019 with 5 FU and mitomycin. She finished chemo/RT early Oct. 2019.     She is advised on ongoing response in the next months.   She is advised on on going colon rectal surgeon local exam.     4/2020 PET is negative for mets.     She needs ongoing oncology follow-up we will see her in 6 months with physical examination lab work.    2.  Chronic and legs lymphedema from local radiation.  He is PT.  She also need Percocet for pain.  She is advised addictive effect from this medication.  She is trying to get off on it.    3.  On and off macrocytosis.  We discussed the year the proper diet, B12 is nl. Folate is pending.       Virtual VISIT time is 25 minutes, spent in dicussion and review patient's cancer and treatment history, labs and images results, symptom  managment,follow up plan.     I have reviewed the note as documented above.  This accurately captures the substance of my conversation with the patient.

## 2020-04-27 NOTE — PATIENT INSTRUCTIONS
Percocet needs to be in [facility] for her to .   6 months follow-up with labs.

## 2020-04-27 NOTE — PROGRESS NOTES
"Sara Dominguez is a 69 year old female who is being evaluated via a billable video visit.      The patient has been notified of following:     \"This video visit will be conducted via a call between you and your physician/provider. If a prescription is necessary we can send it directly to your pharmacy.  If lab work is needed we can place an order for that and you can then stop by our lab to have the test done at a later time.    Video visits are billed as normal face-to-face office visit and reimbursed as the same rate during this public health emergency due to Covid 19 pandemic.  Please reach out to your insurance provider with any questions.    If during the course of the call the physician/provider feels a video visit is not appropriate, you will not be charged for this service.\"    Patient has given verbal consent for Video visit? Yes    How would you like to obtain your AVS? E-Mail (inform patient AVS not encrypted)    Patient would like the video invitation sent by: Send to e-mail at:     [phone]     [email or web address]    Will anyone else be joining your video visit? No        Video-Visit Details    Type of service:  Video Visit    Video Start Time: 2:30 PM  Video End Time: 3: pm  Originating Location (pt. Location): Home    Distant Location (provider location):  [facility]     Mode of Communication:  Video Conference via [employer]  Multiple times.     Cici Purvis CMA            "

## 2020-05-04 ENCOUNTER — VIRTUAL VISIT (OUTPATIENT)
Dept: RADIATION THERAPY | Facility: OUTPATIENT CENTER | Age: 70
End: 2020-05-04
Payer: COMMERCIAL

## 2020-05-04 DIAGNOSIS — C21.1 MALIGNANT NEOPLASM OF ANAL CANAL (H): Primary | ICD-10-CM

## 2020-05-04 NOTE — PROGRESS NOTES
Department of Radiation Oncology  Radiation Therapy Center  [facility] [place] [address]  [phone]       Radiation Oncology Follow-up Visit  May 4, 2020    Sara Dominguez  MRN: [id number]   : 1950     DIAGNOSIS: squamous cell carcinoma of the anal canal  INTENT OF RADIOTHERAPY: definitive CRT.  PATHOLOGY:  moderately differentiated squamous cell carcinoma                              STAGE:  clinical [id number] (anna-rectal nodes).    CONCURRENT SYSTEMIC THERAPY: 5 FU and mitomycin     ONCOLOGIC HISTORY:    Ms. Dominguez is a 69 year old female diagnosed wquamous cell carcinoma of the anal canal, clinical [id number] (anna-rectal nodes).       The patient initially presented with symptoms of rectal pain pressure prompting further evaluation.  She was seen by gastroenterologist Dr. Aguirre who on digital rectal exam noticed a mass in the anterior wall of the anal rectal region, measuring 3.4 cm in size.  Patient eventually underwent colonoscopy on 2019 which demonstrated an 8 mm mucosal abnormality just above the dentate line.  The mass was noted to be in the anterior wall facing the vagina.  Biopsy of the mass demonstrated moderately differentiated squamous cell carcinoma.  The patient was referred to Gyn/onc who on exam palpated a 3 to 4 cm rectovaginal mass.  No vaginal or cervical mass was noted.  2019 the patient underwent MRI of the pelvis.  Imaging demonstrated 2.6 x 2.5 x 2.5 cm mass located in the distal rectum/anal canal region.  The mass was noted to arise exophytically from the distal rectum.  There were suspicious mesorectal lymph nodes measuring subcentimeter in size. No pelvic adenopathy was noted.  On 8/3/2019 the patient underwent a PET scan.  Imaging did not demonstrate any evidence of distant disease. Hypermetabolic 3.3 x 4.1 cm mass seen in the distal rectum/anus compatible with underlying malignancy. Per Dr. Khan review of imaging, also  noted suspicious perirectal adenopathy.  The patient was subsequent seen by Dr. Aquino who discussed definitive treatment with chemoradiation.      SITE OF TREATMENT: anal/pelvis     DATES  OF TREATMENT: 8/22/19-10/09/19     TOTAL DOSE OF TREATMENT: 5400 cGy     DOSE PER FRACTION OF TREATMENT: 180 cGy x 30 fractions    INTERVAL SINCE COMPLETION OF RADIATION THERAPY:   7 months     SUBJECTIVE:   Due to the concerns around COVID-19 and adhering to social distancing we conducted this visit over the telephone.       In the interval, the patient was seen by Dr. Murphy on 3/13/20. On examination and scope, there was no clear evidence of recurrent/ residual disease.     Post treatment PET on 4/9/20 was TYLER.    She otherwise continues to recover well from treatment. Denies any anal/rectal pain. Denies diarrhea. No blood in stool. Dysuria resolved. She has noticed slight bilateral groin discomfort and lower extremity edema, likely secondary to lymphedema. Has seen physical therapy with some alleviation.     LABS AND IMAGING:  Reviewed.    IMPRESSION:   Ms. Dominguez is a 69 year old female diagnosed wquamous cell carcinoma of the anal canal, clinical [id number] (anna-rectal nodes). She completed definitive CRT to a total dose of 54 Gy in 30 fractions on 10/9/19.    PLAN:   1.  Clinically on exam and scope (3/13/20) has no clear evidence of residual/recurrent disease. Post treatment PET scheduled on 4/9/20 was TYLER.     2. Late toxicity. Has mild bilateral groin and lower extremity edema likely secondary to RT. Working with physical therapy, has had mild improvement. Discussed use of vaginal dilators to minimize risk of vaginal stenosis.     3. Cancer related pain. No pain, no longer needing narcotic.     4. Continue follow up for surveillance with surgery (Dr. Murphy). Exam and scope (3/13/20) not demonstrate any clear evidence of residual/recurrent disease. Planned to see patient in 3 months.     5. Continue follow up with medical  oncology. Will see Dr. Aquino on 10/27/20.    6. RTC in 4 months.     Due to the concerns around COVID-19 and adhering to social distancing we conducted this visit over the telephone. Telephone call lasted 20 minutes.       Justin Kahn M.D.  Department of Radiation Oncology  [facility]

## 2020-05-04 NOTE — NURSING NOTE
FOLLOW-UP VISIT    Patient Name: Sara Dominguez      : 1950     Age: 69 year old        ______________________________________________________________________________     No chief complaint on file.    There were no vitals taken for this visit.     Date Radiation Completed: 10/2019 radiation and chemo    Pain  Denies    Meds  Current Med List Reviewed: Yes  Medication Note:     Imaging  PET scan-here at [facility] 2020    On Chemo?: No  Nausea:no  Bowel: takes stool softner every other day to keep stool soft. has had skin tear when stool gets too hard.  Bladder: negative  Skin: Warm  Dry  Intact  Energy Level: normal  -pt does have lymphedema - uses home flexitouch. Has mild pelvic pressure due to lymphedema    Other Appointments:     Date  Oncologist: Dr. Aquino  10/27/20   Surgeon: Dr. Nadeen Murphy      Other Notes:

## 2020-05-11 ENCOUNTER — TELEPHONE (OUTPATIENT)
Dept: ONCOLOGY | Facility: CLINIC | Age: 70
End: 2020-05-11

## 2020-05-11 NOTE — TELEPHONE ENCOUNTER
Received a call from nurse Kearney, from ED in [facility].  She stated the patient is visiting her son, and presented to the ED reporting that she forgot her Percocet and needs a few to get her through the next few days until she gets back home.  Patient had a virtual visit with Dr. Aquino on 4/27.  Provided prescription information from most recent prescription that was ordered on 4/27/2020.  (I also let her know that this medication was marked as therapy completed by a nurse, for unknown reasons.)    Jerry Kinney, MEENAKSHIN, RN, OCN  Oncology Care Coordinator  [facility]

## 2020-05-28 ENCOUNTER — PATIENT OUTREACH (OUTPATIENT)
Dept: ONCOLOGY | Facility: CLINIC | Age: 70
End: 2020-05-28

## 2020-05-28 DIAGNOSIS — G89.3 CANCER RELATED PAIN: ICD-10-CM

## 2020-05-28 RX ORDER — OXYCODONE AND ACETAMINOPHEN 10; 325 MG/1; MG/1
1 TABLET ORAL EVERY 6 HOURS PRN
Qty: 100 TABLET | Refills: 0 | Status: SHIPPED | OUTPATIENT
Start: 2020-05-28 | End: 2020-06-24

## 2020-05-28 NOTE — PROGRESS NOTES
FMLA forms have been completed and faxed in. Copy sent to scanning and original placed at the font desk for pt to .     Agata Renteria RN on 5/28/2020 at 8:03 AM

## 2020-06-24 ENCOUNTER — MYC REFILL (OUTPATIENT)
Dept: ONCOLOGY | Facility: CLINIC | Age: 70
End: 2020-06-24

## 2020-06-24 DIAGNOSIS — G89.3 CANCER RELATED PAIN: ICD-10-CM

## 2020-06-25 RX ORDER — OXYCODONE AND ACETAMINOPHEN 10; 325 MG/1; MG/1
1 TABLET ORAL EVERY 6 HOURS PRN
Qty: 100 TABLET | Refills: 0 | Status: SHIPPED | OUTPATIENT
Start: 2020-06-25 | End: 2020-07-24

## 2020-07-24 DIAGNOSIS — G89.3 CANCER RELATED PAIN: ICD-10-CM

## 2020-07-24 RX ORDER — OXYCODONE AND ACETAMINOPHEN 10; 325 MG/1; MG/1
1 TABLET ORAL EVERY 6 HOURS PRN
Qty: 100 TABLET | Refills: 0 | Status: SHIPPED | OUTPATIENT
Start: 2020-07-24 | End: 2020-08-21

## 2020-07-27 ENCOUNTER — TRANSFERRED RECORDS (OUTPATIENT)
Dept: HEALTH INFORMATION MANAGEMENT | Facility: CLINIC | Age: 70
End: 2020-07-27

## 2020-08-19 ENCOUNTER — MYC REFILL (OUTPATIENT)
Dept: ONCOLOGY | Facility: CLINIC | Age: 70
End: 2020-08-19

## 2020-08-19 DIAGNOSIS — G89.3 CANCER RELATED PAIN: ICD-10-CM

## 2020-08-21 ENCOUNTER — MYC REFILL (OUTPATIENT)
Dept: ONCOLOGY | Facility: CLINIC | Age: 70
End: 2020-08-21

## 2020-08-21 DIAGNOSIS — G89.3 CANCER RELATED PAIN: ICD-10-CM

## 2020-08-21 RX ORDER — OXYCODONE AND ACETAMINOPHEN 10; 325 MG/1; MG/1
1 TABLET ORAL EVERY 6 HOURS PRN
Qty: 100 TABLET | Refills: 0 | Status: SHIPPED | OUTPATIENT
Start: 2020-08-21 | End: 2020-09-08

## 2020-09-08 ENCOUNTER — OFFICE VISIT (OUTPATIENT)
Dept: RADIATION THERAPY | Facility: OUTPATIENT CENTER | Age: 70
End: 2020-09-08
Payer: COMMERCIAL

## 2020-09-08 VITALS
RESPIRATION RATE: 18 BRPM | WEIGHT: 166.2 LBS | DIASTOLIC BLOOD PRESSURE: 72 MMHG | BODY MASS INDEX: 28.09 KG/M2 | HEART RATE: 77 BPM | SYSTOLIC BLOOD PRESSURE: 119 MMHG | OXYGEN SATURATION: 97 %

## 2020-09-08 DIAGNOSIS — C21.1 MALIGNANT NEOPLASM OF ANAL CANAL (H): Primary | ICD-10-CM

## 2020-09-08 RX ORDER — LISINOPRIL 20 MG/1
20 TABLET ORAL DAILY
COMMUNITY
Start: 2020-06-30

## 2020-09-08 RX ORDER — VENLAFAXINE HYDROCHLORIDE 150 MG/1
150 CAPSULE, EXTENDED RELEASE ORAL DAILY
COMMUNITY
Start: 2020-06-30

## 2020-09-08 ASSESSMENT — PAIN SCALES - GENERAL: PAINLEVEL: NO PAIN (0)

## 2020-09-08 NOTE — LETTER
2020         RE: Sara Dominguez  [address]  [place] [place] [facility] [address]        Dear Colleague,    Thank you for referring your patient, Sara Dominguez, to the RADIATION THERAPY CENTER. Please see a copy of my visit note below.       Department of Radiation Oncology  Radiation Therapy Center  [facility]  [place] [address]  [phone]       Radiation Oncology Follow-up Visit  2020    Sara Dominguez  MRN: [id number]   : 1950     DIAGNOSIS: squamous cell carcinoma of the anal canal  INTENT OF RADIOTHERAPY: definitive CRT.  PATHOLOGY:  moderately differentiated squamous cell carcinoma                              STAGE:  clinical H4Y3mA3 (anna-rectal nodes).    CONCURRENT SYSTEMIC THERAPY: 5 FU and mitomycin     ONCOLOGIC HISTORY:    Ms. 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       The patient initially presented with symptoms of rectal pain pressure prompting further evaluation.  She was seen by gastroenterologist Dr. Aguirre who on digital rectal exam noticed a mass in the anterior wall of the anal rectal region, measuring 3.4 cm in size.  Patient eventually underwent colonoscopy on 2019 which demonstrated an 8 mm mucosal abnormality just above the dentate line.  The mass was noted to be in the anterior wall facing the vagina.  Biopsy of the mass demonstrated moderately differentiated squamous cell carcinoma.  The patient was referred to Gyn/onc who on exam palpated a 3 to 4 cm rectovaginal mass.  No vaginal or cervical mass was noted.  2019 the patient underwent MRI of the pelvis.  Imaging demonstrated 2.6 x 2.5 x 2.5 cm mass located in the distal rectum/anal canal region.  The mass was noted to arise exophytically from the distal rectum.  There were suspicious mesorectal lymph nodes measuring subcentimeter in size. No pelvic adenopathy was noted.  On  8/3/2019 the patient underwent a PET scan.  Imaging did not demonstrate any evidence of distant disease. Hypermetabolic 3.3 x 4.1 cm mass seen in the distal rectum/anus compatible with underlying malignancy. Per Dr. Khan review of imaging, also noted suspicious perirectal adenopathy.  The patient was subsequent seen by Dr. Aquino who discussed definitive treatment with chemoradiation.      SITE OF TREATMENT: anal/pelvis     DATES  OF TREATMENT: 8/22/19-10/09/19     TOTAL DOSE OF TREATMENT: 5400 cGy     DOSE PER FRACTION OF TREATMENT: 180 cGy x 30 fractions    INTERVAL SINCE COMPLETION OF RADIATION THERAPY:   11 months     SUBJECTIVE:   In the interval, the patient was seen by Dr. Murphy on 7/27/20. On examination and scope, there was no clear evidence of recurrent/ residual disease.     Post treatment PET on 4/9/20 was TYLER.    She otherwise continues to recover well from treatment. Denies any anal/rectal pain. Denies diarrhea. No blood in stool. Dysuria resolved. She has noticed slight bilateral groin discomfort and lower extremity edema, likely secondary to lymphedema. Has seen physical therapy with some alleviation.     LABS AND IMAGING:  Reviewed.    IMPRESSION:   Ms. Dominguez is a 69 year old female diagnosed wquamous cell carcinoma of the anal canal, clinical [id number] (anna-rectal nodes). She completed definitive CRT to a total dose of 54 Gy in 30 fractions on 10/9/19.    PLAN:   1.  Clinically on exam and scope (7/27/20) has no clear evidence of residual/recurrent disease. Post treatment PET scheduled on 4/9/20 was TYLER.     2. Late toxicity. Has mild bilateral groin and lower extremity edema likely secondary to RT. Working with physical therapy, has had mild improvement. Discussed use of vaginal dilators to minimize risk of vaginal stenosis.     3. Cancer related pain. No pain, no longer needing narcotic.     4. Continue follow up for surveillance with surgery (Dr. Murphy). Exam (7/27/20) and scope (3/13/20) not  demonstrate any clear evidence of residual/recurrent disease. Planned to see patient in October 2020.     5. Continue follow up with medical oncology. Will see Dr. Aquino on 10/27/20.    6. RTC in 6 months.           Justin Khan M.D.  Department of Radiation Oncology  [facility]           Again, thank you for allowing me to participate in the care of your patient.        Sincerely,        Justin Khan MD

## 2020-09-08 NOTE — NURSING NOTE
FOLLOW-UP VISIT    Patient Name: Sara Dominguez      : 1950     Age: 69 year old        ______________________________________________________________________________     Chief Complaint   Patient presents with     Radiation Therapy     follow up     /72   Pulse 77   Resp 18   Wt 75.4 kg (166 lb 3.2 oz)   SpO2 97%   BMI 28.09 kg/m       Date Radiation Completed: 10/9/19    Pain  Denies    Meds  Current Med List Reviewed: Yes  Medication Note:     Imaging  None    On Chemo?: No  Nausea:no  Bowel: Normal and occassional irritation but mostly normal  Bladder: negative  Skin: Warm  Dry  Intact  Energy Level: normal. Working outside doing yardwork. 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 Plans to retire in October but still stay on casual.    Other Appointments:     Date  Oncologist: Dr. saeed    10/27/20   Surgeon: Dr. Murphy      Other Notes:

## 2020-09-08 NOTE — PROGRESS NOTES
Department of Radiation Oncology  Radiation Therapy Center  [facility] [place] [address]  [phone]       Radiation Oncology Follow-up Visit  2020    Sara Dominguez  MRN: [id number]   : 1950     DIAGNOSIS: squamous cell carcinoma of the anal canal  INTENT OF RADIOTHERAPY: definitive CRT.  PATHOLOGY:  moderately differentiated squamous cell carcinoma                              STAGE:  clinical S4O4lE5 (anna-rectal nodes).    CONCURRENT SYSTEMIC THERAPY: 5 FU and mitomycin     ONCOLOGIC HISTORY:    Ms. Dominguez is a 69 year old female diagnosed wquamous cell carcinoma of the anal canal, clinical [id number] (anna-rectal nodes).       The patient initially presented with symptoms of rectal pain pressure prompting further evaluation.  She was seen by gastroenterologist Dr. Aguirre who on digital rectal exam noticed a mass in the anterior wall of the anal rectal region, measuring 3.4 cm in size.  Patient eventually underwent colonoscopy on 2019 which demonstrated an 8 mm mucosal abnormality just above the dentate line.  The mass was noted to be in the anterior wall facing the vagina.  Biopsy of the mass demonstrated moderately differentiated squamous cell carcinoma.  The patient was referred to Gyn/onc who on exam palpated a 3 to 4 cm rectovaginal mass.  No vaginal or cervical mass was noted.  2019 the patient underwent MRI of the pelvis.  Imaging demonstrated 2.6 x 2.5 x 2.5 cm mass located in the distal rectum/anal canal region.  The mass was noted to arise exophytically from the distal rectum.  There were suspicious mesorectal lymph nodes measuring subcentimeter in size. No pelvic adenopathy was noted.  On 8/3/2019 the patient underwent a PET scan.  Imaging did not demonstrate any evidence of distant disease. Hypermetabolic 3.3 x 4.1 cm mass seen in the distal rectum/anus compatible with underlying malignancy. Per Dr. Khan review of imaging, also  noted suspicious perirectal adenopathy.  The patient was subsequent seen by Dr. Aquino who discussed definitive treatment with chemoradiation.      SITE OF TREATMENT: anal/pelvis     DATES  OF TREATMENT: 8/22/19-10/09/19     TOTAL DOSE OF TREATMENT: 5400 cGy     DOSE PER FRACTION OF TREATMENT: 180 cGy x 30 fractions    INTERVAL SINCE COMPLETION OF RADIATION THERAPY:   11 months     SUBJECTIVE:   In the interval, the patient was seen by Dr. Murphy on 7/27/20. On examination and scope, there was no clear evidence of recurrent/ residual disease.     Post treatment PET on 4/9/20 was TYLER.    She otherwise continues to recover well from treatment. Denies any anal/rectal pain. Denies diarrhea. No blood in stool. Dysuria resolved. She has noticed slight bilateral groin discomfort and lower extremity edema, likely secondary to lymphedema. Has seen physical therapy with some alleviation.     LABS AND IMAGING:  Reviewed.    IMPRESSION:   Ms. 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 She completed definitive CRT to a total dose of 54 Gy in 30 fractions on 10/9/19.    PLAN:   1.  Clinically on exam and scope (7/27/20) has no clear evidence of residual/recurrent disease. Post treatment PET scheduled on 4/9/20 was TYLER.     2. Late toxicity. Has mild bilateral groin and lower extremity edema likely secondary to RT. Working with physical therapy, has had mild improvement. Discussed use of vaginal dilators to minimize risk of vaginal stenosis.     3. Cancer related pain. No pain, no longer needing narcotic.     4. Continue follow up for surveillance with surgery (Dr. Murphy). Exam (7/27/20) and scope (3/13/20) not demonstrate any clear evidence of residual/recurrent disease. Planned to see patient in October 2020.     5. Continue follow up with medical oncology. Will see Dr. Aquino on 10/27/20.    6. RTC in 6 months.           Justin Khan M.D.  Department of  Radiation Oncology  [facility]

## 2020-09-22 DIAGNOSIS — G89.3 CANCER RELATED PAIN: ICD-10-CM

## 2020-09-22 RX ORDER — OXYCODONE AND ACETAMINOPHEN 10; 325 MG/1; MG/1
1 TABLET ORAL EVERY 6 HOURS PRN
Qty: 100 TABLET | Refills: 0 | Status: SHIPPED | OUTPATIENT
Start: 2020-09-22 | End: 2020-10-20

## 2020-09-22 RX ORDER — OXYCODONE AND ACETAMINOPHEN 10; 325 MG/1; MG/1
1 TABLET ORAL EVERY 6 HOURS PRN
Qty: 100 TABLET | Refills: 0 | COMMUNITY
Start: 2020-09-22 | End: 2020-09-22

## 2020-09-22 NOTE — TELEPHONE ENCOUNTER
Message received from pt requesting a refill of Percocet on behalf of self.  Last refill: 08.21.20  # 100 with 0 refills.  Last office visit:  04.27.20  Next office visit:  10.27.20    This is an appropriate refill, and has been routed to Dr. Aquino to be e-prescribed. Agata Renteria RN, BSN, OCN

## 2020-10-20 ENCOUNTER — MYC REFILL (OUTPATIENT)
Dept: ONCOLOGY | Facility: CLINIC | Age: 70
End: 2020-10-20

## 2020-10-20 DIAGNOSIS — G89.3 CANCER RELATED PAIN: ICD-10-CM

## 2020-10-20 RX ORDER — OXYCODONE AND ACETAMINOPHEN 10; 325 MG/1; MG/1
1 TABLET ORAL EVERY 6 HOURS PRN
Qty: 100 TABLET | Refills: 0 | Status: SHIPPED | OUTPATIENT
Start: 2020-10-20 | End: 2020-11-18

## 2020-10-26 ENCOUNTER — TRANSFERRED RECORDS (OUTPATIENT)
Dept: HEALTH INFORMATION MANAGEMENT | Facility: CLINIC | Age: 70
End: 2020-10-26

## 2020-11-18 ENCOUNTER — MYC REFILL (OUTPATIENT)
Dept: ONCOLOGY | Facility: CLINIC | Age: 70
End: 2020-11-18

## 2020-11-18 DIAGNOSIS — G89.3 CANCER RELATED PAIN: ICD-10-CM

## 2020-11-18 RX ORDER — OXYCODONE AND ACETAMINOPHEN 10; 325 MG/1; MG/1
1 TABLET ORAL EVERY 6 HOURS PRN
Qty: 100 TABLET | Refills: 0 | Status: SHIPPED | OUTPATIENT
Start: 2020-11-18 | End: 2020-12-15

## 2020-11-18 NOTE — TELEPHONE ENCOUNTER
Refill received from patient requesting a refill of percocet on behalf of self.  Last refill: 10/20/20  # 100 with 0 refills at [facility].  Last office visit:  4/27/2020  Next office visit:  11/24/2020    Sejal Connor RN

## 2020-11-24 ENCOUNTER — VIRTUAL VISIT (OUTPATIENT)
Dept: ONCOLOGY | Facility: CLINIC | Age: 70
End: 2020-11-24
Attending: INTERNAL MEDICINE
Payer: MEDICARE

## 2020-11-24 DIAGNOSIS — G89.29 CHRONIC BILATERAL LOW BACK PAIN WITH LEFT-SIDED SCIATICA: ICD-10-CM

## 2020-11-24 DIAGNOSIS — M54.42 CHRONIC BILATERAL LOW BACK PAIN WITH LEFT-SIDED SCIATICA: ICD-10-CM

## 2020-11-24 DIAGNOSIS — R60.9 EDEMA, UNSPECIFIED TYPE: ICD-10-CM

## 2020-11-24 DIAGNOSIS — Z85.048 PERSONAL HISTORY OF OTHER MALIGNANT NEOPLASM OF RECTUM, RECTOSIGMOID JUNCTION, AND ANUS: Primary | ICD-10-CM

## 2020-11-24 PROCEDURE — 999N001193 HC VIDEO/TELEPHONE VISIT; NO CHARGE

## 2020-11-24 NOTE — PROGRESS NOTES
Type of Visit: Video Visit  Patient has given verbal consent for Video visit.     Video Start Time: 3:20 pm  Video End Time: 3:40 PM     Originating Location (pt. Location): Home     Distant Location (provider location):  [facility]      Platform used for Video Visit: [facility]        ONCOLOGY FOLLOW UP VISIT        REASON FOR VISIT/CHIEF COMPLAIN:    July 2019 at age 68 diagnosed with anal cancer,  S/p concurrent chemo/RT        HISTORY OF ONCOLOGY ILLNESS:    In 7/2019 at age 68, she presented to her PMD Dr. Reid's office for rectal pain 4/10  for a few weeks, she felt a mass in her rectum, increase in rectal pressure after eating.   Colonoscopy 7/12/2019 found 8 mm mucosal abnormality just above the dentate line. This was at the anterior wall facing her vagina where the large mass was felt underneath. 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  She then saw Gyn Dr. Eduardo, 7/19/2019, exam found normal vulva. No vaginal polyp/growth appreciated arising from vaginal mucosa (which appears to be smooth) but there is a nodular 3-4cm mass palpated at rectovaginal area, which is not as well appreciated by digital rectal exam. Fixed mass, mildly tender to touch. Cervix grossly normal. Uterus 6 weeks size, non-tender. No adnexal mass/tenderness bilaterally.      7/2013/2019 pelvic MRI found 2.5 x 2.5 x 2.6 cm mass located between the distal rectum and vagina which appears to arise exophytically from the distal rectum. Single mildly enlarged mesorectal lymph node measuring 8 mm in short axis. No pelvic sidewall adenopathy. No other bony or soft tissue abnormalities identified.      She then saw colon rectal surgeon Dr. Nadeen Murphy 7/26/2019, who informed pt that she has anal cancer, not tx by surgery. Recommended concurrent chemo/RT.     PET 8/2019 found Hypermetabolic 3.3 x 4.1 cm mass SUV 70 seen in the distal  rectum/anus compatible with underlying malignancy. No evidence of distant hypermetabolic metastatic disease seen in the chest, abdomen or pelvis.       She then saw us and Rad-Onc, and made informed decision to proceed with concurrent chemoradiation with mitomycin and 5 FU from August to early Oct. 2019        INTERVAL HISTORY:   Since the patient's last visit with us, there is no hospital stay/surgery/new diagnosis made.        PAST MEDICAL HISTORY  HTN, hyperlipidemia  depression        MEDICATIONS/ALLERY:  Reviewed in Epic system.         SOCIAL HISTORY:    She got laid off.  Denes smoking and ETOH  She retired end of Oct 2020     FAMILY HISTORY:  Mother had cervical cancer at age 29, she survived, who passed from brain tumor.         REVIEW OF SYSTEMS:   Reports lymphedema on both legs, with some pain, s/p PT.   She is also having sciatica pain.        PHYSICAL EXAMINATION ATTAINABLE DURING VIDEO VISIT:  CONSTITUTIONAL - Pt looks like stated age, pleasant, not in acute distress. Not obese.  NEURO: Oriented to time, person, and places. No tremor.  ENT, MOUTH: Pupils are equal.  Sclerae are anicteric.  Moist oral mucosa. No oral thrush.   NECK:  No jugular venous distention.  No thyroid enlargement.   RESPIRATORY: talk nl, no sob during conversation, no cough.   MUSCULOSKELETAL/EXTREMITIES:  + edema on legs.   No joint deformity. Normal range of motion  SKIN:  No petechiae.  No rash.  No signs of cellulitis.   PSYCHIATRIC: Normal mood and affect. Good memory. Proper insight and judgement.   THE REST OF A COMPREHENSIVE PHYSICIAL EXAM IS DEFERRED DUE TO COVID-19 PUBLIC HEALTH EMERGENCY RELATED VIDEO VISIT RESCTRICTION.        CURRENT LAB DATA REVIEWED TODAY:  Cbc diff/CMP are fine, B12 233        CURRENT IMAGE DATA REVIEWED TODAY:  PET 4/2020 - no mets        OLD DATA REVIEWED TODAY WITH SUMMARY:   7/2019 Baseline CMP is fine, cr 1.15  7/2019 Baseline cbc is fine with hb 11.7     7/2019 8 mm mucosal abnormality just  above the dentate line. 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     PET 8/2019 found Hypermetabolic 3.3 x 4.1 cm mass SUV 70 seen in the distal rectum/anus compatible with underlying malignancy. No evidence of distant hypermetabolic metastatic disease seen in  the chest, abdomen or pelvis.       7/2013/2019 pelvic MRI found 2.5 x 2.5 x 2.6 cm mass located between the distal rectum and vagina which appears to arise exophytically from the distal rectum. Single mildly enlarged mesorectal lymph node measuring 8 mm in short axis. No pelvic sidewall adenopathy. No other bony or soft tissue abnormalities identified.             ASSESSMENT AND PLAN:    1. At age 68 in July 2019, she is diagnosed with low rectum/anal squamous cell carcinoma, months duration of anal pain pressure.     She made informed decision to proceed with concurrent chemoradiation cycle 1 day 1 August 22, 2019 with 5 FU and mitomycin. She finished chemo/RT early Oct. 2019.      She is advised on ongoing response in the next months.   She is advised on on going colon rectal surgeon local exam.      4/2020 PET is negative for mets.      She needs ongoing oncology follow-up we will see her in 6 months with physical examination lab work.        2.  Chronic and legs lymphedema from local radiation.  S/p PT.  She also need Percocet for pain.   She is to re connect with PT.          3. Sciatica pain  Advise exercise, stretching, chiropractor.

## 2020-11-24 NOTE — LETTER
"    11/24/2020         RE: Sara Dominguez  [address]  [place] [address]        Dear Colleague,    Thank you for referring your patient, Sara Dominguez, to the [facility]. Please see a copy of my visit note below.    Sara Dominguez is a 70 year old female who is being evaluated via a billable video visit.      The patient has been notified of following:     \"This video visit will be conducted via a call between you and your physician/provider. We have found that certain health care needs can be provided without the need for an in-person physical exam.  This service lets us provide the care you need with a video conversation.  If a prescription is necessary we can send it directly to your pharmacy.  If lab work is needed we can place an order for that and you can then stop by our lab to have the test done at a later time.    Video visits are billed at different rates depending on your insurance coverage.  Please reach out to your insurance provider with any questions.    If during the course of the call the physician/provider feels a video visit is not appropriate, you will not be charged for this service.\"    Patient has given verbal consent for Video visit? Yes  How would you like to obtain your AVS? [employer]  Send to e-mail at: [email or web address]  [phone]  Will anyone else be joining your video visit? No        Video-Visit Details    Type of service:  Video Visit        Originating Location (pt. Location): Home    Distant Location (provider location):  [facility]     Platform used for Video Visit: Kathrin Purvis CMA          Type of Visit: Video Visit  Patient has given verbal consent for Video visit.     Video Start Time: 3:20 pm  Video End Time: 3:40 PM     Originating Location (pt. Location): Home     Distant Location (provider location):  [facility]      Platform used for Video Visit: " [facility]        ONCOLOGY FOLLOW UP VISIT        REASON FOR VISIT/CHIEF COMPLAIN:    July 2019 at age 68 diagnosed with anal cancer,  S/p concurrent chemo/RT        HISTORY OF ONCOLOGY ILLNESS:    In 7/2019 at age 68, she presented to her PMD Dr. Reid's office for rectal pain 4/10  for a few weeks, she felt a mass in her rectum, increase in rectal pressure after eating.   Colonoscopy 7/12/2019 found 8 mm mucosal abnormality just above the dentate line. This was at the anterior wall facing her vagina where the large mass was felt underneath. 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  She then saw Gyn Dr. Eduardo, 7/19/2019, exam found normal vulva. No vaginal polyp/growth appreciated arising from vaginal mucosa (which appears to be smooth) but there is a nodular 3-4cm mass palpated at rectovaginal area, which is not as well appreciated by digital rectal exam. Fixed mass, mildly tender to touch. Cervix grossly normal. Uterus 6 weeks size, non-tender. No adnexal mass/tenderness bilaterally.      7/2013/2019 pelvic MRI found 2.5 x 2.5 x 2.6 cm mass located between the distal rectum and vagina which appears to arise exophytically from the distal rectum. Single mildly enlarged mesorectal lymph node measuring 8 mm in short axis. No pelvic sidewall adenopathy. No other bony or soft tissue abnormalities identified.      She then saw colon rectal surgeon Dr. Nadeen Murphy 7/26/2019, who informed pt that she has anal cancer, not tx by surgery. Recommended concurrent chemo/RT.     PET 8/2019 found Hypermetabolic 3.3 x 4.1 cm mass SUV 70 seen in the distal rectum/anus compatible with underlying malignancy. No evidence of distant hypermetabolic metastatic disease seen in the chest, abdomen or pelvis.       She then saw us and Rad-Onc, and made informed decision to proceed with concurrent chemoradiation with mitomycin and 5 FU from  August to early Oct. 2019        INTERVAL HISTORY:   Since the patient's last visit with us, there is no hospital stay/surgery/new diagnosis made.        PAST MEDICAL HISTORY  HTN, hyperlipidemia  depression        MEDICATIONS/ALLERY:  Reviewed in Epic system.         SOCIAL HISTORY:    She got laid off.  Denes smoking and ETOH  She retired end of Oct 2020     FAMILY HISTORY:  Mother had cervical cancer at age 29, she survived, who passed from brain tumor.         REVIEW OF SYSTEMS:   Reports lymphedema on both legs, with some pain, s/p PT.   She is also having sciatica pain.        PHYSICAL EXAMINATION ATTAINABLE DURING VIDEO VISIT:  CONSTITUTIONAL - Pt looks like stated age, pleasant, not in acute distress. Not obese.  NEURO: Oriented to time, person, and places. No tremor.  ENT, MOUTH: Pupils are equal.  Sclerae are anicteric.  Moist oral mucosa. No oral thrush.   NECK:  No jugular venous distention.  No thyroid enlargement.   RESPIRATORY: talk nl, no sob during conversation, no cough.   MUSCULOSKELETAL/EXTREMITIES:  + edema on legs.   No joint deformity. Normal range of motion  SKIN:  No petechiae.  No rash.  No signs of cellulitis.   PSYCHIATRIC: Normal mood and affect. Good memory. Proper insight and judgement.   THE REST OF A COMPREHENSIVE PHYSICIAL EXAM IS DEFERRED DUE TO COVID-19 PUBLIC HEALTH EMERGENCY RELATED VIDEO VISIT RESCTRICTION.        CURRENT LAB DATA REVIEWED TODAY:  Cbc diff/CMP are fine, B12 233        CURRENT IMAGE DATA REVIEWED TODAY:  PET 4/2020 - no mets        OLD DATA REVIEWED TODAY WITH SUMMARY:   7/2019 Baseline CMP is fine, cr 1.15  7/2019 Baseline cbc is fine with hb 11.7     7/2019 8 mm mucosal abnormality just above the dentate line. 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     PET 8/2019 found Hypermetabolic 3.3 x 4.1 cm mass SUV 70 seen in the distal rectum/anus compatible with underlying malignancy. No  evidence of distant hypermetabolic metastatic disease seen in  the chest, abdomen or pelvis.       7/2013/2019 pelvic MRI found 2.5 x 2.5 x 2.6 cm mass located between the distal rectum and vagina which appears to arise exophytically from the distal rectum. Single mildly enlarged mesorectal lymph node measuring 8 mm in short axis. No pelvic sidewall adenopathy. No other bony or soft tissue abnormalities identified.             ASSESSMENT AND PLAN:    1. At age 68 in July 2019, she is diagnosed with low rectum/anal squamous cell carcinoma, months duration of anal pain pressure.     She made informed decision to proceed with concurrent chemoradiation cycle 1 day 1 August 22, 2019 with 5 FU and mitomycin. She finished chemo/RT early Oct. 2019.      She is advised on ongoing response in the next months.   She is advised on on going colon rectal surgeon local exam.      4/2020 PET is negative for mets.      She needs ongoing oncology follow-up we will see her in 6 months with physical examination lab work.        2.  Chronic and legs lymphedema from local radiation.  S/p PT.  She also need Percocet for pain.   She is to re connect with PT.          3. Sciatica pain  Advise exercise, stretching, chiropractor.          Again, thank you for allowing me to participate in the care of your patient.        Sincerely,        Whitney Aquino MD, MD

## 2020-11-24 NOTE — PROGRESS NOTES
"Sara Dominguez is a 70 year old female who is being evaluated via a billable video visit.      The patient has been notified of following:     \"This video visit will be conducted via a call between you and your physician/provider. We have found that certain health care needs can be provided without the need for an in-person physical exam.  This service lets us provide the care you need with a video conversation.  If a prescription is necessary we can send it directly to your pharmacy.  If lab work is needed we can place an order for that and you can then stop by our lab to have the test done at a later time.    Video visits are billed at different rates depending on your insurance coverage.  Please reach out to your insurance provider with any questions.    If during the course of the call the physician/provider feels a video visit is not appropriate, you will not be charged for this service.\"    Patient has given verbal consent for Video visit? Yes  How would you like to obtain your AVS? [employer]  Send to e-mail at: [email or web address]  [phone]  Will anyone else be joining your video visit? No        Video-Visit Details    Type of service:  Video Visit        Originating Location (pt. Location): Home    Distant Location (provider location):  [facility]     Platform used for Video Visit: Kathrin Purvis CMA        "

## 2020-11-24 NOTE — LETTER
"    11/24/2020         RE: Sara Dominguez  [address]  [place] [address]        Dear Colleague,    Thank you for referring your patient, Sara Dominguez, to the [facility]. Please see a copy of my visit note below.    Sara Dominguez is a 70 year old female who is being evaluated via a billable video visit.      The patient has been notified of following:     \"This video visit will be conducted via a call between you and your physician/provider. We have found that certain health care needs can be provided without the need for an in-person physical exam.  This service lets us provide the care you need with a video conversation.  If a prescription is necessary we can send it directly to your pharmacy.  If lab work is needed we can place an order for that and you can then stop by our lab to have the test done at a later time.    Video visits are billed at different rates depending on your insurance coverage.  Please reach out to your insurance provider with any questions.    If during the course of the call the physician/provider feels a video visit is not appropriate, you will not be charged for this service.\"    Patient has given verbal consent for Video visit? Yes  How would you like to obtain your AVS? [employer]  Send to e-mail at: [email or web address]  [phone]  Will anyone else be joining your video visit? No        Video-Visit Details    Type of service:  Video Visit        Originating Location (pt. Location): Home    Distant Location (provider location):  [facility]     Platform used for Video Visit: Kathrin Purvis CMA          Type of Visit: Video Visit  Patient has given verbal consent for Video visit.     Video Start Time: 3:20 pm  Video End Time: 3:40 PM     Originating Location (pt. Location): Home     Distant Location (provider location):  [facility]      Platform used for Video Visit: " [facility]        ONCOLOGY FOLLOW UP VISIT        REASON FOR VISIT/CHIEF COMPLAIN:    July 2019 at age 68 diagnosed with anal cancer,  S/p concurrent chemo/RT        HISTORY OF ONCOLOGY ILLNESS:    In 7/2019 at age 68, she presented to her PMD Dr. Reid's office for rectal pain 4/10  for a few weeks, she felt a mass in her rectum, increase in rectal pressure after eating.   Colonoscopy 7/12/2019 found 8 mm mucosal abnormality just above the dentate line. This was at the anterior wall facing her vagina where the large mass was felt underneath. 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  She then saw Gyn Dr. Eduardo, 7/19/2019, exam found normal vulva. No vaginal polyp/growth appreciated arising from vaginal mucosa (which appears to be smooth) but there is a nodular 3-4cm mass palpated at rectovaginal area, which is not as well appreciated by digital rectal exam. Fixed mass, mildly tender to touch. Cervix grossly normal. Uterus 6 weeks size, non-tender. No adnexal mass/tenderness bilaterally.      7/2013/2019 pelvic MRI found 2.5 x 2.5 x 2.6 cm mass located between the distal rectum and vagina which appears to arise exophytically from the distal rectum. Single mildly enlarged mesorectal lymph node measuring 8 mm in short axis. No pelvic sidewall adenopathy. No other bony or soft tissue abnormalities identified.      She then saw colon rectal surgeon Dr. Nadeen Murphy 7/26/2019, who informed pt that she has anal cancer, not tx by surgery. Recommended concurrent chemo/RT.     PET 8/2019 found Hypermetabolic 3.3 x 4.1 cm mass SUV 70 seen in the distal rectum/anus compatible with underlying malignancy. No evidence of distant hypermetabolic metastatic disease seen in the chest, abdomen or pelvis.       She then saw us and Rad-Onc, and made informed decision to proceed with concurrent chemoradiation with mitomycin and 5 FU from  August to early Oct. 2019        INTERVAL HISTORY:   Since the patient's last visit with us, there is no hospital stay/surgery/new diagnosis made.        PAST MEDICAL HISTORY  HTN, hyperlipidemia  depression        MEDICATIONS/ALLERY:  Reviewed in Epic system.         SOCIAL HISTORY:    She got laid off.  Denes smoking and ETOH  She retired end of Oct 2020     FAMILY HISTORY:  Mother had cervical cancer at age 29, she survived, who passed from brain tumor.         REVIEW OF SYSTEMS:   Reports lymphedema on both legs, with some pain, s/p PT.   She is also having sciatica pain.        PHYSICAL EXAMINATION ATTAINABLE DURING VIDEO VISIT:  CONSTITUTIONAL - Pt looks like stated age, pleasant, not in acute distress. Not obese.  NEURO: Oriented to time, person, and places. No tremor.  ENT, MOUTH: Pupils are equal.  Sclerae are anicteric.  Moist oral mucosa. No oral thrush.   NECK:  No jugular venous distention.  No thyroid enlargement.   RESPIRATORY: talk nl, no sob during conversation, no cough.   MUSCULOSKELETAL/EXTREMITIES:  + edema on legs.   No joint deformity. Normal range of motion  SKIN:  No petechiae.  No rash.  No signs of cellulitis.   PSYCHIATRIC: Normal mood and affect. Good memory. Proper insight and judgement.   THE REST OF A COMPREHENSIVE PHYSICIAL EXAM IS DEFERRED DUE TO COVID-19 PUBLIC HEALTH EMERGENCY RELATED VIDEO VISIT RESCTRICTION.        CURRENT LAB DATA REVIEWED TODAY:  Cbc diff/CMP are fine, B12 233        CURRENT IMAGE DATA REVIEWED TODAY:  PET 4/2020 - no mets        OLD DATA REVIEWED TODAY WITH SUMMARY:   7/2019 Baseline CMP is fine, cr 1.15  7/2019 Baseline cbc is fine with hb 11.7     7/2019 8 mm mucosal abnormality just above the dentate line. 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     PET 8/2019 found Hypermetabolic 3.3 x 4.1 cm mass SUV 70 seen in the distal rectum/anus compatible with underlying malignancy. No  evidence of distant hypermetabolic metastatic disease seen in  the chest, abdomen or pelvis.       7/2013/2019 pelvic MRI found 2.5 x 2.5 x 2.6 cm mass located between the distal rectum and vagina which appears to arise exophytically from the distal rectum. Single mildly enlarged mesorectal lymph node measuring 8 mm in short axis. No pelvic sidewall adenopathy. No other bony or soft tissue abnormalities identified.             ASSESSMENT AND PLAN:    1. At age 68 in July 2019, she is diagnosed with low rectum/anal squamous cell carcinoma, months duration of anal pain pressure.     She made informed decision to proceed with concurrent chemoradiation cycle 1 day 1 August 22, 2019 with 5 FU and mitomycin. She finished chemo/RT early Oct. 2019.      She is advised on ongoing response in the next months.   She is advised on on going colon rectal surgeon local exam.      4/2020 PET is negative for mets.      She needs ongoing oncology follow-up we will see her in 6 months with physical examination lab work.        2.  Chronic and legs lymphedema from local radiation.  S/p PT.  She also need Percocet for pain.   She is to re connect with PT.          3. Sciatica pain  Advise exercise, stretching, chiropractor.          Again, thank you for allowing me to participate in the care of your patient.        Sincerely,        Whitney Aquino MD, MD

## 2020-11-27 ENCOUNTER — TRANSFERRED RECORDS (OUTPATIENT)
Dept: HEALTH INFORMATION MANAGEMENT | Facility: CLINIC | Age: 70
End: 2020-11-27

## 2020-11-27 LAB
ALT SERPL-CCNC: 18 IU/L (ref 12–65)
AST SERPL-CCNC: 19 IU/L (ref 15–37)
CREAT SERPL-MCNC: 1.09 MG/DL (ref 0.6–1.3)
GFR SERPL CREATININE-BSD FRML MDRD: 49 ML/MIN/1.73M2
GLUCOSE SERPL-MCNC: 96 MG/DL (ref 70–100)
POTASSIUM SERPL-SCNC: 4.1 MMOL/L (ref 3.5–5.1)

## 2020-12-15 DIAGNOSIS — G89.3 CANCER RELATED PAIN: ICD-10-CM

## 2020-12-15 RX ORDER — OXYCODONE AND ACETAMINOPHEN 10; 325 MG/1; MG/1
1 TABLET ORAL EVERY 6 HOURS PRN
Qty: 100 TABLET | Refills: 0 | Status: SHIPPED | OUTPATIENT
Start: 2020-12-15 | End: 2021-01-13

## 2020-12-15 NOTE — TELEPHONE ENCOUNTER
Refill request received from patient requesting a refill of percocet on behalf of self.  Last refill: 11/18/20  # 100 with 0 refills at [facility].  Last office visit:  11/24/20  Next office visit:  Not scheduled     Sejal Connor RN

## 2020-12-30 ENCOUNTER — TELEPHONE (OUTPATIENT)
Dept: ONCOLOGY | Facility: CLINIC | Age: 70
End: 2020-12-30

## 2020-12-30 ENCOUNTER — TELEPHONE (OUTPATIENT)
Dept: RADIATION THERAPY | Facility: OUTPATIENT CENTER | Age: 70
End: 2020-12-30

## 2020-12-30 NOTE — TELEPHONE ENCOUNTER
Patient called regarding pain she is experiencing in her legs. She saw Dr. Mortensen who evaluated her and suggested an MRI. Patient is scheduled to have an MRI and follow up with Dr. Mortensen on 1/6/21. Patient is calling wondering if we can prescribe her a pain medication as she states her Percocet is not touching her pain. Advised patient the earliest we could get her seen is 1/18/21 and she should see her PCP to determine what pain medications she can have to help with this. Patient stated she is thinking about going into the ER for pain, advised patient she could do this or go to an urgent care if she feels the pain is unbearable. Patient verbalized understanding and will call us if there are other questions or concerns.    Sejal Connor RN on 12/30/2020 at 9:23 AM

## 2020-12-30 NOTE — TELEPHONE ENCOUNTER
Gina called reporting R groin/hip pain. She reported a hx of sciatic type pain. She has been connected with an orthopedic/sports medicine MD and that an MRI had been done. She said percocet is not helping the pain. She was wondering if her cancer doctor, Dr. Khan, could prescribe something stronger.  Rn reviewed that it could be nerve or bone pain, may not be cancer pain. Counseled her to call back to orthopedic team since they have some imaging or to go to ER if pain is unbearable.

## 2021-02-10 DIAGNOSIS — G89.3 CANCER RELATED PAIN: ICD-10-CM

## 2021-02-10 RX ORDER — OXYCODONE AND ACETAMINOPHEN 10; 325 MG/1; MG/1
1 TABLET ORAL EVERY 6 HOURS PRN
Qty: 100 TABLET | Refills: 0 | Status: CANCELLED | OUTPATIENT
Start: 2021-02-10

## 2021-02-10 NOTE — PROGRESS NOTES
Refill request received from patient requesting a refill of percocet on behalf of self.  Last refill: 01/13/21  # 100 with 0 refills at [facility]  Last office visit:  11/24/20  Next office visit:  Not scheduled      Marika Rodrigeuz RN, MSN, OCN

## 2021-02-11 ENCOUNTER — MYC REFILL (OUTPATIENT)
Dept: ONCOLOGY | Facility: CLINIC | Age: 71
End: 2021-02-11

## 2021-02-11 DIAGNOSIS — G89.3 CANCER RELATED PAIN: ICD-10-CM

## 2021-02-11 RX ORDER — OXYCODONE AND ACETAMINOPHEN 10; 325 MG/1; MG/1
1 TABLET ORAL EVERY 6 HOURS PRN
Qty: 100 TABLET | Refills: 0 | Status: SHIPPED | OUTPATIENT
Start: 2021-02-11 | End: 2021-03-10

## 2021-02-23 ENCOUNTER — TRANSFERRED RECORDS (OUTPATIENT)
Dept: HEALTH INFORMATION MANAGEMENT | Facility: CLINIC | Age: 71
End: 2021-02-23

## 2021-03-10 ENCOUNTER — MYC REFILL (OUTPATIENT)
Dept: ONCOLOGY | Facility: CLINIC | Age: 71
End: 2021-03-10

## 2021-03-10 DIAGNOSIS — G89.3 CANCER RELATED PAIN: ICD-10-CM

## 2021-03-11 RX ORDER — OXYCODONE AND ACETAMINOPHEN 10; 325 MG/1; MG/1
1 TABLET ORAL EVERY 6 HOURS PRN
Qty: 100 TABLET | Refills: 0 | Status: SHIPPED | OUTPATIENT
Start: 2021-03-11 | End: 2021-04-10

## 2021-04-09 DIAGNOSIS — G89.3 CANCER RELATED PAIN: ICD-10-CM

## 2021-04-09 NOTE — TELEPHONE ENCOUNTER
Rx received from patient requesting a refill of percocet on behalf of self.  Last refill: 3/11/21  # 100 with 0 refills at [facility].  Last office visit:  11/24/20  Next office visit:  Not scheduled    Sejal Connor RN

## 2021-04-10 RX ORDER — OXYCODONE AND ACETAMINOPHEN 10; 325 MG/1; MG/1
1 TABLET ORAL EVERY 6 HOURS PRN
Qty: 100 TABLET | Refills: 0 | Status: SHIPPED | OUTPATIENT
Start: 2021-04-10 | End: 2021-05-11

## 2021-04-13 ENCOUNTER — VIRTUAL VISIT (OUTPATIENT)
Dept: RADIATION THERAPY | Facility: OUTPATIENT CENTER | Age: 71
End: 2021-04-13
Payer: MEDICARE

## 2021-04-13 DIAGNOSIS — C21.1 MALIGNANT NEOPLASM OF ANAL CANAL (H): Primary | ICD-10-CM

## 2021-04-13 DIAGNOSIS — N32.89 BLADDER SPASM: ICD-10-CM

## 2021-04-13 RX ORDER — ALENDRONATE SODIUM 70 MG/1
70 TABLET ORAL
COMMUNITY
Start: 2021-01-04

## 2021-04-13 RX ORDER — OXYBUTYNIN CHLORIDE 5 MG/1
5 TABLET ORAL 3 TIMES DAILY PRN
Qty: 90 TABLET | Refills: 3 | Status: SHIPPED | OUTPATIENT
Start: 2021-04-13 | End: 2021-07-14

## 2021-04-13 RX ORDER — GABAPENTIN 100 MG/1
1 CAPSULE ORAL SEE ADMIN INSTRUCTIONS
COMMUNITY
Start: 2021-03-13 | End: 2022-02-09

## 2021-04-13 NOTE — LETTER
2021         RE: Sara Dominguez  [address]  [facility] [address]        Dear Colleague,    Thank you for referring your patient, Sara Dominguez, to the [facility]. Please see a copy of my visit note below.       Department of Radiation Oncology  Rad[facility] Center  [facility]  [place] [address]  [phone]       Radiation Oncology Follow-up Visit  2021     Sara Dominguez  MRN: [id number]   : 1950     DIAGNOSIS: squamous cell carcinoma of the anal canal  INTENT OF RADIOTHERAPY: definitive CRT.  PATHOLOGY:  moderately differentiated squamous cell carcinoma                              STAGE:  clinical [id number] (anna-rectal nodes).    CONCURRENT SYSTEMIC THERAPY: 5 FU and mitomycin     ONCOLOGIC HISTORY:    Ms. 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       The patient initially presented with symptoms of rectal pain pressure prompting further evaluation.  She was seen by gastroenterologist Dr. Aguirre who on digital rectal exam noticed a mass in the anterior wall of the anal rectal region, measuring 3.4 cm in size.  Patient eventually underwent colonoscopy on 2019 which demonstrated an 8 mm mucosal abnormality just above the dentate line.  The mass was noted to be in the anterior wall facing the vagina.  Biopsy of the mass demonstrated moderately differentiated squamous cell carcinoma.  The patient was referred to Gyn/onc who on exam palpated a 3 to 4 cm rectovaginal mass.  No vaginal or cervical mass was noted.  2019 the patient underwent MRI of the pelvis.  Imaging demonstrated 2.6 x 2.5 x 2.5 cm mass located in the distal rectum/anal canal region.  The mass was noted to arise exophytically from the distal rectum.  There were suspicious mesorectal lymph nodes measuring subcentimeter in size. No pelvic adenopathy was noted.  On  8/3/2019 the patient underwent a PET scan.  Imaging did not demonstrate any evidence of distant disease. Hypermetabolic 3.3 x 4.1 cm mass seen in the distal rectum/anus compatible with underlying malignancy. Per Dr. Khan review of imaging, also noted suspicious perirectal adenopathy.  The patient was subsequent seen by Dr. Aquino who discussed definitive treatment with chemoradiation.      SITE OF TREATMENT: anal/pelvis     DATES  OF TREATMENT: 8/22/19-10/09/19     TOTAL DOSE OF TREATMENT: 5400 cGy     DOSE PER FRACTION OF TREATMENT: 180 cGy x 30 fractions    INTERVAL SINCE COMPLETION OF RADIATION THERAPY:   18 months     SUBJECTIVE:   In the interval, the patient was seen by Dr. Murphy on 2/23/21. On examination, there was no clear evidence of recurrent/ residual disease.     Post treatment PET on 4/9/20 was TYLER. Last CTAP in December 2020 was TLYER. No recent imaging of chest.    Unfortunately posttreatment the patient has developed pelvic insufficiency fractures in the sacrum as well as right pubic bone.  She has been following with Dr. Mortensen with treatment of physical therapy, vitamin D, calcium,  Fosamax, and pain management.  She is noticed some improvement but yet not completely.  She is scheduled to see orthopedic team including Dr. Rodriguez and Dr. Partida for discussion of potential options.    LABS AND IMAGING:  Reviewed.    IMPRESSION:   Ms. 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 She completed definitive CRT to a total dose of 54 Gy in 30 fractions on 10/9/19.    PLAN:   1.  Clinically on exam and scope (2/23/21) has no clear evidence of residual/recurrent disease. Post treatment PET on 4/9/20 was TYLER. Last CTAP in December 2020 was TYLER. No recent imaging of chest.     2. Late toxicity. Has developed sacral insufficiency fracture of the sacrum and R pubic bone post treatment. I discussed that these changes are likely related to  post RT treatment with weakening of the bone.  Working with pain team and orthopedic team as well.     3. Continue follow up for surveillance with surgery (Dr. Murphy).     4. Continue follow up with medical oncology.     5. CTC now as she is due for annual surveillance of the chest. NCCN guidelines recommend annual imaging (CTCAP) for first 3 years.    6. RTC in 6 months.       Justin Khan M.D.  Department of Radiation Oncology  [facility]

## 2021-04-13 NOTE — NURSING NOTE
"FOLLOW-UP VISIT    Patient Name: Sara Dominguez      : 1950     Age: 70 year old        ______________________________________________________________________________     Chief Complaint   Patient presents with     Radiation Therapy     phone follow up     There were no vitals taken for this visit.     Date Radiation Completed: 10/9/19 with concurrent chemo    Pain  Current history of pain associated with this visit:   Intensity: 5/10  Current: aching and throbbing  Location: pelvis - pt has known insufficiency fractures - working with ortho and sports medicine for manamgement  Treatment: oxycodone prn daily, gabapentin    Meds  Current Med List Reviewed: Yes  Medication Note:     Imaging  CT: 2020    On Chemo?: No  Nausea:none  Bowel: Normal and with occassional soft/loose  Bladder: frequency, urgency and incontinence - will see urologist to discuss bladder leaking tomorrow  Skin: Warm  Dry  Intact  Energy Level: low/ normal   \" I am really happy that all my cancer related visits are stable and good. Once this bone pain heals and gets better I'll be doing really good\"    Other Appointments:     Date  Oncologist: Dr. Aquino/HILARIA Martinez   due around now for visit   Surgeon: Dr. Murphy  2021, next 2021     Other Notes: urology 21  Orthopedic team to discuss sacralplasty 21  Sports medicine - Dr. Partida - looking into bone stimulator  "

## 2021-04-13 NOTE — Clinical Note
Jamia will set up a chest CT at [facility] for now - not needing to be coordinated with any visit. Just sbi if ingrid asks

## 2021-04-13 NOTE — PROGRESS NOTES
Department of Radiation Oncology  Radiation Therapy Center  [facility] [place] [address]  [phone]       Radiation Oncology Follow-up Visit  2021     Sara Dominguez  MRN: [id number]   : 1950     DIAGNOSIS: squamous cell carcinoma of the anal canal  INTENT OF RADIOTHERAPY: definitive CRT.  PATHOLOGY:  moderately differentiated squamous cell carcinoma                              STAGE:  clinical [id number] (anna-rectal nodes).    CONCURRENT SYSTEMIC THERAPY: 5 FU and mitomycin     ONCOLOGIC HISTORY:    Ms. 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       The patient initially presented with symptoms of rectal pain pressure prompting further evaluation.  She was seen by gastroenterologist Dr. Aguirre who on digital rectal exam noticed a mass in the anterior wall of the anal rectal region, measuring 3.4 cm in size.  Patient eventually underwent colonoscopy on 2019 which demonstrated an 8 mm mucosal abnormality just above the dentate line.  The mass was noted to be in the anterior wall facing the vagina.  Biopsy of the mass demonstrated moderately differentiated squamous cell carcinoma.  The patient was referred to Gyn/onc who on exam palpated a 3 to 4 cm rectovaginal mass.  No vaginal or cervical mass was noted.  2019 the patient underwent MRI of the pelvis.  Imaging demonstrated 2.6 x 2.5 x 2.5 cm mass located in the distal rectum/anal canal region.  The mass was noted to arise exophytically from the distal rectum.  There were suspicious mesorectal lymph nodes measuring subcentimeter in size. No pelvic adenopathy was noted.  On 8/3/2019 the patient underwent a PET scan.  Imaging did not demonstrate any evidence of distant disease. Hypermetabolic 3.3 x 4.1 cm mass seen in the distal rectum/anus compatible with underlying malignancy. Per Dr. Khan review of imaging, also  noted suspicious perirectal adenopathy.  The patient was subsequent seen by Dr. Aquino who discussed definitive treatment with chemoradiation.      SITE OF TREATMENT: anal/pelvis     DATES  OF TREATMENT: 8/22/19-10/09/19     TOTAL DOSE OF TREATMENT: 5400 cGy     DOSE PER FRACTION OF TREATMENT: 180 cGy x 30 fractions    INTERVAL SINCE COMPLETION OF RADIATION THERAPY:   18 months     SUBJECTIVE:   In the interval, the patient was seen by Dr. Murphy on 2/23/21. On examination, there was no clear evidence of recurrent/ residual disease.     Post treatment PET on 4/9/20 was TYLER. Last CTAP in December 2020 was TYLER. No recent imaging of chest.    Unfortunately posttreatment the patient has developed pelvic insufficiency fractures in the sacrum as well as right pubic bone.  She has been following with Dr. Mortensen with treatment of physical therapy, vitamin D, calcium,  Fosamax, and pain management.  She is noticed some improvement but yet not completely.  She is scheduled to see orthopedic team including Dr. Rodriguez and Dr. Partida for discussion of potential options.    LABS AND IMAGING:  Reviewed.    IMPRESSION:   Ms. 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 She completed definitive CRT to a total dose of 54 Gy in 30 fractions on 10/9/19.    PLAN:   1.  Clinically on exam and scope (2/23/21) has no clear evidence of residual/recurrent disease. Post treatment PET on 4/9/20 was TYLER. Last CTAP in December 2020 was TYLER. No recent imaging of chest.     2. Late toxicity. Has developed sacral insufficiency fracture of the sacrum and R pubic bone post treatment. I discussed that these changes are likely related to post RT treatment with weakening of the bone.  Working with pain team and orthopedic team as well.     3. Continue follow up for surveillance with surgery (Dr. Murphy).     4. Continue follow up with medical oncology.     5. CTC now as she is due  for annual surveillance of the chest. NCCN guidelines recommend annual imaging (CTCAP) for first 3 years.    6. RTC in 6 months.       Justin Khan M.D.  Department of Radiation Oncology  [facility]

## 2021-04-14 ENCOUNTER — TRANSFERRED RECORDS (OUTPATIENT)
Dept: HEALTH INFORMATION MANAGEMENT | Facility: CLINIC | Age: 71
End: 2021-04-14

## 2021-04-25 ENCOUNTER — HEALTH MAINTENANCE LETTER (OUTPATIENT)
Age: 71
End: 2021-04-25

## 2021-05-11 ENCOUNTER — MYC REFILL (OUTPATIENT)
Dept: ONCOLOGY | Facility: CLINIC | Age: 71
End: 2021-05-11

## 2021-05-11 DIAGNOSIS — G89.3 CANCER RELATED PAIN: ICD-10-CM

## 2021-05-12 RX ORDER — OXYCODONE AND ACETAMINOPHEN 10; 325 MG/1; MG/1
1 TABLET ORAL EVERY 6 HOURS PRN
Qty: 100 TABLET | Refills: 0 | Status: SHIPPED | OUTPATIENT
Start: 2021-05-12 | End: 2021-06-08

## 2021-06-08 ENCOUNTER — MYC REFILL (OUTPATIENT)
Dept: ONCOLOGY | Facility: CLINIC | Age: 71
End: 2021-06-08

## 2021-06-08 DIAGNOSIS — G89.3 CANCER RELATED PAIN: ICD-10-CM

## 2021-06-09 RX ORDER — OXYCODONE AND ACETAMINOPHEN 10; 325 MG/1; MG/1
1 TABLET ORAL EVERY 6 HOURS PRN
Qty: 100 TABLET | Refills: 0 | Status: SHIPPED | OUTPATIENT
Start: 2021-06-09 | End: 2021-07-10

## 2021-06-21 DIAGNOSIS — Z85.048 PERSONAL HISTORY OF OTHER MALIGNANT NEOPLASM OF RECTUM, RECTOSIGMOID JUNCTION, AND ANUS: Primary | ICD-10-CM

## 2021-06-24 DIAGNOSIS — Z85.048 PERSONAL HISTORY OF OTHER MALIGNANT NEOPLASM OF RECTUM, RECTOSIGMOID JUNCTION, AND ANUS: ICD-10-CM

## 2021-06-24 LAB
ALBUMIN SERPL-MCNC: 4 G/DL (ref 3.4–5)
ALP SERPL-CCNC: 134 U/L (ref 40–150)
ALT SERPL W P-5'-P-CCNC: 24 U/L (ref 0–50)
ANION GAP SERPL CALCULATED.3IONS-SCNC: 9 MMOL/L (ref 3–14)
AST SERPL W P-5'-P-CCNC: 21 U/L (ref 0–45)
BASOPHILS # BLD AUTO: 0 10E9/L (ref 0–0.2)
BASOPHILS NFR BLD AUTO: 0.3 %
BILIRUB SERPL-MCNC: 0.2 MG/DL (ref 0.2–1.3)
BUN SERPL-MCNC: 18 MG/DL (ref 7–30)
CALCIUM SERPL-MCNC: 9.3 MG/DL (ref 8.5–10.1)
CHLORIDE SERPL-SCNC: 100 MMOL/L (ref 94–109)
CO2 SERPL-SCNC: 27 MMOL/L (ref 20–32)
CREAT SERPL-MCNC: 0.98 MG/DL (ref 0.52–1.04)
DIFFERENTIAL METHOD BLD: NORMAL
EOSINOPHIL # BLD AUTO: 0.1 10E9/L (ref 0–0.7)
EOSINOPHIL NFR BLD AUTO: 2 %
ERYTHROCYTE [DISTWIDTH] IN BLOOD BY AUTOMATED COUNT: 14.2 % (ref 10–15)
GFR SERPL CREATININE-BSD FRML MDRD: 57 ML/MIN/{1.73_M2}
GLUCOSE SERPL-MCNC: 83 MG/DL (ref 70–99)
HCT VFR BLD AUTO: 36.2 % (ref 35–47)
HGB BLD-MCNC: 11.8 G/DL (ref 11.7–15.7)
LYMPHOCYTES # BLD AUTO: 1 10E9/L (ref 0.8–5.3)
LYMPHOCYTES NFR BLD AUTO: 17.4 %
MCH RBC QN AUTO: 31 PG (ref 26.5–33)
MCHC RBC AUTO-ENTMCNC: 32.6 G/DL (ref 31.5–36.5)
MCV RBC AUTO: 95 FL (ref 78–100)
MONOCYTES # BLD AUTO: 0.5 10E9/L (ref 0–1.3)
MONOCYTES NFR BLD AUTO: 9 %
NEUTROPHILS # BLD AUTO: 4.2 10E9/L (ref 1.6–8.3)
NEUTROPHILS NFR BLD AUTO: 71.3 %
PLATELET # BLD AUTO: 283 10E9/L (ref 150–450)
POTASSIUM SERPL-SCNC: 4.7 MMOL/L (ref 3.4–5.3)
PROT SERPL-MCNC: 7.6 G/DL (ref 6.8–8.8)
RBC # BLD AUTO: 3.81 10E12/L (ref 3.8–5.2)
SODIUM SERPL-SCNC: 136 MMOL/L (ref 133–144)
WBC # BLD AUTO: 5.9 10E9/L (ref 4–11)

## 2021-06-24 PROCEDURE — 36415 COLL VENOUS BLD VENIPUNCTURE: CPT | Performed by: NURSE PRACTITIONER

## 2021-06-24 PROCEDURE — 80053 COMPREHEN METABOLIC PANEL: CPT | Performed by: NURSE PRACTITIONER

## 2021-06-24 PROCEDURE — 85025 COMPLETE CBC W/AUTO DIFF WBC: CPT | Performed by: NURSE PRACTITIONER

## 2021-07-10 ENCOUNTER — MYC REFILL (OUTPATIENT)
Dept: ONCOLOGY | Facility: CLINIC | Age: 71
End: 2021-07-10

## 2021-07-10 DIAGNOSIS — G89.3 CANCER RELATED PAIN: ICD-10-CM

## 2021-07-12 ENCOUNTER — MYC REFILL (OUTPATIENT)
Dept: ONCOLOGY | Facility: CLINIC | Age: 71
End: 2021-07-12

## 2021-07-12 DIAGNOSIS — G89.3 CANCER RELATED PAIN: ICD-10-CM

## 2021-07-12 RX ORDER — OXYCODONE AND ACETAMINOPHEN 10; 325 MG/1; MG/1
1 TABLET ORAL EVERY 6 HOURS PRN
Qty: 100 TABLET | Refills: 0 | Status: SHIPPED | OUTPATIENT
Start: 2021-07-12 | End: 2021-07-29

## 2021-07-12 RX ORDER — OXYCODONE AND ACETAMINOPHEN 10; 325 MG/1; MG/1
1 TABLET ORAL EVERY 6 HOURS PRN
Qty: 100 TABLET | Refills: 0 | Status: CANCELLED | OUTPATIENT
Start: 2021-07-12

## 2021-07-13 NOTE — PROGRESS NOTES
"Oncology Rooming Note    July 14, 2021 8:12 AM   Sara Dominguez is a 70 year old female who presents for:    Chief Complaint   Patient presents with     Oncology Clinic Visit     6 month follow up malignant neoplasm of anal canal.      Initial Vitals: /53 (BP Location: Right arm, Patient Position: Sitting, Cuff Size: Adult Large)   Pulse 76   Temp (!) 96.7  F (35.9  C) (Oral)   Resp 18   Ht 1.651 m (5' 5\")   Wt 77 kg (169 lb 12.8 oz)   SpO2 96%   Breastfeeding No   BMI 28.26 kg/m   Estimated body mass index is 28.26 kg/m  as calculated from the following:    Height as of this encounter: 1.651 m (5' 5\").    Weight as of this encounter: 77 kg (169 lb 12.8 oz). Body surface area is 1.88 meters squared.  Moderate Pain (5) Comment: Data Unavailable   No LMP recorded. Patient is postmenopausal.  Allergies reviewed: Yes  Medications reviewed: Yes    Medications: Medication refills not needed today.  Pharmacy name entered into [employer]:    [place] - [place] - [address] [facility] [place] [place] [address]    Clinical concerns: 6 month follow up malignant neoplasm of anal canal. C/o 5/10 pelvis discomfort.       Mervat Tai, [facility]                      Heme/onc visit note  July 14, 2021  Oncology team:    REASON FOR VISIT/CHIEF COMPLAIN:    July 2019 at age 68 diagnosed with anal cancer,  S/p concurrent chemo/RT        HISTORY OF ONCOLOGY ILLNESS:    In 7/2019 at age 68, she presented to her PMD Dr. Reid's office for rectal pain 4/10  for a few weeks, she felt a mass in her rectum, increase in rectal pressure after eating.   Colonoscopy 7/12/2019 found 8 mm mucosal abnormality just above the dentate line. This was at the anterior wall facing her vagina where the large mass was felt underneath. 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  She then saw " Gyn Dr. Eduardo, 7/19/2019, exam found normal vulva. No vaginal polyp/growth appreciated arising from vaginal mucosa (which appears to be smooth) but there is a nodular 3-4cm mass palpated at rectovaginal area, which is not as well appreciated by digital rectal exam. Fixed mass, mildly tender to touch. Cervix grossly normal. Uterus 6 weeks size, non-tender. No adnexal mass/tenderness bilaterally.      7/2013/2019 pelvic MRI found 2.5 x 2.5 x 2.6 cm mass located between the distal rectum and vagina which appears to arise exophytically from the distal rectum. Single mildly enlarged mesorectal lymph node measuring 8 mm in short axis. No pelvic sidewall adenopathy. No other bony or soft tissue abnormalities identified.      She then saw colon rectal surgeon Dr. Nadeen Murphy 7/26/2019, who informed pt that she has anal cancer, not tx by surgery. Recommended concurrent chemo/RT.     PET 8/2019 found Hypermetabolic 3.3 x 4.1 cm mass SUV 70 seen in the distal rectum/anus compatible with underlying malignancy. No evidence of distant hypermetabolic metastatic disease seen in the chest, abdomen or pelvis.       She then saw us and Rad-Onc, and made informed decision to proceed with concurrent chemoradiation with mitomycin and 5 FU from August to early Oct. 2019        INTERVAL HISTORY:   Since the patient's last visit with us, there is no hospital stay/surgery/new diagnosis made.        Interval history: 7/14/21  Reports right inner thigh pain and sacrosciatic on the right LE attributed due to pelvic fracture. Remainder ROS as below.    Past medical history:  Patient Active Problem List   Diagnosis     Malignant neoplasm of anal canal (H)     Allergic rhinitis     Hypertension     Major depressive disorder, recurrent episode, moderate (H)     Migraine variant     Mixed hyperlipidemia     Need for prophylactic hormone replacement therapy (postmenopausal)     Reflux esophagitis     Sciatica     Squamous cell carcinoma of anus  (H)     Urine, incontinence, stress female     Nausea     Hypotension, unspecified hypotension type       Medications:  alendronate (FOSAMAX) 70 MG tablet, Take 70 mg by mouth every 7 days  aspirin (ASA) 81 MG tablet, 81 mg daily  atorvastatin (LIPITOR) 10 MG tablet, Take 10 mg by mouth daily  gabapentin (NEURONTIN) 100 MG capsule, Take 1 capsule by mouth See Admin Instructions  hydrochlorothiazide (HYDRODIURIL) 25 MG tablet, Take 25 mg by mouth daily  lidocaine (XYLOCAINE) 2 % solution, ; Max 8 doses/24 hour period.  lisinopril (ZESTRIL) 20 MG tablet, Take 20 mg by mouth daily  oxybutynin (DITROPAN) 5 MG tablet, Take 1 tablet (5 mg) by mouth 3 times daily as needed for bladder spasms  oxybutynin (DITROPAN) 5 MG tablet, Take 1 tablet (5 mg) by mouth 3 times daily  oxybutynin ER (DITROPAN XL) 10 MG 24 hr tablet, Take 1 tablet (10 mg) by mouth daily  oxyCODONE-acetaminophen (PERCOCET)  MG per tablet, Take 1 tablet by mouth every 6 hours as needed for severe pain  traZODone (DESYREL) 50 MG tablet, Take 1 tablet (50 mg) by mouth At Bedtime  triamcinolone (KENALOG) 0.1 % paste, Take by mouth 2 times daily  venlafaxine (EFFEXOR-XR) 150 MG 24 hr capsule, Take 150 mg by mouth daily    No current facility-administered medications on file prior to visit.      Allergy:   No Known Allergies      Review of systems:    - CONSTITUTIONAL: Denies weight loss, fever and chills.    - HEENT: Denies changes in vision and hearing.    - ?RESPIRATORY: Denies SOB and cough.?    - CV: Denies palpitations and CP. ?    - GI: Denies abdominal pain, nausea, vomiting and diarrhea.?    - : Denies dysuria and urinary frequency. Bladder dysfunction  Scheduled to see a urologist    - MSK: Denies myalgia and joint pain. ++. Pelvic fracture ++     - SKIN: Denies rash and pruritus.    - ?NEUROLOGICAL: Denies headache and syncope.    - PSYCHIATRIC: Denies recent changes in mood. Denies anxiety and depression.    - LYMPHATIC: no palpable lumps in  "the neck, axilla or groin areas.       Physical exam  There were no vitals taken for this visit.  Wt Readings from Last 4 Encounters:   09/08/20 75.4 kg (166 lb 3.2 oz)   02/10/20 78.8 kg (173 lb 12.8 oz)   12/23/19 77.1 kg (169 lb 14.4 oz)   11/25/19 79.7 kg (175 lb 9.6 oz)     /53 (BP Location: Right arm, Patient Position: Sitting, Cuff Size: Adult Large)   Pulse 76   Temp (!) 96.7  F (35.9  C) (Oral)   Resp 18   Ht 1.651 m (5' 5\")   Wt 77 kg (169 lb 12.8 oz)   SpO2 96%   Breastfeeding No   BMI 28.26 kg/m      Constitutional:       General: He is not in acute distress.     Appearance: Normal appearance. He is not toxic-appearing.   HENT:      Head: Normocephalic and atraumatic.      Right Ear: External ear normal.      Left Ear: External ear normal.   Eyes:      Extraocular Movements: Extraocular movements intact.      Conjunctiva/sclera: Conjunctivae normal.      Pupils: Pupils are equal, round, and reactive to light.   Neck:      Musculoskeletal: Normal range of motion and neck supple.   Cardiovascular:      Rate and Rhythm: Normal rate and regular rhythm.      Heart sounds: Normal heart sounds. No murmur. No gallop.    Pulmonary:      Effort: Pulmonary effort is normal.      Breath sounds: Normal breath sounds.   Abdominal:      General: Abdomen is flat. There is no distension.      Palpations: Abdomen is soft. There is no mass.      Tenderness: There is no abdominal tenderness. There is no guarding or rebound.      Hernia: No hernia is present.   Musculoskeletal: Normal range of motion.         General: No swelling or deformity.      Right lower leg: No edema.      Left lower leg: No edema.   Lymphadenopathy:      Cervical: No cervical adenopathy.   Skin:     General: Skin is warm and dry.         Labs:  Orders Only on 06/24/2021   Component Date Value Ref Range Status     Sodium 06/24/2021 136  133 - 144 mmol/L Final     Potassium 06/24/2021 4.7  3.4 - 5.3 mmol/L Final     Chloride 06/24/2021 " 100  94 - 109 mmol/L Final     Carbon Dioxide 06/24/2021 27  20 - 32 mmol/L Final     Anion Gap 06/24/2021 9  3 - 14 mmol/L Final     Glucose 06/24/2021 83  70 - 99 mg/dL Final     Urea Nitrogen 06/24/2021 18  7 - 30 mg/dL Final     Creatinine 06/24/2021 0.98  0.52 - 1.04 mg/dL Final     GFR Estimate 06/24/2021 57* >60 mL/min/[1.73_m2] Final    Comment: Non  GFR Calc  Starting 12/18/2018, serum creatinine based estimated GFR (eGFR) will be   calculated using the Chronic Kidney Disease Epidemiology Collaboration   (CKD-EPI) equation.       GFR Estimate If Black 06/24/2021 66  >60 mL/min/[1.73_m2] Final    Comment:  GFR Calc  Starting 12/18/2018, serum creatinine based estimated GFR (eGFR) will be   calculated using the Chronic Kidney Disease Epidemiology Collaboration   (CKD-EPI) equation.       Calcium 06/24/2021 9.3  8.5 - 10.1 mg/dL Final     Bilirubin Total 06/24/2021 0.2  0.2 - 1.3 mg/dL Final     Albumin 06/24/2021 4.0  3.4 - 5.0 g/dL Final     Protein Total 06/24/2021 7.6  6.8 - 8.8 g/dL Final     Alkaline Phosphatase 06/24/2021 134  40 - 150 U/L Final     ALT 06/24/2021 24  0 - 50 U/L Final     AST 06/24/2021 21  0 - 45 U/L Final     WBC 06/24/2021 5.9  4.0 - 11.0 10e9/L Final     RBC Count 06/24/2021 3.81  3.8 - 5.2 10e12/L Final     Hemoglobin 06/24/2021 11.8  11.7 - 15.7 g/dL Final     Hematocrit 06/24/2021 36.2  35.0 - 47.0 % Final     MCV 06/24/2021 95  78 - 100 fl Final     MCH 06/24/2021 31.0  26.5 - 33.0 pg Final     MCHC 06/24/2021 32.6  31.5 - 36.5 g/dL Final     RDW 06/24/2021 14.2  10.0 - 15.0 % Final     Platelet Count 06/24/2021 283  150 - 450 10e9/L Final     % Neutrophils 06/24/2021 71.3  % Final     % Lymphocytes 06/24/2021 17.4  % Final     % Monocytes 06/24/2021 9.0  % Final     % Eosinophils 06/24/2021 2.0  % Final     % Basophils 06/24/2021 0.3  % Final     Absolute Neutrophil 06/24/2021 4.2  1.6 - 8.3 10e9/L Final     Absolute Lymphocytes 06/24/2021 1.0   0.8 - 5.3 10e9/L Final     Absolute Monocytes 06/24/2021 0.5  0.0 - 1.3 10e9/L Final     Absolute Eosinophils 06/24/2021 0.1  0.0 - 0.7 10e9/L Final     Absolute Basophils 06/24/2021 0.0  0.0 - 0.2 10e9/L Final     Diff Method 06/24/2021 Automated Method   Final         Imaging:    PET Oncology Whole Body [[id number]] (Order [id number])  Exam Information    Exam Date Exam Time Accession # Performing Department Results    4/9/20 11:22 AM [id number] [facility]    PACS Images     Show images for PET Oncology Whole Body   Study Result    Narrative & Impression   PET/CT WHOLE BODY 4/9/2020 11:22 AM     HISTORY: Personal history of other malignant neoplasm of rectum,  rectosigmoid junction, and anus. Squamous cell carcinoma of anus (H).     COMPARISON EXAMS:  SUBURBAN IMAGING: None.  [place]: 8/3/2019.  OTHER: None.     TECHNIQUE:  Following the uneventful administration of FDG glucose  injected intravenously, a PET/CT scan was performed from the top of  the head to the bottom of the feet.  A nondiagnostic CT scan was  performed from the top of the head to the bottom of the feet for  attenuation correction purposes and anatomic localization. Coronal,  sagittal and axial images were created and fused with the noncontrast  CT examination. The CT, PET and fusion images are then evaluated on a  [facility]  workstation. Radiation dose for this scan was reduced using  automated exposure control, adjustment of the mA and/or kV according  to patient size, or iterative reconstruction technique.     DOSES AND GLUCOSE:  NO DX CT, glucose: 90, dose: 14.3mCi FDG inj into  P.Port, wt: 78.8kg, ht: 164cm, uptake 60 minutes     FINDINGS: Normal physiologic uptake is identified within the salivary  glands, myocardium, kidneys, ureters and bladder. Scattered areas of  physiologic bowel uptake are also present.     Head: No pathologic activity. No midline shift or mass effect.     NECK:  Lymph nodes: No  pathologic activity.      Additional findings: None.     CHEST:  Lungs: No pathologic activity. Sub-6 mm nodular densities in the upper  lobes are unchanged.     Lymph nodes: No pathologic activity.      Additional findings: Right-sided internal jugular vein Port-A-Cath  with the tip near the atriocaval junction.     ABDOMEN/PELVIS:  Hepatobiliary: No pathologic activity. Gallbladder phrygian cap noted.     Spleen: No pathologic activity.      Pancreas: No pathologic activity.      Kidneys: No pathologic activity.      Adrenals: No pathologic activity.      Reproductive: No pathologic activity.      Gastrointestinal: No pathologic activity. Focal hypermetabolic uptake  no longer demonstrated in the anus or rectum.     Lymph nodes: No pathologic activity.      Additional findings: Small fat-containing umbilical hernia.     SKELETON AND LOWER EXTREMITIES:   No pathologic activity. Degenerative changes noted at the base of the  right great toe.                                                                      IMPRESSION:  1. No residual hypermetabolic uptake demonstrated in the distal  rectum/anus.  2. No evidence of metastatic disease.      HAZEL WILEY MD             Assessment and plan:  Sara Dominguez is a 70 year old female with the following:        ASSESSMENT AND PLAN:    1. At age 68 in July 2019, she is diagnosed with low rectum/anal squamous cell carcinoma, months duration of anal pain pressure. She made informed decision to proceed with concurrent chemoradiation cycle 1 day 1 August 22, 2019 with 5 FU and mitomycin. She finished chemo/RT early Oct. 2019.     4/2020 PET is negative for mets.   She needs ongoing oncology follow-up we will see her in 6 months with physical examination lab work.    Plan    - PET/CT overdue: to schedule 07/2011    - NP visit with physical and labs in 6 months    - F/u GI and  as scheduled    - Dyslipidemia, f/u to d/w PCP        2.  Chronic and legs lymphedema from  local radiation.  S/p PT.        3. Sciatica pain, pelvic fractures  F/u with HCP as scheduled  Advise exercise, stretching, chiropractor.     -----------------------------    Updated PLAN:  8/5/2021    PET/CT  Done on 7/29/21  Results reviewed    Plan to refer patient to surgery for direct visualization to exclude local recurrence ( of the anorectal junction area)    Discussed with ANA Renteria      IMPRESSION: In this patient with a history of rectal carcinoma status  post chemoradiation:     1. Mildly increased uptake to the anorectal junction since 8/3/2019.  This is nonspecific, most commonly secondary to inflammation, but is  within proximity of the previously seen hypermetabolic mass. Consider  direct visualization to exclude local recurrence.     2. No evidence of metastatic disease.     3. Posttraumatic changes in the pelvis with bilateral sacral  insufficiency fractures, and right superior pubic ramus fracture,  corresponding with fractures described on reports at outside  institution. Uptake to these fractures favored to be secondary to  healing and inflammatory changes.           The total time of this encounter amounted to 45 minutes.This time included face to face time spent with the patient, prep work, ordering tests, and performing post visit documentation.  The patient is ready to learn, no apparent learning barriers were identified.  Diagnosis and treatment plans were explained to the patient. The patient expressed understanding of the content. The patient asked appropriate questions. The patient questions were answered to his satisfaction.        RASHI TOPETE MD

## 2021-07-14 ENCOUNTER — ONCOLOGY VISIT (OUTPATIENT)
Dept: ONCOLOGY | Facility: CLINIC | Age: 71
End: 2021-07-14
Attending: INTERNAL MEDICINE
Payer: MEDICARE

## 2021-07-14 VITALS
WEIGHT: 169.8 LBS | HEIGHT: 65 IN | OXYGEN SATURATION: 96 % | DIASTOLIC BLOOD PRESSURE: 53 MMHG | SYSTOLIC BLOOD PRESSURE: 133 MMHG | BODY MASS INDEX: 28.29 KG/M2 | HEART RATE: 76 BPM | RESPIRATION RATE: 18 BRPM | TEMPERATURE: 96.7 F

## 2021-07-14 DIAGNOSIS — C21.1 MALIGNANT NEOPLASM OF ANAL CANAL (H): ICD-10-CM

## 2021-07-14 DIAGNOSIS — C21.0 SQUAMOUS CELL CARCINOMA OF ANUS (H): ICD-10-CM

## 2021-07-14 DIAGNOSIS — Z85.048 PERSONAL HISTORY OF OTHER MALIGNANT NEOPLASM OF RECTUM, RECTOSIGMOID JUNCTION, AND ANUS: Primary | ICD-10-CM

## 2021-07-14 PROCEDURE — 99215 OFFICE O/P EST HI 40 MIN: CPT | Performed by: INTERNAL MEDICINE

## 2021-07-14 PROCEDURE — G0463 HOSPITAL OUTPT CLINIC VISIT: HCPCS

## 2021-07-14 ASSESSMENT — PAIN SCALES - GENERAL: PAINLEVEL: MODERATE PAIN (5)

## 2021-07-14 ASSESSMENT — MIFFLIN-ST. JEOR: SCORE: 1281.09

## 2021-07-14 NOTE — LETTER
"    7/14/2021         RE: Sara Dominguez  [address]  [facility] [address]        Dear Colleague,    Thank you for referring your patient, Sara Dominguez, to the [facility]. Please see a copy of my visit note below.    Oncology Rooming Note    July 14, 2021 8:12 AM   Sara Dominguez is a 70 year old female who presents for:    Chief Complaint   Patient presents with     Oncology Clinic Visit     6 month follow up malignant neoplasm of anal canal.      Initial Vitals: /53 (BP Location: Right arm, Patient Position: Sitting, Cuff Size: Adult Large)   Pulse 76   Temp (!) 96.7  F (35.9  C) (Oral)   Resp 18   Ht 1.651 m (5' 5\")   Wt 77 kg (169 lb 12.8 oz)   SpO2 96%   Breastfeeding No   BMI 28.26 kg/m   Estimated body mass index is 28.26 kg/m  as calculated from the following:    Height as of this encounter: 1.651 m (5' 5\").    Weight as of this encounter: 77 kg (169 lb 12.8 oz). Body surface area is 1.88 meters squared.  Moderate Pain (5) Comment: Data Unavailable   No LMP recorded. Patient is postmenopausal.  Allergies reviewed: Yes  Medications reviewed: Yes    Medications: Medication refills not needed today.  Pharmacy name entered into AppM[employer]:    [facility] - [place], [place] - [place], [place] - [address]  [facility] - [place], [place] - [address]    Clinical concerns: 6 month follow up malignant neoplasm of anal canal. C/o 5/10 pelvis discomfort.       Mervat Tai CMA                      Heme/onc visit note  July 14, 2021  Oncology team:    REASON FOR VISIT/CHIEF COMPLAIN:    July 2019 at age 68 diagnosed with anal cancer,  S/p concurrent chemo/RT        HISTORY OF ONCOLOGY ILLNESS:    In 7/2019 at age 68, she presented to her PMD Dr. Reid's office for rectal pain 4/10  for a few weeks, she felt a mass in her rectum, increase in rectal pressure after eating.   Colonoscopy 7/12/2019 found 8 mm mucosal abnormality just " above the dentate line. This was at the anterior wall facing her vagina where the large mass was felt underneath. 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  She then saw Gyn Dr. Eduardo, 7/19/2019, exam found normal vulva. No vaginal polyp/growth appreciated arising from vaginal mucosa (which appears to be smooth) but there is a nodular 3-4cm mass palpated at rectovaginal area, which is not as well appreciated by digital rectal exam. Fixed mass, mildly tender to touch. Cervix grossly normal. Uterus 6 weeks size, non-tender. No adnexal mass/tenderness bilaterally.      7/2013/2019 pelvic MRI found 2.5 x 2.5 x 2.6 cm mass located between the distal rectum and vagina which appears to arise exophytically from the distal rectum. Single mildly enlarged mesorectal lymph node measuring 8 mm in short axis. No pelvic sidewall adenopathy. No other bony or soft tissue abnormalities identified.      She then saw colon rectal surgeon Dr. Nadeen Murphy 7/26/2019, who informed pt that she has anal cancer, not tx by surgery. Recommended concurrent chemo/RT.     PET 8/2019 found Hypermetabolic 3.3 x 4.1 cm mass SUV 70 seen in the distal rectum/anus compatible with underlying malignancy. No evidence of distant hypermetabolic metastatic disease seen in the chest, abdomen or pelvis.       She then saw us and Rad-Onc, and made informed decision to proceed with concurrent chemoradiation with mitomycin and 5 FU from August to early Oct. 2019        INTERVAL HISTORY:   Since the patient's last visit with us, there is no hospital stay/surgery/new diagnosis made.        Interval history: 7/14/21  Reports right inner thigh pain and sacrosciatic on the right LE attributed due to pelvic fracture. Remainder ROS as below.    Past medical history:  Patient Active Problem List   Diagnosis     Malignant neoplasm of anal canal (H)     Allergic rhinitis      Hypertension     Major depressive disorder, recurrent episode, moderate (H)     Migraine variant     Mixed hyperlipidemia     Need for prophylactic hormone replacement therapy (postmenopausal)     Reflux esophagitis     Sciatica     Squamous cell carcinoma of anus (H)     Urine, incontinence, stress female     Nausea     Hypotension, unspecified hypotension type       Medications:  alendronate (FOSAMAX) 70 MG tablet, Take 70 mg by mouth every 7 days  aspirin (ASA) 81 MG tablet, 81 mg daily  atorvastatin (LIPITOR) 10 MG tablet, Take 10 mg by mouth daily  gabapentin (NEURONTIN) 100 MG capsule, Take 1 capsule by mouth See Admin Instructions  hydrochlorothiazide (HYDRODIURIL) 25 MG tablet, Take 25 mg by mouth daily  lidocaine (XYLOCAINE) 2 % solution, ; Max 8 doses/24 hour period.  lisinopril (ZESTRIL) 20 MG tablet, Take 20 mg by mouth daily  oxybutynin (DITROPAN) 5 MG tablet, Take 1 tablet (5 mg) by mouth 3 times daily as needed for bladder spasms  oxybutynin (DITROPAN) 5 MG tablet, Take 1 tablet (5 mg) by mouth 3 times daily  oxybutynin ER (DITROPAN XL) 10 MG 24 hr tablet, Take 1 tablet (10 mg) by mouth daily  oxyCODONE-acetaminophen (PERCOCET)  MG per tablet, Take 1 tablet by mouth every 6 hours as needed for severe pain  traZODone (DESYREL) 50 MG tablet, Take 1 tablet (50 mg) by mouth At Bedtime  triamcinolone (KENALOG) 0.1 % paste, Take by mouth 2 times daily  venlafaxine (EFFEXOR-XR) 150 MG 24 hr capsule, Take 150 mg by mouth daily    No current facility-administered medications on file prior to visit.      Allergy:   No Known Allergies      Review of systems:    - CONSTITUTIONAL: Denies weight loss, fever and chills.    - HEENT: Denies changes in vision and hearing.    - ?RESPIRATORY: Denies SOB and cough.?    - CV: Denies palpitations and CP. ?    - GI: Denies abdominal pain, nausea, vomiting and diarrhea.?    - : Denies dysuria and urinary frequency. Bladder dysfunction  Scheduled to see a  "urologist    - MSK: Denies myalgia and joint pain. ++. Pelvic fracture ++     - SKIN: Denies rash and pruritus.    - ?NEUROLOGICAL: Denies headache and syncope.    - PSYCHIATRIC: Denies recent changes in mood. Denies anxiety and depression.    - LYMPHATIC: no palpable lumps in the neck, axilla or groin areas.       Physical exam  There were no vitals taken for this visit.  Wt Readings from Last 4 Encounters:   09/08/20 75.4 kg (166 lb 3.2 oz)   02/10/20 78.8 kg (173 lb 12.8 oz)   12/23/19 77.1 kg (169 lb 14.4 oz)   11/25/19 79.7 kg (175 lb 9.6 oz)     /53 (BP Location: Right arm, Patient Position: Sitting, Cuff Size: Adult Large)   Pulse 76   Temp (!) 96.7  F (35.9  C) (Oral)   Resp 18   Ht 1.651 m (5' 5\")   Wt 77 kg (169 lb 12.8 oz)   SpO2 96%   Breastfeeding No   BMI 28.26 kg/m      Constitutional:       General: He is not in acute distress.     Appearance: Normal appearance. He is not toxic-appearing.   HENT:      Head: Normocephalic and atraumatic.      Right Ear: External ear normal.      Left Ear: External ear normal.   Eyes:      Extraocular Movements: Extraocular movements intact.      Conjunctiva/sclera: Conjunctivae normal.      Pupils: Pupils are equal, round, and reactive to light.   Neck:      Musculoskeletal: Normal range of motion and neck supple.   Cardiovascular:      Rate and Rhythm: Normal rate and regular rhythm.      Heart sounds: Normal heart sounds. No murmur. No gallop.    Pulmonary:      Effort: Pulmonary effort is normal.      Breath sounds: Normal breath sounds.   Abdominal:      General: Abdomen is flat. There is no distension.      Palpations: Abdomen is soft. There is no mass.      Tenderness: There is no abdominal tenderness. There is no guarding or rebound.      Hernia: No hernia is present.   Musculoskeletal: Normal range of motion.         General: No swelling or deformity.      Right lower leg: No edema.      Left lower leg: No edema.   Lymphadenopathy:      Cervical: " No cervical adenopathy.   Skin:     General: Skin is warm and dry.         Labs:  Orders Only on 06/24/2021   Component Date Value Ref Range Status     Sodium 06/24/2021 136  133 - 144 mmol/L Final     Potassium 06/24/2021 4.7  3.4 - 5.3 mmol/L Final     Chloride 06/24/2021 100  94 - 109 mmol/L Final     Carbon Dioxide 06/24/2021 27  20 - 32 mmol/L Final     Anion Gap 06/24/2021 9  3 - 14 mmol/L Final     Glucose 06/24/2021 83  70 - 99 mg/dL Final     Urea Nitrogen 06/24/2021 18  7 - 30 mg/dL Final     Creatinine 06/24/2021 0.98  0.52 - 1.04 mg/dL Final     GFR Estimate 06/24/2021 57* >60 mL/min/[1.73_m2] Final    Comment: Non  GFR Calc  Starting 12/18/2018, serum creatinine based estimated GFR (eGFR) will be   calculated using the Chronic Kidney Disease Epidemiology Collaboration   (CKD-EPI) equation.       GFR Estimate If Black 06/24/2021 66  >60 mL/min/[1.73_m2] Final    Comment:  GFR Calc  Starting 12/18/2018, serum creatinine based estimated GFR (eGFR) will be   calculated using the Chronic Kidney Disease Epidemiology Collaboration   (CKD-EPI) equation.       Calcium 06/24/2021 9.3  8.5 - 10.1 mg/dL Final     Bilirubin Total 06/24/2021 0.2  0.2 - 1.3 mg/dL Final     Albumin 06/24/2021 4.0  3.4 - 5.0 g/dL Final     Protein Total 06/24/2021 7.6  6.8 - 8.8 g/dL Final     Alkaline Phosphatase 06/24/2021 134  40 - 150 U/L Final     ALT 06/24/2021 24  0 - 50 U/L Final     AST 06/24/2021 21  0 - 45 U/L Final     WBC 06/24/2021 5.9  4.0 - 11.0 10e9/L Final     RBC Count 06/24/2021 3.81  3.8 - 5.2 10e12/L Final     Hemoglobin 06/24/2021 11.8  11.7 - 15.7 g/dL Final     Hematocrit 06/24/2021 36.2  35.0 - 47.0 % Final     MCV 06/24/2021 95  78 - 100 fl Final     MCH 06/24/2021 31.0  26.5 - 33.0 pg Final     MCHC 06/24/2021 32.6  31.5 - 36.5 g/dL Final     RDW 06/24/2021 14.2  10.0 - 15.0 % Final     Platelet Count 06/24/2021 283  150 - 450 10e9/L Final     % Neutrophils 06/24/2021 71.3  %  Final     % Lymphocytes 06/24/2021 17.4  % Final     % Monocytes 06/24/2021 9.0  % Final     % Eosinophils 06/24/2021 2.0  % Final     % Basophils 06/24/2021 0.3  % Final     Absolute Neutrophil 06/24/2021 4.2  1.6 - 8.3 10e9/L Final     Absolute Lymphocytes 06/24/2021 1.0  0.8 - 5.3 10e9/L Final     Absolute Monocytes 06/24/2021 0.5  0.0 - 1.3 10e9/L Final     Absolute Eosinophils 06/24/2021 0.1  0.0 - 0.7 10e9/L Final     Absolute Basophils 06/24/2021 0.0  0.0 - 0.2 10e9/L Final     Diff Method 06/24/2021 Automated Method   Final         Imaging:    PET Oncology Whole Body [[id number]] (Order [id number])  Exam Information    Exam Date Exam Time Accession # Performing Department Results    4/9/20 11:22 AM [id number] [facility]    PACS Images     Show images for PET Oncology Whole Body   Study Result    Narrative & Impression   PET/CT WHOLE BODY 4/9/2020 11:22 AM     HISTORY: Personal history of other malignant neoplasm of rectum,  rectosigmoid junction, and anus. Squamous cell carcinoma of anus (H).     COMPARISON EXAMS:  [facility] IMAGING: None.  Cylinder: 8/3/2019.  OTHER: None.     TECHNIQUE:  Following the uneventful administration of FDG glucose  injected intravenously, a PET/CT scan was performed from the top of  the head to the bottom of the feet.  A nondiagnostic CT scan was  performed from the top of the head to the bottom of the feet for  attenuation correction purposes and anatomic localization. Coronal,  sagittal and axial images were created and fused with the noncontrast  CT examination. The CT, PET and fusion images are then evaluated on a  [employer]  workstation. Radiation dose for this scan was reduced using  automated exposure control, adjustment of the mA and/or kV according  to patient size, or iterative reconstruction technique.     DOSES AND GLUCOSE:  NO DX CT, glucose: 90, dose: 14.3mCi FDG inj into  P.Port, wt: 78.8kg, ht: 164cm, uptake 60 minutes     FINDINGS:  Normal physiologic uptake is identified within the salivary  glands, myocardium, kidneys, ureters and bladder. Scattered areas of  physiologic bowel uptake are also present.     Head: No pathologic activity. No midline shift or mass effect.     NECK:  Lymph nodes: No pathologic activity.      Additional findings: None.     CHEST:  Lungs: No pathologic activity. Sub-6 mm nodular densities in the upper  lobes are unchanged.     Lymph nodes: No pathologic activity.      Additional findings: Right-sided internal jugular vein Port-A-Cath  with the tip near the atriocaval junction.     ABDOMEN/PELVIS:  Hepatobiliary: No pathologic activity. Gallbladder phrygian cap noted.     Spleen: No pathologic activity.      Pancreas: No pathologic activity.      Kidneys: No pathologic activity.      Adrenals: No pathologic activity.      Reproductive: No pathologic activity.      Gastrointestinal: No pathologic activity. Focal hypermetabolic uptake  no longer demonstrated in the anus or rectum.     Lymph nodes: No pathologic activity.      Additional findings: Small fat-containing umbilical hernia.     SKELETON AND LOWER EXTREMITIES:   No pathologic activity. Degenerative changes noted at the base of the  right great toe.                                                                      IMPRESSION:  1. No residual hypermetabolic uptake demonstrated in the distal  rectum/anus.  2. No evidence of metastatic disease.      HAZEL WILEY MD             Assessment and plan:  Sara Dominguez is a 70 year old female with the following:        ASSESSMENT AND PLAN:    1. At age 68 in July 2019, she is diagnosed with low rectum/anal squamous cell carcinoma, months duration of anal pain pressure. She made informed decision to proceed with concurrent chemoradiation cycle 1 day 1 August 22, 2019 with 5 FU and mitomycin. She finished chemo/RT early Oct. 2019.     4/2020 PET is negative for mets.   She needs ongoing oncology follow-up we  will see her in 6 months with physical examination lab work.    Plan    - PET/CT overdue: to schedule 07/2011    - NP visit with physical and labs in 6 months    - F/u GI and  as scheduled    - Dyslipidemia, f/u to d/w PCP        2.  Chronic and legs lymphedema from local radiation.  S/p PT.        3. Sciatica pain, pelvic fractures  F/u with HCP as scheduled  Advise exercise, stretching, chiropractor.       The total time of this encounter amounted to 45 minutes.This time included face to face time spent with the patient, prep work, ordering tests, and performing post visit documentation.  The patient is ready to learn, no apparent learning barriers were identified.  Diagnosis and treatment plans were explained to the patient. The patient expressed understanding of the content. The patient asked appropriate questions. The patient questions were answered to his satisfaction.        RASHI ROLON MD        Again, thank you for allowing me to participate in the care of your patient.        Sincerely,        Boy Rolon MD

## 2021-07-14 NOTE — PATIENT INSTRUCTIONS
- PET/CT overdue: to schedule 07/2011     - NP visit with physical and labs in 6 months     - F/u GI and  as scheduled     - Dyslipidemia, f/u to d/w PCP

## 2021-07-16 RX ORDER — OXYCODONE AND ACETAMINOPHEN 10; 325 MG/1; MG/1
TABLET ORAL
Qty: 100 TABLET | Refills: 0 | OUTPATIENT
Start: 2021-07-16

## 2021-07-28 DIAGNOSIS — G89.3 CANCER RELATED PAIN: ICD-10-CM

## 2021-07-29 ENCOUNTER — HOSPITAL ENCOUNTER (OUTPATIENT)
Dept: PET IMAGING | Facility: CLINIC | Age: 71
Discharge: HOME | End: 2021-07-29
Attending: INTERNAL MEDICINE | Admitting: INTERNAL MEDICINE
Payer: MEDICARE

## 2021-07-29 DIAGNOSIS — Z85.048 PERSONAL HISTORY OF OTHER MALIGNANT NEOPLASM OF RECTUM, RECTOSIGMOID JUNCTION, AND ANUS: ICD-10-CM

## 2021-07-29 DIAGNOSIS — C21.0 SQUAMOUS CELL CARCINOMA OF ANUS (H): ICD-10-CM

## 2021-07-29 DIAGNOSIS — C21.1 MALIGNANT NEOPLASM OF ANAL CANAL (H): ICD-10-CM

## 2021-07-29 DIAGNOSIS — G89.3 CANCER RELATED PAIN: ICD-10-CM

## 2021-07-29 LAB
CREAT BLD-MCNC: 0.9 MG/DL (ref 0.5–1)
GFR SERPL CREATININE-BSD FRML MDRD: >60 ML/MIN/1.73M2

## 2021-07-29 PROCEDURE — G1004 CDSM NDSC: HCPCS | Performed by: STUDENT IN AN ORGANIZED HEALTH CARE EDUCATION/TRAINING PROGRAM

## 2021-07-29 PROCEDURE — 250N000011 HC RX IP 250 OP 636: Performed by: INTERNAL MEDICINE

## 2021-07-29 PROCEDURE — 74177 CT ABD & PELVIS W/CONTRAST: CPT | Mod: 26 | Performed by: STUDENT IN AN ORGANIZED HEALTH CARE EDUCATION/TRAINING PROGRAM

## 2021-07-29 PROCEDURE — 343N000001 HC RX 343: Performed by: INTERNAL MEDICINE

## 2021-07-29 PROCEDURE — 78816 PET IMAGE W/CT FULL BODY: CPT | Mod: 26 | Performed by: STUDENT IN AN ORGANIZED HEALTH CARE EDUCATION/TRAINING PROGRAM

## 2021-07-29 PROCEDURE — 78816 PET IMAGE W/CT FULL BODY: CPT | Mod: MG,PS

## 2021-07-29 PROCEDURE — 71260 CT THORAX DX C+: CPT | Mod: PS

## 2021-07-29 PROCEDURE — A9552 F18 FDG: HCPCS | Performed by: INTERNAL MEDICINE

## 2021-07-29 PROCEDURE — 82565 ASSAY OF CREATININE: CPT

## 2021-07-29 PROCEDURE — 71260 CT THORAX DX C+: CPT | Mod: 26 | Performed by: STUDENT IN AN ORGANIZED HEALTH CARE EDUCATION/TRAINING PROGRAM

## 2021-07-29 RX ORDER — IOPAMIDOL 755 MG/ML
10-135 INJECTION, SOLUTION INTRAVASCULAR ONCE
Status: COMPLETED | OUTPATIENT
Start: 2021-07-29 | End: 2021-07-29

## 2021-07-29 RX ORDER — OXYCODONE AND ACETAMINOPHEN 10; 325 MG/1; MG/1
1 TABLET ORAL EVERY 4 HOURS PRN
Qty: 100 TABLET | Refills: 0 | Status: SHIPPED | OUTPATIENT
Start: 2021-07-29 | End: 2021-08-25

## 2021-07-29 RX ORDER — HEPARIN SODIUM (PORCINE) LOCK FLUSH IV SOLN 100 UNIT/ML 100 UNIT/ML
500 SOLUTION INTRAVENOUS EVERY 8 HOURS
Status: DISCONTINUED | OUTPATIENT
Start: 2021-07-29 | End: 2021-07-30 | Stop reason: HOSPADM

## 2021-07-29 RX ADMIN — IOPAMIDOL 104 ML: 755 INJECTION, SOLUTION INTRAVENOUS at 10:08

## 2021-07-29 RX ADMIN — Medication 500 UNITS: at 10:09

## 2021-07-29 RX ADMIN — FLUDEOXYGLUCOSE F-18 13.5 MCI.: 500 INJECTION, SOLUTION INTRAVENOUS at 08:50

## 2021-07-30 RX ORDER — OXYCODONE AND ACETAMINOPHEN 10; 325 MG/1; MG/1
TABLET ORAL
Qty: 100 TABLET | Refills: 0 | OUTPATIENT
Start: 2021-07-30

## 2021-07-30 NOTE — TELEPHONE ENCOUNTER
Denial sent.  Patient not seen at [facility]  Or [facility] for medication.   Prescribe by Radiology Oncology.  Sent back to pharmacy to address accordingly.   Taylor Meadows RN

## 2021-08-11 ENCOUNTER — MEDICAL CORRESPONDENCE (OUTPATIENT)
Dept: HEALTH INFORMATION MANAGEMENT | Facility: CLINIC | Age: 71
End: 2021-08-11

## 2021-08-13 ENCOUNTER — TRANSCRIBE ORDERS (OUTPATIENT)
Dept: OTHER | Age: 71
End: 2021-08-13

## 2021-08-13 DIAGNOSIS — M25.551 PAIN IN JOINT, PELVIC REGION AND THIGH, RIGHT: Primary | ICD-10-CM

## 2021-08-17 NOTE — TELEPHONE ENCOUNTER
RECORDS RECEIVED FROM: Pain in joint,pelvic region and right thigh/Fx followhing treatment for colon cancer/PET scan/XR at [place]/Nadya Reid MD/medicare/orthocon   DATE RECEIVED: Aug 27, 2021     NOTES STATUS DETAILS   OFFICE NOTE from referring provider CARE EVERYWHERE    OFFICE NOTE from other specialist N/A    DISCHARGE SUMMARY from hospital N/A    DISCHARGE REPORT from the ER N/A    OPERATIVE REPORT N/A    MEDICATION LIST In process    IMPLANT RECORD/STICKER N/A    LABS     CBC/DIFF N/A    CULTURES N/A    INJECTIONS DONE IN RADIOLOGY N/A    MRI In process    CT SCAN In process    XRAYS (IMAGES & REPORTS) In process    TUMOR     PATHOLOGY  Slides & report N/A    08/23/21   12:51 PM   IMAGES RESOLVED IN PACS   COMPLETE  Annette Vazquez CMA    08/17/21   10:49 AM   FAXED REQUEST TO [facility] [phone]  Annette Vazquez CMA

## 2021-08-25 DIAGNOSIS — G89.3 CANCER RELATED PAIN: ICD-10-CM

## 2021-08-25 RX ORDER — OXYCODONE AND ACETAMINOPHEN 10; 325 MG/1; MG/1
1 TABLET ORAL EVERY 4 HOURS PRN
Qty: 100 TABLET | Refills: 0 | Status: SHIPPED | OUTPATIENT
Start: 2021-08-25 | End: 2021-09-27

## 2021-08-27 ENCOUNTER — OFFICE VISIT (OUTPATIENT)
Dept: ORTHOPEDICS | Facility: CLINIC | Age: 71
End: 2021-08-27
Payer: MEDICARE

## 2021-08-27 ENCOUNTER — PRE VISIT (OUTPATIENT)
Dept: ORTHOPEDICS | Facility: CLINIC | Age: 71
End: 2021-08-27

## 2021-08-27 VITALS — WEIGHT: 174 LBS | BODY MASS INDEX: 29.71 KG/M2 | HEIGHT: 64 IN

## 2021-08-27 DIAGNOSIS — M89.9 BONE LESION: ICD-10-CM

## 2021-08-27 PROCEDURE — 99204 OFFICE O/P NEW MOD 45 MIN: CPT | Performed by: ORTHOPAEDIC SURGERY

## 2021-08-27 ASSESSMENT — MIFFLIN-ST. JEOR: SCORE: 1287.01

## 2021-08-27 ASSESSMENT — PATIENT HEALTH QUESTIONNAIRE - PHQ9: SUM OF ALL RESPONSES TO PHQ QUESTIONS 1-9: 10

## 2021-08-27 NOTE — NURSING NOTE
"Reason For Visit:   Chief Complaint   Patient presents with     Consult     pelvis bone erosion secondary to metastatic disease referred by [facility]        Ht 1.63 m (5' 4.17\")   Wt 78.9 kg (174 lb)   BMI 29.71 kg/m      Pain Assessment  Patient Currently in Pain: Yes  0-10 Pain Scale: 5  Primary Pain Location: Pelvis (right pelvis)    Alice Jimenez ATC    "

## 2021-08-27 NOTE — NURSING NOTE
"[facility] :  PHQ-9 Screening Note  SITUATION/BACKGROUND                                                    Sara Dominguez is a 70 year old female who completed the PHQ-9 assessment for depression and Score is >9.    Onset of symptoms: from the cancer  Trigger: cancer diagnosis and treatment  Recent related events: cancer  Prior history of suicide attempt or self harm: No  Risk Factors: none  History of depression or mental illness: yes  Medications reviewed: yes     ASSESSMENT      A. Are any of the following present?      Suicidal thoughts with a plan and means to carry out the plan?    Intent to harm others    Altered mental status: confusion, delusional, psychotic NO - go to B   B. Are any of the following present?      Suicidal thoughts without a plan or means to carry out the plan    New onset of delusional ideas    Past inpatient admission for depression    New onset and recent change or addition of new medication NO - go to C   C. Are any of the following present?      Previous suicide attempts    Depression interfering with ability to work or function    Loss of appetite and eating poorly    Abrupt cessation of drugs (OTC or RX), alcohol or caffeine    Drug or alcohol abuse NO - go to D   D. Are several of the following present?      Difficulty concentrating    Difficulty sleeping    Reduced interest in sexual activity or impotency    Irregular or absent menstruation    No interest in activity    Change in interpersonal relationships    Increased use/abuse of alcohol or drugs    Pregnant or recent child birth    Recent major life change    History of depression YES -  Follow-up with PCP for appointment and follow home care instructions.    Place referral to behavioral health team for \"regular\" follow-up.         PLAN      Home Care Instructions:   If currently in counseling, call counselor for appointment  Call local crisis interventions  Contact friends or family for support  Increase exercise " and enjoyable activities  East a well-balanced diet, drink plenty of fluids and rest as needed  Alcohol and other recreational drugs can worsen depression.  If heavy use of drugs or alcohol, contact counselor or PCP to help reduce consumption.    Report the following to your PCP:   Suicidal thoughts without a plan or means to carry out the plan  Depression interferes with daily activities  Persistent inability to sleep    Seek emergency care immediately if any of the following occur:   Suicidal thoughts and plan and means to carry out the plan  Injury to self or others  Altered mental status:  none    BEHAVIORAL HEALTH TEAMS      [facility] - Behavioral Health Team    [facility] Pager: [phone]    [place]  - Behavioral Health Team    Pager number: [phone]    Referral to Behavioral Health    BEHAVIORAL / SPIRITUAL HEALTH SOWQ [84350}    RESOURCES      - none  Patient denied needing any. Her spouse is a great support as well as her MD.    Madelin Cobos RN        Copyright 2016 [employer]

## 2021-08-27 NOTE — LETTER
8/27/2021         RE: Sara Dominguez  [address]  [facility] [address]        Dear Colleague,    Thank you for referring your patient, Sara Dominguez, to the [facility] [place]. Please see a copy of my visit note below.    This patient has a history of radiation for anal carcinoma.  She also has degenerative scoliosis and low back pain causing some numbness in the medial right knee top of the right foot and anterolateral aspect of the right thigh.  She has had 2 steroid injections for this the last of which did not help her.  Her chief complaint is anterior pelvic pain biased to the right side.  It is worse when she is laying down especially when she tries to turn on her side.  She takes 1 Percocet and some over-the-counter pain medicines that allow her to get up and move around during the day.  She still working.    Examination she is able to ambulate independently and is able to stand on her right leg and left leg with mild right groin discomfort with either maneuver. In her lower extremities there is no edema or erythema and she has full motion of the hips knees and ankles.    I reviewed her x-rays and PET scan. In January she has a fracture very close to the symphysis of the right superior pubic ramus. Since then there seems to be resorption calcification of the soft tissues. This may represent an unusual fracture healing pattern or secondary infection. Heterotopic ossification or myositis may be a part of this. The whole picture is rather unusual.    I have propose that we do a biopsy. I like the symphysis aspirated for culture and then these soft tissue masses biopsied for pathology. There calcified and easy to see on the CT scan so I think a CT-guided biopsy would be appropriate. We will arrange this and then discussed the results with the patient.      Inocencio Marcial MD

## 2021-08-27 NOTE — PROGRESS NOTES
This patient has a history of radiation for anal carcinoma.  She also has degenerative scoliosis and low back pain causing some numbness in the medial right knee top of the right foot and anterolateral aspect of the right thigh.  She has had 2 steroid injections for this the last of which did not help her.  Her chief complaint is anterior pelvic pain biased to the right side.  It is worse when she is laying down especially when she tries to turn on her side.  She takes 1 Percocet and some over-the-counter pain medicines that allow her to get up and move around during the day.  She still working.    Examination she is able to ambulate independently and is able to stand on her right leg and left leg with mild right groin discomfort with either maneuver. In her lower extremities there is no edema or erythema and she has full motion of the hips knees and ankles.    I reviewed her x-rays and PET scan. In January she has a fracture very close to the symphysis of the right superior pubic ramus. Since then there seems to be resorption calcification of the soft tissues. This may represent an unusual fracture healing pattern or secondary infection. Heterotopic ossification or myositis may be a part of this. The whole picture is rather unusual.    I have propose that we do a biopsy. I like the symphysis aspirated for culture and then these soft tissue masses biopsied for pathology. There calcified and easy to see on the CT scan so I think a CT-guided biopsy would be appropriate. We will arrange this and then discussed the results with the patient.

## 2021-08-27 NOTE — NURSING NOTE
Depression Response    Patient completed the PHQ-9 assessment for depression and scored >9? Yes  Question 9 on the PHQ-9 was positive for suicidality? No  Does patient have current mental health provider? Yes    Is this a virtual visit? No    I personally notified the following: clinic nurse

## 2021-08-30 DIAGNOSIS — Z11.59 ENCOUNTER FOR SCREENING FOR OTHER VIRAL DISEASES: ICD-10-CM

## 2021-08-30 NOTE — PROGRESS NOTES
Outpatient IR Biopsy Referral    Patient is a 69 y/o female with a PMH of migraine, sciatica, degenerative scoliosis,  HLD, HTN, depression, SCC anal cancer s/p radiation, right superior pubic ramus fracture close to the symphysis 1/2021.  Patient has ongoing c/o of low back pain, right knee numbness, anterior pelvic pain R>L..  Dr. Marcial reviewed patients imaging and notes in January she has a fracture very close to the symphysis of the right superior pubic ramus. Since then there seems to be resorption calcification of the soft tissues. This may represent an unusual fracture healing pattern or secondary infection. Heterotopic ossification or myositis may be a part of this. The whole picture is rather unusual.    IR has been asked by Dr. Marcial to aspirate the symphysis for culture and then have these soft tissue masses biopsied for pathology.      Case and imaging was reviewed with Dr. Huang from IR and CT guided aspiration followed by biopsy of symphysis S: 5 I  :411 and biopsy of the soft soft tissue masses is recommended. Series 7 Image [phone], S: 606 I: 456-462                ASA will need to held 5 days prior to scheduled procedure.     Procedure order and surgical pathology orders placed by Dr. Varghese.     If requesting team would like sample sent for anything else enter them prior to scheduled procedure.     Primary team Dr. Marcial made aware of IR recommendations via epic messaging.     KARISSA Shane CNP  Interventional Radiology   IR on-call pager: [phone]

## 2021-09-07 ENCOUNTER — MYC MEDICAL ADVICE (OUTPATIENT)
Dept: INTERVENTIONAL RADIOLOGY/VASCULAR | Facility: CLINIC | Age: 71
End: 2021-09-07

## 2021-09-11 ENCOUNTER — LAB (OUTPATIENT)
Dept: LAB | Facility: CLINIC | Age: 71
End: 2021-09-11
Attending: PHYSICIAN ASSISTANT
Payer: COMMERCIAL

## 2021-09-11 DIAGNOSIS — Z11.59 ENCOUNTER FOR SCREENING FOR OTHER VIRAL DISEASES: ICD-10-CM

## 2021-09-11 LAB — SARS-COV-2 RNA RESP QL NAA+PROBE: NEGATIVE

## 2021-09-11 PROCEDURE — U0003 INFECTIOUS AGENT DETECTION BY NUCLEIC ACID (DNA OR RNA); SEVERE ACUTE RESPIRATORY SYNDROME CORONAVIRUS 2 (SARS-COV-2) (CORONAVIRUS DISEASE [COVID-19]), AMPLIFIED PROBE TECHNIQUE, MAKING USE OF HIGH THROUGHPUT TECHNOLOGIES AS DESCRIBED BY CMS-2020-01-R: HCPCS | Performed by: ORTHOPAEDIC SURGERY

## 2021-09-15 ENCOUNTER — APPOINTMENT (OUTPATIENT)
Dept: MEDSURG UNIT | Facility: CLINIC | Age: 71
End: 2021-09-15
Attending: ORTHOPAEDIC SURGERY
Payer: MEDICARE

## 2021-09-15 ENCOUNTER — HOSPITAL ENCOUNTER (OUTPATIENT)
Facility: CLINIC | Age: 71
Discharge: HOME | End: 2021-09-15
Attending: ORTHOPAEDIC SURGERY | Admitting: RADIOLOGY
Payer: MEDICARE

## 2021-09-15 ENCOUNTER — APPOINTMENT (OUTPATIENT)
Dept: INTERVENTIONAL RADIOLOGY/VASCULAR | Facility: CLINIC | Age: 71
End: 2021-09-15
Attending: ORTHOPAEDIC SURGERY
Payer: MEDICARE

## 2021-09-15 VITALS
WEIGHT: 170 LBS | DIASTOLIC BLOOD PRESSURE: 62 MMHG | OXYGEN SATURATION: 99 % | HEART RATE: 85 BPM | RESPIRATION RATE: 16 BRPM | TEMPERATURE: 96.4 F | HEIGHT: 66 IN | BODY MASS INDEX: 27.32 KG/M2 | SYSTOLIC BLOOD PRESSURE: 122 MMHG

## 2021-09-15 DIAGNOSIS — M89.9 BONE LESION: ICD-10-CM

## 2021-09-15 LAB
APPEARANCE FLD: CLEAR
APTT PPP: 60 SECONDS (ref 22–38)
COLOR FLD: YELLOW
ERYTHROCYTE [DISTWIDTH] IN BLOOD BY AUTOMATED COUNT: 13.6 % (ref 10–15)
HCT VFR BLD AUTO: 37.8 % (ref 35–47)
HGB BLD-MCNC: 12.1 G/DL (ref 11.7–15.7)
INR PPP: 1.05 (ref 0.85–1.15)
MCH RBC QN AUTO: 31.3 PG (ref 26.5–33)
MCHC RBC AUTO-ENTMCNC: 32 G/DL (ref 31.5–36.5)
MCV RBC AUTO: 98 FL (ref 78–100)
PLATELET # BLD AUTO: 265 10E3/UL (ref 150–450)
RBC # BLD AUTO: 3.86 10E6/UL (ref 3.8–5.2)
WBC # BLD AUTO: 3.7 10E3/UL (ref 4–11)
WBC # FLD AUTO: 0 /UL

## 2021-09-15 PROCEDURE — 85730 THROMBOPLASTIN TIME PARTIAL: CPT | Performed by: NURSE PRACTITIONER

## 2021-09-15 PROCEDURE — 87070 CULTURE OTHR SPECIMN AEROBIC: CPT | Performed by: ORTHOPAEDIC SURGERY

## 2021-09-15 PROCEDURE — 250N000011 HC RX IP 250 OP 636: Performed by: STUDENT IN AN ORGANIZED HEALTH CARE EDUCATION/TRAINING PROGRAM

## 2021-09-15 PROCEDURE — 20610 DRAIN/INJ JOINT/BURSA W/O US: CPT

## 2021-09-15 PROCEDURE — 36415 COLL VENOUS BLD VENIPUNCTURE: CPT | Performed by: NURSE PRACTITIONER

## 2021-09-15 PROCEDURE — 88161 CYTOPATH SMEAR OTHER SOURCE: CPT | Mod: TC,XU | Performed by: NURSE PRACTITIONER

## 2021-09-15 PROCEDURE — 77012 CT SCAN FOR NEEDLE BIOPSY: CPT

## 2021-09-15 PROCEDURE — 99152 MOD SED SAME PHYS/QHP 5/>YRS: CPT | Mod: GC | Performed by: RADIOLOGY

## 2021-09-15 PROCEDURE — 93005 ELECTROCARDIOGRAM TRACING: CPT

## 2021-09-15 PROCEDURE — 88305 TISSUE EXAM BY PATHOLOGIST: CPT | Mod: TC | Performed by: NURSE PRACTITIONER

## 2021-09-15 PROCEDURE — 10160 PNXR ASPIR ABSC HMTMA BULLA: CPT

## 2021-09-15 PROCEDURE — 999N000132 HC STATISTIC PP CARE STAGE 1

## 2021-09-15 PROCEDURE — 93010 ELECTROCARDIOGRAM REPORT: CPT | Mod: 59 | Performed by: INTERNAL MEDICINE

## 2021-09-15 PROCEDURE — 99153 MOD SED SAME PHYS/QHP EA: CPT

## 2021-09-15 PROCEDURE — 89050 BODY FLUID CELL COUNT: CPT | Performed by: ORTHOPAEDIC SURGERY

## 2021-09-15 PROCEDURE — 272N000505 HC NEEDLE CR5

## 2021-09-15 PROCEDURE — 77012 CT SCAN FOR NEEDLE BIOPSY: CPT | Mod: 26 | Performed by: RADIOLOGY

## 2021-09-15 PROCEDURE — 20610 DRAIN/INJ JOINT/BURSA W/O US: CPT | Mod: GC | Performed by: RADIOLOGY

## 2021-09-15 PROCEDURE — 85610 PROTHROMBIN TIME: CPT | Mod: GZ | Performed by: NURSE PRACTITIONER

## 2021-09-15 PROCEDURE — 85027 COMPLETE CBC AUTOMATED: CPT | Performed by: NURSE PRACTITIONER

## 2021-09-15 PROCEDURE — 20206 BIOPSY MUSCLE PERQ NEEDLE: CPT

## 2021-09-15 PROCEDURE — 27040 BIOPSY OF SOFT TISSUES: CPT | Mod: GC | Performed by: RADIOLOGY

## 2021-09-15 PROCEDURE — 99152 MOD SED SAME PHYS/QHP 5/>YRS: CPT

## 2021-09-15 PROCEDURE — 258N000003 HC RX IP 258 OP 636: Performed by: NURSE PRACTITIONER

## 2021-09-15 PROCEDURE — 87075 CULTR BACTERIA EXCEPT BLOOD: CPT | Performed by: ORTHOPAEDIC SURGERY

## 2021-09-15 PROCEDURE — 999N000054 HC STATISTIC EKG NON-CHARGEABLE

## 2021-09-15 PROCEDURE — 250N000009 HC RX 250: Performed by: STUDENT IN AN ORGANIZED HEALTH CARE EDUCATION/TRAINING PROGRAM

## 2021-09-15 RX ORDER — NALOXONE HYDROCHLORIDE 0.4 MG/ML
0.4 INJECTION, SOLUTION INTRAMUSCULAR; INTRAVENOUS; SUBCUTANEOUS
Status: DISCONTINUED | OUTPATIENT
Start: 2021-09-15 | End: 2021-09-15 | Stop reason: HOSPADM

## 2021-09-15 RX ORDER — LIDOCAINE 40 MG/G
CREAM TOPICAL
Status: DISCONTINUED | OUTPATIENT
Start: 2021-09-15 | End: 2021-09-15 | Stop reason: HOSPADM

## 2021-09-15 RX ORDER — SODIUM CHLORIDE 9 MG/ML
INJECTION, SOLUTION INTRAVENOUS CONTINUOUS
Status: DISCONTINUED | OUTPATIENT
Start: 2021-09-15 | End: 2021-09-15 | Stop reason: HOSPADM

## 2021-09-15 RX ORDER — FLUMAZENIL 0.1 MG/ML
0.2 INJECTION, SOLUTION INTRAVENOUS
Status: DISCONTINUED | OUTPATIENT
Start: 2021-09-15 | End: 2021-09-15 | Stop reason: HOSPADM

## 2021-09-15 RX ORDER — NALOXONE HYDROCHLORIDE 0.4 MG/ML
0.2 INJECTION, SOLUTION INTRAMUSCULAR; INTRAVENOUS; SUBCUTANEOUS
Status: DISCONTINUED | OUTPATIENT
Start: 2021-09-15 | End: 2021-09-15 | Stop reason: HOSPADM

## 2021-09-15 RX ORDER — FENTANYL CITRATE 50 UG/ML
25-50 INJECTION, SOLUTION INTRAMUSCULAR; INTRAVENOUS EVERY 5 MIN PRN
Status: DISCONTINUED | OUTPATIENT
Start: 2021-09-15 | End: 2021-09-15 | Stop reason: HOSPADM

## 2021-09-15 RX ORDER — GABAPENTIN 400 MG/1
800 CAPSULE ORAL 3 TIMES DAILY
COMMUNITY
End: 2023-02-24 | Stop reason: DRUGHIGH

## 2021-09-15 RX ADMIN — Medication 500 UNITS: at 14:23

## 2021-09-15 RX ADMIN — MIDAZOLAM 4 MG: 1 INJECTION INTRAMUSCULAR; INTRAVENOUS at 13:22

## 2021-09-15 RX ADMIN — FENTANYL CITRATE 200 MCG: 50 INJECTION, SOLUTION INTRAMUSCULAR; INTRAVENOUS at 13:22

## 2021-09-15 RX ADMIN — SODIUM CHLORIDE: 9 INJECTION, SOLUTION INTRAVENOUS at 12:15

## 2021-09-15 RX ADMIN — LIDOCAINE HYDROCHLORIDE 15 ML: 10 INJECTION, SOLUTION EPIDURAL; INFILTRATION; INTRACAUDAL; PERINEURAL at 13:22

## 2021-09-15 ASSESSMENT — MIFFLIN-ST. JEOR: SCORE: 1292.86

## 2021-09-15 NOTE — PROCEDURES
[facility]    Procedure: Pelvic soft tissue biopsy    Date/Time: 9/15/2021 1:30 PM  Performed by: Gary Lares  Authorized by: Gary Lares Fellow Physician: Luda  Other(s) attending procedure: Young - staff    UNIVERSAL PROTOCOL   Site Marked: Yes  Prior Images Obtained and Reviewed:  Yes  Required items: Required blood products, implants, devices and special equipment available    Patient identity confirmed:  Verbally with patient  Patient was reevaluated immediately before administering moderate or deep sedation or anesthesia  Confirmation Checklist:  Patient's identity using two indicators  Time out: Immediately prior to the procedure a time out was called    Universal Protocol: the Joint Commission Universal Protocol was followed    Preparation: Patient was prepped and draped in usual sterile fashion    ESBL (mL):  0.5         ANESTHESIA    Anesthesia: Local infiltration  Local Anesthetic:  Lidocaine 1% without epinephrine  Anesthetic Total (mL):  15      SEDATION    Patient Sedated: Yes    Sedation Type:  Moderate (conscious) sedation  Sedation:  Fentanyl and midazolam  Vital signs: Vital signs monitored during sedation    See dictated procedure note for full details.  Findings: Serous fluid aspirated from pubic symphysis joint. 4 core tissue samples obtained from right pubic region.     Specimens: none    Complications: None    Condition: Stable    Plan: Bedrest 1 hour in 2A    PROCEDURE   Patient Tolerance:  Patient tolerated the procedure well with no immediate complications  Describe Procedure: See dictation  Length of time physician/provider present for 1:1 monitoring during sedation: 30

## 2021-09-15 NOTE — PROGRESS NOTES
Pt tolerated po well.  Pt ambulated in the hallway and up to the bathroom, tolerated well.  Pubic dressing remains F/D/I.  Right chest port hep locked and de accessed.  Discharge instructions went over with and given to pt and his family, all questions answered.  1440-Pt D/C'ed to home with his family.

## 2021-09-15 NOTE — PRE-PROCEDURE
GENERAL PRE-PROCEDURE:   Procedure:  Pelvic soft tissue biopsy  Date/Time:  9/15/2021 12:04 PM    Verbal consent obtained?: Yes    Written consent obtained?: Yes    Risks and benefits: Risks, benefits and alternatives were discussed    Consent given by:  Patient  Patient states understanding of procedure being performed: Yes    Patient's understanding of procedure matches consent: Yes    Procedure consent matches procedure scheduled: Yes    Expected level of sedation:  Moderate  Appropriately NPO:  Yes  ASA Class:  3  Mallampati  :  Grade 2- soft palate, base of uvula, tonsillar pillars, and portion of posterior pharyngeal wall visible  Lungs:  Lungs clear with good breath sounds bilaterally  Heart:  Normal heart sounds and rate  History & Physical reviewed:  History and physical reviewed and no updates needed  Statement of review:  I have reviewed the lab findings, diagnostic data, medications, and the plan for sedation

## 2021-09-15 NOTE — PROGRESS NOTES
Returned from IR s/p soft tissue biopsy.  Mid pubic/mons pubic biopsy site covered with a primapore; CDI.  Denies pain currently.  VSS.  Resting in bed.

## 2021-09-15 NOTE — PROGRESS NOTES
Arrived from home for a soft tissue biopsy.  VSS.  C/O pelvic pain.  Takes home medication; Percocet and gabapentin.  H&P current.  Consent obtained.  Will be ready for procedure when labs are resulted.

## 2021-09-15 NOTE — PROGRESS NOTES
Patient Name: Sara Dominguez  Medical Record Number: [id number]  Today's Date: 9/15/2021    Procedure: Image-guided pelvic biopsy and aspiration  Proceduralist: Dr. Escobedo and Dr. Lares    Procedure Start: 1255  Procedure end: 1325  Sedation medications administered: 200 mcg fentanyl and 4 mg versed     Report given to: 2A RN  : n/a    Other Notes: Pt arrived to IR room CT2 from . Consent reviewed. Pt denies any questions or concerns regarding procedure. Pt positioned supine and monitored per protocol. Pt tolerated procedure without any noted complications. 4 cores obtained with patholoy present. Pt transferred back to .

## 2021-09-15 NOTE — DISCHARGE INSTRUCTIONS
[facility]    Interventional Radiology  Patient Instructions Following Biopsy of Soft Tissue    AFTER YOU GO HOME  ? If you were given sedation DO NOT drive or operate machinery at home or at work for at least 24 hours  ? DO relax and take it easy for 48 hours, no strenuous activity for 24 hours  ? DO drink plenty of fluids  ? DO resume your regular diet, unless otherwise instructed by your Primary Physician  ? Keep the dressing dry and in place for 24 hours.  ? DO NOT SMOKE FOR AT LEAST 24 HOURS, if you have been given any medications that were to help you relax or sedate you during your procedure  ? DO NOT drink alcoholic beverages the day of your procedure  ? DO NOT do any strenuous exercise or lifting (> 10 lbs) for at least 7 days following your procedure  ? DO NOT take a bath or shower for at least 12 hours following your procedure  ? Remove dressing after shower the next day. Replace with Band aid for 2 days.  Never leave a wet dressing in place.  ? DO NOT make any important or legal decisions for 24 hours following your procedure  ? There should be minimum drainage from the biopsy site    CALL THE PHYSICIAN IF:  ? You start bleeding from the procedure site.  If you do start to bleed from that site, lie down flat and hold pressure on the site for a minimum of 10 minutes.  Your physician will tell you if you need to return to the hospital  ? You develop nausea or vomiting  ? You have excessive swelling, redness, or tenderness at the site  ? You have drainage that looks like it is infected.  ? You experience severe pain  ? You develop hives or a rash or unexplained itching  ? You develop shortness of breath  ? You develop a temperature of 101 degrees F or greater  ? You develop bloody clots or red urine after you are discharged          [facility] INTERVENTIONAL RADIOLOGY DEPARTMENT  Procedure Physician:  Dr. Lares                                  Date of procedure: September 15, 2021  Telephone  Numbers: [phone] Monday-Friday 8:00 am to 4:30 pm  [phone] After 4:30 pm Monday-Friday, Weekends & Holidays.   Ask for the Interventional Radiologist on call.  Someone is on call 24 hrs/day  [facility] toll free number: [phone] Monday-Friday 8:00 am to 4:30 pm  [facility] Emergency Dept: [phone]

## 2021-09-16 LAB
PATH REPORT.COMMENTS IMP SPEC: NORMAL
PATH REPORT.FINAL DX SPEC: NORMAL
PATH REPORT.GROSS SPEC: NORMAL
PATH REPORT.MICROSCOPIC SPEC OTHER STN: NORMAL
PATH REPORT.RELEVANT HX SPEC: NORMAL
PHOTO IMAGE: NORMAL

## 2021-09-16 PROCEDURE — 88305 TISSUE EXAM BY PATHOLOGIST: CPT | Mod: 26 | Performed by: PATHOLOGY

## 2021-09-19 LAB
ATRIAL RATE - MUSE: 74 BPM
DIASTOLIC BLOOD PRESSURE - MUSE: NORMAL MMHG
INTERPRETATION ECG - MUSE: NORMAL
P AXIS - MUSE: 71 DEGREES
PR INTERVAL - MUSE: 164 MS
QRS DURATION - MUSE: 84 MS
QT - MUSE: 420 MS
QTC - MUSE: 466 MS
R AXIS - MUSE: 11 DEGREES
SYSTOLIC BLOOD PRESSURE - MUSE: NORMAL MMHG
T AXIS - MUSE: 72 DEGREES
VENTRICULAR RATE- MUSE: 74 BPM

## 2021-09-20 ENCOUNTER — TELEPHONE (OUTPATIENT)
Dept: ORTHOPEDICS | Facility: CLINIC | Age: 71
End: 2021-09-20

## 2021-09-20 LAB — BACTERIA ASPIRATE CULT: NO GROWTH

## 2021-09-22 LAB — BACTERIA ASPIRATE CULT: NORMAL

## 2021-09-23 ENCOUNTER — TELEPHONE (OUTPATIENT)
Dept: ORTHOPEDICS | Facility: CLINIC | Age: 71
End: 2021-09-23

## 2021-09-23 NOTE — TELEPHONE ENCOUNTER
Inocencio Marcial MD Chapman-Shultz, Dixie RN  I left a message as [facility]  I told her there was no sign of tumor.  I also said I do not have any planned surgery for this issue but would be willing to talk with her more about it.  So if she is interested in doing that then a virtual visit would be appropriate.               Associated Results      Surgical pathology exam - Bone: [id number]  Order: [id number]  Status:  Final result   Visible to patient:  Yes (MyChart)   1 Result Note  Component  Resulting Agency   Case Report   Surgical Pathology Report                         Case: [id number]                                   Authorizing Provider:  Inocencio Marcial,  Collected:           09/15/2021 01:15 PM                                  MD                                                                            Ordering Location:     [facility]     Received:            09/15/2021 02:39 PM                                  [address] [place]                                                             Pathologist:           Derrick Martin MD                                                            Specimen:    Pelvis, Soft tissue mass, pelvic                                                           Final Diagnosis   A. Pelvis, soft tissue mass, biopsy:  - Fragments of reactive fibroadipose tissue  - Negative for malignancy

## 2021-09-24 ENCOUNTER — TELEPHONE (OUTPATIENT)
Dept: RADIATION THERAPY | Facility: OUTPATIENT CENTER | Age: 71
End: 2021-09-24

## 2021-09-24 NOTE — TELEPHONE ENCOUNTER
"Call received at 4pm on Thursday 9/23/21. Pt stating \"needs a refill asap\".  Pt following in this clinic for ongoing f/u s/p completion of chemoradiation for anal cancer.  Recent sylvie changes in pelvis with R pelvic insufficieny fracture. Dr. Khan helping manage pain medication. Pt seeing other orthopedic teams to try to improve mobility, function, and decrease pain.  This RN called Gina at 9 am on Friday 9/24/21. Dr. Khan will be able to refill - plan to get refill to [facility] pharmacy on Monday.  Gina asked if any other provider for get prescription going today, RN reviewed it is best to be consistent with the provider who is seeing her and managing her pain to send narcotic rx and when is best to reach Dr. Khan for this.  Pt is scheduled for next clinic visit on 10/14/21.  "

## 2021-09-27 DIAGNOSIS — G89.3 CANCER RELATED PAIN: ICD-10-CM

## 2021-09-27 RX ORDER — OXYCODONE AND ACETAMINOPHEN 10; 325 MG/1; MG/1
1 TABLET ORAL EVERY 4 HOURS PRN
Qty: 100 TABLET | Refills: 0 | Status: SHIPPED | OUTPATIENT
Start: 2021-09-27 | End: 2021-10-14

## 2021-10-09 ENCOUNTER — HEALTH MAINTENANCE LETTER (OUTPATIENT)
Age: 71
End: 2021-10-09

## 2021-10-14 ENCOUNTER — TRANSFERRED RECORDS (OUTPATIENT)
Dept: HEALTH INFORMATION MANAGEMENT | Facility: CLINIC | Age: 71
End: 2021-10-14

## 2021-10-14 ENCOUNTER — VIRTUAL VISIT (OUTPATIENT)
Dept: RADIATION THERAPY | Facility: OUTPATIENT CENTER | Age: 71
End: 2021-10-14
Payer: COMMERCIAL

## 2021-10-14 DIAGNOSIS — G89.3 CANCER RELATED PAIN: ICD-10-CM

## 2021-10-14 RX ORDER — OXYCODONE AND ACETAMINOPHEN 10; 325 MG/1; MG/1
1 TABLET ORAL EVERY 4 HOURS PRN
Qty: 100 TABLET | Refills: 0 | Status: SHIPPED | OUTPATIENT
Start: 2021-10-14 | End: 2021-11-10

## 2021-10-14 NOTE — LETTER
10/14/2021         RE: Sara Dominguez  [address]  [place] [address]        Dear Colleague,    Thank you for referring your patient, Saar Dominguez, to the RADIATION THERAPY CENTER. Please see a copy of my visit note below.       Department of Radiation Oncology  Radiation Therapy Center  [facility]  [place] [address]  [phone]       Radiation Oncology Follow-up Visit  2021    Sara Dominguez  MRN: [id number]   : 1950     DIAGNOSIS: squamous cell carcinoma of the anal canal  INTENT OF RADIOTHERAPY: definitive CRT.  PATHOLOGY:  moderately differentiated squamous cell carcinoma                              STAGE:  clinical [id number] (anna-rectal nodes).    CONCURRENT SYSTEMIC THERAPY: 5 FU and mitomycin     ONCOLOGIC HISTORY:    Ms. 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       The patient initially presented with symptoms of rectal pain pressure prompting further evaluation.  She was seen by gastroenterologist Dr. Aguirre who on digital rectal exam noticed a mass in the anterior wall of the anal rectal region, measuring 3.4 cm in size.  Patient eventually underwent colonoscopy on 2019 which demonstrated an 8 mm mucosal abnormality just above the dentate line.  The mass was noted to be in the anterior wall facing the vagina.  Biopsy of the mass demonstrated moderately differentiated squamous cell carcinoma.  The patient was referred to Gyn/onc who on exam palpated a 3 to 4 cm rectovaginal mass.  No vaginal or cervical mass was noted.  2019 the patient underwent MRI of the pelvis.  Imaging demonstrated 2.6 x 2.5 x 2.5 cm mass located in the distal rectum/anal canal region.  The mass was noted to arise exophytically from the distal rectum.  There were suspicious mesorectal lymph nodes measuring subcentimeter in size. No pelvic adenopathy was noted.  On  8/3/2019 the patient underwent a PET scan.  Imaging did not demonstrate any evidence of distant disease. Hypermetabolic 3.3 x 4.1 cm mass seen in the distal rectum/anus compatible with underlying malignancy. Per Dr. Khan review of imaging, also noted suspicious perirectal adenopathy.  The patient was subsequent seen by Dr. Aquino who discussed definitive treatment with chemoradiation.      SITE OF TREATMENT: anal/pelvis     DATES  OF TREATMENT: 8/22/19-10/09/19     TOTAL DOSE OF TREATMENT: 5400 cGy     DOSE PER FRACTION OF TREATMENT: 180 cGy x 30 fractions    INTERVAL SINCE COMPLETION OF RADIATION THERAPY:   24 months     SUBJECTIVE:   In the interval, the patient was seen by Dr. Murphy on 10/14/21. On examination, there was no clear evidence of recurrent/ residual disease.     Post treatment PET on 4/9/20 was TYLER. Last PET on 7/29/21 demonstrated no clear evidence of recurrent disease.     Unfortunately posttreatment the patient has developed pelvic insufficiency fractures in the sacrum as well as right pubic bone.  She has been following with Dr. Mortensen with treatment of physical therapy, vitamin D, calcium,  Fosamax, and pain management.  She is noticed some improvement but yet not completely.  She has also met with orthopedic team (Dr. Marcial) to help in further management. She underwent biopsy of soft tissue mass in pelvis on 9/15/21, which was negative for malignancy. She will meet Dr. Marcial on 10/15/21 to discuss any potential options for pelvic fractures.    LABS AND IMAGING:  Reviewed.    IMPRESSION:   Ms. 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 She completed definitive CRT to a total dose of 54 Gy in 30 fractions on 10/9/19.    PLAN:   1.  Clinically and radiographically TYLER. Last exam on 10/14/21 was TYLER. Post treatment PET on 4/9/20 was TYLER. Last PET on 7/29/21 demonstrated no clear evidence of recurrent disease.     2.  Chronic late toxicity. Has developed sacral insufficiency fracture of the sacrum and R pubic bone post treatment. I discussed that these changes are likely related to post RT treatment with weakening of the bone.  Working with pain team and orthopedic team as well. Discussed consideration of referral to oncologic rehab team () as a potential option to help further manage. Patient wishes to think about this.    3. Continue follow up for surveillance with surgery (Dr. Murphy).     4.  NCCN guidelines recommend annual imaging (CTCAP) for first 3 years. Would be due for imaging next in July 2022.    5. RTC in 6 months.       Justin Khan M.D.  Department of Radiation Oncology  [facility]     Due to the concerns around COVID-19 and adhering to social distancing we conduct this visit over the telephone. Telephone call lasted 15 minutes.

## 2021-10-14 NOTE — PROGRESS NOTES
Department of Radiation Oncology  Radiation Therapy Center  [facility] [place] [address]  [phone]       Radiation Oncology Follow-up Visit  2021    Sara Dominguez  MRN: [id number]   : 1950     DIAGNOSIS: squamous cell carcinoma of the anal canal  INTENT OF RADIOTHERAPY: definitive CRT.  PATHOLOGY:  moderately differentiated squamous cell carcinoma                              STAGE:  clinical [id number] (anna-rectal nodes).    CONCURRENT SYSTEMIC THERAPY: 5 FU and mitomycin     ONCOLOGIC HISTORY:    Ms. 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       The patient initially presented with symptoms of rectal pain pressure prompting further evaluation.  She was seen by gastroenterologist Dr. Aguirre who on digital rectal exam noticed a mass in the anterior wall of the anal rectal region, measuring 3.4 cm in size.  Patient eventually underwent colonoscopy on 2019 which demonstrated an 8 mm mucosal abnormality just above the dentate line.  The mass was noted to be in the anterior wall facing the vagina.  Biopsy of the mass demonstrated moderately differentiated squamous cell carcinoma.  The patient was referred to Gyn/onc who on exam palpated a 3 to 4 cm rectovaginal mass.  No vaginal or cervical mass was noted.  2019 the patient underwent MRI of the pelvis.  Imaging demonstrated 2.6 x 2.5 x 2.5 cm mass located in the distal rectum/anal canal region.  The mass was noted to arise exophytically from the distal rectum.  There were suspicious mesorectal lymph nodes measuring subcentimeter in size. No pelvic adenopathy was noted.  On 8/3/2019 the patient underwent a PET scan.  Imaging did not demonstrate any evidence of distant disease. Hypermetabolic 3.3 x 4.1 cm mass seen in the distal rectum/anus compatible with underlying malignancy. Per Dr. Khan review of imaging, also  noted suspicious perirectal adenopathy.  The patient was subsequent seen by Dr. Aquino who discussed definitive treatment with chemoradiation.      SITE OF TREATMENT: anal/pelvis     DATES  OF TREATMENT: 8/22/19-10/09/19     TOTAL DOSE OF TREATMENT: 5400 cGy     DOSE PER FRACTION OF TREATMENT: 180 cGy x 30 fractions    INTERVAL SINCE COMPLETION OF RADIATION THERAPY:   24 months     SUBJECTIVE:   In the interval, the patient was seen by Dr. Murphy on 10/14/21. On examination, there was no clear evidence of recurrent/ residual disease.     Post treatment PET on 4/9/20 was TYLER. Last PET on 7/29/21 demonstrated no clear evidence of recurrent disease.     Unfortunately posttreatment the patient has developed pelvic insufficiency fractures in the sacrum as well as right pubic bone.  She has been following with Dr. Mortensen with treatment of physical therapy, vitamin D, calcium,  Fosamax, and pain management.  She is noticed some improvement but yet not completely.  She has also met with orthopedic team (Dr. Marcial) to help in further management. She underwent biopsy of soft tissue mass in pelvis on 9/15/21, which was negative for malignancy. She will meet Dr. Marcial on 10/15/21 to discuss any potential options for pelvic fractures.    LABS AND IMAGING:  Reviewed.    IMPRESSION:   Ms. 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 She completed definitive CRT to a total dose of 54 Gy in 30 fractions on 10/9/19.    PLAN:   1.  Clinically and radiographically TYLER. Last exam on 10/14/21 was TYLER. Post treatment PET on 4/9/20 was TYLER. Last PET on 7/29/21 demonstrated no clear evidence of recurrent disease.     2. Chronic late toxicity. Has developed sacral insufficiency fracture of the sacrum and R pubic bone post treatment. I discussed that these changes are likely related to post RT treatment with weakening of the bone.  Working with pain team and  orthopedic team as well. Discussed consideration of referral to oncologic rehab team () as a potential option to help further manage. Patient wishes to think about this.    3. Continue follow up for surveillance with surgery (Dr. Murphy).     4.  NCCN guidelines recommend annual imaging (CTCAP) for first 3 years. Would be due for imaging next in July 2022.    5. RTC in 6 months.       Justin Khan M.D.  Department of Radiation Oncology  [facility]     Due to the concerns around COVID-19 and adhering to social distancing we conduct this visit over the telephone. Telephone call lasted 15 minutes.

## 2021-10-14 NOTE — NURSING NOTE
Gina is a 71 year old who is being evaluated via a billable telephone visit.      What phone number would you like to be contacted at? cell  How would you like to obtain your AVS? Teresa  Phone call duration: 8 minutes RN time  Jamia Stewart RN    FOLLOW-UP VISIT    Patient Name: Sara Dominguez      : 1950     Age: 71 year old        ______________________________________________________________________________     Chief Complaint   Patient presents with     Radiation Therapy     phone follow up     There were no vitals taken for this visit.     Date Radiation Completed: 10/9/19    Pain  Current history of pain associated with this visit:   Intensity: 4-7/10 varies  Current: aching and throbbing  Location: R hip area most noticeable. L hip as well.  Treatment: percocet 1 tab every 4 hrs as needed    Meds  Current Med List Reviewed: No  Medication Note:     Imaging  CT: CAP 21,  Pelvis 9/15/21    On Chemo?: No  Nausea:no  Bowel: Normal and Soft  Bladder: frequency  Skin: Warm  Dry  Intact  Energy Level: varies - low to normal. Had to go back to work part time at [employer]. She does like to work. She is fatigued and frustrated due to ongoing chronic pain    Other Appointments:     Date  Oncologist: DR. Gonzales    Surgeon:      Other Notes: seeing orthopedic team, PT , now working with orthopedic oncology to see if there is any option for chronic hip pain secondary to hx of insufficiency fracture

## 2021-10-29 DIAGNOSIS — M89.9 BONE LESION: Primary | ICD-10-CM

## 2021-11-10 DIAGNOSIS — G89.3 CANCER RELATED PAIN: ICD-10-CM

## 2021-11-10 RX ORDER — OXYCODONE AND ACETAMINOPHEN 10; 325 MG/1; MG/1
1 TABLET ORAL EVERY 4 HOURS PRN
Qty: 100 TABLET | Refills: 0 | Status: SHIPPED | OUTPATIENT
Start: 2021-11-10 | End: 2021-12-08

## 2021-11-17 ENCOUNTER — ANCILLARY PROCEDURE (OUTPATIENT)
Dept: GENERAL RADIOLOGY | Facility: CLINIC | Age: 71
End: 2021-11-17
Attending: ORTHOPAEDIC SURGERY
Payer: MEDICARE

## 2021-11-17 ENCOUNTER — OFFICE VISIT (OUTPATIENT)
Dept: ORTHOPEDICS | Facility: CLINIC | Age: 71
End: 2021-11-17
Payer: MEDICARE

## 2021-11-17 DIAGNOSIS — S32.591K: Primary | ICD-10-CM

## 2021-11-17 DIAGNOSIS — M89.9 BONE LESION: ICD-10-CM

## 2021-11-17 PROCEDURE — 99214 OFFICE O/P EST MOD 30 MIN: CPT | Performed by: ORTHOPAEDIC SURGERY

## 2021-11-17 PROCEDURE — 72170 X-RAY EXAM OF PELVIS: CPT | Mod: GC | Performed by: RADIOLOGY

## 2021-11-17 NOTE — NURSING NOTE
Reason For Visit:   Chief Complaint   Patient presents with     RECHECK     followup pelvis // discuss pain // had IR biopsy on 9/15        There were no vitals taken for this visit.    Pain Assessment  Patient Currently in Pain: Yes  0-10 Pain Scale: 8  Primary Pain Location: Pelvis (right pelvis area)    Alice Jimenez ATC

## 2021-11-17 NOTE — PROGRESS NOTES
This patient had a right superior pubic ramus fracture and has suffered some resorption of the bone.  It almost seems like there is been motion at that fracture site between the symphysis and the bones that have caused this phenomenon to happen.  She also had a needle biopsy which showed only fibrosis with no sign of tumor or obvious infection.  This patient has daily pain whenever she walks or whenever she stands after sitting up.  Its sharp pain.  She has been able to return to work but has difficulty doing these daily activities.  The pain is in the anterior right side of the pelvis.    X-rays today are essentially unchanged from those taken 5 months ago.  There is some sclerosis present but no loss of bone over this time.    Examination she is alert oriented has a normal mood and affect and is in no acute distress.  She is able to ambulate without a decided limp.  Respirations are regular and unlabored eyes are nonicteric.    I showed the patient the x-rays.  This does not seem to be tumor or infectious process or anything that is progressive.  I am not sure why she had the dissolution of the fracture at the site.  She speculates because of her radiation this occurred.  Her fracture was essentially atraumatic.  So she may have some radiation necrosis of the bone which is part of this and then subsequent decreased ability at this area.  We will try to do a steroid injection to see if it has any impact.  If this does not help then we would consider doing debridement and plating.  While this is helpful for some people this is far from a sure solution.  The patient is aware this and would like to proceed with the injection as a next step.  I have answered all of her questions today.  We will try to set that injection up through interventional radiology.

## 2021-11-17 NOTE — LETTER
11/17/2021         RE: Sara Dominguez  [address]  [facility] [address]        Dear Colleague,    Thank you for referring your patient, Sara Dominguez, to the [facility] [place]. Please see a copy of my visit note below.    This patient had a right superior pubic ramus fracture and has suffered some resorption of the bone.  It almost seems like there is been motion at that fracture site between the symphysis and the bones that have caused this phenomenon to happen.  She also had a needle biopsy which showed only fibrosis with no sign of tumor or obvious infection.  This patient has daily pain whenever she walks or whenever she stands after sitting up.  Its sharp pain.  She has been able to return to work but has difficulty doing these daily activities.  The pain is in the anterior right side of the pelvis.    X-rays today are essentially unchanged from those taken 5 months ago.  There is some sclerosis present but no loss of bone over this time.    Examination she is alert oriented has a normal mood and affect and is in no acute distress.  She is able to ambulate without a decided limp.  Respirations are regular and unlabored eyes are nonicteric.    I showed the patient the x-rays.  This does not seem to be tumor or infectious process or anything that is progressive.  I am not sure why she had the dissolution of the fracture at the site.  She speculates because of her radiation this occurred.  Her fracture was essentially atraumatic.  So she may have some radiation necrosis of the bone which is part of this and then subsequent decreased ability at this area.  We will try to do a steroid injection to see if it has any impact.  If this does not help then we would consider doing debridement and plating.  While this is helpful for some people this is far from a sure solution.  The patient is aware this and would like to proceed with the injection as a next step.  I  have answered all of her questions today.  We will try to set that injection up through interventional radiology.        Inocencio Marcial MD

## 2021-11-19 ENCOUNTER — TELEPHONE (OUTPATIENT)
Dept: ORTHOPEDICS | Facility: CLINIC | Age: 71
End: 2021-11-19
Payer: COMMERCIAL

## 2021-11-19 DIAGNOSIS — S32.591K: Primary | ICD-10-CM

## 2021-11-19 NOTE — TELEPHONE ENCOUNTER
----- Message from Madelin Cobos RN sent at 11/19/2021 11:14 AM CST -----  Please call [facility] and schedule this steroid injection

## 2021-11-22 ENCOUNTER — TELEPHONE (OUTPATIENT)
Dept: ORTHOPEDICS | Facility: CLINIC | Age: 71
End: 2021-11-22
Payer: COMMERCIAL

## 2021-11-22 NOTE — TELEPHONE ENCOUNTER
[facility] Call Center    Phone Message    May a detailed message be left on voicemail: yes     Reason for Call: Other: [facility] told patient that the order for the steroid injection was put in incorrectly. Patient was thinking that maybe the body part was missing because it only said steroid injection but nothing specific. Needs new order.       Action Taken: Message routed to:  [facility] Orthopedics    Travel Screening: Not Applicable

## 2021-11-23 ENCOUNTER — TELEPHONE (OUTPATIENT)
Dept: ORTHOPEDICS | Facility: CLINIC | Age: 71
End: 2021-11-23
Payer: COMMERCIAL

## 2021-11-23 NOTE — TELEPHONE ENCOUNTER
RN took a call from Evangelista at [facility].  They are able to do this injection under CT guidance.  They will adjust the order and call patient to get her scheduled.

## 2021-11-23 NOTE — TELEPHONE ENCOUNTER
RN callled to:  [facility] [place]  [phone] [address] [place] [address]

## 2021-11-23 NOTE — TELEPHONE ENCOUNTER
RN called over to [facility] and spoke with ancelmo.  He is going to double check to see what department this should go to.  He will call me back.

## 2021-11-29 ENCOUNTER — TELEPHONE (OUTPATIENT)
Dept: ORTHOPEDICS | Facility: CLINIC | Age: 71
End: 2021-11-29
Payer: COMMERCIAL

## 2021-11-29 NOTE — TELEPHONE ENCOUNTER
RN called to [facility] and spoke with Staff.  RN is asking for Evangelista, who is currently at lunch.  He will call back when he returns.  We spoke last week about this patient and I do not see her scheduled yet and he said he would take care of it.

## 2021-12-02 DIAGNOSIS — Z11.59 ENCOUNTER FOR SCREENING FOR OTHER VIRAL DISEASES: ICD-10-CM

## 2021-12-03 DIAGNOSIS — M89.9 BONE LESION: Primary | ICD-10-CM

## 2021-12-03 RX ORDER — DIAZEPAM 5 MG
5 TABLET ORAL EVERY 6 HOURS PRN
Qty: 1 TABLET | Refills: 0 | Status: SHIPPED | OUTPATIENT
Start: 2021-12-03 | End: 2022-02-15

## 2021-12-08 DIAGNOSIS — G89.3 CANCER RELATED PAIN: ICD-10-CM

## 2021-12-08 RX ORDER — OXYCODONE AND ACETAMINOPHEN 10; 325 MG/1; MG/1
1 TABLET ORAL EVERY 4 HOURS PRN
Qty: 100 TABLET | Refills: 0 | Status: SHIPPED | OUTPATIENT
Start: 2021-12-08 | End: 2021-12-09

## 2021-12-09 DIAGNOSIS — G89.3 CANCER RELATED PAIN: ICD-10-CM

## 2021-12-09 RX ORDER — OXYCODONE AND ACETAMINOPHEN 10; 325 MG/1; MG/1
1 TABLET ORAL EVERY 4 HOURS PRN
Qty: 100 TABLET | Refills: 0 | Status: SHIPPED | OUTPATIENT
Start: 2021-12-09 | End: 2022-01-05

## 2021-12-17 ENCOUNTER — HOSPITAL ENCOUNTER (OUTPATIENT)
Dept: CT IMAGING | Facility: CLINIC | Age: 71
Discharge: HOME | End: 2021-12-17
Attending: RADIOLOGY | Admitting: RADIOLOGY
Payer: MEDICARE

## 2021-12-17 DIAGNOSIS — S32.591K: ICD-10-CM

## 2021-12-17 PROCEDURE — 77012 CT SCAN FOR NEEDLE BIOPSY: CPT

## 2021-12-17 PROCEDURE — 272N000431 CT JOINT INJECTION ASPIRATION MAJOR

## 2021-12-17 PROCEDURE — 250N000011 HC RX IP 250 OP 636: Performed by: RADIOLOGY

## 2021-12-17 PROCEDURE — 250N000009 HC RX 250: Performed by: RADIOLOGY

## 2021-12-17 RX ORDER — TRIAMCINOLONE ACETONIDE 40 MG/ML
40 INJECTION, SUSPENSION INTRA-ARTICULAR; INTRAMUSCULAR ONCE
Status: COMPLETED | OUTPATIENT
Start: 2021-12-17 | End: 2021-12-17

## 2021-12-17 RX ORDER — LIDOCAINE HYDROCHLORIDE 10 MG/ML
5 INJECTION, SOLUTION EPIDURAL; INFILTRATION; INTRACAUDAL; PERINEURAL ONCE
Status: COMPLETED | OUTPATIENT
Start: 2021-12-17 | End: 2021-12-17

## 2021-12-17 RX ORDER — BUPIVACAINE HYDROCHLORIDE 5 MG/ML
10 INJECTION, SOLUTION EPIDURAL; INTRACAUDAL ONCE
Status: COMPLETED | OUTPATIENT
Start: 2021-12-17 | End: 2021-12-17

## 2021-12-17 RX ADMIN — BUPIVACAINE HYDROCHLORIDE 50 MG: 5 INJECTION, SOLUTION EPIDURAL; INTRACAUDAL; PERINEURAL at 14:18

## 2021-12-17 RX ADMIN — LIDOCAINE HYDROCHLORIDE 10 ML: 10 INJECTION, SOLUTION EPIDURAL; INFILTRATION; INTRACAUDAL; PERINEURAL at 14:14

## 2021-12-17 RX ADMIN — TRIAMCINOLONE ACETONIDE 40 MG: 40 INJECTION, SUSPENSION INTRA-ARTICULAR; INTRAMUSCULAR at 14:18

## 2021-12-17 NOTE — PROGRESS NOTES
RADIOLOGY PROCEDURE NOTE  Patient name: Sara Dominguez  MRN: [id number]  : 1950    Pre-procedure diagnosis: Pain.  Post-procedure diagnosis: Same    Procedure Date/Time: 2021  2:35 PM  Procedure: Symphysis pubis steroid  injection.  Estimated blood loss: None  Specimen(s) collected with description: None.  The patient tolerated the procedure well with no immediate complications.    See imaging dictation for procedural details.    Provider name: Andi Cross MD  Assistant(s):None

## 2021-12-21 NOTE — ADDENDUM NOTE
Addended by: NAMITA DOAN on: 8/30/2021 11:56 AM     Modules accepted: Orders     Please see other message recommend calcium cardiac scan and referral to cardiology or start statin if abnormal. Carolina Quiñones MD

## 2022-01-05 DIAGNOSIS — G89.3 CANCER RELATED PAIN: ICD-10-CM

## 2022-01-05 RX ORDER — OXYCODONE AND ACETAMINOPHEN 10; 325 MG/1; MG/1
1 TABLET ORAL EVERY 4 HOURS PRN
Qty: 100 TABLET | Refills: 0 | Status: SHIPPED | OUTPATIENT
Start: 2022-01-05 | End: 2022-02-03

## 2022-01-10 ENCOUNTER — MYC MEDICAL ADVICE (OUTPATIENT)
Dept: ORTHOPEDICS | Facility: CLINIC | Age: 72
End: 2022-01-10
Payer: COMMERCIAL

## 2022-02-02 ENCOUNTER — LAB (OUTPATIENT)
Dept: LAB | Facility: CLINIC | Age: 72
End: 2022-02-02
Payer: MEDICARE

## 2022-02-02 DIAGNOSIS — Z85.048 PERSONAL HISTORY OF OTHER MALIGNANT NEOPLASM OF RECTUM, RECTOSIGMOID JUNCTION, AND ANUS: ICD-10-CM

## 2022-02-02 LAB
ALBUMIN SERPL-MCNC: 4.2 G/DL (ref 3.4–5)
ALP SERPL-CCNC: 119 U/L (ref 40–150)
ALT SERPL W P-5'-P-CCNC: 28 U/L (ref 0–50)
ANION GAP SERPL CALCULATED.3IONS-SCNC: 7 MMOL/L (ref 3–14)
AST SERPL W P-5'-P-CCNC: 24 U/L (ref 0–45)
BASOPHILS # BLD AUTO: 0 10E3/UL (ref 0–0.2)
BASOPHILS NFR BLD AUTO: 0 %
BILIRUB SERPL-MCNC: 0.6 MG/DL (ref 0.2–1.3)
BUN SERPL-MCNC: 18 MG/DL (ref 7–30)
CALCIUM SERPL-MCNC: 9 MG/DL (ref 8.5–10.1)
CEA SERPL-MCNC: 4.3 UG/L (ref 0–2.5)
CHLORIDE BLD-SCNC: 103 MMOL/L (ref 94–109)
CO2 SERPL-SCNC: 28 MMOL/L (ref 20–32)
CREAT SERPL-MCNC: 0.84 MG/DL (ref 0.52–1.04)
EOSINOPHIL # BLD AUTO: 0.1 10E3/UL (ref 0–0.7)
EOSINOPHIL NFR BLD AUTO: 1 %
ERYTHROCYTE [DISTWIDTH] IN BLOOD BY AUTOMATED COUNT: 14.3 % (ref 10–15)
GFR SERPL CREATININE-BSD FRML MDRD: 73 ML/MIN/1.73M2
GLUCOSE BLD-MCNC: 129 MG/DL (ref 70–99)
HCT VFR BLD AUTO: 37.7 % (ref 35–47)
HGB BLD-MCNC: 12.6 G/DL (ref 11.7–15.7)
LYMPHOCYTES # BLD AUTO: 0.9 10E3/UL (ref 0.8–5.3)
LYMPHOCYTES NFR BLD AUTO: 16 %
MCH RBC QN AUTO: 31 PG (ref 26.5–33)
MCHC RBC AUTO-ENTMCNC: 33.4 G/DL (ref 31.5–36.5)
MCV RBC AUTO: 93 FL (ref 78–100)
MONOCYTES # BLD AUTO: 0.5 10E3/UL (ref 0–1.3)
MONOCYTES NFR BLD AUTO: 9 %
NEUTROPHILS # BLD AUTO: 4.1 10E3/UL (ref 1.6–8.3)
NEUTROPHILS NFR BLD AUTO: 74 %
PLATELET # BLD AUTO: 230 10E3/UL (ref 150–450)
POTASSIUM BLD-SCNC: 4.2 MMOL/L (ref 3.4–5.3)
PROT SERPL-MCNC: 7.8 G/DL (ref 6.8–8.8)
RBC # BLD AUTO: 4.06 10E6/UL (ref 3.8–5.2)
SODIUM SERPL-SCNC: 138 MMOL/L (ref 133–144)
WBC # BLD AUTO: 5.6 10E3/UL (ref 4–11)

## 2022-02-02 PROCEDURE — 85025 COMPLETE CBC W/AUTO DIFF WBC: CPT

## 2022-02-02 PROCEDURE — 80053 COMPREHEN METABOLIC PANEL: CPT

## 2022-02-02 PROCEDURE — 36415 COLL VENOUS BLD VENIPUNCTURE: CPT

## 2022-02-02 PROCEDURE — 82378 CARCINOEMBRYONIC ANTIGEN: CPT

## 2022-02-03 DIAGNOSIS — G89.3 CANCER RELATED PAIN: ICD-10-CM

## 2022-02-03 DIAGNOSIS — S32.591K: ICD-10-CM

## 2022-02-03 DIAGNOSIS — M89.9 BONE LESION: Primary | ICD-10-CM

## 2022-02-03 RX ORDER — OXYCODONE AND ACETAMINOPHEN 10; 325 MG/1; MG/1
1 TABLET ORAL EVERY 4 HOURS PRN
Qty: 100 TABLET | Refills: 0 | Status: SHIPPED | OUTPATIENT
Start: 2022-02-03 | End: 2022-03-01

## 2022-02-09 ENCOUNTER — ONCOLOGY VISIT (OUTPATIENT)
Dept: ONCOLOGY | Facility: CLINIC | Age: 72
End: 2022-02-09
Attending: NURSE PRACTITIONER
Payer: MEDICARE

## 2022-02-09 VITALS
HEART RATE: 77 BPM | TEMPERATURE: 98.3 F | WEIGHT: 173 LBS | DIASTOLIC BLOOD PRESSURE: 73 MMHG | SYSTOLIC BLOOD PRESSURE: 149 MMHG | BODY MASS INDEX: 27.92 KG/M2 | OXYGEN SATURATION: 98 %

## 2022-02-09 DIAGNOSIS — C21.0 SQUAMOUS CELL CARCINOMA OF ANUS (H): Primary | ICD-10-CM

## 2022-02-09 DIAGNOSIS — S32.509S CLOSED FRACTURE OF PUBIS, UNSPECIFIED PORTION OF PUBIS, UNSPECIFIED LATERALITY, SEQUELA: ICD-10-CM

## 2022-02-09 DIAGNOSIS — C20 MALIGNANT NEOPLASM OF RECTUM (H): ICD-10-CM

## 2022-02-09 PROCEDURE — G0463 HOSPITAL OUTPT CLINIC VISIT: HCPCS

## 2022-02-09 PROCEDURE — 99215 OFFICE O/P EST HI 40 MIN: CPT | Performed by: NURSE PRACTITIONER

## 2022-02-09 RX ORDER — ACETAMINOPHEN 160 MG
TABLET,DISINTEGRATING ORAL
COMMUNITY
Start: 2021-12-27

## 2022-02-09 NOTE — LETTER
2/9/2022         RE: Sara Dominguez  [address]  [facility] [address]        Dear Colleague,    Thank you for referring your patient, Sara Dominguez, to the [facility] [place]. Please see a copy of my visit note below.    [facility] Hematology and Oncology Outpatient Progress Note ([place])    Patient: Sara Dominguez  MRN: [id number]  Date of Service: 02/09/2022        Reason for Visit    1. Anal cancer      Assessment/Plan  1. Anal cancer  Clinically, no new concerns for recurrence.    PET scan in August showed questionable uptake at site of primary mass, likely inflammatory. Subsequent direct visualization by Colorectal Surgery was negative for recurrence.   Benign pelvic fractures, biopsy has been negative. No other evident recurrence.     While CEA is mildly elevated today, there is no prior reference (pre-treatment or post-treatment) so this may be a normal variant.     Plan:  -Return in 6 months with annual PET scan and labs/CEA.   -Continue routine follow-up with Colorectal Surgery for exam.     2.  Benign insufficiency pelvic fractures/sciatica  3.  Osteoporosis  Following Ortho. Biopsy pelvis benign fibrosis. Steroid injection a few months ago was helpful, but pain starting to return so steroid injection planned. Also, uses gabapentin and Percocet (2/day) with benefit.   On Fosamax and Ca/Vit D.    Plan:  -Continue follow-up with Ortho  -Planning 2nd steroid injection. Surgery deferred for now  -Dr. Khan managing pain meds. Query if she may benefit from oral-antiinflammatory such as Celebrex since she responds to corticosteroid injections. She follows up with Dr. Khan next month and will defer mgmt to him.   -Osteoporosis mgmt per PCP    ______________________________________________________________________________    History of Present Illness/ Interval History    Ms. Sara Dominguez  is a 71 year old treated for anal cancer with concurrent  chemoRT almost 3 years ago, returning for routine 6-month visit.     Since her visit in July, she's been doing generally well with stable symptoms. Her main issue has been pain related to chronic pelvic insufficiency fracture. Follows Ortho. Underwent steroid injection in December with 6-week benefit, but pain is starting to return and considering a 2nd steroid injection in near future. Deferring surgical intervention since liklihood of benefit may be low. Gabapentin and Percocet on occasion, managed by Dr. Khan. On Fosamax and ca/vit D for osteoporosis.    In August, PET scan showed mild uptake at prior anorectal mass site which was indeterminate for local recurrence vs inflammation. No distant disease. Post-traumatic insufficiency fracture changes in pelvis favored as benign. Biopsy of pubic ramus negative for cancer nor infection (fibrosis).     Colorectal Surgery exam for direct visualization negative evident recurrence. 6-month follow-up planned.     No GI symptoms, change in stools. Occasional bright blood with wiping secondary to a skin tag per Colorectal assessment, no changes. No rectal pain.  Weight stable/up, good appetite.  Working PT as hospital   Prior leg lymphedema post-RT, has worked with PT. Resolved with time.    ECOG Performance    0-1      Oncology History/Treatment  Diagnosis/Stage:   7/2019: anal cancer, squamous cell ca T2N1M0  -rectal pain/pressure, palpable rectal mass  -Colonoscopy: 8 mm mucosal abnormality above the dentate line at anterior wall facing her vagina where the large mass was felt underneath. Biopsy + invasive mod differentiated squamous cell cancer involving rectal glandular mucosa.   -Gyne exam normal; no vaginal mucosal abnormality but nodular 3-4 cm mass palpate at rectovaginal area not as well appreciated on digital rectal exam  -MRI pelvis: 2.6 cm mass between distal rectum and vagina appearing to arise exophytically from distal rectum, single mildly enlarged  mesorectal LN 8 mm. No pelvic sidewall adenopathy or other mets noted  -PET: distal rectal rectum/anus and no mets    Treatment:  No surgical candidate    8 - 10/2019: concurrent chemoRT with mitomycin and 5FU  -resulted in CR      Physical Exam    GENERAL: Alert and oriented to time place and person. Seated comfortably. In no distress.  HEAD: Atraumatic and normocephalic. No alopecia.  EYES: LANDON, EOMI. No erythema. No icterus.  LYMPH NODES: No palpable supraclavicular, cervical, or inguinal lymphadenopathy.  CHEST: clear to auscultation bilaterally. Resonant to percussion throughout bilaterally. Symmetrical breath movements bilaterally.  CVS: S1 and S2 are heard. Regular rate and rhythm. No murmur or gallop or rub heard.  ABDOMEN: Soft. Not tender. Not distended. No palpable hepatomegaly or splenomegaly. No other mass palpable. Bowel sounds present.  EXTREMITIES: Warm. No peripheral edema.  SKIN: no rash, or bruising or purpura.   NEURO: No gross deficit noted. Non-antalgic gait.      Lab Results    Recent Results (from the past 168 hour(s))   Comprehensive metabolic panel   Result Value Ref Range    Sodium 138 133 - 144 mmol/L    Potassium 4.2 3.4 - 5.3 mmol/L    Chloride 103 94 - 109 mmol/L    Carbon Dioxide (CO2) 28 20 - 32 mmol/L    Anion Gap 7 3 - 14 mmol/L    Urea Nitrogen 18 7 - 30 mg/dL    Creatinine 0.84 0.52 - 1.04 mg/dL    Calcium 9.0 8.5 - 10.1 mg/dL    Glucose 129 (H) 70 - 99 mg/dL    Alkaline Phosphatase 119 40 - 150 U/L    AST 24 0 - 45 U/L    ALT 28 0 - 50 U/L    Protein Total 7.8 6.8 - 8.8 g/dL    Albumin 4.2 3.4 - 5.0 g/dL    Bilirubin Total 0.6 0.2 - 1.3 mg/dL    GFR Estimate 73 >60 mL/min/1.73m2   CEA   Result Value Ref Range    CEA 4.3 (H) 0.0 - 2.5 ug/L   CBC with platelets and differential   Result Value Ref Range    WBC Count 5.6 4.0 - 11.0 10e3/uL    RBC Count 4.06 3.80 - 5.20 10e6/uL    Hemoglobin 12.6 11.7 - 15.7 g/dL    Hematocrit 37.7 35.0 - 47.0 %    MCV 93 78 - 100 fL    MCH 31.0  "26.5 - 33.0 pg    MCHC 33.4 31.5 - 36.5 g/dL    RDW 14.3 10.0 - 15.0 %    Platelet Count 230 150 - 450 10e3/uL    % Neutrophils 74 %    % Lymphocytes 16 %    % Monocytes 9 %    % Eosinophils 1 %    % Basophils 0 %    Absolute Neutrophils 4.1 1.6 - 8.3 10e3/uL    Absolute Lymphocytes 0.9 0.8 - 5.3 10e3/uL    Absolute Monocytes 0.5 0.0 - 1.3 10e3/uL    Absolute Eosinophils 0.1 0.0 - 0.7 10e3/uL    Absolute Basophils 0.0 0.0 - 0.2 10e3/uL       Imaging    No results found.    Billing  Total time: 40 min; including review of EMR, reports, diagnostics and ordering/coordination of care    Signed by: Marissa Monsalve NP      Oncology Rooming Note    February 9, 2022 10:11 AM   Sara Dominguez is a 71 year old female who presents for:    Chief Complaint   Patient presents with     Oncology Clinic Visit     6 month follow up anal cancer.  Review lab results.  Provider visit only     Initial Vitals: BP (!) 149/73 (BP Location: Right arm, Patient Position: Sitting, Cuff Size: Adult Regular)   Pulse 77   Temp 98.3  F (36.8  C) (Oral)   Wt 78.5 kg (173 lb)   SpO2 98%   BMI 27.92 kg/m   Estimated body mass index is 27.92 kg/m  as calculated from the following:    Height as of 9/15/21: 1.676 m (5' 6\").    Weight as of this encounter: 78.5 kg (173 lb). Body surface area is 1.91 meters squared.  Data Unavailable Comment: Data Unavailable   No LMP recorded. Patient is postmenopausal.  Allergies reviewed: Yes  Medications reviewed: Yes    Medications: Medication refills not needed today.  Pharmacy name entered into [facility]:    [facility], [place] - [address]  [facility] [place], [place] - [address] [place] [address]    Clinical concerns:  None      Rody Horne [facility]                Again, thank you for allowing me to participate in the care of your patient.        Sincerely,        Marissa Monsalve NP    "

## 2022-02-09 NOTE — PROGRESS NOTES
"Oncology Rooming Note    February 9, 2022 10:11 AM   Sara Dominguez is a 71 year old female who presents for:    Chief Complaint   Patient presents with     Oncology Clinic Visit     6 month follow up anal cancer.  Review lab results.  Provider visit only     Initial Vitals: BP (!) 149/73 (BP Location: Right arm, Patient Position: Sitting, Cuff Size: Adult Regular)   Pulse 77   Temp 98.3  F (36.8  C) (Oral)   Wt 78.5 kg (173 lb)   SpO2 98%   BMI 27.92 kg/m   Estimated body mass index is 27.92 kg/m  as calculated from the following:    Height as of 9/15/21: 1.676 m (5' 6\").    Weight as of this encounter: 78.5 kg (173 lb). Body surface area is 1.91 meters squared.  Data Unavailable Comment: Data Unavailable   No LMP recorded. Patient is postmenopausal.  Allergies reviewed: Yes  Medications reviewed: Yes    Medications: Medication refills not needed today.  Pharmacy name entered into [facility]:    [facility] [place] [facility] [place], [place] - [address] [id number] [address]  [facility] [place] [facility] [place], [place] - [address]    Clinical concerns:  None      Rody Horne CMA            "

## 2022-02-09 NOTE — PROGRESS NOTES
[facility] Hematology and Oncology Outpatient Progress Note ([place])    Patient: Sara Dominguez  MRN: [id number]  Date of Service: 02/09/2022        Reason for Visit    1. Anal cancer      Assessment/Plan  1. Anal cancer  Clinically, no new concerns for recurrence.    PET scan in August showed questionable uptake at site of primary mass, likely inflammatory. Subsequent direct visualization by Colorectal Surgery was negative for recurrence.   Benign pelvic fractures, biopsy has been negative. No other evident recurrence.     While CEA is mildly elevated today, there is no prior reference (pre-treatment or post-treatment) so this may be a normal variant.     Plan:  -Return in 6 months with annual PET scan and labs/CEA.   -Continue routine follow-up with Colorectal Surgery for exam.     2.  Benign insufficiency pelvic fractures/sciatica  3.  Osteoporosis  Following Ortho. Biopsy pelvis benign fibrosis. Steroid injection a few months ago was helpful, but pain starting to return so steroid injection planned. Also, uses gabapentin and Percocet (2/day) with benefit.   On Fosamax and Ca/Vit D.    Plan:  -Continue follow-up with Ortho  -Planning 2nd steroid injection. Surgery deferred for now  -Dr. Khan managing pain meds. Query if she may benefit from oral-antiinflammatory such as Celebrex since she responds to corticosteroid injections. She follows up with Dr. Khan next month and will defer mgmt to him.   -Osteoporosis mgmt per PCP    ______________________________________________________________________________    History of Present Illness/ Interval History    Ms. Sara Dominguez  is a 71 year old treated for anal cancer with concurrent chemoRT almost 3 years ago, returning for routine 6-month visit.     Since her visit in July, she's been doing generally well with stable symptoms. Her main issue has been pain related to chronic pelvic insufficiency fracture. Follows Ortho. Underwent steroid injection  in December with 6-week benefit, but pain is starting to return and considering a 2nd steroid injection in near future. Deferring surgical intervention since liklihood of benefit may be low. Gabapentin and Percocet on occasion, managed by Dr. Khan. On Fosamax and ca/vit D for osteoporosis.    In August, PET scan showed mild uptake at prior anorectal mass site which was indeterminate for local recurrence vs inflammation. No distant disease. Post-traumatic insufficiency fracture changes in pelvis favored as benign. Biopsy of pubic ramus negative for cancer nor infection (fibrosis).     Colorectal Surgery exam for direct visualization negative evident recurrence. 6-month follow-up planned.     No GI symptoms, change in stools. Occasional bright blood with wiping secondary to a skin tag per Colorectal assessment, no changes. No rectal pain.  Weight stable/up, good appetite.  Working PT as hospital   Prior leg lymphedema post-RT, has worked with PT. Resolved with time.    ECOG Performance    0-1      Oncology History/Treatment  Diagnosis/Stage:   7/2019: anal cancer, squamous cell ca T2N1M0  -rectal pain/pressure, palpable rectal mass  -Colonoscopy: 8 mm mucosal abnormality above the dentate line at anterior wall facing her vagina where the large mass was felt underneath. Biopsy + invasive mod differentiated squamous cell cancer involving rectal glandular mucosa.   -Gyne exam normal; no vaginal mucosal abnormality but nodular 3-4 cm mass palpate at rectovaginal area not as well appreciated on digital rectal exam  -MRI pelvis: 2.6 cm mass between distal rectum and vagina appearing to arise exophytically from distal rectum, single mildly enlarged mesorectal LN 8 mm. No pelvic sidewall adenopathy or other mets noted  -PET: distal rectal rectum/anus and no mets    Treatment:  No surgical candidate    8 - 10/2019: concurrent chemoRT with mitomycin and 5FU  -resulted in CR      Physical Exam    GENERAL: Alert and  oriented to time place and person. Seated comfortably. In no distress.  HEAD: Atraumatic and normocephalic. No alopecia.  EYES: LANDON, EOMI. No erythema. No icterus.  LYMPH NODES: No palpable supraclavicular, cervical, or inguinal lymphadenopathy.  CHEST: clear to auscultation bilaterally. Resonant to percussion throughout bilaterally. Symmetrical breath movements bilaterally.  CVS: S1 and S2 are heard. Regular rate and rhythm. No murmur or gallop or rub heard.  ABDOMEN: Soft. Not tender. Not distended. No palpable hepatomegaly or splenomegaly. No other mass palpable. Bowel sounds present.  EXTREMITIES: Warm. No peripheral edema.  SKIN: no rash, or bruising or purpura.   NEURO: No gross deficit noted. Non-antalgic gait.      Lab Results    Recent Results (from the past 168 hour(s))   Comprehensive metabolic panel   Result Value Ref Range    Sodium 138 133 - 144 mmol/L    Potassium 4.2 3.4 - 5.3 mmol/L    Chloride 103 94 - 109 mmol/L    Carbon Dioxide (CO2) 28 20 - 32 mmol/L    Anion Gap 7 3 - 14 mmol/L    Urea Nitrogen 18 7 - 30 mg/dL    Creatinine 0.84 0.52 - 1.04 mg/dL    Calcium 9.0 8.5 - 10.1 mg/dL    Glucose 129 (H) 70 - 99 mg/dL    Alkaline Phosphatase 119 40 - 150 U/L    AST 24 0 - 45 U/L    ALT 28 0 - 50 U/L    Protein Total 7.8 6.8 - 8.8 g/dL    Albumin 4.2 3.4 - 5.0 g/dL    Bilirubin Total 0.6 0.2 - 1.3 mg/dL    GFR Estimate 73 >60 mL/min/1.73m2   CEA   Result Value Ref Range    CEA 4.3 (H) 0.0 - 2.5 ug/L   CBC with platelets and differential   Result Value Ref Range    WBC Count 5.6 4.0 - 11.0 10e3/uL    RBC Count 4.06 3.80 - 5.20 10e6/uL    Hemoglobin 12.6 11.7 - 15.7 g/dL    Hematocrit 37.7 35.0 - 47.0 %    MCV 93 78 - 100 fL    MCH 31.0 26.5 - 33.0 pg    MCHC 33.4 31.5 - 36.5 g/dL    RDW 14.3 10.0 - 15.0 %    Platelet Count 230 150 - 450 10e3/uL    % Neutrophils 74 %    % Lymphocytes 16 %    % Monocytes 9 %    % Eosinophils 1 %    % Basophils 0 %    Absolute Neutrophils 4.1 1.6 - 8.3 10e3/uL     Absolute Lymphocytes 0.9 0.8 - 5.3 10e3/uL    Absolute Monocytes 0.5 0.0 - 1.3 10e3/uL    Absolute Eosinophils 0.1 0.0 - 0.7 10e3/uL    Absolute Basophils 0.0 0.0 - 0.2 10e3/uL       Imaging    No results found.    Billing  Total time: 40 min; including review of EMR, reports, diagnostics and ordering/coordination of care    Signed by: Marissa Monsalve NP

## 2022-02-15 ENCOUNTER — TELEPHONE (OUTPATIENT)
Dept: ORTHOPEDICS | Facility: CLINIC | Age: 72
End: 2022-02-15
Payer: COMMERCIAL

## 2022-02-15 DIAGNOSIS — M89.9 BONE LESION: ICD-10-CM

## 2022-02-15 RX ORDER — DIAZEPAM 5 MG
5 TABLET ORAL EVERY 6 HOURS PRN
Qty: 1 TABLET | Refills: 0 | Status: SHIPPED | OUTPATIENT
Start: 2022-02-15 | End: 2022-10-13

## 2022-02-24 ENCOUNTER — TELEPHONE (OUTPATIENT)
Dept: MEDSURG UNIT | Facility: CLINIC | Age: 72
End: 2022-02-24
Payer: COMMERCIAL

## 2022-02-25 ENCOUNTER — TELEPHONE (OUTPATIENT)
Dept: MEDSURG UNIT | Facility: CLINIC | Age: 72
End: 2022-02-25
Payer: COMMERCIAL

## 2022-02-28 ENCOUNTER — HOSPITAL ENCOUNTER (OUTPATIENT)
Dept: CT IMAGING | Facility: CLINIC | Age: 72
Discharge: HOME | End: 2022-02-28
Attending: PHYSICIAN ASSISTANT | Admitting: PHYSICIAN ASSISTANT
Payer: MEDICARE

## 2022-02-28 VITALS — SYSTOLIC BLOOD PRESSURE: 141 MMHG | DIASTOLIC BLOOD PRESSURE: 75 MMHG

## 2022-02-28 DIAGNOSIS — S32.591K: ICD-10-CM

## 2022-02-28 DIAGNOSIS — M89.9 BONE LESION: ICD-10-CM

## 2022-02-28 PROCEDURE — 250N000009 HC RX 250: Performed by: PHYSICIAN ASSISTANT

## 2022-02-28 PROCEDURE — 250N000011 HC RX IP 250 OP 636: Performed by: PHYSICIAN ASSISTANT

## 2022-02-28 PROCEDURE — 272N000431 CT JOINT INJECTION ASPIRATION MAJOR

## 2022-02-28 RX ORDER — BUPIVACAINE HYDROCHLORIDE 5 MG/ML
30 INJECTION, SOLUTION PERINEURAL ONCE
Status: COMPLETED | OUTPATIENT
Start: 2022-02-28 | End: 2022-02-28

## 2022-02-28 RX ORDER — TRIAMCINOLONE ACETONIDE 40 MG/ML
80 INJECTION, SUSPENSION INTRA-ARTICULAR; INTRAMUSCULAR ONCE
Status: COMPLETED | OUTPATIENT
Start: 2022-02-28 | End: 2022-02-28

## 2022-02-28 RX ADMIN — LIDOCAINE HYDROCHLORIDE 5 ML: 10 INJECTION, SOLUTION EPIDURAL; INFILTRATION; INTRACAUDAL; PERINEURAL at 13:36

## 2022-02-28 RX ADMIN — TRIAMCINOLONE ACETONIDE 80 MG: 40 INJECTION, SUSPENSION INTRA-ARTICULAR; INTRAMUSCULAR at 13:37

## 2022-02-28 RX ADMIN — BUPIVACAINE HYDROCHLORIDE 2 ML: 5 INJECTION, SOLUTION EPIDURAL; INTRACAUDAL; PERINEURAL at 13:35

## 2022-02-28 NOTE — PROCEDURES
[facility]    Procedure: CT Pubic symphysis steroid injection    Date/Time: 2/28/2022 1:46 PM  Performed by: Eugenio Merino PA-C  Authorized by: Eugenio Merino PA-C       UNIVERSAL PROTOCOL   Site Marked: Yes  Prior Images Obtained and Reviewed:  Yes  Required items: Required blood products, implants, devices and special equipment available    Patient identity confirmed:  Verbally with patient  Patient was reevaluated immediately before administering moderate or deep sedation or anesthesia  Confirmation Checklist:  Patient's identity using two indicators, relevant allergies, procedure was appropriate and matched the consent or emergent situation and correct equipment/implants were available  Time out: Immediately prior to the procedure a time out was called    Universal Protocol: the Joint Commission Universal Protocol was followed    Preparation: Patient was prepped and draped in usual sterile fashion       ANESTHESIA    Local Anesthetic: Lidocaine 1% without epinephrine and bupivacaine 0.5% without epinephrine      SEDATION    Patient Sedated: No    See dictated procedure note for full details.    PROCEDURE  Describe Procedure: 80 mg of steroid per request  2 ml Marcaine 0.5%  Patient Tolerance:  Patient tolerated the procedure well with no immediate complications  Length of time physician/provider present for 1:1 monitoring during sedation: 0

## 2022-02-28 NOTE — DISCHARGE INSTRUCTIONS
Orthopedic Discharge Instructions:   After Your Injection or Aspiration  ________________________________________    Patient Name:  Sara Dominguez  Today's Date:  February 28, 2022  The doctor who performed your Joint Injection was Eugenio Merino at [facility] in the  CT  Department  Care of needle site    If you have new numbness down your leg, this may last up to 6 hours, but it should go away. You may need help with walking until your leg feels normal.     Over the next 24 to 48 hours, pain at the needle site may increase before it gets better.     For the next 48 hours, use ice packs for 15 minutes, three to four times a day for pain.    If you have a bandage, you may remove it the next morning.     No tub baths, hot tubs or swimming for 48 hours. You may shower the next day.   Activity    Do not drive until morning.     Limit your activity based on your pain level. Follow your doctor s orders for activity.     You may eat a normal diet.     If you had sedation,   - You may feel sleepy, forgetful or unsteady.   - Do not drink alcohol for 24 hours.  Medicines    If you take aspirin or platelet inhibitors, you can restart them tomorrow.     Restart all other medicines today at your regular dose, including Coumadin (warfarin).    If you are restarting Coumadin, talk to your doctor about having your INR checked.   If you had a steroid shot     The medicine should help reduce swelling and pain. This may take from 7 to 10 days.     Side effects from the shot will be mild and go away in 2 to 3 days. Common side effects may include:  -  Insomnia (trouble sleeping).  -  Heartburn.  -  Flushed face.  -  Water retention (bloating or fluid build-up).  -  Increased appetite (feeling more hungry than usual).  -  Increased blood sugar.  If you have diabetes, watch your blood sugar closely. If needed, call your doctor to help you control your blood sugar.  Some patients will get lasting relief from a  single shot. Others may require up to three shots to get results. If you have more than one steroid shot, they should be given two weeks apart.  Some patients do not have relief of symptoms.    Follow-up:  No follow-up needed     Call your doctor  or go to the Emergency Room if you have severe pain, fever or problems with bowel or bladder control.

## 2022-03-01 DIAGNOSIS — G89.3 CANCER RELATED PAIN: ICD-10-CM

## 2022-03-01 RX ORDER — OXYCODONE AND ACETAMINOPHEN 10; 325 MG/1; MG/1
1 TABLET ORAL EVERY 4 HOURS PRN
Qty: 100 TABLET | Refills: 0 | Status: SHIPPED | OUTPATIENT
Start: 2022-03-01 | End: 2022-03-31

## 2022-03-31 ENCOUNTER — TELEPHONE (OUTPATIENT)
Dept: RADIATION THERAPY | Facility: OUTPATIENT CENTER | Age: 72
End: 2022-03-31
Payer: COMMERCIAL

## 2022-03-31 DIAGNOSIS — G89.3 CANCER RELATED PAIN: ICD-10-CM

## 2022-03-31 RX ORDER — OXYCODONE AND ACETAMINOPHEN 10; 325 MG/1; MG/1
1 TABLET ORAL EVERY 4 HOURS PRN
Qty: 100 TABLET | Refills: 0 | Status: SHIPPED | OUTPATIENT
Start: 2022-03-31 | End: 2022-04-12

## 2022-03-31 NOTE — TELEPHONE ENCOUNTER
Patient called to request percocet refill. Pt with hx of insuffiencey fracture possible secondary to radiation to pelvis for anal cancer treatment.  Dr Khan has been managing this pain. Pt reports generally taking 1 tablet 2x/day, some days if more pain 3x/day.  Gina is aware Dr. Khan wants to talk through other ideas/recommendations for pain management at next visit on 4/12/22.  Refill sent today.

## 2022-04-12 ENCOUNTER — VIRTUAL VISIT (OUTPATIENT)
Dept: RADIATION THERAPY | Facility: OUTPATIENT CENTER | Age: 72
End: 2022-04-12
Payer: COMMERCIAL

## 2022-04-12 DIAGNOSIS — G89.3 CANCER RELATED PAIN: ICD-10-CM

## 2022-04-12 DIAGNOSIS — S32.509S: Primary | ICD-10-CM

## 2022-04-12 DIAGNOSIS — C21.1 MALIGNANT NEOPLASM OF ANAL CANAL (H): ICD-10-CM

## 2022-04-12 RX ORDER — OXYCODONE AND ACETAMINOPHEN 10; 325 MG/1; MG/1
1 TABLET ORAL EVERY 4 HOURS PRN
Qty: 100 TABLET | Refills: 0 | Status: SHIPPED | OUTPATIENT
Start: 2022-04-12 | End: 2022-05-31

## 2022-04-12 RX ORDER — PENTOXIFYLLINE 400 MG/1
400 TABLET, EXTENDED RELEASE ORAL
Qty: 90 TABLET | Refills: 3 | Status: SHIPPED | OUTPATIENT
Start: 2022-04-12 | End: 2022-09-16

## 2022-04-12 RX ORDER — VITAMIN E 268 MG
400 CAPSULE ORAL 3 TIMES DAILY
Qty: 90 CAPSULE | Refills: 3 | Status: SHIPPED | OUTPATIENT
Start: 2022-04-12 | End: 2022-09-16

## 2022-04-12 NOTE — PROGRESS NOTES
Department of Radiation Oncology  Radiation Therapy Center  [facility] [place] [address]  [phone]       Radiation Oncology Follow-up Visit  2022    Sara Dominguez  MRN: [id number]   : 1950     DIAGNOSIS: squamous cell carcinoma of the anal canal  INTENT OF RADIOTHERAPY: definitive CRT.  PATHOLOGY:  moderately differentiated squamous cell carcinoma                              STAGE:  clinical [id number] (anna-rectal nodes).    CONCURRENT SYSTEMIC THERAPY: 5 FU and mitomycin     ONCOLOGIC HISTORY:    Ms. Dominguez is a 71 year old female diagnosed wquamous cell carcinoma of the anal canal, clinical [id number] (anna-rectal nodes).       The patient initially presented with symptoms of rectal pain pressure prompting further evaluation.  She was seen by gastroenterologist Dr. Aguirre who on digital rectal exam noticed a mass in the anterior wall of the anal rectal region, measuring 3.4 cm in size.  Patient eventually underwent colonoscopy on 2019 which demonstrated an 8 mm mucosal abnormality just above the dentate line.  The mass was noted to be in the anterior wall facing the vagina.  Biopsy of the mass demonstrated moderately differentiated squamous cell carcinoma.  The patient was referred to Gyn/onc who on exam palpated a 3 to 4 cm rectovaginal mass.  No vaginal or cervical mass was noted.  2019 the patient underwent MRI of the pelvis.  Imaging demonstrated 2.6 x 2.5 x 2.5 cm mass located in the distal rectum/anal canal region.  The mass was noted to arise exophytically from the distal rectum.  There were suspicious mesorectal lymph nodes measuring subcentimeter in size. No pelvic adenopathy was noted.  On 8/3/2019 the patient underwent a PET scan.  Imaging did not demonstrate any evidence of distant disease. Hypermetabolic 3.3 x 4.1 cm mass seen in the distal rectum/anus compatible with underlying malignancy. Per Dr. Khan review of imaging, also  noted suspicious perirectal adenopathy.  The patient was subsequent seen by Dr. Aquino who discussed definitive treatment with chemoradiation.      SITE OF TREATMENT: anal/pelvis     DATES  OF TREATMENT: 8/22/19-10/09/19     TOTAL DOSE OF TREATMENT: 5400 cGy     DOSE PER FRACTION OF TREATMENT: 180 cGy x 30 fractions    INTERVAL SINCE COMPLETION OF RADIATION THERAPY:   30 months     SUBJECTIVE:   In the interval, the patient was seen by Dr. Murphy on January 2022. Per report, on examination there was no clear evidence of recurrent/ residual disease.     Post treatment PET on 4/9/20 was TYLER. Last PET on 7/29/21 demonstrated no clear evidence of recurrent disease.     Unfortunately posttreatment the patient has developed pelvic insufficiency fractures in the sacrum as well as right pubic bone.  She has been following with Dr. Mortensen with treatment of physical therapy, vitamin D, calcium,  Fosamax, and pain management.  She is noticed some improvement but yet not completely.  She has also met with orthopedic team (Dr. Marcial) to help in further management. She underwent biopsy of soft tissue mass in pelvis on 9/15/21, which was negative for malignancy. She has met Dr. Marcial to discuss any potential options for pelvic fractures. He discussed possibility of surgical intervention, but patient is not interested in surgery at this point. She has underwent steroid injection x 2 with some alleviation.     LABS AND IMAGING:  Reviewed.    IMPRESSION:   Ms. 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 She completed definitive CRT to a total dose of 54 Gy in 30 fractions on 10/9/19.    PLAN:   1.  Clinically and radiographically TYLER. Last exam on January 2022 was TYLER. Post treatment PET on 4/9/20 was TYLER. Last PET on 7/29/21 demonstrated no clear evidence of recurrent disease.     2. Chronic late toxicity. Has developed sacral insufficiency fracture of  the sacrum and R pubic bone post treatment. I discussed that these changes are likely related to post RT treatment with weakening of the bone.  Working with pain team and orthopedic team as well. Discussed consideration of referral to oncologic rehab team () as a potential option to help further manage,  patient is in agreement. I additionally discussed consideration of trial of Vit E + pentoxifylline in effort to help promote healing.    3. Continue follow up for surveillance with surgery (Dr. Murphy).     4.  Continue follow up with medical oncology team. Current plan is to re-image with re-staging scans in August 2022.     5. RTC in 3 months.       Justin Khan M.D.  Department of Radiation Oncology  [facility]     Due to the concerns around COVID-19 and adhering to social distancing we conduct this visit over the telephone. Telephone call lasted 15 minutes.

## 2022-04-12 NOTE — NURSING NOTE
Gina is a 71 year old who is being evaluated via a billable telephone visit.      What phone number would you like to be contacted at? home  How would you like to obtain your AVS? Xiaoi Robertt  Phone call duration: 10 minutes  Scheduled telephone visit with Dr. Khan.

## 2022-04-12 NOTE — LETTER
2022         RE: Sara Dominguez  [address]  [facility] [address]        Dear Colleague,    Thank you for referring your patient, Sara Dominguez, to the RADIATION TH[facility]APY CENTER. Please see a copy of my visit note below.       Department of Radiation Oncology  Radiation Therapy Center  [facility]  [place] [address]  [phone]       Radiation Oncology Follow-up Visit  2022    Sara Dominguez  MRN: [id number]   : 1950     DIAGNOSIS: squamous cell carcinoma of the anal canal  INTENT OF RADIOTHERAPY: definitive CRT.  PATHOLOGY:  moderately differentiated squamous cell carcinoma                              STAGE:  clinical [id number] (anna-rectal nodes).    CONCURRENT SYSTEMIC THERAPY: 5 FU and mitomycin     ONCOLOGIC HISTORY:    Ms. 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       The patient initially presented with symptoms of rectal pain pressure prompting further evaluation.  She was seen by gastroenterologist Dr. Aguirre who on digital rectal exam noticed a mass in the anterior wall of the anal rectal region, measuring 3.4 cm in size.  Patient eventually underwent colonoscopy on 2019 which demonstrated an 8 mm mucosal abnormality just above the dentate line.  The mass was noted to be in the anterior wall facing the vagina.  Biopsy of the mass demonstrated moderately differentiated squamous cell carcinoma.  The patient was referred to Gyn/onc who on exam palpated a 3 to 4 cm rectovaginal mass.  No vaginal or cervical mass was noted.  2019 the patient underwent MRI of the pelvis.  Imaging demonstrated 2.6 x 2.5 x 2.5 cm mass located in the distal rectum/anal canal region.  The mass was noted to arise exophytically from the distal rectum.  There were suspicious mesorectal lymph nodes measuring subcentimeter in size. No pelvic adenopathy was noted.  On  8/3/2019 the patient underwent a PET scan.  Imaging did not demonstrate any evidence of distant disease. Hypermetabolic 3.3 x 4.1 cm mass seen in the distal rectum/anus compatible with underlying malignancy. Per Dr. Khan review of imaging, also noted suspicious perirectal adenopathy.  The patient was subsequent seen by Dr. Aquino who discussed definitive treatment with chemoradiation.      SITE OF TREATMENT: anal/pelvis     DATES  OF TREATMENT: 8/22/19-10/09/19     TOTAL DOSE OF TREATMENT: 5400 cGy     DOSE PER FRACTION OF TREATMENT: 180 cGy x 30 fractions    INTERVAL SINCE COMPLETION OF RADIATION THERAPY:   30 months     SUBJECTIVE:   In the interval, the patient was seen by Dr. Murphy on January 2022. Per report, on examination there was no clear evidence of recurrent/ residual disease.     Post treatment PET on 4/9/20 was TYLER. Last PET on 7/29/21 demonstrated no clear evidence of recurrent disease.     Unfortunately posttreatment the patient has developed pelvic insufficiency fractures in the sacrum as well as right pubic bone.  She has been following with Dr. Mortensen with treatment of physical therapy, vitamin D, calcium,  Fosamax, and pain management.  She is noticed some improvement but yet not completely.  She has also met with orthopedic team (Dr. Marcial) to help in further management. She underwent biopsy of soft tissue mass in pelvis on 9/15/21, which was negative for malignancy. She has met Dr. Marcial to discuss any potential options for pelvic fractures. He discussed possibility of surgical intervention, but patient is not interested in surgery at this point. She has underwent steroid injection x 2 with some alleviation.     LABS AND IMAGING:  Reviewed.    IMPRESSION:   Ms. 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 She completed definitive CRT to a total dose of 54 Gy in 30 fractions on 10/9/19.    PLAN:   1.  Clinically and  radiographically TYLER. Last exam on January 2022 was TYLER. Post treatment PET on 4/9/20 was TYLER. Last PET on 7/29/21 demonstrated no clear evidence of recurrent disease.     2. Chronic late toxicity. Has developed sacral insufficiency fracture of the sacrum and R pubic bone post treatment. I discussed that these changes are likely related to post RT treatment with weakening of the bone.  Working with pain team and orthopedic team as well. Discussed consideration of referral to oncologic rehab team () as a potential option to help further manage,  patient is in agreement. I additionally discussed consideration of trial of Vit E + pentoxifylline in effort to help promote healing.    3. Continue follow up for surveillance with surgery (Dr. Murphy).     4.  Continue follow up with medical oncology team. Current plan is to re-image with re-staging scans in August 2022.     5. RTC in 3 months.       Justin Khan M.D.  Department of Radiation Oncology  [facility]     Due to the concerns around COVID-19 and adhering to social distancing we conduct this visit over the telephone. Telephone call lasted 15 minutes.             Again, thank you for allowing me to participate in the care of your patient.        Sincerely,        Justin Khan MD

## 2022-04-13 ENCOUNTER — PATIENT OUTREACH (OUTPATIENT)
Dept: ONCOLOGY | Facility: CLINIC | Age: 72
End: 2022-04-13
Payer: COMMERCIAL

## 2022-04-13 NOTE — PROGRESS NOTES
received referral for Dr Chan in the PM&R clinic. Scheduling instructions updated and sent to New Patient Scheduling for completion.

## 2022-04-14 DIAGNOSIS — S32.591K: ICD-10-CM

## 2022-04-14 DIAGNOSIS — M89.9 BONE LESION: Primary | ICD-10-CM

## 2022-04-22 ENCOUNTER — TELEPHONE (OUTPATIENT)
Dept: ORTHOPEDICS | Facility: CLINIC | Age: 72
End: 2022-04-22
Payer: COMMERCIAL

## 2022-04-29 ENCOUNTER — TELEPHONE (OUTPATIENT)
Dept: MEDSURG UNIT | Facility: CLINIC | Age: 72
End: 2022-04-29
Payer: COMMERCIAL

## 2022-05-02 RX ORDER — DEXTROSE MONOHYDRATE 25 G/50ML
25-50 INJECTION, SOLUTION INTRAVENOUS
Status: DISCONTINUED | OUTPATIENT
Start: 2022-05-02 | End: 2022-05-04 | Stop reason: HOSPADM

## 2022-05-02 RX ORDER — NICOTINE POLACRILEX 4 MG
15-30 LOZENGE BUCCAL
Status: DISCONTINUED | OUTPATIENT
Start: 2022-05-02 | End: 2022-05-04 | Stop reason: HOSPADM

## 2022-05-03 ENCOUNTER — HOSPITAL ENCOUNTER (OUTPATIENT)
Dept: CT IMAGING | Facility: CLINIC | Age: 72
Discharge: HOME | End: 2022-05-03
Attending: ORTHOPAEDIC SURGERY | Admitting: PHYSICIAN ASSISTANT
Payer: MEDICARE

## 2022-05-03 DIAGNOSIS — M89.9 BONE LESION: ICD-10-CM

## 2022-05-03 DIAGNOSIS — S32.591K: ICD-10-CM

## 2022-05-03 PROCEDURE — 77012 CT SCAN FOR NEEDLE BIOPSY: CPT

## 2022-05-03 PROCEDURE — 250N000011 HC RX IP 250 OP 636: Performed by: ORTHOPAEDIC SURGERY

## 2022-05-03 PROCEDURE — 272N000431 CT JOINT INJECTION ASPIRATION MAJOR

## 2022-05-03 PROCEDURE — 250N000009 HC RX 250: Performed by: ORTHOPAEDIC SURGERY

## 2022-05-03 RX ORDER — TRIAMCINOLONE ACETONIDE 40 MG/ML
40 INJECTION, SUSPENSION INTRA-ARTICULAR; INTRAMUSCULAR ONCE
Status: COMPLETED | OUTPATIENT
Start: 2022-05-03 | End: 2022-05-03

## 2022-05-03 RX ORDER — BUPIVACAINE HYDROCHLORIDE 5 MG/ML
30 INJECTION, SOLUTION PERINEURAL ONCE
Status: COMPLETED | OUTPATIENT
Start: 2022-05-03 | End: 2022-05-03

## 2022-05-03 RX ADMIN — TRIAMCINOLONE ACETONIDE 40 MG: 40 INJECTION, SUSPENSION INTRA-ARTICULAR; INTRAMUSCULAR at 08:46

## 2022-05-03 RX ADMIN — BUPIVACAINE HYDROCHLORIDE 150 MG: 5 INJECTION, SOLUTION PERINEURAL at 08:46

## 2022-05-03 NOTE — DISCHARGE INSTRUCTIONS
Orthopedic Discharge Instructions:   After Your Injection or Aspiration  ________________________________________    Patient Name:  Sara Dominguez  Today's Date:  May 3, 2022  The doctor who performed your Joint Injection was Eugenio MATTHEWS at [facility]) in the  Interventional Radiology Department  Care of needle site  If you have new numbness down your leg, this may last up to 6 hours, but it should go away. You may need help with walking until your leg feels normal.   Over the next 24 to 48 hours, pain at the needle site may increase before it gets better.   For the next 48 hours, use ice packs for 15 minutes, three to four times a day for pain.  If you have a bandage, you may remove it the next morning.   No tub baths, hot tubs or swimming for 48 hours. You may shower the next day.   Activity  Do not drive until morning.   Limit your activity based on your pain level. Follow your doctor s orders for activity.   You may eat a normal diet.   If you had sedation,   - You may feel sleepy, forgetful or unsteady.   - Do not drink alcohol for 24 hours.  Medicines  If you take aspirin or platelet inhibitors, you can restart them tomorrow.   Restart all other medicines today at your regular dose, including Coumadin (warfarin).  If you are restarting Coumadin, talk to your doctor about having your INR checked.   If you had a steroid shot   The medicine should help reduce swelling and pain. This may take from 7 to 10 days.   Side effects from the shot will be mild and go away in 2 to 3 days. Common side effects may include:  -  Insomnia (trouble sleeping).  -  Heartburn.  -  Flushed face.  -  Water retention (bloating or fluid build-up).  -  Increased appetite (feeling more hungry than usual).  -  Increased blood sugar.  If you have diabetes, watch your blood sugar closely. If needed, call your doctor to help you control your blood sugar.  Some patients will get lasting relief from a single shot. Others  may require up to three shots to get results. If you have more than one steroid shot, they should be given two weeks apart.  Some patients do not have relief of symptoms.    Follow-up:  No follow-up needed     Call your doctor at  or go to the Emergency Room if you have severe pain, fever or problems with bowel or bladder control.

## 2022-05-03 NOTE — PROCEDURES
[facility]    Procedure: Pubic sympysis steroid injection    Date/Time: 5/3/2022 8:53 AM  Performed by: Eugenio Merino PA-C  Authorized by: Eugenio Merino PA-C       UNIVERSAL PROTOCOL   Site Marked: Yes  Prior Images Obtained and Reviewed:  Yes  Required items: Required blood products, implants, devices and special equipment available    Patient identity confirmed:  Verbally with patient  Patient was reevaluated immediately before administering moderate or deep sedation or anesthesia  Confirmation Checklist:  Patient's identity using two indicators, relevant allergies, procedure was appropriate and matched the consent or emergent situation and correct equipment/implants were available  Time out: Immediately prior to the procedure a time out was called    Universal Protocol: the Joint Commission Universal Protocol was followed    Preparation: Patient was prepped and draped in usual sterile fashion       ANESTHESIA    Local Anesthetic: Lidocaine 1% without epinephrine      SEDATION    Patient Sedated: No    See dictated procedure note for full details.    PROCEDURE    Patient Tolerance:  Patient tolerated the procedure well with no immediate complications  Length of time physician/provider present for 1:1 monitoring during sedation: 0

## 2022-05-21 ENCOUNTER — HEALTH MAINTENANCE LETTER (OUTPATIENT)
Age: 72
End: 2022-05-21

## 2022-05-25 ENCOUNTER — VIRTUAL VISIT (OUTPATIENT)
Dept: ONCOLOGY | Facility: CLINIC | Age: 72
End: 2022-05-25
Attending: RADIOLOGY
Payer: COMMERCIAL

## 2022-05-25 DIAGNOSIS — S32.509S: ICD-10-CM

## 2022-05-25 DIAGNOSIS — G89.3 CANCER RELATED PAIN: ICD-10-CM

## 2022-05-25 DIAGNOSIS — C21.1 MALIGNANT NEOPLASM OF ANAL CANAL (H): Primary | ICD-10-CM

## 2022-05-25 DIAGNOSIS — R10.2 PELVIC PAIN IN FEMALE: ICD-10-CM

## 2022-05-25 PROCEDURE — 99205 OFFICE O/P NEW HI 60 MIN: CPT | Mod: 95 | Performed by: STUDENT IN AN ORGANIZED HEALTH CARE EDUCATION/TRAINING PROGRAM

## 2022-05-25 PROCEDURE — 99417 PROLNG OP E/M EACH 15 MIN: CPT | Mod: 95 | Performed by: STUDENT IN AN ORGANIZED HEALTH CARE EDUCATION/TRAINING PROGRAM

## 2022-05-25 NOTE — PROGRESS NOTES
PM&R Clinic Note     Patient Name: Sara Dominguez : 1950 Medical Record: [id number]     Requesting Physician/clinician: Justin Khan MD           History of Present Illness:     Sara Dominguez is a 71 year old female diagnosed with squamous cell carcinoma of the anal canal, clinical [id number] (anna-rectal nodes) in 2019. She completed definitive CRT to a total dose of 54 Gy in 30 fractions on 10/9/19, who presents to PM&R Clinic for evaluation of pelvic pain and rehabilitation needs.    Oncology History:  -In 2019 at age 68, she presented to her PMD Dr. Reid's office for rectal pain 4/10  for a few weeks, she felt a mass in her rectum, increase in rectal pressure after eating.   -Colonoscopy 2019 found 8 mm mucosal abnormality just above the dentate line. This was at the anterior wall facing her vagina where the large mass was felt underneath. 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  -She then saw Gyn Dr. Eduardo, 2019, exam found normal vulva. No vaginal polyp/growth appreciated arising from vaginal mucosa (which appears to be smooth) but there is a nodular 3-4cm mass palpated at rectovaginal area, which is not as well appreciated by digital rectal exam. Fixed mass, mildly tender to touch. Cervix grossly normal. Uterus 6 weeks size, non-tender. No adnexal mass/tenderness bilaterally.   - pelvic MRI found 2.5 x 2.5 x 2.6 cm mass located between the distal rectum and vagina which appears to arise exophytically from the distal rectum. Single mildly enlarged mesorectal lymph node measuring 8 mm in short axis. No pelvic sidewall adenopathy. No other bony or soft tissue abnormalities identified.   -She then saw colon rectal surgeon Dr. Nadeen Murphy 2019, who informed pt that she has anal cancer, not tx by surgery. Recommended concurrent chemo/RT.  -PET 2019 found  Hypermetabolic 3.3 x 4.1 cm mass SUV 70 seen in the distal rectum/anus compatible with underlying malignancy. No evidence of distant hypermetabolic metastatic disease seen in the chest, abdomen or pelvis.    -She then saw Rad-Onc, and made informed decision to proceed with concurrent chemoradiation with mitomycin and 5 FU from August to early Oct. 2019  - Last saw Dr. Khan on 4/12/22 and patient had developed a sacral insufficiency fracture and rigth pubic bone fracture post treatment. She has been working with orthopedics and pain team and has been receiving CT pubic symphysis steroid injections with minimal relief. Referral to PM&R and discussed trial of Vit E and pentoxifylline to help promote healing.      Symptoms,  Patient was seen for an initial virtual visit today.   Pain is localized to her groin area on her right side. She said pain is starting to radiate to her right thigh/center of her thigh. Pain is severe, and she gets a steroid injections into her pelvic area with Radiology at [facility] and she feels that they help but do not last longer than 6-7 weeks. Pain is described as aching, sharp pain and she has never felt pain like this before. She has had hospital visits for pain control at the time of her sacral fracture. She has had to use assistive devices (cane or walker) due to severe pain in the past. She states that pain has been present has for about 1 year.  She has been taking percocet up to 4 per day.   She also gets lumbar epidural injections and has had 3 of these for radicular pain. She also takes gabapentin for her radicular pain, and she feels that it helps her neuropathic pain/groin.   Pain is all in her right side, pain is from her clitoris to the right side in terms of radiation.   She also endorses bladder incontinence which started after her radiation treatment. She also has small bowel incontinence that is infrequent. With bladder, she gets up 3-4 times to empty her bladder. She has had  botox injections to her bladder, and is thinking about having another injection because it did help.        Therapies/HEP,  Patient has not had any pelvic PT.      Functionally,   Patient is independent with mobility, ADLs and IADLs.             Past Medical and Surgical History:     Past Medical History:   Diagnosis Date     Cancer (H)      Disorder of bone and cartilage     osteopenia     Diverticulosis of colon     ,seen at colonoscopy     Major depressive disorder, recurrent episode, moderate (H)     No Comments Provided     Reflux esophagitis     No Comments Provided     Sciatica     right     Urge incontinence     No Comments Provided     Past Surgical History:   Procedure Laterality Date     COLONOSCOPY      ,due 2018     EXTRACTION(S) DENTAL      No Comments Provided     OTHER SURGICAL HISTORY      90120.0,[place]  DELIVERY ONLY     OTHER SURGICAL HISTORY      ,ENDOSCOPY ESOPHAGUS            Social History:     Social History     Tobacco Use     Smoking status: Former Smoker     Years: 20.00     Types: Cigarettes     Quit date: 10/9/2008     Years since quittin.6     Smokeless tobacco: Never Used     Tobacco comment: Quit smoking: passive exposure   Substance Use Topics     Alcohol use: Yes     Comment: Alcoholic Drinks/day: occ       Living situation: Lives in [place], [place] in single family home, 2 stories with basement level with bathroom with stairs to go down. Stairs are still an issue when she comes up the stairs due to pain.  Vocational History: Patient works as a /registration 3 days per week, at [employer].         Functional history:     Sara Dominguez is independent with all aspects of her life.    ADLs: Riding in the care is difficult, getting out of car are difficult.  Assistive devices: none  iADLs (medication management and finances): Independent  Driving:            Family History:     Family History   Problem Relation Age of Onset     Other - See  "Comments Father         Stroke     Cancer Mother         Cancer,cervical-mets to brain     Osteoporosis Mother         Osteoporosis     Heart Disease Brother         Heart Disease,MI  at 59     Heart Disease Maternal Grandmother         Heart Disease,MI  at 58     Asthma Brother         Asthma     Asthma Brother         Asthma     Diabetes Paternal Grandmother         Diabetes            Medications:     Current Outpatient Medications   Medication Sig Dispense Refill     alendronate (FOSAMAX) 70 MG tablet Take 70 mg by mouth every 7 days       atorvastatin (LIPITOR) 10 MG tablet Take 10 mg by mouth daily  1     calcium carbonate 600 mg-vitamin D 400 units (CALTRATE) 600-400 MG-UNIT per tablet Take 1 tablet by mouth       Cholecalciferol (VITAMIN D3) 50 MCG (2000 UT) CAPS Take 1 Capsule (2,000 units) by mouth once daily.       gabapentin (NEURONTIN) 400 MG capsule Take 600 mg by mouth 3 times daily       lisinopril (ZESTRIL) 20 MG tablet Take 20 mg by mouth daily       oxyCODONE-acetaminophen (PERCOCET)  MG per tablet Take 1 tablet by mouth every 4 hours as needed for severe pain 100 tablet 0     pentoxifylline ER (TRENTAL) 400 MG CR tablet Take 1 tablet (400 mg) by mouth 3 times daily (with meals) 90 tablet 3     venlafaxine (EFFEXOR-XR) 150 MG 24 hr capsule Take 150 mg by mouth daily       vitamin E (TOCOPHEROL) 400 units (180 mg) capsule Take 1 capsule (400 Units) by mouth 3 times daily 90 capsule 3     diazepam (VALIUM) 5 MG tablet Take 1 tablet (5 mg) by mouth every 6 hours as needed for anxiety 1 tablet 0            Allergies:     No Known Allergies           ROS:     A focused ROS is negative other than the symptoms noted above in the HPI.           Physical Examiniation:     VITAL SIGNS: There were no vitals taken for this visit.  BMI: Estimated body mass index is 27.92 kg/m  as calculated from the following:    Height as of 9/15/21: 1.676 m (5' 6\").    Weight as of 22: 78.5 kg (173 " lb).    Gen: NAD, pleasant and cooperative   HEENT: Atraumatic, normocephalic, extraocular movements appear intact  Pulm: non-labored breathing in room air  Neuro/MSK:   Orientation: Oriented to person, time, place and situation, Exhibits good insight into her condition and ongoing treatment  Motor: Observed moving upper extremities actively against gravity.         Laboratory/Imaging:     XR Pelvis 1/2 Views (11/17/21):  Impression:  1. Again seen are vertical fractures through the bilateral sacral ala with chronic diastasis of the symphysis pubis.  2. Degenerative changes and scoliosis of the visualized lumbar spine.            Assessment/Plan:   Sara Dominguez is a 71 year old female diagnosed with squamous cell carcinoma of the anal canal, clinical [id number] (anna-rectal nodes) in 2019. She completed definitive CRT to a total dose of 54 Gy in 30 fractions on 10/9/19, who presents to PM&R Clinic for evaluation of pelvic pain and rehabilitation needs. As discussed with Gina, would recommend initiating pelvic therapy to help with pelvic floor strengthening and techniques to aid with incontinence and discomfort. In regards to pelvic/groin pain, we reviewed that we could try a nerve block to see if this helps with her pain. Will discuss with Dr. Reddy to see if this might be an option. She has been doing pubic symphysis CS inj with minimal relief. Will plan a 3 month follow up virtual visit. Patient is in agreement with this plan.      1. Patient education: In depth discussion and education was provided about the assessment and implications of each of the below recommendations for management. Patient indicated readiness to learn, all questions were answered and understanding of material presented was confirmed.  2. Therapy/equipment/braces:  1. Pelvic therapy referral placed  3. Interventions:  1. Consideration of pudendal nerve block. Will reach out to Dr. Reddy to discuss.  4. Referral / follow up with other  providers:  1. Continue to follow with providers for pubic symphysis CS injections.  5. Follow up: 3 months.    Valery Chan MD  Physical Medicine & Rehabilitation    I appreciate the opportunity to participate in the care of your patient.     90 minutes spent on the date of the encounter doing chart review, history and exam, documentation and further activities as noted above.

## 2022-05-25 NOTE — LETTER
2022         RE: Sara Dominguez  [address]  [facility] [address]        Dear Colleague,    Thank you for referring your patient, Sara Dominguez, to the [facility]. Please see a copy of my visit note below.           PM&R Clinic Note     Patient Name: Sara Dominguez : 1950 Medical Record: [id number]     Requesting Physician/clinician: Justin Khan MD           History of Present Illness:     Sara Dominguez is a 71 year old female diagnosed with squamous cell carcinoma of the anal canal, clinical [id number] (anna-rectal nodes) in 2019. She completed definitive CRT to a total dose of 54 Gy in 30 fractions on 10/9/19, who presents to PM&R Clinic for evaluation of pelvic pain and rehabilitation needs.    Oncology History:  -In 2019 at age 68, she presented to her PMD Dr. Reid's office for rectal pain /10  for a few weeks, she felt a mass in her rectum, increase in rectal pressure after eating.   -Colonoscopy 2019 found 8 mm mucosal abnormality just above the dentate line. This was at the anterior wall facing her vagina where the large mass was felt underneath. 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  -She then saw Gyn Dr. Eduardo, 2019, exam found normal vulva. No vaginal polyp/growth appreciated arising from vaginal mucosa (which appears to be smooth) but there is a nodular 3-4cm mass palpated at rectovaginal area, which is not as well appreciated by digital rectal exam. Fixed mass, mildly tender to touch. Cervix grossly normal. Uterus 6 weeks size, non-tender. No adnexal mass/tenderness bilaterally.   - pelvic MRI found 2.5 x 2.5 x 2.6 cm mass located between the distal rectum and vagina which appears to arise exophytically from the distal rectum. Single mildly enlarged mesorectal lymph node measuring 8 mm in short axis.  No pelvic sidewall adenopathy. No other bony or soft tissue abnormalities identified.   -She then saw colon rectal surgeon Dr. Nadeen Murphy 7/26/2019, who informed pt that she has anal cancer, not tx by surgery. Recommended concurrent chemo/RT.  -PET 8/2019 found Hypermetabolic 3.3 x 4.1 cm mass SUV 70 seen in the distal rectum/anus compatible with underlying malignancy. No evidence of distant hypermetabolic metastatic disease seen in the chest, abdomen or pelvis.    -She then saw Rad-Onc, and made informed decision to proceed with concurrent chemoradiation with mitomycin and 5 FU from August to early Oct. 2019  - Last saw Dr. Khan on 4/12/22 and patient had developed a sacral insufficiency fracture and rigth pubic bone fracture post treatment. She has been working with orthopedics and pain team and has been receiving CT pubic symphysis steroid injections with minimal relief. Referral to PM&R and discussed trial of Vit E and pentoxifylline to help promote healing.      Symptoms,  Patient was seen for an initial virtual visit today.   Pain is localized to her groin area on her right side. She said pain is starting to radiate to her right thigh/center of her thigh. Pain is severe, and she gets a steroid injections into her pelvic area with Radiology at [facility] and she feels that they help but do not last longer than 6-7 weeks. Pain is described as aching, sharp pain and she has never felt pain like this before. She has had hospital visits for pain control at the time of her sacral fracture. She has had to use assistive devices (cane or walker) due to severe pain in the past. She states that pain has been present has for about 1 year.  She has been taking percocet up to 4 per day.   She also gets lumbar epidural injections and has had 3 of these for radicular pain. She also takes gabapentin for her radicular pain, and she feels that it helps her neuropathic pain/groin.   Pain is all in her right side, pain is from her  clitoris to the right side in terms of radiation.   She also endorses bladder incontinence which started after her radiation treatment. She also has small bowel incontinence that is infrequent. With bladder, she gets up 3-4 times to empty her bladder. She has had botox injections to her bladder, and is thinking about having another injection because it did help.        Therapies/HEP,  Patient has not had any pelvic PT.      Functionally,   Patient is independent with mobility, ADLs and IADLs.             Past Medical and Surgical History:     Past Medical History:   Diagnosis Date     Cancer (H)      Disorder of bone and cartilage     osteopenia     Diverticulosis of colon     ,seen at colonoscopy     Major depressive disorder, recurrent episode, moderate (H)     No Comments Provided     Reflux esophagitis     No Comments Provided     Sciatica     right     Urge incontinence     No Comments Provided     Past Surgical History:   Procedure Laterality Date     COLONOSCOPY      ,due 2018     EXTRACTION(S) DENTAL      No Comments Provided     OTHER SURGICAL HISTORY      19721.0,[place]  DELIVERY ONLY     OTHER SURGICAL HISTORY      ,ENDOSCOPY ESOPHAGUS            Social History:     Social History     Tobacco Use     Smoking status: Former Smoker     Years: 20.00     Types: Cigarettes     Quit date: 10/9/2008     Years since quittin.6     Smokeless tobacco: Never Used     Tobacco comment: Quit smoking: passive exposure   Substance Use Topics     Alcohol use: Yes     Comment: Alcoholic Drinks/day: occ       Living situation: Lives in [place], [place] in single family home, 2 stories with basement level with bathroom with stairs to go down. Stairs are still an issue when she comes up the stairs due to pain.  Vocational History: Patient works as a /registration 3 days per week, at [employer].         Functional history:     Sara LUIZ Dominguez is independent with all aspects of her  life.    ADLs: Riding in the care is difficult, getting out of car are difficult.  Assistive devices: none  iADLs (medication management and finances): Independent  Driving:            Family History:     Family History   Problem Relation Age of Onset     Other - See Comments Father         Stroke     Cancer Mother         Cancer,cervical-mets to brain     Osteoporosis Mother         Osteoporosis     Heart Disease Brother         Heart Disease,MI  at 59     Heart Disease Maternal Grandmother         Heart Disease,MI  at 58     Asthma Brother         Asthma     Asthma Brother         Asthma     Diabetes Paternal Grandmother         Diabetes            Medications:     Current Outpatient Medications   Medication Sig Dispense Refill     alendronate (FOSAMAX) 70 MG tablet Take 70 mg by mouth every 7 days       atorvastatin (LIPITOR) 10 MG tablet Take 10 mg by mouth daily  1     calcium carbonate 600 mg-vitamin D 400 units (CALTRATE) 600-400 MG-UNIT per tablet Take 1 tablet by mouth       Cholecalciferol (VITAMIN D3) 50 MCG (2000 UT) CAPS Take 1 Capsule (2,000 units) by mouth once daily.       gabapentin (NEURONTIN) 400 MG capsule Take 600 mg by mouth 3 times daily       lisinopril (ZESTRIL) 20 MG tablet Take 20 mg by mouth daily       oxyCODONE-acetaminophen (PERCOCET)  MG per tablet Take 1 tablet by mouth every 4 hours as needed for severe pain 100 tablet 0     pentoxifylline ER (TRENTAL) 400 MG CR tablet Take 1 tablet (400 mg) by mouth 3 times daily (with meals) 90 tablet 3     venlafaxine (EFFEXOR-XR) 150 MG 24 hr capsule Take 150 mg by mouth daily       vitamin E (TOCOPHEROL) 400 units (180 mg) capsule Take 1 capsule (400 Units) by mouth 3 times daily 90 capsule 3     diazepam (VALIUM) 5 MG tablet Take 1 tablet (5 mg) by mouth every 6 hours as needed for anxiety 1 tablet 0            Allergies:     No Known Allergies           ROS:     A focused ROS is negative other than the symptoms noted above in  "the HPI.           Physical Examiniation:     VITAL SIGNS: There were no vitals taken for this visit.  BMI: Estimated body mass index is 27.92 kg/m  as calculated from the following:    Height as of 9/15/21: 1.676 m (5' 6\").    Weight as of 2/9/22: 78.5 kg (173 lb).    Gen: NAD, pleasant and cooperative   HEENT: Atraumatic, normocephalic, extraocular movements appear intact  Pulm: non-labored breathing in room air  Neuro/MSK:   Orientation: Oriented to person, time, place and situation, Exhibits good insight into her condition and ongoing treatment  Motor: Observed moving upper extremities actively against gravity.         Laboratory/Imaging:     XR Pelvis 1/2 Views (11/17/21):  Impression:  1. Again seen are vertical fractures through the bilateral sacral ala with chronic diastasis of the symphysis pubis.  2. Degenerative changes and scoliosis of the visualized lumbar spine.            Assessment/Plan:   Sara Dominguez is a 71 year old female diagnosed with squamous cell carcinoma of the anal canal, clinical [id number] (anna-rectal nodes) in 2019. She completed definitive CRT to a total dose of 54 Gy in 30 fractions on 10/9/19, who presents to PM&R Clinic for evaluation of pelvic pain and rehabilitation needs. As discussed with Gina, would recommend initiating pelvic therapy to help with pelvic floor strengthening and techniques to aid with incontinence and discomfort. In regards to pelvic/groin pain, we reviewed that we could try a nerve block to see if this helps with her pain. Will discuss with Dr. Reddy to see if this might be an option. She has been doing pubic symphysis CS inj with minimal relief. Will plan a 3 month follow up virtual visit. Patient is in agreement with this plan.      1. Patient education: In depth discussion and education was provided about the assessment and implications of each of the below recommendations for management. Patient indicated readiness to learn, all questions were " answered and understanding of material presented was confirmed.  2. Therapy/equipment/braces:  1. Pelvic therapy referral placed  3. Interventions:  1. Consideration of pudendal nerve block. Will reach out to Dr. Reddy to discuss.  4. Referral / follow up with other providers:  1. Continue to follow with providers for pubic symphysis CS injections.  5. Follow up: 3 months.    Valery Chan MD  Physical Medicine & Rehabilitation    I appreciate the opportunity to participate in the care of your patient.     90 minutes spent on the date of the encounter doing chart review, history and exam, documentation and further activities as noted above.

## 2022-05-25 NOTE — PROGRESS NOTES
Gina is a 71 year old who is being evaluated via a billable video visit.      How would you like to obtain your AVS? MyChart  If the video visit is dropped, the invitation should be resent by: Text to cell phone: [phone]  Will anyone else be joining your video visit? Cheri Bradshaw       Video Start Time: 1:10 PM  Video-Visit Details    Type of service:  Video Visit    Video End Time:1:59 PM    Originating Location (pt. Location): Home    Distant Location (provider location):  [facility]     Platform used for Video Visit: [employer]

## 2022-05-26 NOTE — PATIENT INSTRUCTIONS
Pelvic therapy referral placed.  Dr. Chan will discuss possibility of nerve block and get back to you.  Follow up with Dr. Chan in 3 months for a virtual visit.

## 2022-05-31 DIAGNOSIS — G89.3 CANCER RELATED PAIN: ICD-10-CM

## 2022-05-31 RX ORDER — OXYCODONE AND ACETAMINOPHEN 10; 325 MG/1; MG/1
1 TABLET ORAL EVERY 4 HOURS PRN
Qty: 100 TABLET | Refills: 0 | Status: SHIPPED | OUTPATIENT
Start: 2022-05-31 | End: 2022-06-27

## 2022-06-22 NOTE — TELEPHONE ENCOUNTER
DIAGNOSIS: New pt consult   Sacral fracture procedure    DATE RECEIVED: 6.27.22   NOTES STATUS DETAILS   OFFICE NOTE from referring provider Internal 5.25.22  Dustin  Oncology   OFFICE NOTE from other specialist Internal 4.12.22  Bill  Radiation Oncology    2.9.22  [place]st  Oncology    11.17.21  Aniket  Ortho   MEDICATION LIST Internal    PERTINENT LABS Internal    CT Internal 11.17.21  XR Pelvis    9.15.21  CT Pelvis    7.29.21  CT Chest/Abd/Pelvis    7.29.21  PET

## 2022-06-27 ENCOUNTER — PRE VISIT (OUTPATIENT)
Dept: VASCULAR SURGERY | Facility: CLINIC | Age: 72
End: 2022-06-27

## 2022-06-27 DIAGNOSIS — G89.3 CANCER RELATED PAIN: ICD-10-CM

## 2022-06-27 RX ORDER — OXYCODONE AND ACETAMINOPHEN 10; 325 MG/1; MG/1
1 TABLET ORAL EVERY 4 HOURS PRN
Qty: 100 TABLET | Refills: 0 | Status: SHIPPED | OUTPATIENT
Start: 2022-06-27 | End: 2022-08-05

## 2022-07-05 ENCOUNTER — TELEPHONE (OUTPATIENT)
Dept: INTERVENTIONAL RADIOLOGY/VASCULAR | Facility: CLINIC | Age: 72
End: 2022-07-05

## 2022-07-05 NOTE — TELEPHONE ENCOUNTER
----- Message from Aubree Pryor RN sent at 7/5/2022  7:17 AM CDT -----  Regarding: FW: question  Hi,     Please see Dr Schrader's message on this thread.  Ok to double book pt with in person Dr Schrader.  Can you call her to see if she want's to come in sooner.    Thank you,     EJNNIFER rPyor, RN, BSN  Interventional Radiology Nurse Coordinator   Phone:  [phone]    ----- Message -----  From: Jacoby Schrader MD  Sent: 7/4/2022   4:48 PM CDT  To: Aubree Pryor, RN, Fran Zak  Subject: RE: question                                     I just saw that pt is on the schedule for 8/1. Was there no other opening for her to be seen? That's pretty far out for a pt with bone pain. Please feel free to overbook at any earlier clinic.    Thanks,  Jacoby  ----- Message -----  From: Aubree Pryor, ANA  Sent: 7/1/2022  10:08 AM CDT  To: Jacoby Schrader MD, Franmonty Crespo  Subject: RE: question                                     Good day,     Pt was scheduled 6/27, she cancelled appt, her drive shaft went out on her car.      Fran please call pt about rescheduling her consult with Dr Schrader, must be in person    Thank you,     JENNIFER Pryor RN, BSN  Interventional Radiology Nurse Coordinator   Phone:  [phone]    ----- Message -----  From: Fran Crespo  Sent: 6/14/2022   6:53 PM CDT  To: Aubree Pryor, RN  Subject: RE: question                                     The pt is scheduled on 6/27 for a video visit with  the pt is stating that she is unable to miss work at this time and will need to be seen vis video.  Dates and times are per pt.  Fran Crespo on 6/14/2022 at 6:53 PM     The pt was on the phone during scheduling of the above appointments and agreed to the dates times and locations of the appointments.   ----- Message -----  From: Aubree Pryor, RN  Sent: 5/31/2022  11:35 AM CDT  To: [facility]  Subject: FW: question                                     New pt consult IN  PERSON with Dr Schrader, due next Monday if able and ok to double book    Sacral fracture procedure    Thanks,    JENNIFER Pryor, RN, BSN  Interventional Radiology Nurse Coordinator   Phone:  [phone]    ----- Message -----  From: Valery Chan MD  Sent: 5/31/2022  10:28 AM CDT  To: Jacoby Schrader MD, uAbree Pryor, RN, #  Subject: RE: question                                     Rik Dozier,  Thanks for letting us know. Glad to hear you are feeling better.  I will communicate with the patient and let her know/place the referral order to you.  Take careValery  ----- Message -----  From: Jacoby Schrader MD  Sent: 5/31/2022  10:09 AM CDT  To: Aubree Pryor RN, Valery Chan MD, #  Subject: RE: question                                     Crystal Schultz and Valery,    I am back from a covid slump, catching up with my epic messages. If she has sacral/SIJ pain that can be elicited on exam a sacroplasty can be offered, although the fx appears chronic to me. A sacroplasty can alleviate at least a part of it in my opinion in such cases.  I would be happy to see the pt in clinic to make that determination.    Jacoby Fierro    ----- Message -----  From: Valery Chan MD  Sent: 5/27/2022  10:46 AM CDT  To: Jacoby Schrader MD, Marion Reddy MD  Subject: RE: question                                     Thanks for letting me know. Yunier Dozier, would appreciate any input you have.  Thanks to you both!  Valery  ----- Message -----  From: Marion Reddy MD  Sent: 5/26/2022   7:37 PM CDT  To: Jacoby Schrader MD, Valery Chan MD  Subject: RE: question                                     Jacoby, would you consider sacroplasty in this case for the sacral insuff #    Valery,   if her pelvic pain is neuropathic due to post radidation changes a spinal cord stimulation treatment can be considered. Let's see what Jacoby thinks first then I will add her to my schedule.   Marion  ----- Message -----  From:  Valery Chan MD  Sent: 5/26/2022   1:58 PM CDT  To: Marion Reddy MD  Subject: question                                         Rik Schultz,    I wanted to touch base as I saw this patient for a virtual visit today. Has a history of anal cancer, s/p radiation, who has severe groin/pelvic pain. Also with radiation induced sacral insufficiency fracture and right pubic bone fx. She has had a few pubic symphysis CS injections with minimal relief. Is there anything you could offer her procedure wise to help? I was wondering about whether a pudendal n block would be an option? I have placed a referral to pelvic pt for incontinence and pain.    Please let me know, and if you can help-I'll place a referral to you.      Thanks much,  Valery

## 2022-07-05 NOTE — TELEPHONE ENCOUNTER
LVM for daughter Leonarda to see if pt can come sooner than 8/1.  Farn Crespo on 7/5/2022 at 3:04 PM

## 2022-07-25 ENCOUNTER — OFFICE VISIT (OUTPATIENT)
Dept: VASCULAR SURGERY | Facility: CLINIC | Age: 72
End: 2022-07-25
Payer: COMMERCIAL

## 2022-07-25 VITALS — OXYGEN SATURATION: 97 % | SYSTOLIC BLOOD PRESSURE: 123 MMHG | HEART RATE: 78 BPM | DIASTOLIC BLOOD PRESSURE: 73 MMHG

## 2022-07-25 DIAGNOSIS — C21.1 MALIGNANT NEOPLASM OF ANAL CANAL (H): Primary | ICD-10-CM

## 2022-07-25 PROCEDURE — 99203 OFFICE O/P NEW LOW 30 MIN: CPT | Mod: GC | Performed by: RADIOLOGY

## 2022-07-25 ASSESSMENT — PAIN SCALES - GENERAL: PAINLEVEL: EXTREME PAIN (8)

## 2022-07-25 NOTE — NURSING NOTE
Chief Complaint   Patient presents with     New Patient     Sacral fracture procedure       Vitals were taken and medications were reconciled.    China Worley  11:15 AM

## 2022-07-25 NOTE — PROGRESS NOTES
INTERVENTIONAL RADIOLOGY CONSULTATION    Name: Sara Dominguez  Age: 71 year old   Referring Physician: Dr. Marcial   REASON FOR REFERRAL: Pelvic insufficiency fracture    HPI:    Sara Dominguez is a 71 year old female diagnosed with squamous cell carcinoma of the anal canal, clinical [id number] (anna-rectal nodes) in 2019 status post chemoradiation treatment. Patient has not had recurrent disease however has developed pelvic insufficiency fractures post treatment of the sacrum and right pubic bone.     Currently she predominantly reports right groin pain which propagates to her inner thigh. She reports she is still employed and experiences slight limitations in activities involving mobility. Her pain level in the last 24 hours is reported as level of 8.      PAST MEDICAL HISTORY:   Past Medical History:   Diagnosis Date     Cancer (H)      Disorder of bone and cartilage     osteopenia     Diverticulosis of colon     ,seen at colonoscopy     Major depressive disorder, recurrent episode, moderate (H)     No Comments Provided     Reflux esophagitis     No Comments Provided     Sciatica     right     Urge incontinence     No Comments Provided       PAST SURGICAL HISTORY:   Past Surgical History:   Procedure Laterality Date     COLONOSCOPY      ,due 2018     EXTRACTION(S) DENTAL      No Comments Provided     OTHER SURGICAL HISTORY      [address].0,[place]  DELIVERY ONLY     OTHER SURGICAL HISTORY      ,ENDOSCOPY ESOPHAGUS       FAMILY HISTORY:   Family History   Problem Relation Age of Onset     Other - See Comments Father         Stroke     Cancer Mother         Cancer,cervical-mets to brain     Osteoporosis Mother         Osteoporosis     Heart Disease Brother         Heart Disease,MI  at 59     Heart Disease Maternal Grandmother         Heart Disease,[place]  at 58     Asthma Brother         Asthma     Asthma Brother         Asthma     Diabetes Paternal Grandmother         Diabetes        SOCIAL HISTORY:   Social History     Tobacco Use     Smoking status: Former Smoker     Years: 20.00     Types: Cigarettes     Quit date: 10/9/2008     Years since quittin.8     Smokeless tobacco: Never Used     Tobacco comment: Quit smoking: passive exposure   Substance Use Topics     Alcohol use: Yes     Comment: Alcoholic Drinks/day: occ       PROBLEM LIST:   Patient Active Problem List    Diagnosis Date Noted     Nausea 2019     Priority: Medium     Hypotension, unspecified hypotension type 2019     Priority: Medium     Mixed hyperlipidemia 2019     Priority: Medium     Reflux esophagitis 2019     Priority: Medium     Urine, incontinence, stress female 2019     Priority: Medium     Malignant neoplasm of anal canal (H) 2019     Priority: Medium     Squamous cell carcinoma of anus (H) 2019     Priority: Medium     Give at 12.23.[address] [place] [address]       Major depressive disorder, recurrent episode, moderate (H) 2017     Priority: Medium     Hypertension 2016     Priority: Medium     Allergic rhinitis 2012     Priority: Medium     Migraine variant 2008     Priority: Medium     Need for prophylactic hormone replacement therapy (postmenopausal) 2007     Priority: Medium     Sciatica 2007     Priority: Medium     Overview:   right         MEDICATIONS:   Prescription Medications as of 2022       Rx Number Disp Refills Start End Last Dispensed Date Next Fill Date Owning Pharmacy    alendronate (FOSAMAX) 70 MG tablet    2021        Sig: Take 70 mg by mouth every 7 days    Class: Historical    Route: Oral    atorvastatin (LIPITOR) 10 MG tablet   1 2019    [facility] [place] - [address]    Sig: Take 10 mg by mouth daily    Class: Historical    Route: Oral    calcium carbonate 600 mg-vitamin D 400 units (CALTRATE) 600-400 MG-UNIT per tablet    2022    [facility] - [place] -  [address]    Sig: Take 1 tablet by mouth    Class: Historical    Route: Oral    Cholecalciferol (VITAMIN D3) 50 MCG (2000 UT) CAPS    12/27/2021    [facility] [address]    Sig: Take 1 Capsule (2,000 units) by mouth once daily.    Class: Historical    gabapentin (NEURONTIN) 400 MG capsule            Sig: Take 600 mg by mouth 3 times daily    Class: Historical    Route: Oral    lisinopril (ZESTRIL) 20 MG tablet    6/30/2020        Sig: Take 20 mg by mouth daily    Class: Historical    Route: Oral    oxyCODONE-acetaminophen (PERCOCET)  MG per tablet  100 tablet 0 6/27/2022    [place], [place] - [address]    Sig: Take 1 tablet by mouth every 4 hours as needed for severe pain    Class: E-Prescribe    Earliest Fill Date: 6/27/2022    Route: Oral    No prior authorization was found for this prescription.    Found prior authorization for another prescription for the same medication: Closed - Other    pentoxifylline ER (TRENTAL) 400 MG CR tablet  90 tablet 3 4/12/2022    [place] [address] [facility] [address]    Sig: Take 1 tablet (400 mg) by mouth 3 times daily (with meals)    Class: E-Prescribe    Route: Oral    venlafaxine (EFFEXOR-XR) 150 MG 24 hr capsule    6/30/2020        Sig: Take 150 mg by mouth daily    Class: Historical    Route: Oral    vitamin E (TOCOPHEROL) 400 units (180 mg) capsule  90 capsule 3 4/12/2022    [address]    Sig: Take 1 capsule (400 Units) by mouth 3 times daily    Class: E-Prescribe    Route: Oral    diazepam (VALIUM) 5 MG tablet  1 tablet 0 2/15/2022    [address]    Sig: Take 1 tablet (5 mg) by mouth every 6 hours as needed for anxiety    Class: E-Prescribe    Route: Oral          ALLERGIES:   Patient has no known allergies.    ROS:  Negative unless otherwise stated in  HPI.      Physical Examination:   Limited MSK exam does not demonstrate significant explicit ttp over her sacrum/lower back and pelvic bones  No neurological deficits of her lower extremities    VITALS:   /73 (BP Location: Left arm, Patient Position: Sitting, Cuff Size: Adult Regular)   Pulse 78   SpO2 97%         Labs:    BMP RESULTS:  Lab Results   Component Value Date     02/02/2022     06/24/2021    POTASSIUM 4.2 02/02/2022    POTASSIUM 4.7 06/24/2021    CHLORIDE 103 02/02/2022    CHLORIDE 100 06/24/2021    CO2 28 02/02/2022    CO2 27 06/24/2021    ANIONGAP 7 02/02/2022    ANIONGAP 9 06/24/2021     (H) 02/02/2022    GLC 83 06/24/2021    BUN 18 02/02/2022    BUN 18 06/24/2021    CR 0.84 02/02/2022    CR 0.98 06/24/2021    GFRESTIMATED 73 02/02/2022    GFRESTIMATED >60 07/29/2021    GFRESTIMATED 57 (L) 06/24/2021    GFRESTBLACK 66 06/24/2021    EMERALD 9.0 02/02/2022    EMERALD 9.3 06/24/2021        CBC RESULTS:  Lab Results   Component Value Date    WBC 5.6 02/02/2022    WBC 5.9 06/24/2021    RBC 4.06 02/02/2022    RBC 3.81 06/24/2021    HGB 12.6 02/02/2022    HGB 11.8 06/24/2021    HCT 37.7 02/02/2022    HCT 36.2 06/24/2021    MCV 93 02/02/2022    MCV 95 06/24/2021    MCH 31.0 02/02/2022    MCH 31.0 06/24/2021    MCHC 33.4 02/02/2022    MCHC 32.6 06/24/2021    RDW 14.3 02/02/2022    RDW 14.2 06/24/2021     02/02/2022     06/24/2021       INR/PTT:  Lab Results   Component Value Date    INR 1.05 09/15/2021    PTT 60 (H) 09/15/2021       Diagnostic studies: Relevant Imaging has been reviewed.    Assessment 71 fm with pelvic insufficiency fracture s/p chemoradiation treatment for anal carcinoma.    Plan We do not believe bone augmentation is currently the best option for Sara as she does not have explicit tenderness to palpation. I believe she may receive greater benefit from nerve block procedure as her pain symptoms are likely related to nerve compression. Down the line  symptoms progress and are resistant to nerve block therapy we can re-evaluate her as a candidate for augmentation.     I, Jacoby Schrader, was present with the fellow during the history and exam. I discussed the case with the fellow and agree with the findings as documented in the assessment and plan.    Jacoby Schrader MD    I spent a total of 25 minutes face-to-face with Sara K Angelica during today's office visit. Over 50% of this time was spent counseling the patient and/or coordinating care. See note for details.             CC  Patient Care Team:  Nadya Reid MD as PCP - General (Family Practice)  Nadeen Murphy MD as Referring Physician (Colon and Rectal Surgery)  Justin Khan MD as MD (Radiation Oncology)  Nneka Barrera APRN CNP as Nurse Practitioner (Nurse Practitioner)  Nadya Redi MD (Family Practice)  Agata Renteria RN as Specialty Care Coordinator (Oncology)  Justin Khan MD as Assigned Cancer Care Provider  Inocencio Perez MD as Assigned Musculoskeletal Provider  Valery Chan MD as Assigned Neuroscience Provider  INOCENCIO PEREZ

## 2022-07-25 NOTE — LETTER
2022       RE: Sara Dominguez  [address]  [facility] [address]     Dear Colleague,    Thank you for referring your patient, Sara Dominguez, to the [facility] [place] at [facility]. Please see a copy of my visit note below.        INTERVENTIONAL RADIOLOGY CONSULTATION    Name: Sara Dominguez  Age: 71 year old   Referring Physician: Dr. Marcial   REASON FOR REFERRAL: Pelvic insufficiency fracture    HPI:    Sara Dominguez is a 71 year old female diagnosed with squamous cell carcinoma of the anal canal, clinical [id number] (anna-rectal nodes) in 2019 status post chemoradiation treatment. Patient has not had recurrent disease however has developed pelvic insufficiency fractures post treatment of the sacrum and right pubic bone.     Currently she predominantly reports right groin pain which propagates to her inner thigh. She reports she is still employed and experiences slight limitations in activities involving mobility. Her pain level in the last 24 hours is reported as level of 8.      PAST MEDICAL HISTORY:   Past Medical History:   Diagnosis Date     Cancer (H)      Disorder of bone and cartilage     osteopenia     Diverticulosis of colon     ,seen at colonoscopy     Major depressive disorder, recurrent episode, moderate (H)     No Comments Provided     Reflux esophagitis     No Comments Provided     Sciatica     right     Urge incontinence     No Comments Provided       PAST SURGICAL HISTORY:   Past Surgical History:   Procedure Laterality Date     COLONOSCOPY      ,due 2018     EXTRACTION(S) DENTAL      No Comments Provided     OTHER SURGICAL HISTORY      [address].0,[place]  DELIVERY ONLY     OTHER SURGICAL HISTORY      ,ENDOSCOPY ESOPHAGUS       FAMILY HISTORY:   Family History   Problem Relation Age of Onset     Other - See Comments Father         Stroke     Cancer Mother          Cancer,cervical-mets to brain     Osteoporosis Mother         Osteoporosis     Heart Disease Brother         Heart Disease,MI  at 59     Heart Disease Maternal Grandmother         Heart Disease,MI  at 58     Asthma Brother         Asthma     Asthma Brother         Asthma     Diabetes Paternal Grandmother         Diabetes       SOCIAL HISTORY:   Social History     Tobacco Use     Smoking status: Former Smoker     Years: 20.00     Types: Cigarettes     Quit date: 10/9/2008     Years since quittin.8     Smokeless tobacco: Never Used     Tobacco comment: Quit smoking: passive exposure   Substance Use Topics     Alcohol use: Yes     Comment: Alcoholic Drinks/day: occ       PROBLEM LIST:   Patient Active Problem List    Diagnosis Date Noted     Nausea 2019     Priority: Medium     Hypotension, unspecified hypotension type 2019     Priority: Medium     Mixed hyperlipidemia 2019     Priority: Medium     Reflux esophagitis 2019     Priority: Medium     Urine, incontinence, stress female 2019     Priority: Medium     Malignant neoplasm of anal canal (H) 2019     Priority: Medium     Squamous cell carcinoma of anus (H) 2019     Priority: Medium     Give at 12.23.19 [place] [place]       Major depressive disorder, recurrent episode, moderate (H) 2017     Priority: Medium     Hypertension 2016     Priority: Medium     Allergic rhinitis 2012     Priority: Medium     Migraine variant 2008     Priority: Medium     Need for prophylactic hormone replacement therapy (postmenopausal) 2007     Priority: Medium     Sciatica 2007     Priority: Medium     Overview:   right         MEDICATIONS:   Prescription Medications as of 2022       Rx Number Disp Refills Start End Last Dispensed Date Next Fill Date Owning Pharmacy    alendronate (FOSAMAX) 70 MG tablet    2021        Sig: Take 70 mg by mouth every 7 days    Class: Historical    Route: Oral     atorvastatin (LIPITOR) 10 MG tablet   1 7/17/2019    [address]    Sig: Take 10 mg by mouth daily    Class: Historical    Route: Oral    calcium carbonate 600 mg-vitamin D 400 units (CALTRATE) 600-400 MG-UNIT per tablet    1/26/2022    [place] [id number] [facility]    Sig: Take 1 tablet by mouth    Class: Historical    Route: Oral    Cholecalciferol (VITAMIN D3) 50 MCG (2000 UT) CAPS    12/27/2021    [address]    Sig: Take 1 Capsule (2,000 units) by mouth once daily.    Class: Historical    gabapentin (NEURONTIN) 400 MG capsule            Sig: Take 600 mg by mouth 3 times daily    Class: Historical    Route: Oral    lisinopril (ZESTRIL) 20 MG tablet    6/30/2020        Sig: Take 20 mg by mouth daily    Class: Historical    Route: Oral    oxyCODONE-acetaminophen (PERCOCET)  MG per tablet  100 tablet 0 6/27/2022    [place] [address] [facility]    Sig: Take 1 tablet by mouth every 4 hours as needed for severe pain    Class: E-Prescribe    Earliest Fill Date: 6/27/2022    Route: Oral    No prior authorization was found for this prescription.    Found prior authorization for another prescription for the same medication: Closed - Other    pentoxifylline ER (TRENTAL) 400 MG CR tablet  90 tablet 3 4/12/2022    [address]    Sig: Take 1 tablet (400 mg) by mouth 3 times daily (with meals)    Class: E-Prescribe    Route: Oral    venlafaxine (EFFEXOR-XR) 150 MG 24 hr capsule    6/30/2020        Sig: Take 150 mg by mouth daily    Class: Historical    Route: Oral    vitamin E (TOCOPHEROL) 400 units (180 mg) capsule  90 capsule 3 4/12/2022    [address]    Sig: Take 1 capsule (400 Units) by mouth 3 times daily    Class: E-Prescribe    Route: Oral     diazepam (VALIUM) 5 MG tablet  1 tablet 0 2/15/2022    [facility] [place] - [address]    Sig: Take 1 tablet (5 mg) by mouth every 6 hours as needed for anxiety    Class: E-Prescribe    Route: Oral          ALLERGIES:   Patient has no known allergies.    ROS:  Negative unless otherwise stated in HPI.      Physical Examination:   Limited MSK exam does not demonstrate significant explicit ttp over her sacrum/lower back and pelvic bones  No neurological deficits of her lower extremities    VITALS:   /73 (BP Location: Left arm, Patient Position: Sitting, Cuff Size: Adult Regular)   Pulse 78   SpO2 97%         Labs:    BMP RESULTS:  Lab Results   Component Value Date     02/02/2022     06/24/2021    POTASSIUM 4.2 02/02/2022    POTASSIUM 4.7 06/24/2021    CHLORIDE 103 02/02/2022    CHLORIDE 100 06/24/2021    CO2 28 02/02/2022    CO2 27 06/24/2021    ANIONGAP 7 02/02/2022    ANIONGAP 9 06/24/2021     (H) 02/02/2022    GLC 83 06/24/2021    BUN 18 02/02/2022    BUN 18 06/24/2021    CR 0.84 02/02/2022    CR 0.98 06/24/2021    GFRESTIMATED 73 02/02/2022    GFRESTIMATED >60 07/29/2021    GFRESTIMATED 57 (L) 06/24/2021    GFRESTBLACK 66 06/24/2021    EMERALD 9.0 02/02/2022    EMERALD 9.3 06/24/2021        CBC RESULTS:  Lab Results   Component Value Date    WBC 5.6 02/02/2022    WBC 5.9 06/24/2021    RBC 4.06 02/02/2022    RBC 3.81 06/24/2021    HGB 12.6 02/02/2022    HGB 11.8 06/24/2021    HCT 37.7 02/02/2022    HCT 36.2 06/24/2021    MCV 93 02/02/2022    MCV 95 06/24/2021    MCH 31.0 02/02/2022    MCH 31.0 06/24/2021    MCHC 33.4 02/02/2022    MCHC 32.6 06/24/2021    RDW 14.3 02/02/2022    RDW 14.2 06/24/2021     02/02/2022     06/24/2021       INR/PTT:  Lab Results   Component Value Date    INR 1.05 09/15/2021    PTT 60 (H) 09/15/2021       Diagnostic studies: Relevant Imaging has been reviewed.    Assessment 71 fm with pelvic insufficiency fracture s/p  chemoradiation treatment for anal carcinoma.    Plan We do not believe bone augmentation is currently the best option for Sara as she does not have explicit tenderness to palpation. I believe she may receive greater benefit from nerve block procedure as her pain symptoms are likely related to nerve compression. Down the line symptoms progress and are resistant to nerve block therapy we can re-evaluate her as a candidate for augmentation.     I, Jacoby Schrader, was present with the fellow during the history and exam. I discussed the case with the fellow and agree with the findings as documented in the assessment and plan.    Jacoby Schrader MD    I spent a total of 25 minutes face-to-face with Sara Dominguez during today's office visit. Over 50% of this time was spent counseling the patient and/or coordinating care. See note for details.             CC  Patient Care Team:  Nadya Reid MD as PCP - General (Family Practice)  Nadeen Murphy MD as Referring Physician (Colon and Rectal Surgery)  Justin Khan MD as MD (Radiation Oncology)  Nneka Barrera, APRN CNP as Nurse Practitioner (Nurse Practitioner)  Nadya Reid MD (Family Practice)  Agata Renteria RN as Specialty Care Coordinator (Oncology)  Justin Khan MD as Assigned Cancer Care Provider  Inocencio Perez MD as Assigned Musculoskeletal Provider  Valery Chan MD as Assigned Neuroscience Provider  INOCENCIO PEREZ        Sincerely,    Jacoby Schrader MD

## 2022-07-28 DIAGNOSIS — G89.3 CANCER RELATED PAIN: ICD-10-CM

## 2022-07-28 DIAGNOSIS — C21.1 MALIGNANT NEOPLASM OF ANAL CANAL (H): ICD-10-CM

## 2022-07-28 DIAGNOSIS — R10.2 PELVIC PAIN IN FEMALE: Primary | ICD-10-CM

## 2022-08-04 ENCOUNTER — PATIENT OUTREACH (OUTPATIENT)
Dept: ONCOLOGY | Facility: CLINIC | Age: 72
End: 2022-08-04

## 2022-08-04 ENCOUNTER — HOSPITAL ENCOUNTER (OUTPATIENT)
Dept: PET IMAGING | Facility: CLINIC | Age: 72
Discharge: HOME | End: 2022-08-04
Attending: NURSE PRACTITIONER | Admitting: NURSE PRACTITIONER
Payer: MEDICARE

## 2022-08-04 DIAGNOSIS — C20 MALIGNANT NEOPLASM OF RECTUM (H): ICD-10-CM

## 2022-08-04 DIAGNOSIS — C21.0 SQUAMOUS CELL CARCINOMA OF ANUS (H): ICD-10-CM

## 2022-08-04 LAB — RADIOLOGIST FLAGS: ABNORMAL

## 2022-08-04 PROCEDURE — G1004 CDSM NDSC: HCPCS | Mod: GC | Performed by: RADIOLOGY

## 2022-08-04 PROCEDURE — G1004 CDSM NDSC: HCPCS | Mod: PS

## 2022-08-04 PROCEDURE — 78816 PET IMAGE W/CT FULL BODY: CPT | Mod: 26 | Performed by: RADIOLOGY

## 2022-08-04 PROCEDURE — A9552 F18 FDG: HCPCS | Performed by: NURSE PRACTITIONER

## 2022-08-04 PROCEDURE — 250N000011 HC RX IP 250 OP 636: Performed by: NURSE PRACTITIONER

## 2022-08-04 PROCEDURE — 343N000001 HC RX 343: Performed by: NURSE PRACTITIONER

## 2022-08-04 RX ORDER — HEPARIN SODIUM (PORCINE) LOCK FLUSH IV SOLN 100 UNIT/ML 100 UNIT/ML
500 SOLUTION INTRAVENOUS ONCE
Status: COMPLETED | OUTPATIENT
Start: 2022-08-04 | End: 2022-08-04

## 2022-08-04 RX ADMIN — Medication 500 UNITS: at 09:47

## 2022-08-04 RX ADMIN — FLUDEOXYGLUCOSE F-18 15.3 MCI.: 500 INJECTION, SOLUTION INTRAVENOUS at 09:47

## 2022-08-04 NOTE — PROGRESS NOTES
Received a call from access center on this Urgent finding from PET today.    3. Diffuse centrilobular groundglass nodules throughout the lungs,  suspicious for infectious or inflammatory bronchiolitis.    [Access Center: Infectious or inflammatory bronchiolitis    Called pt and she does not have any symptoms related to the finding. And no recent illness.     Will review with KAYODE Mcduffie RN on 8/4/2022 at 3:38 PM

## 2022-08-05 DIAGNOSIS — G89.3 CANCER RELATED PAIN: ICD-10-CM

## 2022-08-05 RX ORDER — OXYCODONE AND ACETAMINOPHEN 10; 325 MG/1; MG/1
1 TABLET ORAL EVERY 4 HOURS PRN
Qty: 100 TABLET | Refills: 0 | Status: SHIPPED | OUTPATIENT
Start: 2022-08-05 | End: 2022-09-02

## 2022-08-05 NOTE — PROGRESS NOTES
Update, Marissa Wells NP reviewed PET scan results and states this:  Could be evolution from prior respiratory infection in the last few months, even if not symptomatic now.  If she has respiratory symptoms she will need to be seen by PCP/Urgent care for infectious eval/treatment (including Covid testing). If not symptomatic now, would just follow. She sees Galo in a few weeks and he can decide whether follow-up needed if she's doing good up to that appt       So called patient with above recommendations per Marissa Wells NP. Patient continues to deny any respiratory symptoms at this time. Patient states she will go into PCP or urgent care is she does develop some. Shaye Vang RN on 8/5/2022 at 2:30 PM

## 2022-08-10 ENCOUNTER — OFFICE VISIT (OUTPATIENT)
Dept: RADIATION THERAPY | Facility: OUTPATIENT CENTER | Age: 72
End: 2022-08-10
Payer: COMMERCIAL

## 2022-08-10 VITALS
OXYGEN SATURATION: 99 % | WEIGHT: 175.2 LBS | RESPIRATION RATE: 18 BRPM | HEART RATE: 82 BPM | DIASTOLIC BLOOD PRESSURE: 69 MMHG | SYSTOLIC BLOOD PRESSURE: 130 MMHG | BODY MASS INDEX: 28.28 KG/M2

## 2022-08-10 DIAGNOSIS — C21.1 MALIGNANT NEOPLASM OF ANAL CANAL (H): Primary | ICD-10-CM

## 2022-08-10 ASSESSMENT — PAIN SCALES - GENERAL: PAINLEVEL: SEVERE PAIN (6)

## 2022-08-10 NOTE — NURSING NOTE
FOLLOW-UP VISIT    Patient Name: Sara Dominguez      : 1950     Age: 71 year old        ______________________________________________________________________________     Chief Complaint   Patient presents with     Radiation Therapy     Follow up     /69   Pulse 82   Resp 18   Wt 79.5 kg (175 lb 3.2 oz)   SpO2 99%   BMI 28.28 kg/m       Date Radiation Completed: 10/9/19    Pain  Current history of pain associated with this visit:   Intensity: 6/10  Current: aching and shooting  Location: R groin/pelvis  Treatment: gabapentin 600 mg 3x/day, percocet 1 pill 2-3x/day  -currently on pentoxifylline and vitamin E per Dr. Khan  Chronic/constant pain daily per pt - most felt when trying to rise/stand out of chair -takes a few minutes to get moving.     Meds  Current Med List Reviewed: Yes  Medication Note:     Imaging  PET/CT: 22    On Chemo?: No  Nausea:none  Bowel: Normal  Bladder: nocturia - mild not too bothersome. Pt does Kegel's  Skin: Warm  Dry  Intact  Energy Level: fair - works part time. Does deal with chronic B hip, R>L pain - referred to Dr. Chan and has upcoming pain clinic visit - discuss option of nerve block/nerve ablation.    Other Appointments:     Date  Oncologist: Dr. Gera Wells  22   Surgeon:      Other Notes:

## 2022-08-10 NOTE — LETTER
8/10/2022         RE: Sara Dominguez  [address]  [facility] [address]        Dear Colleague,    Thank you for referring your patient, Sara Dominguez, to the [facility]. Please see a copy of my visit note below.       Department of Radiation Oncology  Radiation Therapy Center  [facility]  [place] [address]  [phone]       Radiation Oncology Follow-up Visit  August 10, 2022    Sara Dominguez  MRN: [id number]   : 1950     DIAGNOSIS: squamous cell carcinoma of the anal canal  INTENT OF RADIOTHERAPY: definitive CRT.  PATHOLOGY:  moderately differentiated squamous cell carcinoma                              STAGE:  clinical [id number] (anna-rectal nodes).    CONCURRENT SYSTEMIC THERAPY: 5 FU and mitomycin     ONCOLOGIC HISTORY:    Ms. Dominguez is a 71 year old female diagnosed wquamous cell carcinoma of the anal canal, clinical [id number] (anna-rectal nodes).       The patient initially presented with symptoms of rectal pain pressure prompting further evaluation.  She was seen by gastroenterologist Dr. Aguirre who on digital rectal exam noticed a mass in the anterior wall of the anal rectal region, measuring 3.4 cm in size.  Patient eventually underwent colonoscopy on 2019 which demonstrated an 8 mm mucosal abnormality just above the dentate line.  The mass was noted to be in the anterior wall facing the vagina.  Biopsy of the mass demonstrated moderately differentiated squamous cell carcinoma.  The patient was referred to Gyn/onc who on exam palpated a 3 to 4 cm rectovaginal mass.  No vaginal or cervical mass was noted.  2019 the patient underwent MRI of the pelvis.  Imaging demonstrated 2.6 x 2.5 x 2.5 cm mass located in the distal rectum/anal canal region.  The mass was noted to arise exophytically from the distal rectum.  There were suspicious mesorectal lymph nodes measuring subcentimeter in size. No pelvic adenopathy was noted.  On  8/3/2019 the patient underwent a PET scan.  Imaging did not demonstrate any evidence of distant disease. Hypermetabolic 3.3 x 4.1 cm mass seen in the distal rectum/anus compatible with underlying malignancy. Per Dr. Khan review of imaging, also noted suspicious perirectal adenopathy.  The patient was subsequent seen by Dr. Aquino who discussed definitive treatment with chemoradiation.      SITE OF TREATMENT: anal/pelvis     DATES  OF TREATMENT: 19-10/09/19     TOTAL DOSE OF TREATMENT: 5400 cGy     DOSE PER FRACTION OF TREATMENT: 180 cGy x 30 fractions    INTERVAL SINCE COMPLETION OF RADIATION THERAPY:   ~  3 years    SUBJECTIVE:   In the interval, the patient was seen by Dr. Murphy on 2022. Per report, on examination there was no clear evidence of recurrent/ residual disease.     Post treatment PET on 20 was TYLER. Most recent PET on 22 demonstrated no clear evidence of recurrent disease.     Unfortunately posttreatment the patient has developed pelvic insufficiency fractures in the sacrum as well as right pubic bone.  She has been following with Dr. Mortensen with treatment of physical therapy, vitamin D, calcium,  Fosamax, and pain management.  She has noticed some improvement but yet not completely.  She has also met with orthopedic team (Dr. Marcial) to help in further management. She underwent biopsy of soft tissue mass in pelvis on 9/15/21, which was negative for malignancy. She has met Dr. Marcial to discuss any potential options for pelvic fractures. He discussed possibility of surgical intervention, but patient is not interested in surgery at this point. She has underwent steroid injection x 3 with some alleviation. Met with Dr. Chan of [facility] who referred to Dr. Reddy for consideration of nerve block, consultation is pending.     LABS AND IMAGIN22  PET    FINDINGS:      HEAD/NECK:  There is no suspicious FDG uptake in the neck.      Diminutive thyroid gland. The paranasal sinuses and  mastoid air cells  are clear. No cervical lymphadenopathy.     CHEST:  There is no suspicious FDG uptake in the chest.      Right chest wall port catheter tip terminates in the right atrium.     The left vertebral artery arises directly from the aortic arch. Mild  atherosclerotic calcifications of the aortic arch. Patulous esophagus.        Mild diffuse centrilobular groundglass nodules throughout the lungs.  No airspace consolidation.     ABDOMEN AND PELVIS:  Focal increased FDG uptake involving the cecum and proximal ascending  colon with max SUV of 9.43. No CT abnormality.     Normal variant [place] tail liver. Colonic diverticulosis without  evidence of diverticulitis.     LOWER EXTREMITIES:   No abnormal masses or hypermetabolic lesions.     BONES:   There is no abnormal FDG uptake in the skeleton. Chronic unhealed  fracture deformities of the sacral ala with low-level FDG uptake about  the fracture sites, which is increased from the prior PET. Chronic  fracture deformities of the right inferior and superior pubic rami  adjacent partially calcified lesion in the right abductors, presumably  posttraumatic in nature.                                                                       IMPRESSION:   In this patient with a history of rectal carcinoma status post  chemoradiation:  1. No evidence of recurrent or metastatic disease in the body.  2. Diffuse centrilobular groundglass nodules throughout the lungs,  suspicious for infectious or inflammatory bronchiolitis.  3. FDG uptake in the rectum and proximal ascending colon without CT  correlate. Presumably physiologic continued attention on follow-up.   4. Low level FDG uptake about the persistent sacral insufficiency  fracture lines.       IMPRESSION:   Ms. Dominguez is a 71 year old female diagnosed wquamous cell carcinoma of the anal canal, clinical [id number] (anna-rectal nodes). She completed definitive CRT to a total dose of 54 Gy in 30 fractions on  10/9/19.    PLAN:   1.  Clinically and radiographically TYLER. Last exam on January 2022 was TYLER. Post treatment PET on 4/9/20 was TYLER.Most recent PET on 8/4/22 demonstrated no clear evidence of recurrent disease.     2. Chronic late toxicity. Has developed sacral insufficiency fracture of the sacrum and R pubic bone post treatment. I discussed that these changes are likely related to post RT treatment with weakening of the bone.  Working with pain team and orthopedic team as well. [facility] to Dr. Reddy for consideration of nerve block, consultation is pending. I additionally discussed continuing  Vit E + pentoxifylline in effort to help promote healing.    3. Continue follow up for surveillance with surgery (Dr. Murphy). Patient will schedule appointment soon.    4.  Continue follow up with medical oncology team.     5. RTC in 6 months.       Justin Khan M.D.  Department of Radiation Oncology  [facility]

## 2022-08-10 NOTE — PROGRESS NOTES
Department of Radiation Oncology  Radiation Therapy Center  [facility] [place] [address]  [phone]       Radiation Oncology Follow-up Visit  August 10, 2022    Sara Dominguez  MRN: [id number]   : 1950     DIAGNOSIS: squamous cell carcinoma of the anal canal  INTENT OF RADIOTHERAPY: definitive CRT.  PATHOLOGY:  moderately differentiated squamous cell carcinoma                              STAGE:  clinical [id number] (anna-rectal nodes).    CONCURRENT SYSTEMIC THERAPY: 5 FU and mitomycin     ONCOLOGIC HISTORY:    Ms. 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       The patient initially presented with symptoms of rectal pain pressure prompting further evaluation.  She was seen by gastroenterologist Dr. Aguirre who on digital rectal exam noticed a mass in the anterior wall of the anal rectal region, measuring 3.4 cm in size.  Patient eventually underwent colonoscopy on 2019 which demonstrated an 8 mm mucosal abnormality just above the dentate line.  The mass was noted to be in the anterior wall facing the vagina.  Biopsy of the mass demonstrated moderately differentiated squamous cell carcinoma.  The patient was referred to Gyn/onc who on exam palpated a 3 to 4 cm rectovaginal mass.  No vaginal or cervical mass was noted.  2019 the patient underwent MRI of the pelvis.  Imaging demonstrated 2.6 x 2.5 x 2.5 cm mass located in the distal rectum/anal canal region.  The mass was noted to arise exophytically from the distal rectum.  There were suspicious mesorectal lymph nodes measuring subcentimeter in size. No pelvic adenopathy was noted.  On 8/3/2019 the patient underwent a PET scan.  Imaging did not demonstrate any evidence of distant disease. Hypermetabolic 3.3 x 4.1 cm mass seen in the distal rectum/anus compatible with underlying malignancy. Per Dr. Khan review of imaging, also  noted suspicious perirectal adenopathy.  The patient was subsequent seen by Dr. Aquino who discussed definitive treatment with chemoradiation.      SITE OF TREATMENT: anal/pelvis     DATES  OF TREATMENT: 19-10/09/19     TOTAL DOSE OF TREATMENT: 5400 cGy     DOSE PER FRACTION OF TREATMENT: 180 cGy x 30 fractions    INTERVAL SINCE COMPLETION OF RADIATION THERAPY:   ~  3 years    SUBJECTIVE:   In the interval, the patient was seen by Dr. Murphy on 2022. Per report, on examination there was no clear evidence of recurrent/ residual disease.     Post treatment PET on 20 was TYLER. Most recent PET on 22 demonstrated no clear evidence of recurrent disease.     Unfortunately posttreatment the patient has developed pelvic insufficiency fractures in the sacrum as well as right pubic bone.  She has been following with Dr. Mortensen with treatment of physical therapy, vitamin D, calcium,  Fosamax, and pain management.  She has noticed some improvement but yet not completely.  She has also met with orthopedic team (Dr. Marcial) to help in further management. She underwent biopsy of soft tissue mass in pelvis on 9/15/21, which was negative for malignancy. She has met Dr. Marcial to discuss any potential options for pelvic fractures. He discussed possibility of surgical intervention, but patient is not interested in surgery at this point. She has underwent steroid injection x 3 with some alleviation. Met with Dr. Chan of [facility] who referred to Dr. Reddy for consideration of nerve block, consultation is pending.     LABS AND IMAGIN22  PET    FINDINGS:      HEAD/NECK:  There is no suspicious FDG uptake in the neck.      Diminutive thyroid gland. The paranasal sinuses and mastoid air cells  are clear. No cervical lymphadenopathy.     CHEST:  There is no suspicious FDG uptake in the chest.      Right chest wall port catheter tip terminates in the right atrium.     The left vertebral artery arises directly from the  aortic arch. Mild  atherosclerotic calcifications of the aortic arch. Patulous esophagus.        Mild diffuse centrilobular groundglass nodules throughout the lungs.  No airspace consolidation.     ABDOMEN AND PELVIS:  Focal increased FDG uptake involving the cecum and proximal ascending  colon with max SUV of 9.43. No CT abnormality.     Normal variant [place] tail liver. Colonic diverticulosis without  evidence of diverticulitis.     LOWER EXTREMITIES:   No abnormal masses or hypermetabolic lesions.     BONES:   There is no abnormal FDG uptake in the skeleton. Chronic unhealed  fracture deformities of the sacral ala with low-level FDG uptake about  the fracture sites, which is increased from the prior PET. Chronic  fracture deformities of the right inferior and superior pubic rami  adjacent partially calcified lesion in the right abductors, presumably  posttraumatic in nature.                                                                       IMPRESSION:   In this patient with a history of rectal carcinoma status post  chemoradiation:  1. No evidence of recurrent or metastatic disease in the body.  2. Diffuse centrilobular groundglass nodules throughout the lungs,  suspicious for infectious or inflammatory bronchiolitis.  3. FDG uptake in the rectum and proximal ascending colon without CT  correlate. Presumably physiologic continued attention on follow-up.   4. Low level FDG uptake about the persistent sacral insufficiency  fracture lines.       IMPRESSION:   Ms. 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 She completed definitive CRT to a total dose of 54 Gy in 30 fractions on 10/9/19.    PLAN:   1.  Clinically and radiographically TYLER. Last exam on January 2022 was TYLER. Post treatment PET on 4/9/20 was TYLER.Most recent PET on 8/4/22 demonstrated no clear evidence of recurrent disease.     2. Chronic late toxicity. Has developed  sacral insufficiency fracture of the sacrum and R pubic bone post treatment. I discussed that these changes are likely related to post RT treatment with weakening of the bone.  Working with pain team and orthopedic team as well. [facility] to Dr. Reddy for consideration of nerve block, consultation is pending. I additionally discussed continuing  Vit E + pentoxifylline in effort to help promote healing.    3. Continue follow up for surveillance with surgery (Dr. Murphy). Patient will schedule appointment soon.    4.  Continue follow up with medical oncology team.     5. RTC in 6 months.       Justin Khan M.D.  Department of Radiation Oncology  [facility]

## 2022-08-23 ENCOUNTER — VIRTUAL VISIT (OUTPATIENT)
Dept: ONCOLOGY | Facility: CLINIC | Age: 72
End: 2022-08-23
Attending: INTERNAL MEDICINE
Payer: COMMERCIAL

## 2022-08-23 DIAGNOSIS — C21.1 MALIGNANT NEOPLASM OF ANAL CANAL (H): Primary | ICD-10-CM

## 2022-08-23 PROCEDURE — 99213 OFFICE O/P EST LOW 20 MIN: CPT | Mod: 95 | Performed by: INTERNAL MEDICINE

## 2022-08-23 NOTE — LETTER
8/23/2022         RE: Sara Dominguez  [address]  [place] [facility] [address]        Dear Colleague,    Thank you for referring your patient, Sara Dominguez, to the [facility]. Please see a copy of my visit note below.    Gina is a 71 year old who is being evaluated via a billable video visit.      How would you like to obtain your AVS? MyChart  If the video visit is dropped, the invitation should be resent by: Text to cell phone: [phone]  Will anyone else be joining your video visit? No        Video-Visit Details    Type of service:  Video Visit    Originating Location (pt. Location): Home    Distant Location (provider location):  [facility] -     Platform used for Video Visit: Talib Horne CMA on 8/23/2022 at 1:53 PM      [facility] Hematology and Oncology Outpatient Progress Note ([place])    Patient: Sara Dominguez  MRN: [id number]  Date of Service: 02/09/2022        Reason for Visit    1. Anal cancer      Assessment/Plan  1. Anal cancer  Status post concurrent chemoradiation 3 years ago.  Has been TYLER since then.  Recent PET scan reviewed.  No evidence of disease recurrence.  She has some uptake in the lungs which was consistent with inflammation but she is asymptomatic.  Also has some uptake in the rectum which was thought to be inflammation.  No CT evidence of any structural abnormality.  I explained to her that anal canal cancer has really good prognosis and disease recurrence after 3 years is highly unlikely.  The typically do not do routine imaging studies after 3 years of treatment.  She will need 6 monthly follow-up to 5 years physical exam.  She is getting regular BELÉN at surgery visits.    I will plan to see her back in 6 months for physical exam.  If she has any signs or symptoms of disease progression then we will pursue further investigation.  No need for routine labs.    2.  Benign  insufficiency pelvic fractures/sciatica  She is meeting with a sociology at the [facility] to consider nerve block.      ______________________________________________________________________________    History of Present Illness/ Interval History    Ms. Sara Dominguez  is a 71 year old treated for anal cancer with concurrent chemoRT almost 3 years ago, returning for routine 6-month visit.     Since her visit in July, she's been doing generally well with stable symptoms. Her main issue has been pain related to chronic pelvic insufficiency fracture. Follows Ortho. Underwent steroid injection in December with 6-week benefit, but pain is starting to return and considering a 2nd steroid injection in near future. Deferring surgical intervention since liklihood of benefit may be low. Gabapentin and Percocet on occasion, managed by Dr. Khan. On Fosamax and ca/vit D for osteoporosis.    In August, PET scan showed mild uptake at prior anorectal mass site which was indeterminate for local recurrence vs inflammation. No distant disease. Post-traumatic insufficiency fracture changes in pelvis favored as benign. Biopsy of pubic ramus negative for cancer nor infection (fibrosis).     Colorectal Surgery exam for direct visualization negative evident recurrence. 6-month follow-up planned.     No GI symptoms, change in stools. Occasional bright blood with wiping secondary to a skin tag per Colorectal assessment, no changes. No rectal pain.  Weight stable/up, good appetite.  Working PT as hospital   Prior leg lymphedema post-RT, has worked with PT. Resolved with time.    ECOG Performance    0-1      Oncology History/Treatment  Diagnosis/Stage:   7/2019: anal cancer, squamous cell ca T2N1M0  -rectal pain/pressure, palpable rectal mass  -Colonoscopy: 8 mm mucosal abnormality above the dentate line at anterior wall facing her vagina where the large mass was felt underneath. Biopsy + invasive mod differentiated squamous  cell cancer involving rectal glandular mucosa.   -Gyne exam normal; no vaginal mucosal abnormality but nodular 3-4 cm mass palpate at rectovaginal area not as well appreciated on digital rectal exam  -MRI pelvis: 2.6 cm mass between distal rectum and vagina appearing to arise exophytically from distal rectum, single mildly enlarged mesorectal LN 8 mm. No pelvic sidewall adenopathy or other mets noted  -PET: distal rectal rectum/anus and no mets    Treatment:  No surgical candidate    8 - 10/2019: concurrent chemoRT with mitomycin and 5FU  -resulted in CR      Physical Exam    GENERAL: Alert and oriented to time place and person. Seated comfortably. In no distress.        Lab Results    No results found for this or any previous visit (from the past 168 hour(s)).    Imaging    PET Oncology Whole Body    Result Date: 8/4/2022  Combined Report of:    PET and CT on  8/4/2022 12:16 PM : 1. PET of the neck, chest, abdomen, and pelvis. 2. PET CT Fusion for Attenuation Correction and Anatomical Localization:  3. 3D MIP and PET-CT fused images were processed on an independent workstation and archived to PACS and reviewed by a radiologist. Technique: 1. PET: The patient received 15.3 mCi of F-18-FDG; the serum glucose was 54` prior to administration, body weight was 78.5 kg. Images were evaluated in the axial, sagittal, and coronal planes as well as the rotational whole body MIP. Images were acquired from the Vertex to the Feet. UPTAKE WAS MEASURED AT 60 MINUTES. BACKGROUND:  Liver SUV max= 3.09,   Aorta Blood SUV Max: 2.84. 2. CT: CT only obtained for attenuation correction and not diagnostic purposes. INDICATION: Anal carcinoma, monitor; Squamous cell carcinoma of anus (H); Malignant neoplasm of rectum (H) ADDITIONAL INFORMATION OBTAINED FROM EMR: 71-year-old female with history of rectal carcinoma status post chemoradiation. COMPARISON: PET CT 7/29/2021. FINDINGS: HEAD/NECK: There is no suspicious FDG uptake in the neck.  Diminutive thyroid gland. The paranasal sinuses and mastoid air cells are clear. No cervical lymphadenopathy. CHEST: There is no suspicious FDG uptake in the chest. Right chest wall port catheter tip terminates in the right atrium. The left vertebral artery arises directly from the aortic arch. Mild atherosclerotic calcifications of the aortic arch. Patulous esophagus. Mild diffuse centrilobular groundglass nodules throughout the lungs. No airspace consolidation. ABDOMEN AND PELVIS: Focal increased FDG uptake involving the cecum and proximal ascending colon with max SUV of 9.43. No CT abnormality. Normal variant [place] tail liver. Colonic diverticulosis without evidence of diverticulitis. LOWER EXTREMITIES: No abnormal masses or hypermetabolic lesions. BONES: There is no abnormal FDG uptake in the skeleton. Chronic unhealed fracture deformities of the sacral ala with low-level FDG uptake about the fracture sites, which is increased from the prior PET. Chronic fracture deformities of the right inferior and superior pubic rami adjacent partially calcified lesion in the right abductors, presumably posttraumatic in nature.     IMPRESSION: In this patient with a history of rectal carcinoma status post chemoradiation: 1. No evidence of recurrent or metastatic disease in the body. 2. Diffuse centrilobular groundglass nodules throughout the lungs, suspicious for infectious or inflammatory bronchiolitis. 3. FDG uptake in the rectum and proximal ascending colon without CT correlate. Presumably physiologic continued attention on follow-up. 4. Low level FDG uptake about the persistent sacral insufficiency fracture lines. [Access Center: Infectious or inflammatory bronchiolitis] This report will be copied to the [facility] to ensure a provider acknowledges the finding. Access Center is available Monday through Friday 8am-3:30 pm. I have personally reviewed the examination and initial interpretation and I agree with  the findings. ABBY OAKES MD   SYSTEM ID:  [id number]      Billing  A total of 20 min were spent today on this visit which included face to face conversation with the patient, EMR review, counseling and co-ordination of care and medical documentation.      Video-Visit Details    Type of service:  Video Visit    Video Start Time: 2:10 PM  Video End Time: 2:19 PM    Originating Location (pt. Location): Other work in [place]    Distant Location (provider location):  [place]    Platform used for Video Visit: [facility]    Signed by: Morales Boss MD           Again, thank you for allowing me to participate in the care of your patient.        Sincerely,        Morales Boss MD

## 2022-08-23 NOTE — PROGRESS NOTES
Gina is a 71 year old who is being evaluated via a billable video visit.      How would you like to obtain your AVS? MyChart  If the video visit is dropped, the invitation should be resent by: Text to cell phone: [phone]  Will anyone else be joining your video visit? No        Video-Visit Details    Type of service:  Video Visit    Originating Location (pt. Location): Home    Distant Location (provider location):  [facility] -     Platform used for Video Visit: Talib Horne CMA on 8/23/2022 at 1:53 PM

## 2022-08-23 NOTE — LETTER
8/23/2022         RE: Sara Dominguez  [address]  [facility] [address]        Dear Colleague,    Thank you for referring your patient, Sara Dominguez, to the [facility]. Please see a copy of my visit note below.    Gina is a 71 year old who is being evaluated via a billable video visit.      How would you like to obtain your AVS? MyChart  If the video visit is dropped, the invitation should be resent by: Text to cell phone: [phone]  Will anyone else be joining your video visit? No        Video-Visit Details    Type of service:  Video Visit    Originating Location (pt. Location): Home    Distant Location (provider location):  [facility] -     Platform used for Video Visit: Talib Horne CMA on 8/23/2022 at 1:53 PM      [facility] Hematology and Oncology Outpatient Progress Note ([place])    Patient: Sara Dominguez  MRN: [id number]  Date of Service: 02/09/2022        Reason for Visit    1. Anal cancer      Assessment/Plan  1. Anal cancer  Status post concurrent chemoradiation 3 years ago.  Has been TYLER since then.  Recent PET scan reviewed.  No evidence of disease recurrence.  She has some uptake in the lungs which was consistent with inflammation but she is asymptomatic.  Also has some uptake in the rectum which was thought to be inflammation.  No CT evidence of any structural abnormality.  I explained to her that anal canal cancer has really good prognosis and disease recurrence after 3 years is highly unlikely.  The typically do not do routine imaging studies after 3 years of treatment.  She will need 6 monthly follow-up to 5 years physical exam.  She is getting regular BELÉN at surgery visits.    I will plan to see her back in 6 months for physical exam.  If she has any signs or symptoms of disease progression then we will pursue further investigation.  No need for routine labs.    2.  Benign  insufficiency pelvic fractures/sciatica  She is meeting with a sociology at the [facility] to consider nerve block.      ______________________________________________________________________________    History of Present Illness/ Interval History    Ms. Sara Dominguez  is a 71 year old treated for anal cancer with concurrent chemoRT almost 3 years ago, returning for routine 6-month visit.     Since her visit in July, she's been doing generally well with stable symptoms. Her main issue has been pain related to chronic pelvic insufficiency fracture. Follows Ortho. Underwent steroid injection in December with 6-week benefit, but pain is starting to return and considering a 2nd steroid injection in near future. Deferring surgical intervention since liklihood of benefit may be low. Gabapentin and Percocet on occasion, managed by Dr. Khan. On Fosamax and ca/vit D for osteoporosis.    In August, PET scan showed mild uptake at prior anorectal mass site which was indeterminate for local recurrence vs inflammation. No distant disease. Post-traumatic insufficiency fracture changes in pelvis favored as benign. Biopsy of pubic ramus negative for cancer nor infection (fibrosis).     Colorectal Surgery exam for direct visualization negative evident recurrence. 6-month follow-up planned.     No GI symptoms, change in stools. Occasional bright blood with wiping secondary to a skin tag per Colorectal assessment, no changes. No rectal pain.  Weight stable/up, good appetite.  Working PT as hospital   Prior leg lymphedema post-RT, has worked with PT. Resolved with time.    ECOG Performance    0-1      Oncology History/Treatment  Diagnosis/Stage:   7/2019: anal cancer, squamous cell ca T2N1M0  -rectal pain/pressure, palpable rectal mass  -Colonoscopy: 8 mm mucosal abnormality above the dentate line at anterior wall facing her vagina where the large mass was felt underneath. Biopsy + invasive mod differentiated squamous  cell cancer involving rectal glandular mucosa.   -Gyne exam normal; no vaginal mucosal abnormality but nodular 3-4 cm mass palpate at rectovaginal area not as well appreciated on digital rectal exam  -MRI pelvis: 2.6 cm mass between distal rectum and vagina appearing to arise exophytically from distal rectum, single mildly enlarged mesorectal LN 8 mm. No pelvic sidewall adenopathy or other mets noted  -PET: distal rectal rectum/anus and no mets    Treatment:  No surgical candidate    8 - 10/2019: concurrent chemoRT with mitomycin and 5FU  -resulted in CR      Physical Exam    GENERAL: Alert and oriented to time place and person. Seated comfortably. In no distress.        Lab Results    No results found for this or any previous visit (from the past 168 hour(s)).    Imaging    PET Oncology Whole Body    Result Date: 8/4/2022  Combined Report of:    PET and CT on  8/4/2022 12:16 PM : 1. PET of the neck, chest, abdomen, and pelvis. 2. PET CT Fusion for Attenuation Correction and Anatomical Localization:  3. 3D MIP and PET-CT fused images were processed on an independent workstation and archived to PACS and reviewed by a radiologist. Technique: 1. PET: The patient received 15.3 mCi of F-18-FDG; the serum glucose was 54` prior to administration, body weight was 78.5 kg. Images were evaluated in the axial, sagittal, and coronal planes as well as the rotational whole body MIP. Images were acquired from the Vertex to the Feet. UPTAKE WAS MEASURED AT 60 MINUTES. BACKGROUND:  Liver SUV max= 3.09,   Aorta Blood SUV Max: 2.84. 2. CT: CT only obtained for attenuation correction and not diagnostic purposes. INDICATION: Anal carcinoma, monitor; Squamous cell carcinoma of anus (H); Malignant neoplasm of rectum (H) ADDITIONAL INFORMATION OBTAINED FROM EMR: 71-year-old female with history of rectal carcinoma status post chemoradiation. COMPARISON: PET CT 7/29/2021. FINDINGS: HEAD/NECK: There is no suspicious FDG uptake in the neck.  Diminutive thyroid gland. The paranasal sinuses and mastoid air cells are clear. No cervical lymphadenopathy. CHEST: There is no suspicious FDG uptake in the chest. Right chest wall port catheter tip terminates in the right atrium. The left vertebral artery arises directly from the aortic arch. Mild atherosclerotic calcifications of the aortic arch. Patulous esophagus. Mild diffuse centrilobular groundglass nodules throughout the lungs. No airspace consolidation. ABDOMEN AND PELVIS: Focal increased FDG uptake involving the cecum and proximal ascending colon with max SUV of 9.43. No CT abnormality. Normal variant [place] tail liver. Colonic diverticulosis without evidence of diverticulitis. LOWER EXTREMITIES: No abnormal masses or hypermetabolic lesions. BONES: There is no abnormal FDG uptake in the skeleton. Chronic unhealed fracture deformities of the sacral ala with low-level FDG uptake about the fracture sites, which is increased from the prior PET. Chronic fracture deformities of the right inferior and superior pubic rami adjacent partially calcified lesion in the right abductors, presumably posttraumatic in nature.     IMPRESSION: In this patient with a history of rectal carcinoma status post chemoradiation: 1. No evidence of recurrent or metastatic disease in the body. 2. Diffuse centrilobular groundglass nodules throughout the lungs, suspicious for infectious or inflammatory bronchiolitis. 3. FDG uptake in the rectum and proximal ascending colon without CT correlate. Presumably physiologic continued attention on follow-up. 4. Low level FDG uptake about the persistent sacral insufficiency fracture lines. [Access Center: Infectious or inflammatory bronchiolitis] This report will be copied to the [place] to ensure a provider acknowledges the finding. Access Center is available Monday through Friday 8am-3:30 pm. I have personally reviewed the examination and initial interpretation and I agree with  the findings. ABBY OAKES MD   SYSTEM ID:  [id number]      Billing  A total of 20 min were spent today on this visit which included face to face conversation with the patient, EMR review, counseling and co-ordination of care and medical documentation.      Video-Visit Details    Type of service:  Video Visit    Video Start Time: 2:10 PM  Video End Time: 2:19 PM    Originating Location (pt. Location): Other work in [place]    Distant Location (provider location):  [place]    Platform used for Video Visit: [facility]    Signed by: Morales Boss MD           Again, thank you for allowing me to participate in the care of your patient.        Sincerely,        Morales Boss MD

## 2022-08-23 NOTE — PROGRESS NOTES
[facility] Hematology and Oncology Outpatient Progress Note ([place])    Patient: Sara Dominguez  MRN: [id number]  Date of Service: 02/09/2022        Reason for Visit    1. Anal cancer      Assessment/Plan  1. Anal cancer  Status post concurrent chemoradiation 3 years ago.  Has been TYLER since then.  Recent PET scan reviewed.  No evidence of disease recurrence.  She has some uptake in the lungs which was consistent with inflammation but she is asymptomatic.  Also has some uptake in the rectum which was thought to be inflammation.  No CT evidence of any structural abnormality.  I explained to her that anal canal cancer has really good prognosis and disease recurrence after 3 years is highly unlikely.  The typically do not do routine imaging studies after 3 years of treatment.  She will need 6 monthly follow-up to 5 years physical exam.  She is getting regular BELÉN at surgery visits.    I will plan to see her back in 6 months for physical exam.  If she has any signs or symptoms of disease progression then we will pursue further investigation.  No need for routine labs.    2.  Benign insufficiency pelvic fractures/sciatica  She is meeting with a sociology at the [facility] to consider nerve block.      ______________________________________________________________________________    History of Present Illness/ Interval History    Ms. Sara Dominguez  is a 71 year old treated for anal cancer with concurrent chemoRT almost 3 years ago, returning for routine 6-month visit.     Since her visit in July, she's been doing generally well with stable symptoms. Her main issue has been pain related to chronic pelvic insufficiency fracture. Follows Ortho. Underwent steroid injection in December with 6-week benefit, but pain is starting to return and considering a 2nd steroid injection in near future. Deferring surgical intervention since liklihood of benefit may be low. Gabapentin and Percocet on occasion, managed  by Dr. Khan. On Fosamax and ca/vit D for osteoporosis.    In August, PET scan showed mild uptake at prior anorectal mass site which was indeterminate for local recurrence vs inflammation. No distant disease. Post-traumatic insufficiency fracture changes in pelvis favored as benign. Biopsy of pubic ramus negative for cancer nor infection (fibrosis).     Colorectal Surgery exam for direct visualization negative evident recurrence. 6-month follow-up planned.     No GI symptoms, change in stools. Occasional bright blood with wiping secondary to a skin tag per Colorectal assessment, no changes. No rectal pain.  Weight stable/up, good appetite.  Working PT as hospital   Prior leg lymphedema post-RT, has worked with PT. Resolved with time.    ECOG Performance    0-1      Oncology History/Treatment  Diagnosis/Stage:   7/2019: anal cancer, squamous cell ca T2N1M0  -rectal pain/pressure, palpable rectal mass  -Colonoscopy: 8 mm mucosal abnormality above the dentate line at anterior wall facing her vagina where the large mass was felt underneath. Biopsy + invasive mod differentiated squamous cell cancer involving rectal glandular mucosa.   -Gyne exam normal; no vaginal mucosal abnormality but nodular 3-4 cm mass palpate at rectovaginal area not as well appreciated on digital rectal exam  -MRI pelvis: 2.6 cm mass between distal rectum and vagina appearing to arise exophytically from distal rectum, single mildly enlarged mesorectal LN 8 mm. No pelvic sidewall adenopathy or other mets noted  -PET: distal rectal rectum/anus and no mets    Treatment:  No surgical candidate    8 - 10/2019: concurrent chemoRT with mitomycin and 5FU  -resulted in CR      Physical Exam    GENERAL: Alert and oriented to time place and person. Seated comfortably. In no distress.        Lab Results    No results found for this or any previous visit (from the past 168 hour(s)).    Imaging    PET Oncology Whole Body    Result Date:  8/4/2022  Combined Report of:    PET and CT on  8/4/2022 12:16 PM : 1. PET of the neck, chest, abdomen, and pelvis. 2. PET CT Fusion for Attenuation Correction and Anatomical Localization:  3. 3D MIP and PET-CT fused images were processed on an independent workstation and archived to PACS and reviewed by a radiologist. Technique: 1. PET: The patient received 15.3 mCi of F-18-FDG; the serum glucose was 54` prior to administration, body weight was 78.5 kg. Images were evaluated in the axial, sagittal, and coronal planes as well as the rotational whole body MIP. Images were acquired from the Vertex to the Feet. UPTAKE WAS MEASURED AT 60 MINUTES. BACKGROUND:  Liver SUV max= 3.09,   Aorta Blood SUV Max: 2.84. 2. CT: CT only obtained for attenuation correction and not diagnostic purposes. INDICATION: Anal carcinoma, monitor; Squamous cell carcinoma of anus (H); Malignant neoplasm of rectum (H) ADDITIONAL INFORMATION OBTAINED FROM EMR: 71-year-old female with history of rectal carcinoma status post chemoradiation. COMPARISON: PET CT 7/29/2021. FINDINGS: HEAD/NECK: There is no suspicious FDG uptake in the neck. Diminutive thyroid gland. The paranasal sinuses and mastoid air cells are clear. No cervical lymphadenopathy. CHEST: There is no suspicious FDG uptake in the chest. Right chest wall port catheter tip terminates in the right atrium. The left vertebral artery arises directly from the aortic arch. Mild atherosclerotic calcifications of the aortic arch. Patulous esophagus. Mild diffuse centrilobular groundglass nodules throughout the lungs. No airspace consolidation. ABDOMEN AND PELVIS: Focal increased FDG uptake involving the cecum and proximal ascending colon with max SUV of 9.43. No CT abnormality. Normal variant [place] tail liver. Colonic diverticulosis without evidence of diverticulitis. LOWER EXTREMITIES: No abnormal masses or hypermetabolic lesions. BONES: There is no abnormal FDG uptake in the skeleton. Chronic  unhealed fracture deformities of the sacral ala with low-level FDG uptake about the fracture sites, which is increased from the prior PET. Chronic fracture deformities of the right inferior and superior pubic rami adjacent partially calcified lesion in the right abductors, presumably posttraumatic in nature.     IMPRESSION: In this patient with a history of rectal carcinoma status post chemoradiation: 1. No evidence of recurrent or metastatic disease in the body. 2. Diffuse centrilobular groundglass nodules throughout the lungs, suspicious for infectious or inflammatory bronchiolitis. 3. FDG uptake in the rectum and proximal ascending colon without CT correlate. Presumably physiologic continued attention on follow-up. 4. Low level FDG uptake about the persistent sacral insufficiency fracture lines. [Access Center: Infectious or inflammatory bronchiolitis] This report will be copied to the [place] [facility] to ensure a provider acknowledges the finding. Access Center is available Monday through Friday 8am-3:30 pm. I have personally reviewed the examination and initial interpretation and I agree with the findings. ABBY OAKES MD   SYSTEM ID:  [id number]      Billing  A total of 20 min were spent today on this visit which included face to face conversation with the patient, EMR review, counseling and co-ordination of care and medical documentation.      Video-Visit Details    Type of service:  Video Visit    Video Start Time: 2:10 PM  Video End Time: 2:19 PM    Originating Location (pt. Location): Other work in [place]    Distant Location (provider location):  [place]    Platform used for Video Visit: [facility]    Signed by: Morales Boss MD

## 2022-08-31 ENCOUNTER — VIRTUAL VISIT (OUTPATIENT)
Dept: ONCOLOGY | Facility: CLINIC | Age: 72
End: 2022-08-31
Attending: STUDENT IN AN ORGANIZED HEALTH CARE EDUCATION/TRAINING PROGRAM
Payer: COMMERCIAL

## 2022-08-31 DIAGNOSIS — C20 MALIGNANT NEOPLASM OF RECTUM (H): ICD-10-CM

## 2022-08-31 DIAGNOSIS — C21.1 MALIGNANT NEOPLASM OF ANAL CANAL (H): Primary | ICD-10-CM

## 2022-08-31 DIAGNOSIS — G89.3 CANCER RELATED PAIN: ICD-10-CM

## 2022-08-31 DIAGNOSIS — C21.0 SQUAMOUS CELL CARCINOMA OF ANUS (H): ICD-10-CM

## 2022-08-31 DIAGNOSIS — R10.2 PELVIC PAIN IN FEMALE: ICD-10-CM

## 2022-08-31 DIAGNOSIS — S32.509S CLOSED FRACTURE OF PUBIS, UNSPECIFIED PORTION OF PUBIS, UNSPECIFIED LATERALITY, SEQUELA: ICD-10-CM

## 2022-08-31 PROCEDURE — G0463 HOSPITAL OUTPT CLINIC VISIT: HCPCS | Mod: PN,RTG | Performed by: STUDENT IN AN ORGANIZED HEALTH CARE EDUCATION/TRAINING PROGRAM

## 2022-08-31 PROCEDURE — 99215 OFFICE O/P EST HI 40 MIN: CPT | Mod: 95 | Performed by: STUDENT IN AN ORGANIZED HEALTH CARE EDUCATION/TRAINING PROGRAM

## 2022-08-31 NOTE — PATIENT INSTRUCTIONS
Continue pelvic therapy.  Keep scheduled appt with , Dr. Chan will reach out to her team.  Follow up with Dr. Chan in 3 months.

## 2022-08-31 NOTE — PROGRESS NOTES
Gina is a 71 year old who is being evaluated via a billable video visit.      Patient stated she is in the state of [place] for the visit today.    How would you like to obtain your AVS? MyChart  If the video visit is dropped, the invitation should be resent by: Text to cell phone: [phone]  Will anyone else be joining your video visit? No        Video-Visit Details    Video Start Time: 8:15 AM    Type of service:  Video Visit    Video End Time:8:38 AM    Originating Location (pt. Location): Home    Distant Location (provider location):  [facility]     Platform used for Video Visit: Talib Ryan, Virtual Visit Facilitator

## 2022-08-31 NOTE — LETTER
8/31/2022         RE: Sara Dominguez  [address]  [facility] [address]        Dear Colleague,    Thank you for referring your patient, Sara Dominguez, to the [facility]. Please see a copy of my visit note below.    Gina is a 71 year old who is being evaluated via a billable video visit.      Patient stated she is in the state of [place] for the visit today.    How would you like to obtain your AVS? MyChart  If the video visit is dropped, the invitation should be resent by: Text to cell phone: [phone]  Will anyone else be joining your video visit? No        Video-Visit Details    Video Start Time: 8:15 AM    Type of service:  Video Visit    Video End Time:8:38 AM    Originating Location (pt. Location): Home    Distant Location (provider location):  [facility]     Platform used for Video Visit: Talib Ryan, Virtual Visit Facilitator      [facility]   PM&R clinic note        Interval history:     Sara Dominguez presents to clinic today for follow up reg her rehab needs.   She has h/o squamous cell carcinoma of the anal canal, clinical [id number] (anna-rectal nodes) in 2019. She completed definitive CRT to a total dose of 54 Gy in 30 fractions on 10/9/19  Was last seen in clinic on 5/25/22.  Recommendations included:  1. Therapy/equipment/braces:  1. Pelvic therapy referral placed  2. Interventions:  1. Consideration of pudendal nerve block. Will reach out to Dr. Reddy to discuss.  3. Referral / follow up with other providers:  1. Continue to follow with providers for pubic symphysis CS injections.  4. Follow up: 3 months.      Symptoms,  Pain is still the same. She takes what she has to take in the morning to alleviate it   Pelvic therapy worked more for her bladder issues than for the pain. The exercises decreases the frequency of emptying her bladder.  She is planning to continue with  pelvic therapy for now as it is helping with bladder issues.  She would like to get this pain under better control, and is looking forward to her visit with Dr. Reddy. She is wondering if she can get the injection the same day she has the appointment with Dr. Reddy, since she travels from far.      Therapies/HEP,  Continues with pelvic therapy.      Functionally,   No changes.      Social history is unchanged.        Medications:  Current Outpatient Medications   Medication Sig Dispense Refill     alendronate (FOSAMAX) 70 MG tablet Take 70 mg by mouth every 7 days       atorvastatin (LIPITOR) 10 MG tablet Take 10 mg by mouth daily  1     calcium carbonate 600 mg-vitamin D 400 units (CALTRATE) 600-400 MG-UNIT per tablet Take 1 tablet by mouth       Cholecalciferol (VITAMIN D3) 50 MCG (2000 UT) CAPS Take 1 Capsule (2,000 units) by mouth once daily.       diazepam (VALIUM) 5 MG tablet Take 1 tablet (5 mg) by mouth every 6 hours as needed for anxiety 1 tablet 0     gabapentin (NEURONTIN) 400 MG capsule Take 600 mg by mouth 3 times daily       lisinopril (ZESTRIL) 20 MG tablet Take 20 mg by mouth daily       naloxone (NARCAN) 4 MG/0.1ML nasal spray Spray 1 spray (4 mg) into one nostril alternating nostrils once as needed for opioid reversal every 2-3 minutes until assistance arrives 0.2 mL 0     oxyCODONE-acetaminophen (PERCOCET)  MG per tablet Take 1 tablet by mouth every 4 hours as needed for severe pain 100 tablet 0     pentoxifylline ER (TRENTAL) 400 MG CR tablet Take 1 tablet (400 mg) by mouth 3 times daily (with meals) 90 tablet 3     venlafaxine (EFFEXOR-XR) 150 MG 24 hr capsule Take 150 mg by mouth daily       vitamin E (TOCOPHEROL) 400 units (180 mg) capsule Take 1 capsule (400 Units) by mouth 3 times daily 90 capsule 3              Physical Exam:   There were no vitals taken for this visit.  Gen: NAD, pleasant and cooperative   HEENT: Atraumatic, normocephalic, extraocular movements appear intact  Pulm:  non-labored breathing in room air  Neuro/MSK:   Orientation: Oriented to person, time, place and situation, Exhibits good insight into her condition and ongoing treatment  Motor: Observed moving upper extremities actively against gravity    Labs/Imaging:  Lab Results   Component Value Date    WBC 5.6 02/02/2022    HGB 12.6 02/02/2022    HCT 37.7 02/02/2022    MCV 93 02/02/2022     02/02/2022     Lab Results   Component Value Date     02/02/2022    POTASSIUM 4.2 02/02/2022    CHLORIDE 103 02/02/2022    CO2 28 02/02/2022     (H) 02/02/2022     Lab Results   Component Value Date    GFRESTIMATED 73 02/02/2022    GFRESTBLACK 66 06/24/2021     Lab Results   Component Value Date    AST 24 02/02/2022    ALT 28 02/02/2022    ALKPHOS 119 02/02/2022    BILITOTAL 0.6 02/02/2022     Lab Results   Component Value Date    INR 1.05 09/15/2021     Lab Results   Component Value Date    BUN 18 02/02/2022    CR 0.84 02/02/2022              Assessment/Plan   Sara Dominguez presents to clinic today for follow up reg her rehab needs.   She has h/o squamous cell carcinoma of the anal canal, clinical [id number] (anna-rectal nodes) in 2019. She completed definitive CRT to a total dose of 54 Gy in 30 fractions on 10/9/19. Was last seen in clinic on 5/25/22. Please see below recommendations for today's visit.    1. Therapy/equipment/braces:  1. Continue Pelvic therapy  2. Interventions:  2. Keep scheduled appointment with Dr. Reddy for consideration of pudendal nerve block. Will reach out to Dr. Reddy to see if procedure can be done at that visit since patient is coming in from 2[place]5 hours away.  3. Referral / follow up with other providers:  3. Continue to follow with providers for pubic symphysis CS injections.  4. Follow up: 3 months.        50 minutes spent on the date of the encounter doing chart review, history and exam, documentation and further activities as noted above.        Again, thank you for allowing me to  participate in the care of your patient.      Sincerely,    Valery Chan MD

## 2022-08-31 NOTE — PROGRESS NOTES
[facility]   PM&R clinic note        Interval history:     Sara Dominguez presents to clinic today for follow up reg her rehab needs.   She has h/o squamous cell carcinoma of the anal canal, clinical [id number] (anna-rectal nodes) in 2019. She completed definitive CRT to a total dose of 54 Gy in 30 fractions on 10/9/19  Was last seen in clinic on 5/25/22.  Recommendations included:  1. Therapy/equipment/braces:  1. Pelvic therapy referral placed  2. Interventions:  1. Consideration of pudendal nerve block. Will reach out to Dr. Reddy to discuss.  3. Referral / follow up with other providers:  1. Continue to follow with providers for pubic symphysis CS injections.  4. Follow up: 3 months.      Symptoms,  Pain is still the same. She takes what she has to take in the morning to alleviate it   Pelvic therapy worked more for her bladder issues than for the pain. The exercises decreases the frequency of emptying her bladder.  She is planning to continue with pelvic therapy for now as it is helping with bladder issues.  She would like to get this pain under better control, and is looking forward to her visit with Dr. Reddy. She is wondering if she can get the injection the same day she has the appointment with Dr. Reddy, since she travels from far.      Therapies/HEP,  Continues with pelvic therapy.      Functionally,   No changes.      Social history is unchanged.        Medications:  Current Outpatient Medications   Medication Sig Dispense Refill     alendronate (FOSAMAX) 70 MG tablet Take 70 mg by mouth every 7 days       atorvastatin (LIPITOR) 10 MG tablet Take 10 mg by mouth daily  1     calcium carbonate 600 mg-vitamin D 400 units (CALTRATE) 600-400 MG-UNIT per tablet Take 1 tablet by mouth       Cholecalciferol (VITAMIN D3) 50 MCG (2000 UT) CAPS Take 1 Capsule (2,000 units) by mouth once daily.       diazepam (VALIUM) 5 MG tablet Take 1 tablet (5 mg) by mouth every 6 hours as  needed for anxiety 1 tablet 0     gabapentin (NEURONTIN) 400 MG capsule Take 600 mg by mouth 3 times daily       lisinopril (ZESTRIL) 20 MG tablet Take 20 mg by mouth daily       naloxone (NARCAN) 4 MG/0.1ML nasal spray Spray 1 spray (4 mg) into one nostril alternating nostrils once as needed for opioid reversal every 2-3 minutes until assistance arrives 0.2 mL 0     oxyCODONE-acetaminophen (PERCOCET)  MG per tablet Take 1 tablet by mouth every 4 hours as needed for severe pain 100 tablet 0     pentoxifylline ER (TRENTAL) 400 MG CR tablet Take 1 tablet (400 mg) by mouth 3 times daily (with meals) 90 tablet 3     venlafaxine (EFFEXOR-XR) 150 MG 24 hr capsule Take 150 mg by mouth daily       vitamin E (TOCOPHEROL) 400 units (180 mg) capsule Take 1 capsule (400 Units) by mouth 3 times daily 90 capsule 3              Physical Exam:   There were no vitals taken for this visit.  Gen: NAD, pleasant and cooperative   HEENT: Atraumatic, normocephalic, extraocular movements appear intact  Pulm: non-labored breathing in room air  Neuro/MSK:   Orientation: Oriented to person, time, place and situation, Exhibits good insight into her condition and ongoing treatment  Motor: Observed moving upper extremities actively against gravity    Labs/Imaging:  Lab Results   Component Value Date    WBC 5.6 02/02/2022    HGB 12.6 02/02/2022    HCT 37.7 02/02/2022    MCV 93 02/02/2022     02/02/2022     Lab Results   Component Value Date     02/02/2022    POTASSIUM 4.2 02/02/2022    CHLORIDE 103 02/02/2022    CO2 28 02/02/2022     (H) 02/02/2022     Lab Results   Component Value Date    GFRESTIMATED 73 02/02/2022    GFRESTBLACK 66 06/24/2021     Lab Results   Component Value Date    AST 24 02/02/2022    ALT 28 02/02/2022    ALKPHOS 119 02/02/2022    BILITOTAL 0.6 02/02/2022     Lab Results   Component Value Date    INR 1.05 09/15/2021     Lab Results   Component Value Date    BUN 18 02/02/2022    CR 0.84 02/02/2022               Assessment/Plan   Sara Dominguez presents to clinic today for follow up reg her rehab needs.   She has h/o squamous cell carcinoma of the anal canal, clinical [id number] (anna-rectal nodes) in 2019. She completed definitive CRT to a total dose of 54 Gy in 30 fractions on 10/9/19. Was last seen in clinic on 5/25/22. Please see below recommendations for today's visit.    1. Therapy/equipment/braces:  1. Continue Pelvic therapy  2. Interventions:  2. Keep scheduled appointment with Dr. Reddy for consideration of pudendal nerve block. Will reach out to Dr. Reddy to see if procedure can be done at that visit since patient is coming in from 2[place]5 hours away.  3. Referral / follow up with other providers:  3. Continue to follow with providers for pubic symphysis CS injections.  4. Follow up: 3 months.      Valery Chan MD  Physical Medicine & Rehabilitation      50 minutes spent on the date of the encounter doing chart review, history and exam, documentation and further activities as noted above.

## 2022-09-02 DIAGNOSIS — G89.3 CANCER RELATED PAIN: ICD-10-CM

## 2022-09-02 RX ORDER — OXYCODONE AND ACETAMINOPHEN 10; 325 MG/1; MG/1
1 TABLET ORAL EVERY 4 HOURS PRN
Qty: 100 TABLET | Refills: 0 | Status: SHIPPED | OUTPATIENT
Start: 2022-09-02 | End: 2022-10-03

## 2022-09-15 ENCOUNTER — OFFICE VISIT (OUTPATIENT)
Dept: ANESTHESIOLOGY | Facility: CLINIC | Age: 72
End: 2022-09-15
Attending: STUDENT IN AN ORGANIZED HEALTH CARE EDUCATION/TRAINING PROGRAM
Payer: COMMERCIAL

## 2022-09-15 VITALS
WEIGHT: 177 LBS | DIASTOLIC BLOOD PRESSURE: 76 MMHG | SYSTOLIC BLOOD PRESSURE: 150 MMHG | BODY MASS INDEX: 30.22 KG/M2 | HEIGHT: 64 IN | OXYGEN SATURATION: 94 % | HEART RATE: 88 BPM | TEMPERATURE: 98 F

## 2022-09-15 DIAGNOSIS — G89.3 CANCER RELATED PAIN: ICD-10-CM

## 2022-09-15 DIAGNOSIS — C21.1 MALIGNANT NEOPLASM OF ANAL CANAL (H): ICD-10-CM

## 2022-09-15 DIAGNOSIS — R10.2 PELVIC PAIN IN FEMALE: ICD-10-CM

## 2022-09-15 DIAGNOSIS — G58.8 NEURALGIA OF RIGHT PUDENDAL NERVE: Primary | ICD-10-CM

## 2022-09-15 PROCEDURE — G0463 HOSPITAL OUTPT CLINIC VISIT: HCPCS

## 2022-09-15 PROCEDURE — 99204 OFFICE O/P NEW MOD 45 MIN: CPT | Mod: GC | Performed by: ANESTHESIOLOGY

## 2022-09-15 ASSESSMENT — ANXIETY QUESTIONNAIRES
7. FEELING AFRAID AS IF SOMETHING AWFUL MIGHT HAPPEN: NOT AT ALL
2. NOT BEING ABLE TO STOP OR CONTROL WORRYING: NOT AT ALL
8. IF YOU CHECKED OFF ANY PROBLEMS, HOW DIFFICULT HAVE THESE MADE IT FOR YOU TO DO YOUR WORK, TAKE CARE OF THINGS AT HOME, OR GET ALONG WITH OTHER PEOPLE?: SOMEWHAT DIFFICULT
6. BECOMING EASILY ANNOYED OR IRRITABLE: NOT AT ALL
7. FEELING AFRAID AS IF SOMETHING AWFUL MIGHT HAPPEN: NOT AT ALL
3. WORRYING TOO MUCH ABOUT DIFFERENT THINGS: SEVERAL DAYS
GAD7 TOTAL SCORE: 3
5. BEING SO RESTLESS THAT IT IS HARD TO SIT STILL: NOT AT ALL
GAD7 TOTAL SCORE: 3
1. FEELING NERVOUS, ANXIOUS, OR ON EDGE: SEVERAL DAYS
4. TROUBLE RELAXING: SEVERAL DAYS
IF YOU CHECKED OFF ANY PROBLEMS ON THIS QUESTIONNAIRE, HOW DIFFICULT HAVE THESE PROBLEMS MADE IT FOR YOU TO DO YOUR WORK, TAKE CARE OF THINGS AT HOME, OR GET ALONG WITH OTHER PEOPLE: SOMEWHAT DIFFICULT

## 2022-09-15 ASSESSMENT — ENCOUNTER SYMPTOMS
FATIGUE: 1
NECK PAIN: 0
ABDOMINAL PAIN: 1
DYSURIA: 0
ARTHRALGIAS: 1
INCREASED ENERGY: 1
DOUBLE VISION: 0
POLYDIPSIA: 0
SYNCOPE: 0
LIGHT-HEADEDNESS: 0
NECK MASS: 0
WEIGHT GAIN: 1
VOMITING: 0
SKIN CHANGES: 0
BOWEL INCONTINENCE: 0
LEG PAIN: 1
BLOATING: 1
PALPITATIONS: 0
HALLUCINATIONS: 0
NAUSEA: 0
JOINT SWELLING: 0
EYE REDNESS: 0
DECREASED APPETITE: 0
CHILLS: 0
HEMATURIA: 0
FLANK PAIN: 0
HEARTBURN: 1
CONSTIPATION: 0
POLYPHAGIA: 0
EYE IRRITATION: 0
BACK PAIN: 1
SLEEP DISTURBANCES DUE TO BREATHING: 0
DIARRHEA: 0
STIFFNESS: 0
NIGHT SWEATS: 1
SMELL DISTURBANCE: 1
TASTE DISTURBANCE: 0
BLOOD IN STOOL: 0
EYE PAIN: 0
HYPOTENSION: 0
MUSCLE CRAMPS: 0
WEIGHT LOSS: 0
POOR WOUND HEALING: 0
JAUNDICE: 0
MUSCLE WEAKNESS: 0
HYPERTENSION: 1
RECTAL PAIN: 0
DIFFICULTY URINATING: 0
NAIL CHANGES: 1
EYE WATERING: 0
FEVER: 0
ALTERED TEMPERATURE REGULATION: 0
EXERCISE INTOLERANCE: 0
MYALGIAS: 0
ORTHOPNEA: 0

## 2022-09-15 ASSESSMENT — PAIN SCALES - GENERAL: PAINLEVEL: MODERATE PAIN (5)

## 2022-09-15 NOTE — PROGRESS NOTES
Pain Clinic New Patient Consult Note:    Referring Provider: Dustin   Primary care provider: Nadya Reid    Sara Dominguez is a 72 year old y.o. old female who presents to the pain clinic with pain 2 year history of right sided anterior groin pain that starts in the vaginal area and radiates upwards to her lower abdomen, and inferomedially down the thigh.  She developed this pain suddenly and went to the ER and was found to have vertical fractures through the bilateral sacral ala with chronic diastasis of the symphysis pubis.    She has had pubic symphesis steroid injections which provided pain relief for 5-6 weeks on average. Her last one was in May.    She does also have a history of:     Low back and left greater than right L4 radiculopathy, neurologically intact, failure of multiple bilateral L4 nerve root injections.  L3-4 severe left and moderate right lateral recess stenosis with mild to moderate central stenosis.  Degenerative scoliosis.  L3-4 mild bilateral foraminal stenosis.       HPI:  Patient Supplied Answers To the [facility] Pain Questionnaire  [facility] Pain -  Patient Entered Questionnaire/Answers 9/15/2022   What number best describes your pain right now:  0 = No pain  to  10 = Worst pain imaginable 5   How would you describe the pain? sharp, cutting, dull, aching, pressure   Which of the following worsen your pain? lying down, sitting, walking   Which of the following improve or reduce your pain? walking, medication   What number best describes your average pain for the past week:  0 = No pain  to  10 = Worst pain imaginable 5   What number best describes your LOWEST pain in past 24 hours:  0 = No pain  to  10 = Worst pain imaginable 5   What number best describes your WORST pain in past 24 hours:  0 = No pain  to  10 = Worst pain imaginable 5   When is your pain worst? AM   What non-medicine treatments have you already had for your pain? physical therapy, spine injections (shots), other nerve  blocks           Pain treatments:    Gabapentin 600 mg TID (provides good relief)  Percocet 10 mg 4 times daily  Ibuprofen    Herbal medicines: no  Physical therapy: yes  Chiropractor: no  Pain physician: yes  Surgery: yes  Biofeedback: no  Acupuncture: no    Tests/Imaging reviewed with the patient:    Xray pelvis    Significant Medical History:   Past Medical History:   Diagnosis Date     Cancer (H)      Disorder of bone and cartilage     osteopenia     Diverticulosis of colon     ,seen at colonoscopy     Major depressive disorder, recurrent episode, moderate (H)     No Comments Provided     Reflux esophagitis     No Comments Provided     Sciatica     right     Urge incontinence     No Comments Provided          Past Surgical History:  Past Surgical History:   Procedure Laterality Date     COLONOSCOPY      ,2018     EXTRACTION(S) DENTAL      No Comments Provided     OTHER SURGICAL HISTORY      [address].0,[place]  DELIVERY ONLY     OTHER SURGICAL HISTORY      ,ENDOSCOPY ESOPHAGUS          Family History:  Family History   Problem Relation Age of Onset     Other - See Comments Father         Stroke     Cancer Mother         Cancer,cervical-mets to brain     Osteoporosis Mother         Osteoporosis     Heart Disease Brother         Heart Disease,MI  at 59     Heart Disease Maternal Grandmother         Heart Disease,MI  at 58     Asthma Brother         Asthma     Asthma Brother         Asthma     Diabetes Paternal Grandmother         Diabetes          Social History:  Social History     Socioeconomic History     Marital status:      Spouse name: Not on file     Number of children: Not on file     Years of education: Not on file     Highest education level: Not on file   Occupational History     Not on file   Tobacco Use     Smoking status: Former Smoker     Years: 20.00     Types: Cigarettes     Quit date: 10/9/2008     Years since quittin.9     Smokeless tobacco: Never Used      Tobacco comment: Quit smoking: passive exposure   Substance and Sexual Activity     Alcohol use: Yes     Comment: Alcoholic Drinks/day: occ     Drug use: Unknown     Types: Other     Comment: Drug use: No     Sexual activity: Yes     Partners: Male   Other Topics Concern     Parent/sibling w/ CABG, MI or angioplasty before 65F 55M? No   Social History Narrative    Preloaded 01/09/2013.     Social Determinants of Health     Financial Resource Strain: Not on file   Food Insecurity: Not on file   Transportation Needs: Not on file   Physical Activity: Not on file   Stress: Not on file   Social Connections: Not on file   Intimate Partner Violence: Not on file   Housing Stability: Not on file     Social History     Social History Narrative    Preloaded 01/09/2013.          Allergies:  No Known Allergies    Current Medications:   Current Outpatient Medications   Medication Sig Dispense Refill     alendronate (FOSAMAX) 70 MG tablet Take 70 mg by mouth every 7 days       atorvastatin (LIPITOR) 10 MG tablet Take 10 mg by mouth daily  1     calcium carbonate 600 mg-vitamin D 400 units (CALTRATE) 600-400 MG-UNIT per tablet Take 1 tablet by mouth       Cholecalciferol (VITAMIN D3) 50 MCG (2000 UT) CAPS Take 1 Capsule (2,000 units) by mouth once daily.       diazepam (VALIUM) 5 MG tablet Take 1 tablet (5 mg) by mouth every 6 hours as needed for anxiety 1 tablet 0     gabapentin (NEURONTIN) 400 MG capsule Take 600 mg by mouth 3 times daily       lisinopril (ZESTRIL) 20 MG tablet Take 20 mg by mouth daily       naloxone (NARCAN) 4 MG/0.1ML nasal spray Spray 1 spray (4 mg) into one nostril alternating nostrils once as needed for opioid reversal every 2-3 minutes until assistance arrives 0.2 mL 0     oxyCODONE-acetaminophen (PERCOCET)  MG per tablet Take 1 tablet by mouth every 4 hours as needed for severe pain 100 tablet 0     pentoxifylline ER (TRENTAL) 400 MG CR tablet Take 1 tablet (400 mg) by mouth 3 times daily (with  "meals) 90 tablet 3     venlafaxine (EFFEXOR-XR) 150 MG 24 hr capsule Take 150 mg by mouth daily       vitamin E (TOCOPHEROL) 400 units (180 mg) capsule Take 1 capsule (400 Units) by mouth 3 times daily 90 capsule 3          Current Pain Medications:  Medications related to Pain Management (From now, onward)    None           Past Pain Medications:    none    Blood thinner:    none    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   Eyes: Negative for double vision, pain and redness.   Cardiovascular: Negative for chest pain, palpitations and orthopnea.   Gastrointestinal: Positive for abdominal pain and heartburn. Negative for blood in stool, constipation, diarrhea, melena, nausea and vomiting.   Genitourinary: Negative for dysuria, flank pain, hematuria and urgency.   Musculoskeletal: Positive for back pain. Negative for myalgias and neck pain.   Skin: Negative for itching and rash.   Endo/Heme/Allergies: Negative for polydipsia.   Psychiatric/Behavioral: Negative for hallucinations.         Physical Exam:     Vitals:    09/15/22 0922   BP: (!) 150/76   Pulse: 88   Temp: 98  F (36.7  C)   TempSrc: Oral   SpO2: 94%   Weight: 80.3 kg (177 lb)   Height: 1.638 m (5' 4.49\")       General Appearance: No distress, seated comfortably  Mood: Euthymic  HE ENT: Non constricted pupils  Respiratory: Non labored breathing  CVS: Symmetrical chest rise  Skin: No rashes over exposed skin  MSK: resisted adduction/abduction at thighs reproduce pain  Neuro: 5/5 strength in bilateral lower extremities, normal sensation to light touch in bilateral lower extremities    Laboratory results:  Recent Labs   Lab Test 02/02/22  1011 07/29/21  0859 06/24/21  1731     --  136   POTASSIUM 4.2  --  4.7   CHLORIDE " 103  --  100   CO2 28  --  27   ANIONGAP 7  --  9   *  --  83   BUN 18  --  18   CR 0.84 0.9 0.98   EMERALD 9.0  --  9.3       CBC RESULTS:   Recent Labs   Lab Test 02/02/22  1011   WBC 5.6   RBC 4.06   HGB 12.6   HCT 37.7   MCV 93   MCH 31.0   MCHC 33.4   RDW 14.3            Imaging:       ASSESSMENT AND PLAN:     Encounter Diagnosis:    Pudendal Neuralgia  Lumbar Spondyloarthropathy  Diastasis of the symphysis pubis    Sara Dominguez is a 72 year old y.o. old female with pmhx of rectal cancer s/p CRT who presents to the pain clinic with pain 2 year history of right sided anterior groin pain that starts in the vaginal area and radiates upwards to her lower abdomen, and inferomedially down the thigh. She has found temporary relief with pubic symphysis steroid injections and has been referred for consideration of pudendal nerve block. Her symptoms are consistent with a diagnosis of pudendal neuralgia and she would like to proceed with this procedure.    I have summarized the patient s past medical history, discussed their clinical findings and the potential differential diagnosis with the patient. Significant past medical history pertinent to the patient s current condition includes right groin pain.  The clinical findings reveal pain with hip adduction/abduction. The differential diagnosis discussed with the patient are listed above. I have discussed anatomy and possible sources of the pain using models and/or pictures (diagrams). I have discussed multi- disciplinary pain management options withthe patient as pertaining to their case as detailed above.     RECOMMENDATIONS:     1. Medications: No changes at this time.    2. Procedure: We are scheduling the patient for pudendal nerve block.      Follow up: At scheduled procedure      Answers for HPI/ROS submitted by the patient on 9/15/2022  HEATHER 7 TOTAL SCORE: 3  General Symptoms: Yes  Skin Symptoms: Yes  HENT Symptoms: Yes  EYE SYMPTOMS: Yes  HEART  SYMPTOMS: Yes  LUNG SYMPTOMS: No  INTESTINAL SYMPTOMS: Yes  URINARY SYMPTOMS: Yes  GYNECOLOGIC SYMPTOMS: No  BREAST SYMPTOMS: No  SKELETAL SYMPTOMS: Yes  BLOOD SYMPTOMS: No  NERVOUS SYSTEM SYMPTOMS: No  MENTAL HEALTH SYMPTOMS: No  Tooth pain: No  Gum tenderness: No  Bleeding gums: No  Change in taste: No  Change in sense of smell: Yes  Dry mouth: No  Hearing aid used: No  Neck lump: No  Loss of appetite: No  Weight gain: Yes  Fatigue: Yes  Night sweats: Yes  Increased stress: Yes  Excessive hunger: No  Feeling hot or cold when others believe the temperature is normal: No  Loss of height: No  Post-operative complications: No  Surgical site pain: No  Change in or Loss of Energy: Yes  Hyperactivity: No  Confusion: No  Changes in hair: No  Changes in moles/birth marks: No  Changes in nails: Yes  Acne: No  Hair in places you don't want it: No  Change in facial hair: No  Warts: No  Non-healing sores: No  Scarring: No  Flaking of skin: No  Color changes of hands/feet in cold : No  Sun sensitivity: Yes  Skin thickening: No  Vision loss: No  Dry eyes: Yes  Watery eyes: No  Eye bulging: No  Flashing of lights: No  Spots: No  Floaters: Yes  Crossed eyes: No  Tunnel Vision: No  Yellowing of eyes: No  Eye irritation: No  Pain in legs with walking: Yes  Fingers or toes appear blue: No  High blood pressure: Yes  Low blood pressure: No  Fainting: No  Murmurs: No  Pacemaker: No  Varicose veins: No  Edema or swelling: Yes  Wake up at night with shortness of breath: No  Light-headedness: No  Exercise intolerance: No  Bloating: Yes  Rectal or Anal pain: No  Fecal incontinence: No  Yellowing of skin or eyes: No  Vomit with blood: No  Change in stools: No  Trouble holding urine or incontinence: Yes  Increased frequency of urination: Yes  Decreased frequency of urination: No  Frequent nighttime urination: Yes  Difficulty emptying bladder: No  Swollen joints: No  Joint pain: Yes  Bone pain: Yes  Muscle cramps: No  Muscle weakness:  No  Joint stiffness: No  Bone fracture: No    ATTENDING ATTESTATION  I saw the patient along with the pain medicine fellow Dr. Dayne Ardon. Please see his note above for full details. I was involved in gathering history, physical examination and development of the plan of care.

## 2022-09-15 NOTE — NURSING NOTE
"Oncology Rooming Note    September 15, 2022 9:27 AM   Sara Dominguez is a 72 year old female who presents for:    Chief Complaint   Patient presents with     Oncology Clinic Visit     [facility] for cancer related pain, malignant neoplasm of anal canal     Initial Vitals: BP (!) 150/76   Pulse 88   Temp 98  F (36.7  C) (Oral)   Ht 1.638 m (5' 4.49\")   Wt 80.3 kg (177 lb)   SpO2 94%   BMI 29.92 kg/m   Estimated body mass index is 29.92 kg/m  as calculated from the following:    Height as of this encounter: 1.638 m (5' 4.49\").    Weight as of this encounter: 80.3 kg (177 lb). Body surface area is 1.91 meters squared.  Moderate Pain (5) Comment: Data Unavailable   No LMP recorded. Patient is postmenopausal.  Allergies reviewed: Yes  Medications reviewed: Yes    Medications: Medication refills not needed today.  Pharmacy name entered into [employer]: [facility], [place] - [address]    Clinical concerns: Pt was wondering if she is receiving the nerve block treatment today as suggested by dr arana.     Pt has difficulty in the morning while waiting for medications to begin working    ODESSA Mcgee            "

## 2022-09-15 NOTE — LETTER
9/15/2022       RE: Sara Dominguez  [address]  [facility] [address]     Dear Colleague,    Thank you for referring your patient, [facility]uez[facility]. Please see a copy of my visit note below.      Pain Clinic New Patient Consult Note:    Referring Provider: Dustin   Primary care provider: Nadya Reid    Sara Dominguez is a 72 year old y.o. old female who presents to the pain clinic with pain 2 year history of right sided anterior groin pain that starts in the vaginal area and radiates upwards to her lower abdomen, and inferomedially down the thigh.  She developed this pain suddenly and went to the ER and was found to have vertical fractures through the bilateral sacral ala with chronic diastasis of the symphysis pubis.    She has had pubic symphesis steroid injections which provided pain relief for 5-6 weeks on average. Her last one was in May.    She does also have a history of:     Low back and left greater than right L4 radiculopathy, neurologically intact, failure of multiple bilateral L4 nerve root injections.  L3-4 severe left and moderate right lateral recess stenosis with mild to moderate central stenosis.  Degenerative scoliosis.  L3-4 mild bilateral foraminal stenosis.       HPI:  Patient Supplied Answers To the [facility] Pain Questionnaire  [facility] Pain -  Patient Entered Questionnaire/Answers 9/15/2022   What number best describes your pain right now:  0 = No pain  to  10 = Worst pain imaginable 5   How would you describe the pain? sharp, cutting, dull, aching, pressure   Which of the following worsen your pain? lying down, sitting, walking   Which of the following improve or reduce your pain? walking, medication   What number best describes your average pain for the past week:  0 = No pain  to  10 = Worst pain imaginable 5   What number best describes your LOWEST pain in past 24  hours:  0 = No pain  to  10 = Worst pain imaginable 5   What number best describes your WORST pain in past 24 hours:  0 = No pain  to  10 = Worst pain imaginable 5   When is your pain worst? AM   What non-medicine treatments have you already had for your pain? physical therapy, spine injections (shots), other nerve blocks           Pain treatments:    Gabapentin 600 mg TID (provides good relief)  Percocet 10 mg 4 times daily  Ibuprofen    Herbal medicines: no  Physical therapy: yes  Chiropractor: no  Pain physician: yes  Surgery: yes  Biofeedback: no  Acupuncture: no    Tests/Imaging reviewed with the patient:    Xray pelvis    Significant Medical History:   Past Medical History:   Diagnosis Date     Cancer (H)      Disorder of bone and cartilage     osteopenia     Diverticulosis of colon     ,seen at colonoscopy     Major depressive disorder, recurrent episode, moderate (H)     No Comments Provided     Reflux esophagitis     No Comments Provided     Sciatica     right     Urge incontinence     No Comments Provided          Past Surgical History:  Past Surgical History:   Procedure Laterality Date     COLONOSCOPY      ,2018     EXTRACTION(S) DENTAL      No Comments Provided     OTHER SURGICAL HISTORY      03024.0,[place]  DELIVERY ONLY     OTHER SURGICAL HISTORY      ,ENDOSCOPY ESOPHAGUS          Family History:  Family History   Problem Relation Age of Onset     Other - See Comments Father         Stroke     Cancer Mother         Cancer,cervical-mets to brain     Osteoporosis Mother         Osteoporosis     Heart Disease Brother         Heart Disease,MI  at 59     Heart Disease Maternal Grandmother         Heart Disease,MI  at 58     Asthma Brother         Asthma     Asthma Brother         Asthma     Diabetes Paternal Grandmother         Diabetes          Social History:  Social History     Socioeconomic History     Marital status:      Spouse name: Not on file     Number of  children: Not on file     Years of education: Not on file     Highest education level: Not on file   Occupational History     Not on file   Tobacco Use     Smoking status: Former Smoker     Years: 20.00     Types: Cigarettes     Quit date: 10/9/2008     Years since quittin.9     Smokeless tobacco: Never Used     Tobacco comment: Quit smoking: passive exposure   Substance and Sexual Activity     Alcohol use: Yes     Comment: Alcoholic Drinks/day: occ     Drug use: Unknown     Types: Other     Comment: Drug use: No     Sexual activity: Yes     Partners: Male   Other Topics Concern     Parent/sibling w/ CABG, MI or angioplasty before 65F 55M? No   Social History Narrative    Preloaded 2013.     Social Determinants of Health     Financial Resource Strain: Not on file   Food Insecurity: Not on file   Transportation Needs: Not on file   Physical Activity: Not on file   Stress: Not on file   Social Connections: Not on file   Intimate Partner Violence: Not on file   Housing Stability: Not on file     Social History     Social History Narrative    Preloaded 2013.          Allergies:  No Known Allergies    Current Medications:   Current Outpatient Medications   Medication Sig Dispense Refill     alendronate (FOSAMAX) 70 MG tablet Take 70 mg by mouth every 7 days       atorvastatin (LIPITOR) 10 MG tablet Take 10 mg by mouth daily  1     calcium carbonate 600 mg-vitamin D 400 units (CALTRATE) 600-400 MG-UNIT per tablet Take 1 tablet by mouth       Cholecalciferol (VITAMIN D3) 50 MCG (2000 UT) CAPS Take 1 Capsule (2,000 units) by mouth once daily.       diazepam (VALIUM) 5 MG tablet Take 1 tablet (5 mg) by mouth every 6 hours as needed for anxiety 1 tablet 0     gabapentin (NEURONTIN) 400 MG capsule Take 600 mg by mouth 3 times daily       lisinopril (ZESTRIL) 20 MG tablet Take 20 mg by mouth daily       naloxone (NARCAN) 4 MG/0.1ML nasal spray Spray 1 spray (4 mg) into one nostril alternating nostrils once as  "needed for opioid reversal every 2-3 minutes until assistance arrives 0.2 mL 0     oxyCODONE-acetaminophen (PERCOCET)  MG per tablet Take 1 tablet by mouth every 4 hours as needed for severe pain 100 tablet 0     pentoxifylline ER (TRENTAL) 400 MG CR tablet Take 1 tablet (400 mg) by mouth 3 times daily (with meals) 90 tablet 3     venlafaxine (EFFEXOR-XR) 150 MG 24 hr capsule Take 150 mg by mouth daily       vitamin E (TOCOPHEROL) 400 units (180 mg) capsule Take 1 capsule (400 Units) by mouth 3 times daily 90 capsule 3          Current Pain Medications:  Medications related to Pain Management (From now, onward)    None           Past Pain Medications:    none    Blood thinner:    none    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   Eyes: Negative for double vision, pain and redness.   Cardiovascular: Negative for chest pain, palpitations and orthopnea.   Gastrointestinal: Positive for abdominal pain and heartburn. Negative for blood in stool, constipation, diarrhea, melena, nausea and vomiting.   Genitourinary: Negative for dysuria, flank pain, hematuria and urgency.   Musculoskeletal: Positive for back pain. Negative for myalgias and neck pain.   Skin: Negative for itching and rash.   Endo/Heme/Allergies: Negative for polydipsia.   Psychiatric/Behavioral: Negative for hallucinations.         Physical Exam:     Vitals:    09/15/22 0922   BP: (!) 150/76   Pulse: 88   Temp: 98  F (36.7  C)   TempSrc: Oral   SpO2: 94%   Weight: 80.3 kg (177 lb)   Height: 1.638 m (5' 4.49\")       General Appearance: No distress, seated comfortably  Mood: Euthymic  HE ENT: Non constricted pupils  Respiratory: Non labored breathing  CVS: Symmetrical chest rise  Skin: No rashes over exposed skin  MSK: " resisted adduction/abduction at thighs reproduce pain  Neuro: 5/5 strength in bilateral lower extremities, normal sensation to light touch in bilateral lower extremities    Laboratory results:  Recent Labs   Lab Test 02/02/22  1011 07/29/21  0859 06/24/21  1731     --  136   POTASSIUM 4.2  --  4.7   CHLORIDE 103  --  100   CO2 28  --  27   ANIONGAP 7  --  9   *  --  83   BUN 18  --  18   CR 0.84 0.9 0.98   EMERALD 9.0  --  9.3       CBC RESULTS:   Recent Labs   Lab Test 02/02/22  1011   WBC 5.6   RBC 4.06   HGB 12.6   HCT 37.7   MCV 93   MCH 31.0   MCHC 33.4   RDW 14.3            Imaging:       ASSESSMENT AND PLAN:     Encounter Diagnosis:    Pudendal Neuralgia  Lumbar Spondyloarthropathy  Diastasis of the symphysis pubis    Sara Dominguez is a 72 year old y.o. old female with pmhx of rectal cancer s/p CRT who presents to the pain clinic with pain 2 year history of right sided anterior groin pain that starts in the vaginal area and radiates upwards to her lower abdomen, and inferomedially down the thigh. She has found temporary relief with pubic symphysis steroid injections and has been referred for consideration of pudendal nerve block. Her symptoms are consistent with a diagnosis of pudendal neuralgia and she would like to proceed with this procedure.    I have summarized the patient s past medical history, discussed their clinical findings and the potential differential diagnosis with the patient. Significant past medical history pertinent to the patient s current condition includes right groin pain.  The clinical findings reveal pain with hip adduction/abduction. The differential diagnosis discussed with the patient are listed above. I have discussed anatomy and possible sources of the pain using models and/or pictures (diagrams). I have discussed multi- disciplinary pain management options withthe patient as pertaining to their case as detailed above.     RECOMMENDATIONS:     1.  Medications: No changes at this time.    2. Procedure: We are scheduling the patient for pudendal nerve block.      Follow up: At scheduled procedure      Answers for HPI/ROS submitted by the patient on 9/15/2022  HEATHER 7 TOTAL SCORE: 3  General Symptoms: Yes  Skin Symptoms: Yes  HENT Symptoms: Yes  EYE SYMPTOMS: Yes  HEART SYMPTOMS: Yes  LUNG SYMPTOMS: No  INTESTINAL SYMPTOMS: Yes  URINARY SYMPTOMS: Yes  GYNECOLOGIC SYMPTOMS: No  BREAST SYMPTOMS: No  SKELETAL SYMPTOMS: Yes  BLOOD SYMPTOMS: No  NERVOUS SYSTEM SYMPTOMS: No  MENTAL HEALTH SYMPTOMS: No  Tooth pain: No  Gum tenderness: No  Bleeding gums: No  Change in taste: No  Change in sense of smell: Yes  Dry mouth: No  Hearing aid used: No  Neck lump: No  Loss of appetite: No  Weight gain: Yes  Fatigue: Yes  Night sweats: Yes  Increased stress: Yes  Excessive hunger: No  Feeling hot or cold when others believe the temperature is normal: No  Loss of height: No  Post-operative complications: No  Surgical site pain: No  Change in or Loss of Energy: Yes  Hyperactivity: No  Confusion: No  Changes in hair: No  Changes in moles/birth marks: No  Changes in nails: Yes  Acne: No  Hair in places you don't want it: No  Change in facial hair: No  Warts: No  Non-healing sores: No  Scarring: No  Flaking of skin: No  Color changes of hands/feet in cold : No  Sun sensitivity: Yes  Skin thickening: No  Vision loss: No  Dry eyes: Yes  Watery eyes: No  Eye bulging: No  Flashing of lights: No  Spots: No  Floaters: Yes  Crossed eyes: No  Tunnel Vision: No  Yellowing of eyes: No  Eye irritation: No  Pain in legs with walking: Yes  Fingers or toes appear blue: No  High blood pressure: Yes  Low blood pressure: No  Fainting: No  Murmurs: No  Pacemaker: No  Varicose veins: No  Edema or swelling: Yes  Wake up at night with shortness of breath: No  Light-headedness: No  Exercise intolerance: No  Bloating: Yes  Rectal or Anal pain: No  Fecal incontinence: No  Yellowing of skin or eyes: No  Vomit  with blood: No  Change in stools: No  Trouble holding urine or incontinence: Yes  Increased frequency of urination: Yes  Decreased frequency of urination: No  Frequent nighttime urination: Yes  Difficulty emptying bladder: No  Swollen joints: No  Joint pain: Yes  Bone pain: Yes  Muscle cramps: No  Muscle weakness: No  Joint stiffness: No  Bone fracture: No    ATTENDING ATTESTATION  I saw the patient along with the pain medicine fellow Dr. Dayne Ardon. Please see his note above for full details. I was involved in gathering history, physical examination and development of the plan of care.     Sincerely,    Marion Reddy MD

## 2022-09-15 NOTE — PATIENT INSTRUCTIONS
Medications:    Gabapentin recommendations will be in the note.      Procedures:    Call to schedule your procedure: [phone]    Right Pudendal Nerve Block.    Your pre-procedure instructions are below, please call our clinic if you have any questions.        Recommended Follow up:      Follow up 3 weeks after block (virtual is okay).    Please call [phone] to schedule your clinic appointment if you don't already have an appointment scheduled.      Procedure Information related to COVID-19     Please call [phone] to schedule, reschedule, or cancel your procedure appointment.   Phones are answered Monday - Friday from 08:00 - 4:30pm.  Leave a voicemail with your name, birth date, and phone number if no one is available to take your call.         You will need to be tested for COVID-19 before your procedure. If you're going home on the same day as your procedure (surgery), you may take an at-home rapid antigen test 1 to 2 days before your procedure. If your test is negative, please take a photo of the test. Show the photo to the nurse when you come in for your procedure. If your test is positive, please update our office right away and your procedure may have to be postponed.     If you do not have access to an at-home rapid test, you will have to be tested at a lab within 4 days of your procedure.  You will be called to schedule your COVID test by a central scheduling team. If you have not been contacted to schedule your test within 4 days of the scheduled procedure, call [phone]     Please be aware that the turn around time for the test is approximately 24-72 hours.   If your results are still pending the day of your procedure, you will be notified as soon as possible as the procedure may be cancelled.     Please note: You will only be contacted for positive and pending results.      The procedure center staff will call you several days before the procedure to review important information that you  will need to know for the day of the procedure.   Please contact the clinic if you have further questions about this information.         Information related to Scheduling and Pre-Procedure Instructions:    If you must reschedule your procedure more than two times, you must follow up in clinic before rescheduling again.    Preparing for your procedure    CAUTION - FAILURE TO FOLLOW THESE PRE-PROCEDURE INSTRUCTIONS WILL RESULT IN YOUR PROCEDURE BEING RESCHEDULED.    Your Procedure: Right pudendal nerve block with ultrasound            You must have a  take you home after your procedure. Transportation by taxi or para-transit is okay as long as you have a responsible adult accompany you. You must provide your 's full name and contact number at time of check in.     Fasting Protocol Please have nothing to eat and drink for 2 hours before the procedure.      Medications If you take any medications, DO NOT STOP. Take your medications as usual the day of your procedure with a sip of water AT LEAST 2 HOURS PRIOR TO ARRIVAL.    Antibiotics If you are currently taking antibiotics, you must complete the entire dose 7 days prior to your scheduled procedure. You must be clear of any signs or symptoms of infection. If you begin antibiotics, please contact our clinic for instructions.     Fever, Chills, or Rash If you experience a fever of higher than 100 degrees, chills, rash, or open wounds during the one week before your procedure, please call the clinic to see if you may proceed with your procedure.      Medication Hold List  **Patients under Cardiology/Neurology care should consult their provider prior to the pain procedure to verify pre-procedure medication instructions. The information below contains general guidelines.**      Blood Thinners If you are taking daily ASPIRIN, PLAVIX, OR OTHER BLOOD THINNERS SUCH AS COUMADIN/WARFARIN, we will need your prescribing doctor to sign a release permitting you to  stop these medications. Once approved by your prescribing doctor - STOP ALL BLOOD THINNERS BASED ON THE TIME TABLE BELOW PRIOR TO YOUR PROCEDURE. If you have been instructed to stop WARFARIN(COUMADIN), you must have an INR lab drawn the day before your procedure. . Your INR must be within normal limits before we can perform your injection. MEDICATIONS CAN BE RESTARTED AFTER YOUR PROCEDURE.    14 DAY HOLD  Ticlid (ticlopidine)    10 DAY HOLD  Effient (Prasugel)    3 DAY HOLD  Xarelto (rivaroxaban) 7 DAY HOLD  Anacin, Bufferin, Ecotrin, Excedrin, Aggrenox (Aspirin)  Brilinta (ticagrelor)  Coumadin (Warfarin)  Pradexa (Dabigatran)  Elmiron (Pentosan)  Plavix (Clopidogrel Bisulfate)  Pletal (Cilostazol)    24 HOUR HOLD  Lovenox (enoxaparin)  Agrylin (Anagrelide)        Non-steroidal Anti-inflammatories (NSAIDs) DO NOT TAKE any non-steroidal anti-inflammatory medications (NSAIDs) listed on the table below. MEDICATIONS CAN BE RESTARTED AFTER YOUR PROCEDURE. Celebrex is OK to take and does not need to be discontinued.     Medications to stop:  3 DAY HOLD  Advil, Motrin (Ibuprofen)  Arthrotec (diciofenac sodium/misoprostol)  Clinoril (Sulindac)  Indocin (Indomethacin)  Lodine (Etodolac)  Toradol (Ketorolac)  Vicoprofen (Hydrocodone and Ibuprofen)  Voltaren (Diclotenac)    14 DAY HOLD  Daypro (Oxaprozin)  Feldene (Piroxicam)   7 DAY HOLD  Aleve (Naproxen sodium)  Darvon compound (contains aspirin)  Naprosyn (Naproxen)  Norgesic Forte (contains aspirin)  Mobic (Meloxicam)  Oruvall (Ketoprofen)  Percodan (contains aspirin)  Relafen (Nabumetone)  Salsalate  Trilisate  Vitamin E (more than 400 mg per day)  Any medication containing aspirin                To speak with a nurse, schedule/reschedule/cancel a clinic appointment, or request a medication refill call: [phone]    You can also reach us by [employer]: [email or web address]

## 2022-09-16 ENCOUNTER — TELEPHONE (OUTPATIENT)
Dept: ANESTHESIOLOGY | Facility: CLINIC | Age: 72
End: 2022-09-16

## 2022-09-16 DIAGNOSIS — S32.509S: ICD-10-CM

## 2022-09-16 PROBLEM — R10.2 PELVIC PAIN IN FEMALE: Status: ACTIVE | Noted: 2022-09-16

## 2022-09-16 RX ORDER — PENTOXIFYLLINE 400 MG/1
400 TABLET, EXTENDED RELEASE ORAL
Qty: 90 TABLET | Refills: 3 | Status: SHIPPED | OUTPATIENT
Start: 2022-09-16 | End: 2023-08-01

## 2022-09-16 RX ORDER — VITAMIN E 268 MG
400 CAPSULE ORAL 3 TIMES DAILY
Qty: 90 CAPSULE | Refills: 3 | Status: SHIPPED | OUTPATIENT
Start: 2022-09-16

## 2022-09-16 NOTE — TELEPHONE ENCOUNTER
Received call from pt wanting to schedule procedure. Offered pt 10/4 and pt requested anything on a Wednesday or Friday as those are her off days. Informed pt nothing available on a Wednesday or Friday. Pt then requested a late morning  appointment. Offered pt next available appointment on 10/13 with a 10am arrival time. Pt agreed.    Joyce Grimaldo on 9/16/2022 at 2:40 PM

## 2022-09-17 ENCOUNTER — HEALTH MAINTENANCE LETTER (OUTPATIENT)
Age: 72
End: 2022-09-17

## 2022-10-03 DIAGNOSIS — G89.3 CANCER RELATED PAIN: ICD-10-CM

## 2022-10-03 RX ORDER — OXYCODONE AND ACETAMINOPHEN 10; 325 MG/1; MG/1
1 TABLET ORAL EVERY 4 HOURS PRN
Qty: 100 TABLET | Refills: 0 | Status: SHIPPED | OUTPATIENT
Start: 2022-10-03 | End: 2022-10-26

## 2022-10-13 ENCOUNTER — ANCILLARY PROCEDURE (OUTPATIENT)
Dept: RADIOLOGY | Facility: AMBULATORY SURGERY CENTER | Age: 72
End: 2022-10-13
Attending: ANESTHESIOLOGY
Payer: COMMERCIAL

## 2022-10-13 ENCOUNTER — HOSPITAL ENCOUNTER (OUTPATIENT)
Facility: AMBULATORY SURGERY CENTER | Age: 72
Discharge: HOME | End: 2022-10-13
Attending: ANESTHESIOLOGY | Admitting: ANESTHESIOLOGY
Payer: MEDICARE

## 2022-10-13 VITALS
HEART RATE: 68 BPM | DIASTOLIC BLOOD PRESSURE: 76 MMHG | WEIGHT: 177 LBS | OXYGEN SATURATION: 99 % | TEMPERATURE: 97 F | RESPIRATION RATE: 18 BRPM | BODY MASS INDEX: 29.92 KG/M2 | SYSTOLIC BLOOD PRESSURE: 148 MMHG

## 2022-10-13 DIAGNOSIS — R10.2 PELVIC PAIN IN FEMALE: ICD-10-CM

## 2022-10-13 DIAGNOSIS — G58.8 NEURALGIA OF RIGHT PUDENDAL NERVE: ICD-10-CM

## 2022-10-13 DIAGNOSIS — R52 PAIN: ICD-10-CM

## 2022-10-13 PROCEDURE — 76942 ECHO GUIDE FOR BIOPSY: CPT | Mod: 26 | Performed by: ANESTHESIOLOGY

## 2022-10-13 PROCEDURE — 64430 NJX AA&/STRD PUDENDAL NERVE: CPT | Mod: RT

## 2022-10-13 PROCEDURE — 64430 NJX AA&/STRD PUDENDAL NERVE: CPT | Mod: RT | Performed by: ANESTHESIOLOGY

## 2022-10-13 RX ORDER — METHYLPREDNISOLONE ACETATE 40 MG/ML
INJECTION, SUSPENSION INTRA-ARTICULAR; INTRALESIONAL; INTRAMUSCULAR; SOFT TISSUE DAILY PRN
Status: DISCONTINUED | OUTPATIENT
Start: 2022-10-13 | End: 2022-10-13 | Stop reason: HOSPADM

## 2022-10-13 RX ORDER — BUPIVACAINE HYDROCHLORIDE 7.5 MG/ML
INJECTION, SOLUTION EPIDURAL; RETROBULBAR DAILY PRN
Status: DISCONTINUED | OUTPATIENT
Start: 2022-10-13 | End: 2022-10-13 | Stop reason: HOSPADM

## 2022-10-13 RX ORDER — LIDOCAINE HYDROCHLORIDE 10 MG/ML
INJECTION, SOLUTION EPIDURAL; INFILTRATION; INTRACAUDAL; PERINEURAL DAILY PRN
Status: DISCONTINUED | OUTPATIENT
Start: 2022-10-13 | End: 2022-10-13 | Stop reason: HOSPADM

## 2022-10-13 NOTE — DISCHARGE INSTRUCTIONS
Home Care Instructions after a Pudendal Nerve Block      Pudendal nerve blocks can help in the diagnosis and treatment of pain located in the lower pelvic area.      Activity  -You may resume most normal activity levels with the exception of strenuous activity. It is important for us to know if your pain with normal activity is relieved after this injection.  -DO NOT shower for 24 hours  -DO NOT remove bandaid for 24 hours    Pain  -You may experience soreness at the injection site for one or two days  -You may use an ice pack for 20 minutes every 2 hours for the first 24 hours  -You may use a heating pad after the first 24 hours  -You may use Tylenol (acetaminophen) every 4 hours or other pain medicines as directed by your physician    You may experience numbness radiating into your legs or arms (depending on the procedure location). This numbness may last several hours. Until sensation returns to normal; please use caution in walking, climbing stairs, and stepping out of your vehicle, etc.    Common side effects of steroids:  Not everyone will experience corticosteroid side effects. If side effects are experienced, they will gradually subside in the 7-10 day period following an injection. Most common side effects include:  -Flushed face and/or chest  -Feeling of warmth, particularly in the face but could be an overall feeling of warmth  -Increased blood sugar in diabetic patients  -Menstrual irregularities my occur. If taking hormone-based birth control an alternate method of birth control is recommended  -Sleep disturbances and/or mood swings are possible  -Leg cramps      PLEASE KEEP TRACK OF YOUR SYMPTOMS AND NOTE YOUR IMPROVEMENT FOR YOUR DOCTOR.     Please contact us if you have:  -Severe pain  -Fever more than 101.5 degrees Fahrenheit  -Signs of infection at the injection site (redness, swelling, or drainage)    FOR PAIN CENTER PATIENTS:  If you have questions, please contact the Pain Clinic at [phone]  Option #1 between the hours of 7:00 am and 3:00 pm Monday through Friday. After office hours you can contact the on call provider by dialing [phone]. If you need immediate attention, we recommend that you go to a hospital emergency room or dial [phone].      FOR PM&R PATIENTS:  For patients seen by the PM and R service, please call [phone]. If you need to fax a pain diary to PM&R the fax number is [phone]. If you are unable to fax, uploading to [employer] is encouraged, then send to provider. If you have procedure scheduling questions please call [phone] Option #2

## 2022-10-13 NOTE — OP NOTE
[facility] PROCEDURE NOTE    ATTENDING CLINICIAN:    Marion Reddy MD    PREPROCEDURE DIAGNOSES:  1.  Right pudendal neuralgia  2.  Neuropathic pain  3.  Chronic pain    POSTPROCEDURE DIAGNOSES:  1.  Right pudendal neuralgia  2.  Neuropathic pain  3.  Chronic pain    PROCEDURE(S) PERFORMED:  1.  Right pudendal nerve block  2.  Ultrasound guidance for needle guidance    ANESTHESIA:  Local    COMPLICATIONS:  None    INDICATIONS:  Gina 71 yo female with history of chronic pelvic pain likely secondary to myofascial pain of the pudendal neuralgia.  During her last clinic visit we recommended a diagnostic and therapeutic injection of her right pudendal nerve block. The patient stated that she was in her usual state of health and denied recent anticoagulant use or recent infections.  Therefore, the plan is to perform above mentioned procedure.     Procedure Details:  Written informed consent was obtained and saved in the electronic medical record, after the risks, benefits, and alternatives were discussed with the patient.  All of the patient s questions were answered.  The patient was brought to the procedure room, where she was identified by name, medical record number and date of birth.       The patient was placed in the prone position on the procedure room table.  All pressure points were checked and comfortably padded.  Routine monitors were placed.  Vital signs were stable.  A formal time-out procedure was performed, as per protocol, including patient name, title of procedure, and site of procedure, and all in the room concurred.     A chlorhexidine prep was completed followed by sterile draping per standard procedure.     Curvilinear ultrasound guidance was used to identify the inferior border of the right SI joint. I scanned caudally and the pyriformis muscle was identified. Subsequently, the left ischial spine was identified including the sacrotuberous and sacrospinous ligaments.  After 1% lidocaine infiltration using a 22 gauge 1.5 inch needle, a 22G was advanced at the target with ultrasound guidance until it was inbetween the sacrotuberous and sacrospinous ligaments. At this point, a mixture of 40 mg methylprednisolone mixed with 3 ml 0.75% bupivacaine was injected.  After negative aspiration, the above listed injection solution was injected, the needle was flushed with 1% lidocaine and withdrawn.  No paresthesias were noted throughout the duration of the procedure.     A contralateral procedure WAS NOT performed.     Light pressure was held at the puncture site(s) to prevent ecchymosis and oozing.  The patient's skin was cleansed, and hemostasis was confirmed.  Band-aids were applied to the needle injection site(s).       Condition:     The patient remained awake and alert throughout the procedure.  The patient tolerated the procedure well and was monitored for approximately 15 minutes afterward in the post procedure area.  There were no immediate post procedure complications noted.  The patient was then discharged to home as per protocol.      LESTER Alvarez    Pain Medicine  Department of Anesthesiology  [facility]

## 2022-10-13 NOTE — H&P
Sara Dominguez  [id number]  female  72 year old      Reason for procedure/surgery: pudendal neuralgia    Patient Active Problem List   Diagnosis     Malignant neoplasm of anal canal (H)     Allergic rhinitis     Hypertension     Major depressive disorder, recurrent episode, moderate (H)     Migraine variant     Mixed hyperlipidemia     Need for prophylactic hormone replacement therapy (postmenopausal)     Reflux esophagitis     Sciatica     Squamous cell carcinoma of anus (H)     Urine, incontinence, stress female     Nausea     Hypotension, unspecified hypotension type     Pelvic pain in female     Neuralgia of right pudendal nerve       Past Surgical History:    Past Surgical History:   Procedure Laterality Date     COLONOSCOPY      ,due 2018     EXTRACTION(S) DENTAL      No Comments Provided     OTHER SURGICAL HISTORY      83247.0,[place]  DELIVERY ONLY     OTHER SURGICAL HISTORY      ,ENDOSCOPY ESOPHAGUS       Past Medical History:   Past Medical History:   Diagnosis Date     Cancer (H)      Disorder of bone and cartilage     osteopenia     Diverticulosis of colon     ,seen at colonoscopy     Major depressive disorder, recurrent episode, moderate (H)     No Comments Provided     Reflux esophagitis     No Comments Provided     Sciatica     right     Urge incontinence     No Comments Provided       Social History:   Social History     Tobacco Use     Smoking status: Former     Years: 20.00     Types: Cigarettes     Quit date: 10/9/2008     Years since quittin.0     Smokeless tobacco: Never     Tobacco comments:     Quit smoking: passive exposure   Substance Use Topics     Alcohol use: Yes     Comment: Alcoholic Drinks/day: occ       Family History:   Family History   Problem Relation Age of Onset     Other - See Comments Father         Stroke     Cancer Mother         Cancer,cervical-mets to brain     Osteoporosis Mother         Osteoporosis     Heart Disease Brother         Heart  Disease,MI  at 59     Heart Disease Maternal Grandmother         Heart Disease,MI  at 58     Asthma Brother         Asthma     Asthma Brother         Asthma     Diabetes Paternal Grandmother         Diabetes       Allergies: No Known Allergies    Active Medications:   Current Outpatient Medications   Medication Sig Dispense Refill     alendronate (FOSAMAX) 70 MG tablet Take 70 mg by mouth every 7 days       atorvastatin (LIPITOR) 10 MG tablet Take 10 mg by mouth daily  1     calcium carbonate 600 mg-vitamin D 400 units (CALTRATE) 600-400 MG-UNIT per tablet Take 1 tablet by mouth       Cholecalciferol (VITAMIN D3) 50 MCG (2000 UT) CAPS Take 1 Capsule (2,000 units) by mouth once daily.       gabapentin (NEURONTIN) 400 MG capsule Take 600 mg by mouth 3 times daily       lisinopril (ZESTRIL) 20 MG tablet Take 20 mg by mouth daily       naloxone (NARCAN) 4 MG/0.1ML nasal spray Spray 1 spray (4 mg) into one nostril alternating nostrils once as needed for opioid reversal every 2-3 minutes until assistance arrives 0.2 mL 0     oxyCODONE-acetaminophen (PERCOCET)  MG per tablet Take 1 tablet by mouth every 4 hours as needed for severe pain 100 tablet 0     pentoxifylline ER (TRENTAL) 400 MG CR tablet Take 1 tablet (400 mg) by mouth 3 times daily (with meals) 90 tablet 3     venlafaxine (EFFEXOR-XR) 150 MG 24 hr capsule Take 150 mg by mouth daily       vitamin E (TOCOPHEROL) 400 units (180 mg) capsule Take 1 capsule (400 Units) by mouth 3 times daily 90 capsule 3       Systemic Review:   CONSTITUTIONAL: NEGATIVE for fever, chills, change in weight  ENT/MOUTH: NEGATIVE for ear, mouth and throat problems  RESP: NEGATIVE for significant cough or SOB  CV: NEGATIVE for chest pain, palpitations or peripheral edema    Physical Examination:   Vital Signs: BP (!) 151/92   Pulse 75   Temp 97.6  F (36.4  C) (Temporal)   Resp 16   Wt 80.3 kg (177 lb)   SpO2 96%   BMI 29.92 kg/m    GENERAL: healthy, alert and no  distress  NECK: no adenopathy, no asymmetry, masses, or scars  RESP: lungs clear to auscultation - no rales, rhonchi or wheezes  CV: regular rate and rhythm, normal S1 S2, no S3 or S4, no murmur, click or rub, no peripheral edema and peripheral pulses strong  ABDOMEN: soft, nontender, no hepatosplenomegaly, no masses and bowel sounds normal  MS: no gross musculoskeletal defects noted, no edema    Plan: Appropriate to proceed as scheduled.      Marion Reddy MD  10/13/2022

## 2022-10-24 ENCOUNTER — DOCUMENTATION ONLY (OUTPATIENT)
Dept: ANESTHESIOLOGY | Facility: CLINIC | Age: 72
End: 2022-10-24

## 2022-10-24 ENCOUNTER — TELEPHONE (OUTPATIENT)
Dept: ANESTHESIOLOGY | Facility: CLINIC | Age: 72
End: 2022-10-24

## 2022-10-24 NOTE — PROGRESS NOTES
Purpose of call: Follow up after Right pudendal nerve block   Date of service: 10/13/2022  Spoke with: Pain Diary    Location of pain: Right pudendal  Percentage of pain relief after procedure: 40%  Duration of pain relief: 8 hours    Follow up: Routing to provider for review.    Linn Santana RN

## 2022-10-26 DIAGNOSIS — G89.3 CANCER RELATED PAIN: ICD-10-CM

## 2022-10-26 RX ORDER — OXYCODONE AND ACETAMINOPHEN 10; 325 MG/1; MG/1
1 TABLET ORAL EVERY 4 HOURS PRN
Qty: 100 TABLET | Refills: 0 | Status: SHIPPED | OUTPATIENT
Start: 2022-10-26 | End: 2022-11-29

## 2022-11-29 ENCOUNTER — TELEPHONE (OUTPATIENT)
Dept: ANESTHESIOLOGY | Facility: CLINIC | Age: 72
End: 2022-11-29

## 2022-11-29 ENCOUNTER — DOCUMENTATION ONLY (OUTPATIENT)
Dept: RADIATION THERAPY | Facility: OUTPATIENT CENTER | Age: 72
End: 2022-11-29

## 2022-11-29 DIAGNOSIS — C21.1 MALIGNANT NEOPLASM OF ANAL CANAL (H): ICD-10-CM

## 2022-11-29 DIAGNOSIS — G89.3 CANCER RELATED PAIN: Primary | ICD-10-CM

## 2022-11-29 DIAGNOSIS — G89.3 CANCER RELATED PAIN: ICD-10-CM

## 2022-11-29 RX ORDER — OXYCODONE AND ACETAMINOPHEN 10; 325 MG/1; MG/1
1 TABLET ORAL EVERY 4 HOURS PRN
Qty: 100 TABLET | Refills: 0 | Status: SHIPPED | OUTPATIENT
Start: 2022-11-29 | End: 2022-12-21

## 2022-11-29 NOTE — TELEPHONE ENCOUNTER
Health Call Center    Phone Message    May a detailed message be left on voicemail: yes     Reason for Call: Other: A new priority referral was received for this patient, she was last seen by Dr. Reddy in October, and had an initial visit in September. The  patient is requesting to schedule a virtual visit due to the distance she has to travel. Please advise     Action Taken: Other: Routed to [facility] pool    Travel Screening: Not Applicable

## 2022-11-29 NOTE — PROGRESS NOTES
Pt called for refill of Percocet #100 tablets.   She is taking 1 tablet every 6 hours for breakthrough pain.   I had a long talk with her that we are trying to find other modalities to help with her pain, perhaps there are longer acting meds that will decrease the need for break through pain management. I explained that we placed a referral to Physiatrist Dr Chan to help with cancer related pain and she has a follow up appointment with them either on 12/6 or 12/21. Both appointments are documented in the pts chart.   I reached out to Dr Chan via staff message asking her if she can take over pts pain management issues to consolidate and avoid redundancy.   I will refer to Pain Management if Dr Chan recommends or if the patient wants.   For now I will refill her Percocet #100 tabs per her request and will re-evaluate refilling after hearing back from Dr Chan.     Elizabeth Martinez  Physician Assistant  Radiation Oncology

## 2022-11-30 NOTE — TELEPHONE ENCOUNTER
RN reviewed chart. Last office visit with Dr. Reddy on 9/15/22, documentation is noted that patient may schedule next follow up via video visit. Writer routing to Clinic Coordinators to call patient and schedule appointment.       Carla Mills RN

## 2022-12-02 ENCOUNTER — PATIENT OUTREACH (OUTPATIENT)
Dept: ONCOLOGY | Facility: CLINIC | Age: 72
End: 2022-12-02

## 2022-12-02 NOTE — PROGRESS NOTES
[facility]: Physical Medicine and Rehabilitation                                                                                     Call back to Gina to verify upcoming appointments. We had a long discussion on her fear of someone changing her pain med regimen.  She feels like 3- 4 percocet a day (100/ month)  takes care of the pain.  She states she is 72 and feels like she is old enough where things do not need changing if it is the current plan works. She has been taking this since 2019 She also had questions regarding palliative care as she thought is was for end of life cares.  Reviewed palliative care's role in pain management of cancer related pain.    She will discuss with Jai De Oliveira at her palliative care consult.    Kristy Canchola LPN  Oncology PM&R Care Coordination  [phone]

## 2022-12-21 ENCOUNTER — VIRTUAL VISIT (OUTPATIENT)
Dept: PALLIATIVE MEDICINE | Facility: CLINIC | Age: 72
End: 2022-12-21
Payer: COMMERCIAL

## 2022-12-21 DIAGNOSIS — G89.3 CANCER RELATED PAIN: ICD-10-CM

## 2022-12-21 DIAGNOSIS — C21.1 MALIGNANT NEOPLASM OF ANAL CANAL (H): ICD-10-CM

## 2022-12-21 PROCEDURE — 99205 OFFICE O/P NEW HI 60 MIN: CPT | Mod: 95 | Performed by: NURSE PRACTITIONER

## 2022-12-21 RX ORDER — OXYCODONE AND ACETAMINOPHEN 10; 325 MG/1; MG/1
1 TABLET ORAL EVERY 4 HOURS PRN
Qty: 100 TABLET | Refills: 0 | Status: SHIPPED | OUTPATIENT
Start: 2022-12-21 | End: 2023-01-09

## 2022-12-21 NOTE — PATIENT INSTRUCTIONS
Thank you for meeting with us in the [facility].    How to get a hold of us:  For non-urgent matters, MyChart works best.    For more urgent matters, or if you prefer not to use MyChart, call our clinic nurse coordinator Fany Gutierrez RN at [phone]    We have an on-call number for evenings and weekends. Please call this only if you are having uncontrolled symptoms or serious side effects from your medicines: [phone].     For refills, please give us a week (5 working days) notice. We don't always have providers available everyday to do refills. If you call the day you run out of your medicine, we may not be able to refill it in time, so call 5 days in advance!

## 2022-12-21 NOTE — PROGRESS NOTES
Gina is a 72 year old who is being evaluated via a billable video visit.    Currently in [place].    How would you like to obtain your AVS? MyChart  If the video visit is dropped, the invitation should be resent by: Text to cell phone: [phone]  Will anyone else be joining your video visit? MELISSA Dobbins      Video-Visit Details    Type of service:  Video Visit   Video Start Time: 8:25 AM  Video End Time: 8:54 AM    Originating Location (pt. Location): Home    Distant Location (provider location):  On-site  Platform used for Video Visit: [facility]       Palliative Care Outpatient Clinic      Patient ID/Chief Complaint: Sara Dominguez 72 year old female who is presenting to the palliative medicine clinic today at the request of CASSIE Denise for a palliative care consultation secondary to assistance with pain management.   The patient's primary care provider is:  Nadya Reid       Impression & Recommendations:  72-year-old female with history of anal cancer in 2019 [id number] (anna-rectal nodes) in 2019. She completed definitive CRT and is currently TYLER.  Unfortunately she developed post CRT pelvic insufficiency fractures and sacrum and right pubic bone causing continuous right groin pain that extends into her buttock and down her leg.  Pain can be deep and severely aching, spiking to 10 out of 10 reported pain, with numbness/tingling/radiating pain outward.  She also has long-term chronic back pain.    Right pudendal nerve block completed by Dr. Reddy October 2022 with a decent amount of symptom relief.  Unfortunately she reports that pain relief has now worn off.    She takes 10/325 Percocet approximately 4 times per day, gabapentin 600 mg 3 times a day, and Effexor  mg daily.    Denies issues with constipation.    Symptoms/recommendations/discussion:    Prescription refill for Percocet sent.  We did have a conversation about concern for increasing tolerance to Percocet and  hyperalgesia with long-term opioid use.  We began discussion about the possibility of Butrans patch.  She will research Butrans    Continue gabapentin as prescribed by PM&R physician    Continue Effexor XR    Palliative care follow-up in approximately 6 weeks    Please feel free to call our service at [phone] earlier if needed          History:  History gathered today from: patient, medical chart      PE: There were no vitals taken for this visit.   Wt Readings from Last 3 Encounters:   10/13/22 80.3 kg (177 lb)   09/15/22 80.3 kg (177 lb)   08/10/22 79.5 kg (175 lb 3.2 oz)       Gen alert, comfortable appearing, NAD.   Head NCAT.  Eyes anicteric without injection  Face symmetric, eyes conjugate  Lungs unlabored, no cough, speaking full sentences  Skin no rashes or lesions evident on face/neck  Neuro Face symmetric, eyes conjugate; speech fluent.  Neuropsych exam normal including affect, sensorium, gross memory, thought processes, and fund of knowledge.         Data reviewed:  I reviewed electrolytes, BUN/creatinine, liver profile, hemoglobin and hematocrit, platelet count, and most recent imaging       database reviewed: 12/20        62 minutes spent on the date of the encounter doing chart review, history and exam, patient education & counseling, documentation and other activities as noted above.        Thank you for involving us in the patient's care.   KARISSA Telles, [facility] Palliative Care Service  Office [phone]

## 2022-12-22 ENCOUNTER — TELEPHONE (OUTPATIENT)
Dept: PALLIATIVE MEDICINE | Facility: CLINIC | Age: 72
End: 2022-12-22

## 2022-12-22 NOTE — TELEPHONE ENCOUNTER
----- Message from KARISSA Telles CNP sent at 12/21/2022  9:10 AM CST -----  Please schedule for follow-up with Jai De Oliveira in approximately 6 weeks

## 2022-12-27 ENCOUNTER — TELEPHONE (OUTPATIENT)
Dept: PALLIATIVE MEDICINE | Facility: CLINIC | Age: 72
End: 2022-12-27

## 2022-12-27 NOTE — TELEPHONE ENCOUNTER
Patient called. She has her follow up appointment with Jai on 02.01.2023, however she will be out of her medication before the appointment, she wants to make sure she has enough to last her until her appointment.   She would also like a referral to have a nerve block done. She said that she's had one before and would prefer to see the same provider.     Please call patient to further discuss referral.

## 2023-01-09 ENCOUNTER — TELEPHONE (OUTPATIENT)
Dept: ANESTHESIOLOGY | Facility: CLINIC | Age: 73
End: 2023-01-09

## 2023-01-09 DIAGNOSIS — G58.8 PUDENDAL NEURALGIA: Primary | ICD-10-CM

## 2023-01-09 DIAGNOSIS — G89.3 CANCER RELATED PAIN: ICD-10-CM

## 2023-01-09 RX ORDER — OXYCODONE AND ACETAMINOPHEN 10; 325 MG/1; MG/1
1 TABLET ORAL EVERY 4 HOURS PRN
Qty: 100 TABLET | Refills: 0 | Status: SHIPPED | OUTPATIENT
Start: 2023-01-09 | End: 2023-02-01

## 2023-01-09 NOTE — TELEPHONE ENCOUNTER
Patient is scheduled for procedure with Dr. Reddy    Spoke with: pt    Date of Procedure: 01-24-23    Location: [facility]    Informed patient they will need an adult  yes    Pre-procedure COVID-19 Test: N/A    Additional comments: Arrival @ 2:30 pm    Patient is aware pre-op RN will call 2-3 days prior to procedure with arrival time and instructions. yes      Kelly Sagastume on 1/9/2023 at 3:14 PM

## 2023-01-09 NOTE — TELEPHONE ENCOUNTER
Received voicemail from patient requesting refill of percocet.     Last refill: 12/21/22  Last office visit: 12/21/22  Scheduled for follow up 2/1/23     Will route request to NP for review.     Reviewed [place]  Report.

## 2023-01-13 NOTE — PROGRESS NOTES
"Video-Visit Details    Type of service:  Video Visit    Video Start Time (time video started): 07:56    Video End Time (time video stopped): 08:16    Originating Location (pt. Location): Home        Distant Location (provider location):  On-site    Mode of Communication:  Video Conference via Madelyn Buck MD    Pain clinic follow up note    HPI:   Sara Dominguez is a 72 year old year old female  who presents to the pain clinic with pudendal neuralgia, s/p right pudendal nerve block on 10/13/22.    Patient Supplied Answers To the [facility] Pain Questionnaire  [facility] Pain -  Patient Entered Questionnaire/Answers 1/16/2023   What number best describes your pain right now:  0 = No pain  to  10 = Worst pain imaginable 5   How would you describe the pain? dull, aching   Which of the following worsen your pain? lying down   Which of the following improve or reduce your pain? medication   What number best describes your average pain for the past week:  0 = No pain  to  10 = Worst pain imaginable 5   What number best describes your LOWEST pain in past 24 hours:  0 = No pain  to  10 = Worst pain imaginable 5   What number best describes your WORST pain in past 24 hours:  0 = No pain  to  10 = Worst pain imaginable 5   When is your pain worst? AM   What non-medicine treatments have you already had for your pain? pain clinic, physical therapy, spine injections (shots)     The nerve block didn't seem to help at first but after a couple of weeks it started working. It never took the pain away completely but it helped considerably. She is still using percocet four times a day. But now the pain is back to where it was previously with the right groin pain particularly in the morning. The pain is the same as it was before the injection an \"miserable cramping pain.\" She sometimes  she has shooting pains that go down the inside of her right leg down to her knee. She has another injection scheduled on 1/24. She is " "seeing Jai De Oliveira with palliative care and he is going to start a new pain medication that is a patch - she can't recall the name but says its not fentanyl - it may be buprenorphine.     She has had pubic symphesis steroid injections which provided pain relief for 5-6 weeks on average. Her last one was in May. These did not seem to help, they would work initially but would not last.      In terms of her cancer she is still in remission she gets yearly pet scans yearly and so far everything looks \"fine\". She is still following up with her other providers.     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     ROS:  Review of Systems   Constitutional: Negative for chills and fever.   HENT: Negative.    Eyes: Negative.    Respiratory: Negative for cough and shortness of breath.    Cardiovascular: Positive for chest pain. Negative for palpitations.   Gastrointestinal: Negative.  Negative for abdominal pain, constipation and diarrhea.   Genitourinary: Negative for frequency.        Incontinence at night since her cancer diagnosis   Musculoskeletal:        See HPI   Skin: Negative for rash.   Neurological: Negative for dizziness, weakness and headaches.   Endo/Heme/Allergies: Positive for environmental allergies. Does not bruise/bleed easily.   Psychiatric/Behavioral: Negative.          Significant Medical History:   Past Medical History:   Diagnosis Date     Cancer (H)      Disorder of bone and cartilage     osteopenia     Diverticulosis of colon     2008,seen at colonoscopy     Major depressive disorder, recurrent episode, moderate (H)     No Comments Provided     Reflux esophagitis     No Comments Provided     Sciatica     right     Urge incontinence     No Comments Provided          Past Surgical History:  Past Surgical History:   Procedure Laterality Date     COLONOSCOPY      2008,due 2018     EXTRACTION(S) DENTAL      No Comments Provided     NERVE BLOCK PERIPHERAL " Right 10/13/2022    Procedure: Right pudendal nerve block with ultrasound;  Surgeon: Marion Reddy MD;  Location: [facility] OR     OTHER SURGICAL HISTORY      [address].0,[place]  DELIVERY ONLY     OTHER SURGICAL HISTORY      ,ENDOSCOPY ESOPHAGUS          Family History:  Family History   Problem Relation Age of Onset     Other - See Comments Father         Stroke     Cancer Mother         Cancer,cervical-mets to brain     Osteoporosis Mother         Osteoporosis     Heart Disease Brother         Heart Disease,MI  at 59     Heart Disease Maternal Grandmother         Heart Disease,MI  at 58     Asthma Brother         Asthma     Asthma Brother         Asthma     Diabetes Paternal Grandmother         Diabetes          Social History:  Social History     Socioeconomic History     Marital status:      Spouse name: Not on file     Number of children: Not on file     Years of education: Not on file     Highest education level: Not on file   Occupational History     Not on file   Tobacco Use     Smoking status: Former     Years: 20.00     Types: Cigarettes     Quit date: 10/9/2008     Years since quittin.2     Smokeless tobacco: Never     Tobacco comments:     Quit smoking: passive exposure   Substance and Sexual Activity     Alcohol use: Yes     Comment: Alcoholic Drinks/day: occ     Drug use: Unknown     Types: Other     Comment: Drug use: No     Sexual activity: Yes     Partners: Male   Other Topics Concern     Parent/sibling w/ CABG, MI or angioplasty before 65F 55M? No   Social History Narrative    Preloaded 2013.     Social Determinants of Health     Financial Resource Strain: Not on file   Food Insecurity: Not on file   Transportation Needs: Not on file   Physical Activity: Not on file   Stress: Not on file   Social Connections: Not on file   Intimate Partner Violence: Not on file   Housing Stability: Not on file     Social History     Social History Narrative    Preloaded 2013.           Allergies:  No Known Allergies    Current Medications:   Current Outpatient Medications   Medication Sig Dispense Refill     alendronate (FOSAMAX) 70 MG tablet Take 70 mg by mouth every 7 days       atorvastatin (LIPITOR) 10 MG tablet Take 10 mg by mouth daily  1     calcium carbonate 600 mg-vitamin D 400 units (CALTRATE) 600-400 MG-UNIT per tablet Take 1 tablet by mouth       Cholecalciferol (VITAMIN D3) 50 MCG (2000 UT) CAPS Take 1 Capsule (2,000 units) by mouth once daily.       gabapentin (NEURONTIN) 400 MG capsule Take 600 mg by mouth 3 times daily       lisinopril (ZESTRIL) 20 MG tablet Take 20 mg by mouth daily       naloxone (NARCAN) 4 MG/0.1ML nasal spray Spray 1 spray (4 mg) into one nostril alternating nostrils once as needed for opioid reversal every 2-3 minutes until assistance arrives 0.2 mL 0     oxyCODONE-acetaminophen (PERCOCET)  MG per tablet Take 1 tablet by mouth every 4 hours as needed for severe pain (7-10) 100 tablet 0     pentoxifylline ER (TRENTAL) 400 MG CR tablet Take 1 tablet (400 mg) by mouth 3 times daily (with meals) 90 tablet 3     venlafaxine (EFFEXOR-XR) 150 MG 24 hr capsule Take 150 mg by mouth daily       vitamin E (TOCOPHEROL) 400 units (180 mg) capsule Take 1 capsule (400 Units) by mouth 3 times daily 90 capsule 3             Current Pain Medications:  Gabapentin 600 mg TID (provides good relief)  Percocet 10 mg 4 times daily  Ibuprofen prn    Physical Exam:     There were no vitals taken for this visit.    General Appearance: No distress, seated comfortably  Mood: Euthymic  HE ENT: Non constricted pupils  Respiratory: Non labored breathing  CVS: Regular rate and rhythm  GI: Soft, non distended, no TTP  Skin: No rashes over exposed skin  MS: Normal muscle bulk  Neuro: Cranial nerves grossly 2-12 intact      ASSESSMENT AND PLAN:     Encounter Diagnosis:  Pudendal Neuralgia  Lumbar Spondyloarthropathy  Diastasis of the symphysis pubis       Sara Dominguez is a  72 year old year old female with a pmhx of rectal cancer s/p CRT who presents to the pain clinic with pudendal neuralgia, s/p right pudendal nerve block 10/13/22.    RECOMMENDATIONS:     1. Medications: Continue current medication regimen these are being managed by palliative care.     2. Procedure: She is scheduled for a repeat right pudendal nerve block on 1/24/23. Risks/benefits/alternatives were discussed.     Follow up: 5 weeks    This patient was seen and discussed with the attending physician.    Mayelin Buck MD  Chronic Pain Fellow   [facility]       ATTENDING ATTESTATION  I saw the patient along with the pain medicine fellow Dr. Mayelin Buck. Please see her note above for full details. I was involved in gathering history, physical examination and development of the plan of care.                                                                                               Answers for HPI/ROS submitted by the patient on 1/16/2023  HEATHER 7 TOTAL SCORE: 2

## 2023-01-16 ENCOUNTER — VIRTUAL VISIT (OUTPATIENT)
Dept: ANESTHESIOLOGY | Facility: CLINIC | Age: 73
End: 2023-01-16
Payer: COMMERCIAL

## 2023-01-16 DIAGNOSIS — C21.1 MALIGNANT NEOPLASM OF ANAL CANAL (H): ICD-10-CM

## 2023-01-16 DIAGNOSIS — G89.3 CANCER RELATED PAIN: ICD-10-CM

## 2023-01-16 PROCEDURE — 99214 OFFICE O/P EST MOD 30 MIN: CPT | Mod: 95 | Performed by: ANESTHESIOLOGY

## 2023-01-16 ASSESSMENT — PAIN SCALES - PAIN ENJOYMENT GENERAL ACTIVITY SCALE (PEG)
AVG_PAIN_PASTWEEK: 5
INTERFERED_ENJOYMENT_LIFE: 4
PEG_TOTALSCORE: 4.67
AVG_PAIN_PASTWEEK: 5
INTERFERED_ENJOYMENT_LIFE: 4
PEG_TOTALSCORE: 4.67
INTERFERED_GENERAL_ACTIVITY: 5
INTERFERED_GENERAL_ACTIVITY: 5

## 2023-01-16 ASSESSMENT — ENCOUNTER SYMPTOMS
BRUISES/BLEEDS EASILY: 0
PALPITATIONS: 0
HEADACHES: 0
EYES NEGATIVE: 1
ABDOMINAL PAIN: 0
DIARRHEA: 0
FEVER: 0
CONSTIPATION: 0
COUGH: 0
SHORTNESS OF BREATH: 0
DIZZINESS: 0
WEAKNESS: 0
FREQUENCY: 0
GASTROINTESTINAL NEGATIVE: 1
CHILLS: 0
PSYCHIATRIC NEGATIVE: 1

## 2023-01-16 ASSESSMENT — ANXIETY QUESTIONNAIRES
GAD7 TOTAL SCORE: 2
3. WORRYING TOO MUCH ABOUT DIFFERENT THINGS: SEVERAL DAYS
IF YOU CHECKED OFF ANY PROBLEMS ON THIS QUESTIONNAIRE, HOW DIFFICULT HAVE THESE PROBLEMS MADE IT FOR YOU TO DO YOUR WORK, TAKE CARE OF THINGS AT HOME, OR GET ALONG WITH OTHER PEOPLE: NOT DIFFICULT AT ALL
GAD7 TOTAL SCORE: 2
8. IF YOU CHECKED OFF ANY PROBLEMS, HOW DIFFICULT HAVE THESE MADE IT FOR YOU TO DO YOUR WORK, TAKE CARE OF THINGS AT HOME, OR GET ALONG WITH OTHER PEOPLE?: NOT DIFFICULT AT ALL
7. FEELING AFRAID AS IF SOMETHING AWFUL MIGHT HAPPEN: NOT AT ALL
5. BEING SO RESTLESS THAT IT IS HARD TO SIT STILL: NOT AT ALL
GAD7 TOTAL SCORE: 2
6. BECOMING EASILY ANNOYED OR IRRITABLE: NOT AT ALL
1. FEELING NERVOUS, ANXIOUS, OR ON EDGE: NOT AT ALL
4. TROUBLE RELAXING: SEVERAL DAYS
7. FEELING AFRAID AS IF SOMETHING AWFUL MIGHT HAPPEN: NOT AT ALL
2. NOT BEING ABLE TO STOP OR CONTROL WORRYING: NOT AT ALL

## 2023-01-16 ASSESSMENT — PAIN SCALES - GENERAL: PAINLEVEL: MODERATE PAIN (5)

## 2023-01-16 NOTE — NURSING NOTE
Patient presents with:  Follow Up: Follow-up Lower Back Pain      Moderate Pain (5)     Pain Medications     Opioid Combinations Refills Start End     oxyCODONE-acetaminophen (PERCOCET)  MG per tablet    0 1/9/2023     Sig - Route: Take 1 tablet by mouth every 4 hours as needed for severe pain (7-10) - Oral    Class: E-Prescribe    Earliest Fill Date: 1/9/2023    No prior authorization was found for this prescription.    Found prior authorization for another prescription for the same medication: Closed - Other          What medications are you using for pain? Oxycodone    (New patients only) Have you been seen by another pain clinic/ provider? no    (Return Patients only) What refills are you needing today? no

## 2023-01-16 NOTE — LETTER
1/16/2023       RE: Sara Dominguez  [address]  [place] [place] [place] [facility] [address]     Dear Colleague,    Thank you for referring your patient, Sara Dominguez, to the [facility] [place] at [facility]. Please see a copy of my visit note below.    Video-Visit Details    Type of service:  Video Visit    Video Start Time (time video started): 07:56    Video End Time (time video stopped): 08:16    Originating Location (pt. Location): Home        Distant Location (provider location):  On-site    Mode of Communication:  [facility] Conference [facility]    Mayelin Buck MD    Pain clinic follow up note    HPI:   Sara Dominguez is a 72 year old year old female  who presents to the pain clinic with pudendal neuralgia, s/p right pudendal nerve block on 10/13/22.    Patient Supplied Answers To the [facility] Pain Questionnaire  [facility] Pain -  Patient Entered Questionnaire/Answers 1/16/2023   What number best describes your pain right now:  0 = No pain  to  10 = Worst pain imaginable 5   How would you describe the pain? dull, aching   Which of the following worsen your pain? lying down   Which of the following improve or reduce your pain? medication   What number best describes your average pain for the past week:  0 = No pain  to  10 = Worst pain imaginable 5   What number best describes your LOWEST pain in past 24 hours:  0 = No pain  to  10 = Worst pain imaginable 5   What number best describes your WORST pain in past 24 hours:  0 = No pain  to  10 = Worst pain imaginable 5   When is your pain worst? AM   What non-medicine treatments have you already had for your pain? pain clinic, physical therapy, spine injections (shots)     The nerve block didn't seem to help at first but after a couple of weeks it started working. It never took the pain away completely but it helped considerably. She is still using percocet four  "times a day. But now the pain is back to where it was previously with the right groin pain particularly in the morning. The pain is the same as it was before the injection an \"miserable cramping pain.\" She sometimes  she has shooting pains that go down the inside of her right leg down to her knee. She has another injection scheduled on 1/24. She is seeing Jai De Oliveira with palliative care and he is going to start a new pain medication that is a patch - she can't recall the name but says its not fentanyl - it may be buprenorphine.     She has had pubic symphesis steroid injections which provided pain relief for 5-6 weeks on average. Her last one was in May. These did not seem to help, they would work initially but would not last.      In terms of her cancer she is still in remission she gets yearly pet scans yearly and so far everything looks \"fine\". She is still following up with her other providers.     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     ROS:  Review of Systems   Constitutional: Negative for chills and fever.   HENT: Negative.    Eyes: Negative.    Respiratory: Negative for cough and shortness of breath.    Cardiovascular: Positive for chest pain. Negative for palpitations.   Gastrointestinal: Negative.  Negative for abdominal pain, constipation and diarrhea.   Genitourinary: Negative for frequency.        Incontinence at night since her cancer diagnosis   Musculoskeletal:        See HPI   Skin: Negative for rash.   Neurological: Negative for dizziness, weakness and headaches.   Endo/Heme/Allergies: Positive for environmental allergies. Does not bruise/bleed easily.   Psychiatric/Behavioral: Negative.          Significant Medical History:   Past Medical History:   Diagnosis Date     Cancer (H)      Disorder of bone and cartilage     osteopenia     Diverticulosis of colon     2008,seen at colonoscopy     Major depressive disorder, recurrent episode, " moderate (H)     No Comments Provided     Reflux esophagitis     No Comments Provided     Sciatica     right     Urge incontinence     No Comments Provided          Past Surgical History:  Past Surgical History:   Procedure Laterality Date     COLONOSCOPY      ,due      EXTRACTION(S) DENTAL      No Comments Provided     NERVE BLOCK PERIPHERAL Right 10/13/2022    Procedure: Right pudendal nerve block with ultrasound;  Surgeon: Marion Reddy MD;  Location: [facility] OR     OTHER SURGICAL HISTORY      [address].0,[place]  DELIVERY ONLY     OTHER SURGICAL HISTORY      ,ENDOSCOPY ESOPHAGUS          Family History:  Family History   Problem Relation Age of Onset     Other - See Comments Father         Stroke     Cancer Mother         Cancer,cervical-mets to brain     Osteoporosis Mother         Osteoporosis     Heart Disease Brother         Heart Disease,MI  at 59     Heart Disease Maternal Grandmother         Heart Disease,MI  at 58     Asthma Brother         Asthma     Asthma Brother         Asthma     Diabetes Paternal Grandmother         Diabetes          Social History:  Social History     Socioeconomic History     Marital status:      Spouse name: Not on file     Number of children: Not on file     Years of education: Not on file     Highest education level: Not on file   Occupational History     Not on file   Tobacco Use     Smoking status: Former     Years: 20.00     Types: Cigarettes     Quit date: 10/9/2008     Years since quittin.2     Smokeless tobacco: Never     Tobacco comments:     Quit smoking: passive exposure   Substance and Sexual Activity     Alcohol use: Yes     Comment: Alcoholic Drinks/day: occ     Drug use: Unknown     Types: Other     Comment: Drug use: No     Sexual activity: Yes     Partners: Male   Other Topics Concern     Parent/sibling w/ CABG, MI or angioplasty before 65F 55M? No   Social History Narrative    Preloaded 2013.     Social Determinants of  Health     Financial Resource Strain: Not on file   Food Insecurity: Not on file   Transportation Needs: Not on file   Physical Activity: Not on file   Stress: Not on file   Social Connections: Not on file   Intimate Partner Violence: Not on file   Housing Stability: Not on file     Social History     Social History Narrative    Preloaded 01/09/2013.          Allergies:  No Known Allergies    Current Medications:   Current Outpatient Medications   Medication Sig Dispense Refill     alendronate (FOSAMAX) 70 MG tablet Take 70 mg by mouth every 7 days       atorvastatin (LIPITOR) 10 MG tablet Take 10 mg by mouth daily  1     calcium carbonate 600 mg-vitamin D 400 units (CALTRATE) 600-400 MG-UNIT per tablet Take 1 tablet by mouth       Cholecalciferol (VITAMIN D3) 50 MCG (2000 UT) CAPS Take 1 Capsule (2,000 units) by mouth once daily.       gabapentin (NEURONTIN) 400 MG capsule Take 600 mg by mouth 3 times daily       lisinopril (ZESTRIL) 20 MG tablet Take 20 mg by mouth daily       naloxone (NARCAN) 4 MG/0.1ML nasal spray Spray 1 spray (4 mg) into one nostril alternating nostrils once as needed for opioid reversal every 2-3 minutes until assistance arrives 0.2 mL 0     oxyCODONE-acetaminophen (PERCOCET)  MG per tablet Take 1 tablet by mouth every 4 hours as needed for severe pain (7-10) 100 tablet 0     pentoxifylline ER (TRENTAL) 400 MG CR tablet Take 1 tablet (400 mg) by mouth 3 times daily (with meals) 90 tablet 3     venlafaxine (EFFEXOR-XR) 150 MG 24 hr capsule Take 150 mg by mouth daily       vitamin E (TOCOPHEROL) 400 units (180 mg) capsule Take 1 capsule (400 Units) by mouth 3 times daily 90 capsule 3             Current Pain Medications:  Gabapentin 600 mg TID (provides good relief)  Percocet 10 mg 4 times daily  Ibuprofen prn    Physical Exam:     There were no vitals taken for this visit.    General Appearance: No distress, seated comfortably  Mood: Euthymic  HE ENT: Non constricted  pupils  Respiratory: Non labored breathing  CVS: Regular rate and rhythm  GI: Soft, non distended, no TTP  Skin: No rashes over exposed skin  MS: Normal muscle bulk  Neuro: Cranial nerves grossly 2-12 intact      ASSESSMENT AND PLAN:     Encounter Diagnosis:  Pudendal Neuralgia  Lumbar Spondyloarthropathy  Diastasis of the symphysis pubis       Sara Dominguez is a 72 year old year old female with a pmhx of rectal cancer s/p CRT who presents to the pain clinic with pudendal neuralgia, s/p right pudendal nerve block 10/13/22.    RECOMMENDATIONS:     1. Medications: Continue current medication regimen these are being managed by palliative care.     2. Procedure: She is scheduled for a repeat right pudendal nerve block on 1/24/23. Risks/benefits/alternatives were discussed.     Follow up: 5 weeks    This patient was seen and discussed with the attending physician.    Mayelin Buck MD  Chronic Pain Fellow   [facility]       ATTENDING ATTESTATION  I saw the patient along with the pain medicine fellow Dr. Mayelin Buck. Please see her note above for full details. I was involved in gathering history, physical examination and development of the plan of care.                                                                                               Answers for HPI/ROS submitted by the patient on 1/16/2023  HEATHER 7 TOTAL SCORE: 2          Again, thank you for allowing me to participate in the care of your patient.      Sincerely,    Marion Reddy MD

## 2023-01-16 NOTE — PATIENT INSTRUCTIONS
Recommended Follow up:      Follow up in 5 weeks. Video visit is okay.       Please call [phone] to schedule your clinic appointment if you don't already have an appointment scheduled.        To speak with a nurse, schedule/reschedule/cancel a clinic appointment, or request a medication refill call: [phone]    You can also reach us by [employer]: [email or web address]

## 2023-01-18 DIAGNOSIS — S32.509S: ICD-10-CM

## 2023-01-24 ENCOUNTER — ANCILLARY PROCEDURE (OUTPATIENT)
Dept: RADIOLOGY | Facility: AMBULATORY SURGERY CENTER | Age: 73
End: 2023-01-24
Attending: ANESTHESIOLOGY
Payer: COMMERCIAL

## 2023-01-24 ENCOUNTER — HOSPITAL ENCOUNTER (OUTPATIENT)
Facility: AMBULATORY SURGERY CENTER | Age: 73
Discharge: HOME | End: 2023-01-24
Attending: ANESTHESIOLOGY | Admitting: ANESTHESIOLOGY
Payer: MEDICARE

## 2023-01-24 VITALS
RESPIRATION RATE: 16 BRPM | TEMPERATURE: 97.6 F | DIASTOLIC BLOOD PRESSURE: 74 MMHG | OXYGEN SATURATION: 96 % | SYSTOLIC BLOOD PRESSURE: 129 MMHG | HEART RATE: 81 BPM

## 2023-01-24 DIAGNOSIS — R52 PAIN: ICD-10-CM

## 2023-01-24 PROCEDURE — 64430 NJX AA&/STRD PUDENDAL NERVE: CPT | Mod: RT

## 2023-01-24 PROCEDURE — 64430 NJX AA&/STRD PUDENDAL NERVE: CPT | Mod: RT | Performed by: ANESTHESIOLOGY

## 2023-01-24 PROCEDURE — 76942 ECHO GUIDE FOR BIOPSY: CPT | Mod: 26 | Performed by: ANESTHESIOLOGY

## 2023-01-24 RX ORDER — BUPIVACAINE HYDROCHLORIDE 7.5 MG/ML
INJECTION, SOLUTION EPIDURAL; RETROBULBAR DAILY PRN
Status: DISCONTINUED | OUTPATIENT
Start: 2023-01-24 | End: 2023-01-24 | Stop reason: HOSPADM

## 2023-01-24 RX ORDER — LIDOCAINE HYDROCHLORIDE 10 MG/ML
INJECTION, SOLUTION EPIDURAL; INFILTRATION; INTRACAUDAL; PERINEURAL DAILY PRN
Status: DISCONTINUED | OUTPATIENT
Start: 2023-01-24 | End: 2023-01-24 | Stop reason: HOSPADM

## 2023-01-24 RX ORDER — METHYLPREDNISOLONE ACETATE 40 MG/ML
INJECTION, SUSPENSION INTRA-ARTICULAR; INTRALESIONAL; INTRAMUSCULAR; SOFT TISSUE DAILY PRN
Status: DISCONTINUED | OUTPATIENT
Start: 2023-01-24 | End: 2023-01-24 | Stop reason: HOSPADM

## 2023-01-24 NOTE — DISCHARGE INSTRUCTIONS
"PAIN INJECTION HOME CARE INSTRUCTIONS  Activity  -You may resume most normal activity levels with the exception of strenuous activity. It may help to move in ways that hurt before the injection, to see if the pain is still there, but do not overdo it.     -DO NOT remove bandaid for 24 hours  -DO NOT shower for 24 hours      Pain  -You may feel immediate pain relief and numbness for a period of time after the injection. This may indicate the medication has reached the right spot.  -Your pain may return after this short pain-free period, or may even be a little worse for a day or two. It may be caused by needle irritation or by the medication itself. The medications usually take two or three days to start working, but can take as long as a week.    -You may use an ice pack for 20 minutes every 2 hours for the first 24 hours  -You may use a heating pad after the first 24 hours  -You may use Tylenol (acetaminophen) every 4 hours or other pain medicines as directed by your physician      DID YOU RECEIVE SEDATION TODAY?  {YES / NO:174690::\"Yes\"}    If you received sedation please follow these additional safety measures.    Sedation medicine, if given, may remain active for many hours. It is important for the next 24 hours that you do not:  -Drive a car  -Operate machines or power tools  -Consume alcohol, including beer  -Sign any important papers or legal documents    DID YOU RECEIVE STEROIDS TODAY?  {YES / NO:733523::\"Yes\"}    Common side effects of steroids:  Not everyone will experience corticosteroid side effects. If side effects are experienced, they will gradually subside in the 7-10 day period following an injection. Most common side effects include:  -Flushed face and/or chest  -Feeling of warmth, particularly in the face but could be an overall feeling of warmth  -Increased blood sugar in diabetic patients  -Menstrual irregularities my occur. If taking hormone-based birth control an alternate method of birth " control is recommended  -Sleep disturbances and/or mood swings are possible  -Leg cramps    PLEASE KEEP TRACK OF YOUR SYMPTOMS AND NOTE ANY CHANGES FOR YOUR DOCTOR.       Please contact us if you have:  -Severe pain  -Fever more than 101.5 degrees Fahrenheit  -Signs of infection at the injection site (redness, swelling, or drainage)      FOR PAIN CENTER PATIENTS:  If you have questions, please contact the Pain Clinic at [phone] Option 1 between the hours of 7:00 am and 3:00 pm Monday through Friday. After office hours you can contact the on call provider by dialing [phone]. If you need immediate attention, we recommend that you go to a hospital emergency room or dial [phone]. If you need to Fax information, the number is [phone].

## 2023-01-24 NOTE — OP NOTE
[facility] PROCEDURE NOTE     ATTENDING CLINICIAN:    Marion Reddy MD     PREPROCEDURE DIAGNOSES:  1.  Right pudendal neuralgia  2.  Neuropathic pain  3.  Chronic pain     POSTPROCEDURE DIAGNOSES:  1.  Right pudendal neuralgia  2.  Neuropathic pain  3.  Chronic pain     PROCEDURE(S) PERFORMED:  1.  Right pudendal nerve block  2.  Ultrasound guidance for needle guidance     ANESTHESIA:  Local     COMPLICATIONS:  None     INDICATIONS:  Gina 71 yo female with history of chronic pelvic pain likely secondary to myofascial pain of the pudendal neuralgia.  During her last clinic visit we recommended a diagnostic and therapeutic injection of her right pudendal nerve block. The patient stated that she was in her usual state of health and denied recent anticoagulant use or recent infections.  Therefore, the plan is to perform above mentioned procedure.      Procedure Details:  Written informed consent was obtained and saved in the electronic medical record, after the risks, benefits, and alternatives were discussed with the patient.  All of the patient s questions were answered.  The patient was brought to the procedure room, where she was identified by name, medical record number and date of birth.       The patient was placed in the prone position on the procedure room table.  All pressure points were checked and comfortably padded.  Routine monitors were placed.  Vital signs were stable.  A formal time-out procedure was performed, as per protocol, including patient name, title of procedure, and site of procedure, and all in the room concurred.     A chlorhexidine prep was completed followed by sterile draping per standard procedure.     Curvilinear ultrasound guidance was used to identify the inferior border of the right SI joint. I scanned caudally and the pyriformis muscle was identified. Subsequently, the left ischial spine was identified including the sacrotuberous and sacrospinous  ligaments. After 1% lidocaine infiltration using a 22 gauge 1.5 inch needle, a 22G was advanced at the target with ultrasound guidance until it was inbetween the sacrotuberous and sacrospinous ligaments. At this point, a mixture of 40 mg methylprednisolone mixed with 3 ml 0.75% bupivacaine was injected.  After negative aspiration, the above listed injection solution was injected, the needle was flushed with 1% lidocaine and withdrawn.  No paresthesias were noted throughout the duration of the procedure.     A contralateral procedure WAS NOT performed.     Light pressure was held at the puncture site(s) to prevent ecchymosis and oozing.  The patient's skin was cleansed, and hemostasis was confirmed.  Band-aids were applied to the needle injection site(s).       Condition:     The patient remained awake and alert throughout the procedure.  The patient tolerated the procedure well and was monitored for approximately 15 minutes afterward in the post procedure area.  There were no immediate post procedure complications noted.  The patient was then discharged to home as per protocol.       LESTER Alvarez    Pain Medicine  Department of Anesthesiology  [facility]

## 2023-01-24 NOTE — H&P
Sara LUIZ Solomonjassalycia  [id number]  female  72 year old      Reason for procedure/surgery: pudendal neuralgia    Patient Active Problem List   Diagnosis     Malignant neoplasm of anal canal (H)     Allergic rhinitis     Hypertension     Major depressive disorder, recurrent episode, moderate (H)     Migraine variant     Mixed hyperlipidemia     Need for prophylactic hormone replacement therapy (postmenopausal)     Reflux esophagitis     Sciatica     Squamous cell carcinoma of anus (H)     Urine, incontinence, stress female     Nausea     Hypotension, unspecified hypotension type     Pelvic pain in female     Neuralgia of right pudendal nerve       Past Surgical History:    Past Surgical History:   Procedure Laterality Date     COLONOSCOPY      ,2018     EXTRACTION(S) DENTAL      No Comments Provided     NERVE BLOCK PERIPHERAL Right 10/13/2022    Procedure: Right pudendal nerve block with ultrasound;  Surgeon: Marion Reddy MD;  Location: [facility] OR     OTHER SURGICAL HISTORY      [address].0,MT  DELIVERY ONLY     OTHER SURGICAL HISTORY      ,ENDOSCOPY ESOPHAGUS       Past Medical History:   Past Medical History:   Diagnosis Date     Cancer (H)      Disorder of bone and cartilage     osteopenia     Diverticulosis of colon     ,seen at colonoscopy     Major depressive disorder, recurrent episode, moderate (H)     No Comments Provided     Reflux esophagitis     No Comments Provided     Sciatica     right     Urge incontinence     No Comments Provided       Social History:   Social History     Tobacco Use     Smoking status: Former     Years: 20.00     Types: Cigarettes     Quit date: 10/9/2008     Years since quittin.3     Smokeless tobacco: Never     Tobacco comments:     Quit smoking: passive exposure   Substance Use Topics     Alcohol use: Yes     Comment: Alcoholic Drinks/day: occ       Family History:   Family History   Problem Relation Age of Onset     Other - See Comments Father          Stroke     Cancer Mother         Cancer,cervical-mets to brain     Osteoporosis Mother         Osteoporosis     Heart Disease Brother         Heart Disease,MI  at 59     Heart Disease Maternal Grandmother         Heart Disease,MI  at 58     Asthma Brother         Asthma     Asthma Brother         Asthma     Diabetes Paternal Grandmother         Diabetes       Allergies: No Known Allergies    Active Medications:   Current Outpatient Medications   Medication Sig Dispense Refill     alendronate (FOSAMAX) 70 MG tablet Take 70 mg by mouth every 7 days       atorvastatin (LIPITOR) 10 MG tablet Take 10 mg by mouth daily  1     calcium carbonate 600 mg-vitamin D 400 units (CALTRATE) 600-400 MG-UNIT per tablet Take 1 tablet by mouth       Cholecalciferol (VITAMIN D3) 50 MCG (2000 UT) CAPS Take 1 Capsule (2,000 units) by mouth once daily.       gabapentin (NEURONTIN) 400 MG capsule Take 600 mg by mouth 3 times daily       lisinopril (ZESTRIL) 20 MG tablet Take 20 mg by mouth daily       oxyCODONE-acetaminophen (PERCOCET)  MG per tablet Take 1 tablet by mouth every 4 hours as needed for severe pain (7-10) 100 tablet 0     pentoxifylline ER (TRENTAL) 400 MG CR tablet Take 1 tablet (400 mg) by mouth 3 times daily (with meals) 90 tablet 3     venlafaxine (EFFEXOR-XR) 150 MG 24 hr capsule Take 150 mg by mouth daily       vitamin E (TOCOPHEROL) 400 units (180 mg) capsule Take 1 capsule (400 Units) by mouth 3 times daily 90 capsule 3     naloxone (NARCAN) 4 MG/0.1ML nasal spray Spray 1 spray (4 mg) into one nostril alternating nostrils once as needed for opioid reversal every 2-3 minutes until assistance arrives 0.2 mL 0       Systemic Review:   CONSTITUTIONAL: NEGATIVE for fever, chills, change in weight  ENT/MOUTH: NEGATIVE for ear, mouth and throat problems  RESP: NEGATIVE for significant cough or SOB  CV: NEGATIVE for chest pain, palpitations or peripheral edema    Physical Examination:   Vital Signs: /71    Temp 97.6  F (36.4  C) (Temporal)   Resp 16   SpO2 97%   GENERAL: healthy, alert and no distress  NECK: no adenopathy, no asymmetry, masses, or scars  RESP: lungs clear to auscultation - no rales, rhonchi or wheezes  CV: regular rate and rhythm, normal S1 S2, no S3 or S4, no murmur, click or rub, no peripheral edema and peripheral pulses strong  ABDOMEN: soft, nontender, no hepatosplenomegaly, no masses and bowel sounds normal  MS: no gross musculoskeletal defects noted, no edema    Plan: Appropriate to proceed as scheduled.      Marion Reddy MD  1/24/2023

## 2023-02-01 ENCOUNTER — VIRTUAL VISIT (OUTPATIENT)
Dept: PALLIATIVE MEDICINE | Facility: CLINIC | Age: 73
End: 2023-02-01
Payer: COMMERCIAL

## 2023-02-01 DIAGNOSIS — C21.1 MALIGNANT NEOPLASM OF ANAL CANAL (H): ICD-10-CM

## 2023-02-01 DIAGNOSIS — R10.2 PELVIC PAIN IN FEMALE: Primary | ICD-10-CM

## 2023-02-01 DIAGNOSIS — G89.3 CANCER RELATED PAIN: ICD-10-CM

## 2023-02-01 PROCEDURE — 99215 OFFICE O/P EST HI 40 MIN: CPT | Mod: 95 | Performed by: NURSE PRACTITIONER

## 2023-02-01 RX ORDER — BUPRENORPHINE 10 UG/H
1 PATCH TRANSDERMAL
Qty: 4 PATCH | Refills: 0 | Status: SHIPPED | OUTPATIENT
Start: 2023-02-01 | End: 2023-03-15

## 2023-02-01 RX ORDER — OXYCODONE AND ACETAMINOPHEN 10; 325 MG/1; MG/1
1 TABLET ORAL EVERY 4 HOURS PRN
Qty: 100 TABLET | Refills: 0 | Status: SHIPPED | OUTPATIENT
Start: 2023-02-01 | End: 2023-02-23

## 2023-02-01 NOTE — PROGRESS NOTES
Gina is a 72 year old who is being evaluated via a billable video visit.      How would you like to obtain your AVS? MyChart  If the video visit is dropped, the invitation should be resent by: Text to cell phone: [phone]  Will anyone else be joining your video visit? Cheri SEWELL VVCARINA    Video-Visit Details    Type of service:  Video Visit   Video Start Time: 9:50 AM  Video End Time: 10:10 AM    Originating Location (pt. Location): Home    Distant Location (provider location):  On-site  Platform used for Video Visit: [facility]         Palliative Care Outpatient Clinic      Patient ID/Chief Complaint: Sara Dominguez 72 year old female who is presenting to the palliative medicine clinic today at the request of CASSIE Denise for a palliative care follow-up secondary to assistance with pain management.   The patient's primary care provider is:  Nadya Reid       Impression & Recommendations:  72-year-old female with history of anal cancer in 2019 [id number] (anna-rectal nodes) in 2019. She completed definitive CRT and is currently TYLER.  Unfortunately she developed post CRT pelvic insufficiency fractures and sacrum and right pubic bone causing continuous right groin pain that extends into her buttock and down her leg.  Pain can be deep and severely aching, spiking to 10 out of 10 reported pain, with numbness/tingling/radiating pain outward.  She also has long-term chronic back pain.    Right pudendal nerve block completed by Dr. Reddy October 2022 with a decent amount of symptom relief.  Block repeated in 1/24/23 with limited effect so far.     She takes 10/325 Percocet approximately 4 times per day, gabapentin 600 mg 4 times a day, and Effexor  mg daily.    Denies issues with constipation.    Symptoms/recommendations/discussion:    Start Butrans 10 mcg/h patch.  Rationale for starting Butrans patch is persistent need for 40 mg of oxycodone per day, pain during the night and morning  after oxycodone has worn off, and hope for positive response to long-acting opioid agonist/antagonist and less need for Percocet.  Continue Percocet 10/325.    Continue gabapentin as prescribed by PM&R physician    Continue Effexor XR    She continues to follow with interventional pain management, Dr. Reddy.    Palliative care follow-up in approximately 6-8 weeks    Please feel free to call our service at [phone] earlier if needed          History:  History gathered today from: patient, medical chart      PE: There were no vitals taken for this visit.   Wt Readings from Last 3 Encounters:   10/13/22 80.3 kg (177 lb)   09/15/22 80.3 kg (177 lb)   08/10/22 79.5 kg (175 lb 3.2 oz)       Gen alert, comfortable appearing, NAD.   Head NCAT.  Eyes anicteric without injection  Face symmetric, eyes conjugate  Lungs unlabored, no cough, speaking full sentences  Skin no rashes or lesions evident on face/neck  Neuro Face symmetric, eyes conjugate; speech fluent.  Neuropsych exam normal including affect, sensorium, gross memory, thought processes, and fund of knowledge.         Data reviewed:  I reviewed electrolytes, BUN/creatinine, liver profile, hemoglobin and hematocrit, platelet count, and most recent imaging       database reviewed: 1/30        45 minutes spent on the date of the encounter doing chart review, history and exam, patient education & counseling, documentation and other activities as noted above.        Thank you for involving us in the patient's care.   KARISSA Telles, [facility] Palliative Care Service  Office [phone]

## 2023-02-01 NOTE — PATIENT INSTRUCTIONS
Thank you for meeting with us in the [facility]iative [facility].    How to get a hold of us:  For non-urgent matters, MyChart works best.    For more urgent matters, or if you prefer not to use MyChart, call our clinic nurse coordinator Fany Gutierrez RN at [phone] or [phone]    We have an on-call number for evenings and weekends. Please call this only if you are having uncontrolled symptoms or serious side effects from your medicines: [phone].     For refills, please give us a week (5 working days) notice. We don't always have providers available everyday to do refills. If you call the day you run out of your medicine, we may not be able to refill it in time, so call 5 days in advance!

## 2023-02-02 ENCOUNTER — TELEPHONE (OUTPATIENT)
Dept: PALLIATIVE MEDICINE | Facility: CLINIC | Age: 73
End: 2023-02-02
Payer: COMMERCIAL

## 2023-02-02 NOTE — TELEPHONE ENCOUNTER
----- Message from KARISSA Telles CNP sent at 2/1/2023 12:44 PM CST -----  Please schedule for follow-up with Jai De Oliveira in 4-6 weeks

## 2023-02-03 ENCOUNTER — TELEPHONE (OUTPATIENT)
Dept: PALLIATIVE CARE | Facility: CLINIC | Age: 73
End: 2023-02-03
Payer: COMMERCIAL

## 2023-02-03 NOTE — LETTER
2023    To:   [facility]  [address] [address]  [place] [address]    RE: Sara Dominguez  [address] [address]  : 1950  MRN: [id number]  Policy #: [id number]      To Whom It May Concern,    I am writing on behalf of my patient, Sara Dominguez to document the medical necessity of Butrans Patch for the treatment of chronic pain following cancer treatment. This letter provides information about the patient's medical history and diagnosis and a statement summarizing my treatment rationale.     Summary of Patient History and Diagnosis  72-year-old female with history of anal cancer in 2019 [id number] (anna-rectal nodes) in 2019. She completed definitive CRT and is currently TYLER.  Unfortunately she developed post CRT pelvic insufficiency fractures and sacrum and right pubic bone causing continuous right groin pain that extends into her buttock and down her leg.  Pain can be deep and severely aching, spiking to 10 out of 10 reported pain, with numbness/tingling/radiating pain outward.  She also has long-term chronic back pain.     Right pudendal nerve block completed by Dr. Reddy 2022 with a decent amount of symptom relief.  Block repeated in 23 with limited effect so far.      She takes 10/325 Percocet approximately 4 times per day, gabapentin 600 mg 4 times a day, and Effexor  mg daily.      Treatment Rationale  Request for Butrans patch was made to make use of mixed opioid agonist/antagonist long acting medication in hopes of better symptom control, but also to provide safer pain relief with mixed agonist/antagonist. I would hope for less use of short-acting full opioid agonist Oxycodone IR--eventually maybe even discontinuing. Therefore, I don't think adding another full opioid agonist like methadone, fentanly patch, or ER preparations of morphine or oxycodone would be the right plan for this patient with no evidence of cancer at this time. She is receiving  multi-modal pain control and trying to decrease full opioid agonist medications is the right plan of care.       Duration  12 months    I look forward to receiving your timely response and approval of this request.     Sincerely,    Jai De Oliveira, Palliative Care APRN

## 2023-02-03 NOTE — TELEPHONE ENCOUNTER
----- Message from Munira MAYNARD Jennifer sent at 2/2/2023 11:53 AM CST -----  Regarding: RE: Buprenorphine PA  Hi good morning. Sorry for the delay.     Please create a telephone encounter for this Prior Auth request and re-route to the PA pool. We are unable to document and track encounters that are not set up as a telephone encounters.   Thank you. If you need it done urgently please darlin it as such, as we are currently working on requests from 01/31/23.  Note: Due to high request volumes our turn around time is taking a little longer than normal. We are working diligently to submit all requests in the order they are received and in a timely manner. If you have any questions - please send a note/message in the active PA encounter and send to the CPA team [[phone]].    Thank you!     ----- Message -----  From: Clara Gutierrez, RN  Sent: 2/2/2023   9:00 AM CST  To: Pa Retail Team [facility]  Subject: Buprenorphine PA                                 Good morning,    PA needed for buprenorphine.    Thank you!    MEENAKSHI PandeyN, RN  Palliative Care Nurse Clinician    [phone] (Direct)  [phone] (Main)  [phone] (Appointment Scheduling)

## 2023-02-06 NOTE — TELEPHONE ENCOUNTER
Central Prior Authorization Team   Phone: [phone]      PA Initiation    Medication: buprenorphine (BUTRANS) 10 MCG/HR WK patch-PA INITIATED  Insurance Company: [employer] - Phone [phone] Fax [phone]  Pharmacy Filling the Rx: [facility] [place] - [address]  Filling Pharmacy Phone: [phone]  Filling Pharmacy Fax:    Start Date: 2/6/2023

## 2023-02-07 NOTE — TELEPHONE ENCOUNTER
PRIOR AUTHORIZATION DENIED    Medication: buprenorphine (BUTRANS) 10 MCG/HR WK patch-PA denied    Denial Date: 2/6/2023    Denial Rational: Must try/fail 2 alternatives      Appeal Information:

## 2023-02-13 NOTE — TELEPHONE ENCOUNTER
Re: Sara Dominguez    From: Jai De Oliveira, Palliative Care APRBRITNEY      To Whom It May Concern:    Request for Butrans patch was made to make use of mixed opioid agonist/antagonist long acting medication in hopes of better symptom control, but also to provide safer pain relief with mixed agonist/antagonist. I would hope for less use of short-acting full opioid agonist Oxycodone IR--eventually maybe even discontinuing. Therefore, I don't think adding another full opioid agonist like methadone, fentanly patch, or ER preparations of morphine or oxycodone would be the right plan for this patient with no evidence of cancer at this time.     Respectfully,    KARISSA Telles   None known

## 2023-02-14 NOTE — TELEPHONE ENCOUNTER
Central Prior Authorization Team   Phone: [phone]      Medication Appeal Initiation-Sent via [employer]    We have initiated an appeal for the requested medication:  Medication: buprenorphine (BUTRANS) 10 MCG/HR WK patch-PA appeal initiated  Appeal Start Date:  2/14/2023  Insurance Company: [employer] - Phone [phone] Fax [phone]  Comments:  [facility] necessity [facility]

## 2023-02-15 ENCOUNTER — OFFICE VISIT (OUTPATIENT)
Dept: RADIATION THERAPY | Facility: OUTPATIENT CENTER | Age: 73
End: 2023-02-15
Payer: COMMERCIAL

## 2023-02-15 VITALS
WEIGHT: 170.6 LBS | HEART RATE: 77 BPM | BODY MASS INDEX: 28.84 KG/M2 | SYSTOLIC BLOOD PRESSURE: 140 MMHG | DIASTOLIC BLOOD PRESSURE: 91 MMHG | OXYGEN SATURATION: 96 % | RESPIRATION RATE: 16 BRPM

## 2023-02-15 DIAGNOSIS — C21.1 MALIGNANT NEOPLASM OF ANAL CANAL (H): Primary | ICD-10-CM

## 2023-02-15 NOTE — PROGRESS NOTES
Department of Radiation Oncology  Radiation Therapy Center  [facility] [place] [address]  [phone]       Radiation Oncology Follow-up Visit  February 15, 2023    Sara Dominguez  MRN: [id number]   : 1950     DIAGNOSIS: squamous cell carcinoma of the anal canal  INTENT OF RADIOTHERAPY: definitive CRT.  PATHOLOGY:  moderately differentiated squamous cell carcinoma                              STAGE:  clinical [id number] (anna-rectal nodes).    CONCURRENT SYSTEMIC THERAPY: 5 FU and mitomycin     ONCOLOGIC HISTORY:    Ms. Dominguez is a 71 year old female diagnosed wquamous cell carcinoma of the anal canal, clinical [id number] (anna-rectal nodes).       The patient initially presented with symptoms of rectal pain pressure prompting further evaluation.  She was seen by gastroenterologist Dr. Aguirre who on digital rectal exam noticed a mass in the anterior wall of the anal rectal region, measuring 3.4 cm in size.  Patient eventually underwent colonoscopy on 2019 which demonstrated an 8 mm mucosal abnormality just above the dentate line.  The mass was noted to be in the anterior wall facing the vagina.  Biopsy of the mass demonstrated moderately differentiated squamous cell carcinoma.  The patient was referred to Gyn/onc who on exam palpated a 3 to 4 cm rectovaginal mass.  No vaginal or cervical mass was noted.  2019 the patient underwent MRI of the pelvis.  Imaging demonstrated 2.6 x 2.5 x 2.5 cm mass located in the distal rectum/anal canal region.  The mass was noted to arise exophytically from the distal rectum.  There were suspicious mesorectal lymph nodes measuring subcentimeter in size. No pelvic adenopathy was noted.  On 8/3/2019 the patient underwent a PET scan.  Imaging did not demonstrate any evidence of distant disease. Hypermetabolic 3.3 x 4.1 cm mass seen in the distal rectum/anus compatible with underlying malignancy. Per Dr. Khan review of imaging, also  noted suspicious perirectal adenopathy.  The patient was subsequent seen by Dr. Aquino who discussed definitive treatment with chemoradiation.      SITE OF TREATMENT: anal/pelvis     DATES  OF TREATMENT: 19-10/09/19     TOTAL DOSE OF TREATMENT: 5400 cGy     DOSE PER FRACTION OF TREATMENT: 180 cGy x 30 fractions    INTERVAL SINCE COMPLETION OF RADIATION THERAPY:   ~  3.5 years    SUBJECTIVE:   In the interval, the patient was seen by Dr. Murphy in 2022. Per report, on examination there was no clear evidence of recurrent/ residual disease. Patient will next see Dr. Murphy in  for exam.    Post treatment PET on 20 was TYLER. Most recent PET on 22 demonstrated no clear evidence of recurrent disease.     Unfortunately posttreatment the patient has developed pelvic insufficiency fractures in the sacrum as well as right pubic bone.  She has been following with Dr. Mortensen with treatment of physical therapy, vitamin D, calcium,  Fosamax, and pain management.  She has noticed some improvement but yet not completely.  She has also met with orthopedic team (Dr. Marcial) to help in further management. She underwent biopsy of soft tissue mass in pelvis on 9/15/21, which was negative for malignancy. She has met Dr. Marcial to discuss any potential options for pelvic fractures. He discussed possibility of surgical intervention, but patient is not interested in surgery at this point. She has underwent steroid injection x 3 with some alleviation. Met with Dr. Chan of [facility] who referred to Dr. Reddy for consideration of nerve block, now s/p 2 procedures. Has noticed some improvement but still continues to have chronic pain. Working with palliative care team for pain management as well.    LABS AND IMAGIN22  PET    FINDINGS:      HEAD/NECK:  There is no suspicious FDG uptake in the neck.      Diminutive thyroid gland. The paranasal sinuses and mastoid air cells  are clear. No cervical  lymphadenopathy.     CHEST:  There is no suspicious FDG uptake in the chest.      Right chest wall port catheter tip terminates in the right atrium.     The left vertebral artery arises directly from the aortic arch. Mild  atherosclerotic calcifications of the aortic arch. Patulous esophagus.        Mild diffuse centrilobular groundglass nodules throughout the lungs.  No airspace consolidation.     ABDOMEN AND PELVIS:  Focal increased FDG uptake involving the cecum and proximal ascending  colon with max SUV of 9.43. No CT abnormality.     Normal variant [place] tail liver. Colonic diverticulosis without  evidence of diverticulitis.     LOWER EXTREMITIES:   No abnormal masses or hypermetabolic lesions.     BONES:   There is no abnormal FDG uptake in the skeleton. Chronic unhealed  fracture deformities of the sacral ala with low-level FDG uptake about  the fracture sites, which is increased from the prior PET. Chronic  fracture deformities of the right inferior and superior pubic rami  adjacent partially calcified lesion in the right abductors, presumably  posttraumatic in nature.                                                                       IMPRESSION:   In this patient with a history of rectal carcinoma status post  chemoradiation:  1. No evidence of recurrent or metastatic disease in the body.  2. Diffuse centrilobular groundglass nodules throughout the lungs,  suspicious for infectious or inflammatory bronchiolitis.  3. FDG uptake in the rectum and proximal ascending colon without CT  correlate. Presumably physiologic continued attention on follow-up.   4. Low level FDG uptake about the persistent sacral insufficiency  fracture lines.       IMPRESSION:   Ms. Dominguez is a 72 year old female diagnosed wquamous cell carcinoma of the anal canal, clinical [id number] (anna-rectal nodes). She completed definitive CRT to a total dose of 54 Gy in 30 fractions on 10/9/19.    PLAN:   1.  Clinically and radiographically  TYLER. Last exam was June 2022. Per report, on examination there was no clear evidence of recurrent/ residual disease. Patient will next see Dr. Murphy in March 20223 for exam. Post treatment PET on 4/9/20 was TYLER.Most recent PET on 8/4/22 demonstrated no clear evidence of recurrent disease.     2. Chronic late toxicity. Has developed sacral insufficiency fracture of the sacrum and R pubic bone post treatment. I discussed that these changes are likely related to post RT treatment with weakening of the bone. Working with anesthesia pain team (Dr. Reddy) and palliative care team (Jai De Oliveira). We have tried Vit E + pentoxifylline in effort to help promote healing in the past without much benefit, will discontinue.     3. Continue follow up for surveillance with surgery (Dr. Murphy).     4.  Continue follow up with medical oncology team.     5. RTC in 12 months.       Justin Khan M.D.  Department of Radiation Oncology  [facility]

## 2023-02-15 NOTE — LETTER
2/15/2023         RE: Sara Dominguez  [address]  [place] [facility] [address]        Dear Colleague,    Thank you for referring your patient, Sara Dominguez, to the RADIATION THERAPY CENTER. Please see a copy of my visit note below.       Department of Radiation Oncology  Radiation Therapy Center  [facility]  [place] [address]  [phone]       Radiation Oncology Follow-up Visit  February 15, 2023    Sara Dominguez  MRN: [id number]   : 1950     DIAGNOSIS: squamous cell carcinoma of the anal canal  INTENT OF RADIOTHERAPY: definitive CRT.  PATHOLOGY:  moderately differentiated squamous cell carcinoma                              STAGE:  clinical [id number] (anna-rectal nodes).    CONCURRENT SYSTEMIC THERAPY: 5 FU and mitomycin     ONCOLOGIC HISTORY:    Ms. Dominguez is a 71 year old female diagnosed wquamous cell carcinoma of the anal canal, clinical [id number] (anna-rectal nodes).       The patient initially presented with symptoms of rectal pain pressure prompting further evaluation.  She was seen by gastroenterologist Dr. Aguirre who on digital rectal exam noticed a mass in the anterior wall of the anal rectal region, measuring 3.4 cm in size.  Patient eventually underwent colonoscopy on 2019 which demonstrated an 8 mm mucosal abnormality just above the dentate line.  The mass was noted to be in the anterior wall facing the vagina.  Biopsy of the mass demonstrated moderately differentiated squamous cell carcinoma.  The patient was referred to Gyn/onc who on exam palpated a 3 to 4 cm rectovaginal mass.  No vaginal or cervical mass was noted.  2019 the patient underwent MRI of the pelvis.  Imaging demonstrated 2.6 x 2.5 x 2.5 cm mass located in the distal rectum/anal canal region.  The mass was noted to arise exophytically from the distal rectum.  There were suspicious mesorectal lymph nodes measuring subcentimeter in size. No pelvic adenopathy was noted.  On  8/3/2019 the patient underwent a PET scan.  Imaging did not demonstrate any evidence of distant disease. Hypermetabolic 3.3 x 4.1 cm mass seen in the distal rectum/anus compatible with underlying malignancy. Per Dr. Khan review of imaging, also noted suspicious perirectal adenopathy.  The patient was subsequent seen by Dr. Aquino who discussed definitive treatment with chemoradiation.      SITE OF TREATMENT: anal/pelvis     DATES  OF TREATMENT: 8/22/19-10/09/19     TOTAL DOSE OF TREATMENT: 5400 cGy     DOSE PER FRACTION OF TREATMENT: 180 cGy x 30 fractions    INTERVAL SINCE COMPLETION OF RADIATION THERAPY:   ~  3.5 years    SUBJECTIVE:   In the interval, the patient was seen by Dr. Murphy in June 2022. Per report, on examination there was no clear evidence of recurrent/ residual disease. Patient will next see Dr. Murphy in March 20223 for exam.    Post treatment PET on 4/9/20 was TYLER. Most recent PET on 8/4/22 demonstrated no clear evidence of recurrent disease.     Unfortunately posttreatment the patient has developed pelvic insufficiency fractures in the sacrum as well as right pubic bone.  She has been following with Dr. Mortensen with treatment of physical therapy, vitamin D, calcium,  Fosamax, and pain management.  She has noticed some improvement but yet not completely.  She has also met with orthopedic team (Dr. Marcial) to help in further management. She underwent biopsy of soft tissue mass in pelvis on 9/15/21, which was negative for malignancy. She has met Dr. Marcial to discuss any potential options for pelvic fractures. He discussed possibility of surgical intervention, but patient is not interested in surgery at this point. She has underwent steroid injection x 3 with some alleviation. [facility] to Dr. Reddy for consideration of nerve block, now s/p 2 procedures. Has noticed some improvement but still continues to have chronic pain. Working with palliative care team for pain  management as well.    LABS AND IMAGIN22  PET    FINDINGS:      HEAD/NECK:  There is no suspicious FDG uptake in the neck.      Diminutive thyroid gland. The paranasal sinuses and mastoid air cells  are clear. No cervical lymphadenopathy.     CHEST:  There is no suspicious FDG uptake in the chest.      Right chest wall port catheter tip terminates in the right atrium.     The left vertebral artery arises directly from the aortic arch. Mild  atherosclerotic calcifications of the aortic arch. Patulous esophagus.        Mild diffuse centrilobular groundglass nodules throughout the lungs.  No airspace consolidation.     ABDOMEN AND PELVIS:  Focal increased FDG uptake involving the cecum and proximal ascending  colon with max SUV of 9.43. No CT abnormality.     Normal variant [place] tail liver. Colonic diverticulosis without  evidence of diverticulitis.     LOWER EXTREMITIES:   No abnormal masses or hypermetabolic lesions.     BONES:   There is no abnormal FDG uptake in the skeleton. Chronic unhealed  fracture deformities of the sacral ala with low-level FDG uptake about  the fracture sites, which is increased from the prior PET. Chronic  fracture deformities of the right inferior and superior pubic rami  adjacent partially calcified lesion in the right abductors, presumably  posttraumatic in nature.                                                                       IMPRESSION:   In this patient with a history of rectal carcinoma status post  chemoradiation:  1. No evidence of recurrent or metastatic disease in the body.  2. Diffuse centrilobular groundglass nodules throughout the lungs,  suspicious for infectious or inflammatory bronchiolitis.  3. FDG uptake in the rectum and proximal ascending colon without CT  correlate. Presumably physiologic continued attention on follow-up.   4. Low level FDG uptake about the persistent sacral insufficiency  fracture lines.       IMPRESSION:   Ms. Dominguez is a 72 year  old female diagnosed wquamous cell carcinoma of the anal canal, clinical [id number] (anna-rectal nodes). She completed definitive CRT to a total dose of 54 Gy in 30 fractions on 10/9/19.    PLAN:   1.  Clinically and radiographically TYLER. Last exam was June 2022. Per report, on examination there was no clear evidence of recurrent/ residual disease. Patient will next see Dr. Murphy in March 20223 for exam. Post treatment PET on 4/9/20 was TYLER.Most recent PET on 8/4/22 demonstrated no clear evidence of recurrent disease.     2. Chronic late toxicity. Has developed sacral insufficiency fracture of the sacrum and R pubic bone post treatment. I discussed that these changes are likely related to post RT treatment with weakening of the bone. Working with anesthesia pain team (Dr. Reddy) and palliative care team (Jai De Oliveira). We have tried Vit E + pentoxifylline in effort to help promote healing in the past without much benefit, will discontinue.     3. Continue follow up for surveillance with surgery (Dr. Murphy).     4.  Continue follow up with medical oncology team.     5. RTC in 12 months.       Justin Khan M.D.  Department of Radiation Oncology  [facility]

## 2023-02-15 NOTE — TELEPHONE ENCOUNTER
I received notice from [facility] that this was closed by them because patient does not have coverage with them. She does have coverage and the denial from 2/6 is also attached. Patient has not cancelled this coverage in the past week. I resent via RightFax letting them know they need to correct their mistake and open this for appeal review.

## 2023-02-15 NOTE — NURSING NOTE
"Oncology Rooming Note    February 15, 2023 1:45 PM   Sara Dominguez is a 72 year old female who presents for:    Chief Complaint   Patient presents with     Radiation Therapy     Return visit, anal cancer     Initial Vitals: BP (!) 140/91 (BP Location: Left arm, Cuff Size: Adult Large)   Pulse 77   Resp 16   Wt 77.4 kg (170 lb 9.6 oz)   SpO2 96%   BMI 28.84 kg/m   Estimated body mass index is 28.84 kg/m  as calculated from the following:    Height as of 9/15/22: 1.638 m (5' 4.49\").    Weight as of this encounter: 77.4 kg (170 lb 9.6 oz). Body surface area is 1.88 meters squared.  Data Unavailable Comment: Data Unavailable   No LMP recorded. Patient is postmenopausal.  Allergies reviewed: Yes  Medications reviewed: Yes    Medications: may need a refill of tocopherol and pentoxifylline   Pharmacy name entered into [employer]: [facility], [place] [address] [address]    Clinical concerns: Follow up today with Dr. Khan  Pain - still significant pain in pelvis and right groin. Taking oxycodone 10 mg QID and gabapentin. Working with Jai De Oliveira, who just prescribed Butrans patch, but has not started.  Gi - occasional loose stools, feels due to lactose/diet related. Takes imodium 1-2 times per month  Gu - still having nocturia 2-3 x per night, no other concerns  Gyn - no issues, has not been using dilator, but will start since having a painful gyn exam in 2019 (her last)  Weight stable, sleeping well, appetite good, some fatigue remains, but she is working casual    Providers:  Dr. Boss/Marissa Wells - has upcoming appointment  Dr. Rebollar - does not see  Jai De Oliveira/palliative - sees regularly for pain/sx management  Dr. Stafford/surgery - has upcoming appointment in March      Cindy Woodard RN              "

## 2023-02-16 NOTE — TELEPHONE ENCOUNTER
Central Prior Authorization Team   Phone: [phone]      MEDICATION APPEAL APPROVED    Medication: buprenorphine (BUTRANS) 10 MCG/HR WK patch-PA appeal approved  Authorization Effective Date: 2/6/2023  Authorization Expiration Date: 12/31/1999  Approved Dose/Quantity:   Reference #: [id number]   Insurance Company: [employer] - Phone [phone] Fax [phone]  Expected CoPay:     $161/month  CoPay Card Available:      Foundation Assistance Needed:    Which Pharmacy is filling the prescription (Not needed for infusion/clinic administered): [facility] [place], [place] - [address] [facility] [address]

## 2023-02-23 DIAGNOSIS — G89.3 CANCER RELATED PAIN: ICD-10-CM

## 2023-02-23 RX ORDER — OXYCODONE AND ACETAMINOPHEN 10; 325 MG/1; MG/1
1 TABLET ORAL EVERY 4 HOURS PRN
Qty: 100 TABLET | Refills: 0 | Status: SHIPPED | OUTPATIENT
Start: 2023-02-23 | End: 2023-03-15

## 2023-02-23 NOTE — TELEPHONE ENCOUNTER
Received telephone call from patient requesting refill of percocet. Pt hs not started butrans patch due to cost, however hopes to start it next month. Pt asking about increasing gabapentin dose.    Last refill: 2/1/23  Last office visit: 2/1/23  Scheduled for follow up 3/15/23     Will route request to NP for review.     Reviewed [place]  Report.

## 2023-02-24 DIAGNOSIS — G58.8 NEURALGIA OF RIGHT PUDENDAL NERVE: Primary | ICD-10-CM

## 2023-02-24 RX ORDER — GABAPENTIN 100 MG/1
200 CAPSULE ORAL 3 TIMES DAILY
Qty: 180 CAPSULE | Refills: 0 | Status: SHIPPED | OUTPATIENT
Start: 2023-02-24 | End: 2023-05-15

## 2023-02-24 RX ORDER — GABAPENTIN 100 MG/1
600 CAPSULE ORAL 3 TIMES DAILY
COMMUNITY
Start: 2023-02-24 | End: 2023-04-18

## 2023-03-15 ENCOUNTER — VIRTUAL VISIT (OUTPATIENT)
Dept: PALLIATIVE MEDICINE | Facility: CLINIC | Age: 73
End: 2023-03-15
Payer: MEDICARE

## 2023-03-15 DIAGNOSIS — G89.3 CANCER RELATED PAIN: ICD-10-CM

## 2023-03-15 RX ORDER — OXYCODONE AND ACETAMINOPHEN 10; 325 MG/1; MG/1
1 TABLET ORAL EVERY 4 HOURS PRN
Qty: 100 TABLET | Refills: 0 | Status: SHIPPED | OUTPATIENT
Start: 2023-03-15 | End: 2023-04-05

## 2023-03-15 NOTE — NURSING NOTE
Is the patient currently in the state of [place]? YES    Visit mode:VIDEO    If the visit is dropped, the patient can be reconnected by: VIDEO VISIT: Text to cell phone: [phone]    Will anyone else be joining the visit? NO      How would you like to obtain your AVS? MyChart    Are changes needed to the allergy or medication list? NO    Reason for visit: no

## 2023-03-15 NOTE — PROGRESS NOTES
Palliative care visit will need to be rescheduled.  She was in the [facility] for appointment and connection was not adequate.  Reschedule as soon as possible

## 2023-03-22 ENCOUNTER — VIRTUAL VISIT (OUTPATIENT)
Dept: PALLIATIVE MEDICINE | Facility: CLINIC | Age: 73
End: 2023-03-22
Payer: MEDICARE

## 2023-03-22 DIAGNOSIS — C21.0 SQUAMOUS CELL CARCINOMA OF ANUS (H): ICD-10-CM

## 2023-03-22 DIAGNOSIS — R10.2 PELVIC PAIN IN FEMALE: ICD-10-CM

## 2023-03-22 DIAGNOSIS — G58.8 NEURALGIA OF RIGHT PUDENDAL NERVE: Primary | ICD-10-CM

## 2023-03-22 PROCEDURE — 99215 OFFICE O/P EST HI 40 MIN: CPT | Mod: VID | Performed by: NURSE PRACTITIONER

## 2023-03-22 NOTE — NURSING NOTE
Is the patient currently in the state of [place]? YES    Visit mode:VIDEO    If the visit is dropped, the patient can be reconnected by: VIDEO VISIT: Text to cell phone: [phone]    Will anyone else be joining the visit? NO      How would you like to obtain your AVS? MyChart    Are changes needed to the allergy or medication list? NO    Reason for visit: return video visit    Rebeca Sewell, MELISSA

## 2023-03-22 NOTE — NURSING NOTE
Patient declined individual allergy and medication review by support staff because medications and allergies were reviewed on 3/15 and pt states no changes.    Rebeca Sewell, VF

## 2023-03-22 NOTE — PROGRESS NOTES
Virtual Visit Details    Type of service:  Video Visit   Video Start Time: 10:18 AM  Video End Time: 10:37 AM    Originating Location (pt. Location): Home    Distant Location (provider location):  Off-site  Platform used for Video Visit: [facility]       Palliative Care Outpatient Clinic      Patient ID/Chief Complaint: Sara Dominguez 72 year old female who is presenting to the palliative medicine clinic today at the request of CASSIE Denise for a palliative care follow-up secondary to assistance with pain management.   The patient's primary care provider is:  Nadya Reid       Impression & Recommendations:  72-year-old female with history of anal cancer in 2019 [id number] (anna-rectal nodes) in 2019. She completed definitive CRT and is currently TYLER.  Unfortunately she developed post CRT pelvic insufficiency fractures and sacrum and right pubic bone causing continuous right groin pain that extends into her buttock and down her leg.  Pain can be deep and severely aching, spiking to 10 out of 10 reported pain, with numbness/tingling/radiating pain outward.  She also has long-term chronic back pain.    Right pudendal nerve block completed by Dr. Reddy October 2022 with a decent amount of symptom relief.  Block repeated in 1/24/23 with limited effect so far.     She takes 10/325 Percocet approximately 4 times per day, gabapentin 600 mg 4 times a day, and Effexor  mg daily.    Denies issues with constipation.    Symptoms/recommendations/discussion:    She has tried Butrans 10 mcg/h patch for 3 weeks.  She does not report improvement in pain and is concerned that she is experiencing increasing nausea.  Discontinue Butrans patch.  Continue Percocet 10/325.    Continue gabapentin as prescribed by PM&R physician 600 mg 3 times daily.  Trial of increased to 800 mg 3 times daily without effect.  Return to previous 600 mg 3 times daily dosing.    Continue Effexor XR    She continues to follow with  interventional pain management, Dr. Reddy--Per previous communication with Dr. Reddy, she hopes to try additional interventions. Gina tells me that she will attempt to arrange a follow-up appointment today.    Palliative care follow-up in approximately 6-8 weeks      How to get a hold of us:  For non-urgent matters, MyChart works best.    For more urgent matters, or if you prefer not to use MyChart, call our clinic nurse coordinator Fany Gutierrez RN at [phone] or [phone]    We have an on-call number for evenings and weekends. Please call this only if you are having uncontrolled symptoms or serious side effects from your medicines: [phone].     For refills, please give us a week (5 working days) notice. We don't always have providers available everyday to do refills. If you call the day you run out of your medicine, we may not be able to refill it in time, so call 5 days in advance!            History:  History gathered today from: patient, medical chart      PE: There were no vitals taken for this visit.   Wt Readings from Last 3 Encounters:   02/15/23 77.4 kg (170 lb 9.6 oz)   10/13/22 80.3 kg (177 lb)   09/15/22 80.3 kg (177 lb)       Gen alert, comfortable appearing, NAD.   Head NCAT.  Eyes anicteric without injection  Face symmetric, eyes conjugate  Lungs unlabored, no cough, speaking full sentences  Skin no rashes or lesions evident on face/neck  Neuro Face symmetric, eyes conjugate; speech fluent.  Neuropsych exam normal including affect, sensorium, gross memory, thought processes, and fund of knowledge.         Data reviewed:  I reviewed electrolytes, BUN/creatinine, liver profile, hemoglobin and hematocrit, platelet count, and most recent imaging       database reviewed: 3/15        41 minutes spent on the date of the encounter doing chart review, history and exam, patient education & counseling, documentation and other activities as noted above.        Thank you for involving us in the  patient's care.   KARISSA Telles, [facility] Palliative Care Service  Office [phone]

## 2023-03-22 NOTE — PATIENT INSTRUCTIONS
Thank you for meeting with us in the [facility] Palliative Care [facility].    How to get a hold of us:  For non-urgent matters, MyChart works best.    For more urgent matters, or if you prefer not to use MyChart, call our clinic nurse coordinator Fany Gutierrez RN at [phone] or [phone]    We have an on-call number for evenings and weekends. Please call this only if you are having uncontrolled symptoms or serious side effects from your medicines: [phone].     For refills, please give us a week (5 working days) notice. We don't always have providers available everyday to do refills. If you call the day you run out of your medicine, we may not be able to refill it in time, so call 5 days in advance!

## 2023-04-05 ENCOUNTER — VIRTUAL VISIT (OUTPATIENT)
Dept: ONCOLOGY | Facility: CLINIC | Age: 73
End: 2023-04-05
Attending: NURSE PRACTITIONER
Payer: MEDICARE

## 2023-04-05 DIAGNOSIS — C21.1 MALIGNANT NEOPLASM OF ANAL CANAL (H): Primary | ICD-10-CM

## 2023-04-05 PROCEDURE — G0463 HOSPITAL OUTPT CLINIC VISIT: HCPCS | Mod: PN,GT | Performed by: NURSE PRACTITIONER

## 2023-04-05 PROCEDURE — 99214 OFFICE O/P EST MOD 30 MIN: CPT | Mod: VID | Performed by: NURSE PRACTITIONER

## 2023-04-05 NOTE — PROGRESS NOTES
[facility] Hematology and Oncology Outpatient Progress Note ([place])    Patient: Sara Dominguez  MRN: [id number]  Date of Service: 02/09/2022        Reason for Visit    1. Anal cancer    Primary Oncologist: Dr. Boss    Virtual Visit    Assessment/Plan  1. Anal cancer  Now, over 3.5 yrs from her diagnosis and concurrent chemoRT.   Clinically, no new concerns for recurrence.    PET scan last August showed inflammatory rectal uptake. DRS exams with CRS have been negative.   Benign pelvic fractures, biopsy has been negative. No other evident recurrence.     No labs.    Anal canal cancer has really good prognosis and disease recurrence after 3 years is highly unlikely.     Plan:  -Follow every 6 mths for history/exam through 5 yrs (Summer, 2024)  -Continue CRS follow-up for BELÉN  -No need for routine labs.  -No routine imaging. Will do based off symptoms/exam    2.  Benign insufficiency pelvic fractures/sciatica, chronic pain  3.  Osteoporosis  Following Ortho. Biopsy pelvis benign fibrosis. Steroid injection a few months ago was helpful, but pain starting to return so steroid injection planned. Also, uses gabapentin and Percocet (2/day) with benefit.   On Fosamax and Ca/Vit D.  Underwent nerve block at [facility] 10/2022 + 1/2023 with partial pain relief.    Plan:  -Continue follow-up with Ortho and Dr. Reddy for interventional pain mgmt  -Palliative Care assisting in chronic pain mgmt  -Osteoporosis mgmt per PCP    ______________________________________________________________________________    History of Present Illness/ Interval History    Ms. Sara Dominguez  is a 72 year old treated for anal cancer with concurrent chemoRT 3.5 years ago, returning for routine 6-month surveillance visit.     Since her visit in August, she's been doing generally well with stable symptoms.   Her main issue has been pain related to chronic pelvic insufficiency fracture. Follows Ortho. Underwent steroid injections with temp  relief. Underwent nerve block x 2 over last 6 mths with partial benefit. On Gabapentin and Percocet, managed by Palliative Care. On Fosamax and ca/vit D for osteoporosis.     Last  PET scan (Aug, 2022) was negative for recurrence. Inflammatory rectal uptake.  Colorectal Surgery exams for direct visualization have been negative for recurrence.     No GI symptoms, change in stools. No blood in stools. No rectal pain.  Weight stable/up, good appetite.  Prior leg lymphedema post-RT, has worked with PT. Resolved with time.    ECOG Performance    0-1      Oncology History/Treatment  Diagnosis/Stage:   7/2019: anal cancer, squamous cell ca T2N1M0  -rectal pain/pressure, palpable rectal mass  -Colonoscopy: 8 mm mucosal abnormality above the dentate line at anterior wall facing her vagina where the large mass was felt underneath. Biopsy + invasive mod differentiated squamous cell cancer involving rectal glandular mucosa.   -Gyne exam normal; no vaginal mucosal abnormality but nodular 3-4 cm mass palpate at rectovaginal area not as well appreciated on digital rectal exam  -MRI pelvis: 2.6 cm mass between distal rectum and vagina appearing to arise exophytically from distal rectum, single mildly enlarged mesorectal LN 8 mm. No pelvic sidewall adenopathy or other mets noted  -PET: distal rectal rectum/anus and no mets    Treatment:  Not a surgical candidate    8 - 10/2019: concurrent chemoRT with mitomycin and 5FU  -resulted in CR      Physical Exam    GENERAL: Alert and oriented to time place and person. Seated comfortably. In no distress.  HEAD: Atraumatic and normocephalic. No alopecia.  EYES: LANDON, EOMI. No erythema. No icterus.  LYMPH NODES: No palpable supraclavicular, cervical, or inguinal lymphadenopathy.  CHEST: clear to auscultation bilaterally. Resonant to percussion throughout bilaterally. Symmetrical breath movements bilaterally.  CVS: S1 and S2 are heard. Regular rate and rhythm. No murmur or gallop or rub  heard.  ABDOMEN: Soft. Not tender. Not distended. No palpable hepatomegaly or splenomegaly. No other mass palpable. Bowel sounds present.  EXTREMITIES: Warm. No peripheral edema.  SKIN: no rash, or bruising or purpura.   NEURO: No gross deficit noted. Non-antalgic gait.      Lab Results    No results found for this or any previous visit (from the past 168 hour(s)).    Imaging    No results found.    Billing  Total time: 30 min; including review of EMR, reports, diagnostics and ordering/coordination of care    Start time: 1128 / End time: 1136  Platform: [employer]  Patient location: Home  Provider location: [facility]      Signed by: Marissa Monsalve NP

## 2023-04-05 NOTE — LETTER
4/5/2023         RE: Sara Dominguez  [address]  [place] [address]        Dear Colleague,    Thank you for referring your patient, Sara Dominguez, to the [facility] [place]. Please see a copy of my visit note below.    [facility] Hematology and Oncology Outpatient Progress Note ([place])    Patient: Sara Dominguez  MRN: [id number]  Date of Service: 02/09/2022        Reason for Visit    1. Anal cancer    Primary Oncologist: Dr. Boss    Virtual Visit    Assessment/Plan  1. Anal cancer  Now, over 3.5 yrs from her diagnosis and concurrent chemoRT.   Clinically, no new concerns for recurrence.    PET scan last August showed inflammatory rectal uptake. DRS exams with CRS have been negative.   Benign pelvic fractures, biopsy has been negative. No other evident recurrence.     No labs.    Anal canal cancer has really good prognosis and disease recurrence after 3 years is highly unlikely.     Plan:  -Follow every 6 mths for history/exam through 5 yrs (Summer, 2024)  -Continue CRS follow-up for BELÉN  -No need for routine labs.  -No routine imaging. Will do based off symptoms/exam    2.  Benign insufficiency pelvic fractures/sciatica, chronic pain  3.  Osteoporosis  Following Ortho. Biopsy pelvis benign fibrosis. Steroid injection a few months ago was helpful, but pain starting to return so steroid injection planned. Also, uses gabapentin and Percocet (2/day) with benefit.   On Fosamax and Ca/Vit D.  Underwent nerve block at [facility] 10/2022 + 1/2023 with partial pain relief.    Plan:  -Continue follow-up with Ortho and Dr. Reddy for interventional pain mgmt  -Palliative Care assisting in chronic pain mgmt  -Osteoporosis mgmt per PCP    ______________________________________________________________________________    History of Present Illness/ Interval History    Ms. Sara Dominguez  is a 72 year old treated for anal cancer with concurrent chemoRT 3.5 years ago,  returning for routine 6-month surveillance visit.     Since her visit in August, she's been doing generally well with stable symptoms.   Her main issue has been pain related to chronic pelvic insufficiency fracture. Follows Ortho. Underwent steroid injections with temp relief. Underwent nerve block x 2 over last 6 mths with partial benefit. On Gabapentin and Percocet, managed by Palliative Care. On Fosamax and ca/vit D for osteoporosis.     Last  PET scan (Aug, 2022) was negative for recurrence. Inflammatory rectal uptake.  Colorectal Surgery exams for direct visualization have been negative for recurrence.     No GI symptoms, change in stools. No blood in stools. No rectal pain.  Weight stable/up, good appetite.  Prior leg lymphedema post-RT, has worked with PT. Resolved with time.    ECOG Performance    0-1      Oncology History/Treatment  Diagnosis/Stage:   7/2019: anal cancer, squamous cell ca T2N1M0  -rectal pain/pressure, palpable rectal mass  -Colonoscopy: 8 mm mucosal abnormality above the dentate line at anterior wall facing her vagina where the large mass was felt underneath. Biopsy + invasive mod differentiated squamous cell cancer involving rectal glandular mucosa.   -Gyne exam normal; no vaginal mucosal abnormality but nodular 3-4 cm mass palpate at rectovaginal area not as well appreciated on digital rectal exam  -MRI pelvis: 2.6 cm mass between distal rectum and vagina appearing to arise exophytically from distal rectum, single mildly enlarged mesorectal LN 8 mm. No pelvic sidewall adenopathy or other mets noted  -PET: distal rectal rectum/anus and no mets    Treatment:  Not a surgical candidate    8 - 10/2019: concurrent chemoRT with mitomycin and 5FU  -resulted in CR      Physical Exam    GENERAL: Alert and oriented to time place and person. Seated comfortably. In no distress.  HEAD: Atraumatic and normocephalic. No alopecia.  EYES: LANDON, EOMI. No erythema. No icterus.  LYMPH NODES: No palpable  supraclavicular, cervical, or inguinal lymphadenopathy.  CHEST: clear to auscultation bilaterally. Resonant to percussion throughout bilaterally. Symmetrical breath movements bilaterally.  CVS: S1 and S2 are heard. Regular rate and rhythm. No murmur or gallop or rub heard.  ABDOMEN: Soft. Not tender. Not distended. No palpable hepatomegaly or splenomegaly. No other mass palpable. Bowel sounds present.  EXTREMITIES: Warm. No peripheral edema.  SKIN: no rash, or bruising or purpura.   NEURO: No gross deficit noted. Non-antalgic gait.      Lab Results    No results found for this or any previous visit (from the past 168 hour(s)).    Imaging    No results found.    Billing  Total time: 30 min; including review of EMR, reports, diagnostics and ordering/coordination of care    Start time: 1128 / End time: 1136  Platform: [employer]  Patient location: Home  Provider location: [facility]      Signed by: Marissa Monsalve NP      Virtual Visit Details    Type of service:  Video Visit   Video Start Time: n/a  Video End Time:n/a    Originating Location (pt. Location): Home  Distant Location (provider location):  On-site  Platform used for Video Visit: Talib Gallegos CMA on 4/5/2023 at 11:09 AM        Again, thank you for allowing me to participate in the care of your patient.        Sincerely,        Marissa Wells NP

## 2023-04-05 NOTE — PROGRESS NOTES
Virtual Visit Details    Type of service:  Video Visit   Video Start Time: n/a  Video End Time:n/a    Originating Location (pt. Location): Home  Distant Location (provider location):  On-site  Platform used for Video Visit: Talib Gallegos CMA on 4/5/2023 at 11:09 AM

## 2023-04-05 NOTE — LETTER
4/5/2023         RE: Sara Dominguez  [address]  [place] [address]        Dear Colleague,    Thank you for referring your patient, Sara Dominguez, to the [facility] [place]. Please see a copy of my visit note below.    [facility] Hematology and Oncology Outpatient Progress Note ([place])    Patient: Sara Dominguez  MRN: [id number]  Date of Service: 02/09/2022        Reason for Visit    1. Anal cancer    Primary Oncologist: Dr. Boss    Virtual Visit    Assessment/Plan  1. Anal cancer  Now, over 3.5 yrs from her diagnosis and concurrent chemoRT.   Clinically, no new concerns for recurrence.    PET scan last August showed inflammatory rectal uptake. DRS exams with CRS have been negative.   Benign pelvic fractures, biopsy has been negative. No other evident recurrence.     No labs.    Anal canal cancer has really good prognosis and disease recurrence after 3 years is highly unlikely.     Plan:  -Follow every 6 mths for history/exam through 5 yrs (Summer, 2024)  -Continue CRS follow-up for BELÉN  -No need for routine labs.  -No routine imaging. Will do based off symptoms/exam    2.  Benign insufficiency pelvic fractures/sciatica, chronic pain  3.  Osteoporosis  Following Ortho. Biopsy pelvis benign fibrosis. Steroid injection a few months ago was helpful, but pain starting to return so steroid injection planned. Also, uses gabapentin and Percocet (2/day) with benefit.   On Fosamax and Ca/Vit D.  Underwent nerve block at [facility] 10/2022 + 1/2023 with partial pain relief.    Plan:  -Continue follow-up with Ortho and Dr. Reddy for interventional pain mgmt  -Palliative Care assisting in chronic pain mgmt  -Osteoporosis mgmt per PCP    ______________________________________________________________________________    History of Present Illness/ Interval History    Ms. Sara Dominguez  is a 72 year old treated for anal cancer with concurrent chemoRT 3.5 years ago,  returning for routine 6-month surveillance visit.     Since her visit in August, she's been doing generally well with stable symptoms.   Her main issue has been pain related to chronic pelvic insufficiency fracture. Follows Ortho. Underwent steroid injections with temp relief. Underwent nerve block x 2 over last 6 mths with partial benefit. On Gabapentin and Percocet, managed by Palliative Care. On Fosamax and ca/vit D for osteoporosis.     Last  PET scan (Aug, 2022) was negative for recurrence. Inflammatory rectal uptake.  Colorectal Surgery exams for direct visualization have been negative for recurrence.     No GI symptoms, change in stools. No blood in stools. No rectal pain.  Weight stable/up, good appetite.  Prior leg lymphedema post-RT, has worked with PT. Resolved with time.    ECOG Performance    0-1      Oncology History/Treatment  Diagnosis/Stage:   7/2019: anal cancer, squamous cell ca T2N1M0  -rectal pain/pressure, palpable rectal mass  -Colonoscopy: 8 mm mucosal abnormality above the dentate line at anterior wall facing her vagina where the large mass was felt underneath. Biopsy + invasive mod differentiated squamous cell cancer involving rectal glandular mucosa.   -Gyne exam normal; no vaginal mucosal abnormality but nodular 3-4 cm mass palpate at rectovaginal area not as well appreciated on digital rectal exam  -MRI pelvis: 2.6 cm mass between distal rectum and vagina appearing to arise exophytically from distal rectum, single mildly enlarged mesorectal LN 8 mm. No pelvic sidewall adenopathy or other mets noted  -PET: distal rectal rectum/anus and no mets    Treatment:  Not a surgical candidate    8 - 10/2019: concurrent chemoRT with mitomycin and 5FU  -resulted in CR      Physical Exam    GENERAL: Alert and oriented to time place and person. Seated comfortably. In no distress.  HEAD: Atraumatic and normocephalic. No alopecia.  EYES: LANDON, EOMI. No erythema. No icterus.  LYMPH NODES: No palpable  supraclavicular, cervical, or inguinal lymphadenopathy.  CHEST: clear to auscultation bilaterally. Resonant to percussion throughout bilaterally. Symmetrical breath movements bilaterally.  CVS: S1 and S2 are heard. Regular rate and rhythm. No murmur or gallop or rub heard.  ABDOMEN: Soft. Not tender. Not distended. No palpable hepatomegaly or splenomegaly. No other mass palpable. Bowel sounds present.  EXTREMITIES: Warm. No peripheral edema.  SKIN: no rash, or bruising or purpura.   NEURO: No gross deficit noted. Non-antalgic gait.      Lab Results    No results found for this or any previous visit (from the past 168 hour(s)).    Imaging    No results found.    Billing  Total time: 30 min; including review of EMR, reports, diagnostics and ordering/coordination of care    Start time: 1128 / End time: 1136  Platform: [email or web address]  Patient location: Home  Provider location: [facility]      Signed by: Marissa Monsalve NP      Virtual Visit Details    Type of service:  Video Visit   Video Start Time: n/a  Video End Time:n/a    Originating Location (pt. Location): Home  Distant Location (provider location):  On-site  Platform used for Video Visit: Talib Gallegos CMA on 4/5/2023 at 11:09 AM        Again, thank you for allowing me to participate in the care of your patient.        Sincerely,        Marissa Wells NP

## 2023-04-06 DIAGNOSIS — G58.8 NEURALGIA OF RIGHT PUDENDAL NERVE: Primary | ICD-10-CM

## 2023-04-07 ENCOUNTER — TELEPHONE (OUTPATIENT)
Dept: ANESTHESIOLOGY | Facility: CLINIC | Age: 73
End: 2023-04-07
Payer: MEDICARE

## 2023-04-07 NOTE — TELEPHONE ENCOUNTER
RN reviewed chart. Pre-procedure instructions sent via [employer] plchart encounter on 4/5/23.       Carla Mills RN

## 2023-04-07 NOTE — TELEPHONE ENCOUNTER
Patient is scheduled with Dr. Reddy   Spoke with: the patient   Date of Procedure: 04/18   Location: [facility]   Informed patient they will need an adult : yes   Additional comments: n/a    Patient is aware pre-op RN will call 2-3 days prior to procedure with arrival time and instructions     Brennen De Oliveira on 4/7/2023 at 8:27 AM

## 2023-04-18 ENCOUNTER — HOSPITAL ENCOUNTER (OUTPATIENT)
Facility: AMBULATORY SURGERY CENTER | Age: 73
Discharge: HOME | End: 2023-04-18
Attending: ANESTHESIOLOGY | Admitting: ANESTHESIOLOGY
Payer: MEDICARE

## 2023-04-18 ENCOUNTER — ANCILLARY PROCEDURE (OUTPATIENT)
Dept: RADIOLOGY | Facility: AMBULATORY SURGERY CENTER | Age: 73
End: 2023-04-18
Attending: ANESTHESIOLOGY
Payer: MEDICARE

## 2023-04-18 VITALS
SYSTOLIC BLOOD PRESSURE: 134 MMHG | RESPIRATION RATE: 14 BRPM | WEIGHT: 165 LBS | HEART RATE: 76 BPM | OXYGEN SATURATION: 98 % | HEIGHT: 64 IN | BODY MASS INDEX: 28.17 KG/M2 | DIASTOLIC BLOOD PRESSURE: 100 MMHG | TEMPERATURE: 96.9 F

## 2023-04-18 DIAGNOSIS — R52 PAIN: ICD-10-CM

## 2023-04-18 PROCEDURE — 76942 ECHO GUIDE FOR BIOPSY: CPT | Mod: 26 | Performed by: ANESTHESIOLOGY

## 2023-04-18 PROCEDURE — 64430 NJX AA&/STRD PUDENDAL NERVE: CPT | Mod: RT

## 2023-04-18 PROCEDURE — 64430 NJX AA&/STRD PUDENDAL NERVE: CPT | Mod: RT | Performed by: ANESTHESIOLOGY

## 2023-04-18 RX ORDER — VITAMIN E 268 MG
CAPSULE ORAL
Qty: 90 CAPSULE | Refills: 3 | OUTPATIENT
Start: 2023-04-18

## 2023-04-18 RX ORDER — LIDOCAINE HYDROCHLORIDE 10 MG/ML
INJECTION, SOLUTION EPIDURAL; INFILTRATION; INTRACAUDAL; PERINEURAL DAILY PRN
Status: DISCONTINUED | OUTPATIENT
Start: 2023-04-18 | End: 2023-04-18 | Stop reason: HOSPADM

## 2023-04-18 RX ORDER — BUPIVACAINE HYDROCHLORIDE 7.5 MG/ML
INJECTION, SOLUTION EPIDURAL; RETROBULBAR DAILY PRN
Status: DISCONTINUED | OUTPATIENT
Start: 2023-04-18 | End: 2023-04-18 | Stop reason: HOSPADM

## 2023-04-18 RX ORDER — METHYLPREDNISOLONE ACETATE 40 MG/ML
INJECTION, SUSPENSION INTRA-ARTICULAR; INTRALESIONAL; INTRAMUSCULAR; SOFT TISSUE DAILY PRN
Status: DISCONTINUED | OUTPATIENT
Start: 2023-04-18 | End: 2023-04-18 | Stop reason: HOSPADM

## 2023-04-18 RX ORDER — PENTOXIFYLLINE 400 MG/1
400 TABLET, EXTENDED RELEASE ORAL
Qty: 90 TABLET | Refills: 3 | OUTPATIENT
Start: 2023-04-18

## 2023-04-18 NOTE — H&P
Sara Dominguez  [id number]  female  72 year old      Reason for procedure/surgery: pudendal neuralgia    Patient Active Problem List   Diagnosis     Malignant neoplasm of anal canal (H)     Allergic rhinitis     Hypertension     Major depressive disorder, recurrent episode, moderate (H)     Migraine variant     Mixed hyperlipidemia     Need for prophylactic hormone replacement therapy (postmenopausal)     Reflux esophagitis     Sciatica     Squamous cell carcinoma of anus (H)     Urine, incontinence, stress female     Nausea     Hypotension, unspecified hypotension type     Pelvic pain in female     Neuralgia of right pudendal nerve       Past Surgical History:    Past Surgical History:   Procedure Laterality Date     COLONOSCOPY      ,due      EXTRACTION(S) DENTAL      No Comments Provided     NERVE BLOCK PERIPHERAL Right 10/13/2022    Procedure: Right pudendal nerve block with ultrasound;  Surgeon: Marion Reddy MD;  Location: [facility] OR     NERVE BLOCK PERIPHERAL Right 2023    Procedure: Right pudendal nerve block with ultrasound;  Surgeon: Marion Reddy MD;  Location: [facility] OR     OTHER SURGICAL HISTORY      43736.0,DC  DELIVERY ONLY     OTHER SURGICAL HISTORY      ,ENDOSCOPY ESOPHAGUS       Past Medical History:   Past Medical History:   Diagnosis Date     Cancer (H)     anal cancer     Disorder of bone and cartilage     osteopenia     Diverticulosis of colon     ,seen at colonoscopy     Major depressive disorder, recurrent episode, moderate (H)     No Comments Provided     Reflux esophagitis     No Comments Provided     Sciatica     right     Urge incontinence     No Comments Provided       Social History:   Social History     Tobacco Use     Smoking status: Former     Years: 20.00     Types: Cigarettes     Quit date: 10/9/2008     Years since quittin.5     Smokeless tobacco: Never     Tobacco comments:     Quit smoking: passive exposure   Vaping Use     Vaping  status: Not on file   Substance Use Topics     Alcohol use: Yes     Comment: Alcoholic Drinks/day: occ       Family History:   Family History   Problem Relation Age of Onset     Other - See Comments Father         Stroke     Cancer Mother         Cancer,cervical-mets to brain     Osteoporosis Mother         Osteoporosis     Heart Disease Brother         Heart Disease,MI  at 59     Heart Disease Maternal Grandmother         Heart Disease,MI  at 58     Asthma Brother         Asthma     Asthma Brother         Asthma     Diabetes Paternal Grandmother         Diabetes       Allergies: No Known Allergies    Active Medications:   Current Outpatient Medications   Medication Sig Dispense Refill     alendronate (FOSAMAX) 70 MG tablet Take 70 mg by mouth every 7 days       atorvastatin (LIPITOR) 10 MG tablet Take 10 mg by mouth daily  1     calcium carbonate 600 mg-vitamin D 400 units (CALTRATE) 600-400 MG-UNIT per tablet Take 1 tablet by mouth       Cholecalciferol (VITAMIN D3) 50 MCG (2000 UT) CAPS        gabapentin (NEURONTIN) 100 MG capsule Take 2 capsules (200 mg) by mouth 3 times daily Patient currently taking 600 mg of Gabapentin 3 times per day. Add two 100mg tabs to take 800mg 3 times per day. 180 capsule 0     lisinopril (ZESTRIL) 20 MG tablet Take 20 mg by mouth daily       naloxone (NARCAN) 4 MG/0.1ML nasal spray Spray 1 spray (4 mg) into one nostril alternating nostrils once as needed for opioid reversal every 2-3 minutes until assistance arrives 0.2 mL 0     oxyCODONE-acetaminophen (PERCOCET)  MG per tablet Take 1 tablet by mouth every 4 hours as needed for severe pain 100 tablet 0     pentoxifylline ER (TRENTAL) 400 MG CR tablet Take 1 tablet (400 mg) by mouth 3 times daily (with meals) 90 tablet 3     venlafaxine (EFFEXOR-XR) 150 MG 24 hr capsule Take 150 mg by mouth daily       vitamin E (TOCOPHEROL) 400 units (180 mg) capsule Take 1 capsule (400 Units) by mouth 3 times daily 90 capsule 3  "      Systemic Review:   CONSTITUTIONAL: NEGATIVE for fever, chills, change in weight  ENT/MOUTH: NEGATIVE for ear, mouth and throat problems  RESP: NEGATIVE for significant cough or SOB  CV: NEGATIVE for chest pain, palpitations or peripheral edema    Physical Examination:   Vital Signs: BP (!) 168/82   Pulse 77   Temp 96.9  F (36.1  C) (Temporal)   Resp 16   Ht 1.626 m (5' 4\")   Wt 74.8 kg (165 lb)   SpO2 97%   BMI 28.32 kg/m    GENERAL: healthy, alert and no distress  NECK: no adenopathy, no asymmetry, masses, or scars  RESP: lungs clear to auscultation - no rales, rhonchi or wheezes  CV: regular rate and rhythm, normal S1 S2, no S3 or S4, no murmur, click or rub, no peripheral edema and peripheral pulses strong  ABDOMEN: soft, nontender, no hepatosplenomegaly, no masses and bowel sounds normal  MS: no gross musculoskeletal defects noted, no edema    Plan: Appropriate to proceed as scheduled.      Marion Reddy MD  4/18/2023          "

## 2023-04-18 NOTE — DISCHARGE INSTRUCTIONS
"PAIN INJECTION HOME CARE INSTRUCTIONS  Activity  -You may resume most normal activity levels with the exception of strenuous activity. It may help to move in ways that hurt before the injection, to see if the pain is still there, but do not overdo it.     -DO NOT remove bandaid for 24 hours  -DO NOT shower for 24 hours      Pain  -You may feel immediate pain relief and numbness for a period of time after the injection. This may indicate the medication has reached the right spot.  -Your pain may return after this short pain-free period, or may even be a little worse for a day or two. It may be caused by needle irritation or by the medication itself. The medications usually take two or three days to start working, but can take as long as a week.    -You may use an ice pack for 20 minutes every 2 hours for the first 24 hours  -You may use a heating pad after the first 24 hours  -You may use Tylenol (acetaminophen) every 4 hours or other pain medicines as directed by your physician      DID YOU RECEIVE STEROIDS TODAY?  {YES / NO:529880::\"Yes\"}    Common side effects of steroids:  Not everyone will experience corticosteroid side effects. If side effects are experienced, they will gradually subside in the 7-10 day period following an injection. Most common side effects include:  -Flushed face and/or chest  -Feeling of warmth, particularly in the face but could be an overall feeling of warmth  -Increased blood sugar in diabetic patients  -Menstrual irregularities my occur. If taking hormone-based birth control an alternate method of birth control is recommended  -Sleep disturbances and/or mood swings are possible  -Leg cramps    PLEASE KEEP TRACK OF YOUR SYMPTOMS AND NOTE ANY CHANGES FOR YOUR DOCTOR.       Please contact us if you have:  -Severe pain  -Fever more than 101.5 degrees Fahrenheit  -Signs of infection at the injection site (redness, swelling, or drainage)      FOR PAIN CENTER PATIENTS:  If you have questions, " please contact the Pain Clinic at [phone] Option 1 between the hours of 7:00 am and 3:00 pm Monday through Friday. After office hours you can contact the on call provider by dialing [phone]. If you need immediate attention, we recommend that you go to a hospital emergency room or dial [phone]. If you need to Fax information, the number is [phone].

## 2023-04-18 NOTE — OP NOTE
PAIN MEDICINE [facility] PROCEDURE NOTE     ATTENDING CLINICIAN:    Marion Reddy MD     PREPROCEDURE DIAGNOSES:  1.  Right pudendal neuralgia  2.  Neuropathic pain  3.  Chronic pain     POSTPROCEDURE DIAGNOSES:  1.  Right pudendal neuralgia  2.  Neuropathic pain  3.  Chronic pain     PROCEDURE(S) PERFORMED:  1.  Right pudendal nerve block  2.  Ultrasound guidance for needle guidance     ANESTHESIA:  Local     COMPLICATIONS:  None     INDICATIONS:  Gina 71 yo female with history of chronic pelvic pain likely secondary to myofascial pain of the pudendal neuralgia.  During her last clinic visit we recommended a diagnostic and therapeutic injection of her right pudendal nerve block. The patient stated that she was in her usual state of health and denied recent anticoagulant use or recent infections.  Therefore, the plan is to perform above mentioned procedure.      Procedure Details:  Written informed consent was obtained and saved in the electronic medical record, after the risks, benefits, and alternatives were discussed with the patient.  All of the patient s questions were answered.  The patient was brought to the procedure room, where she was identified by name, medical record number and date of birth.       The patient was placed in the prone position on the procedure room table.  All pressure points were checked and comfortably padded.  Routine monitors were placed.  Vital signs were stable.  A formal time-out procedure was performed, as per protocol, including patient name, title of procedure, and site of procedure, and all in the room concurred.     A chlorhexidine prep was completed followed by sterile draping per standard procedure.     Curvilinear ultrasound guidance was used to identify the inferior border of the right SI joint. I scanned caudally and the pyriformis muscle was identified. Subsequently, the left ischial spine was identified including the sacrotuberous and sacrospinous  ligaments. After 1% lidocaine infiltration using a 22 gauge 1.5 inch needle, a 22G was advanced at the target with ultrasound guidance until it was inbetween the sacrotuberous and sacrospinous ligaments. At this point, a mixture of 40 mg methylprednisolone mixed with 3 ml 0.75% bupivacaine was injected.  After negative aspiration, the above listed injection solution was injected, the needle was flushed with 1% lidocaine and withdrawn.  No paresthesias were noted throughout the duration of the procedure.     A contralateral procedure WAS NOT performed.     Light pressure was held at the puncture site(s) to prevent ecchymosis and oozing.  The patient's skin was cleansed, and hemostasis was confirmed.  Band-aids were applied to the needle injection site(s).       Condition:     The patient remained awake and alert throughout the procedure.  The patient tolerated the procedure well and was monitored for approximately 15 minutes afterward in the post procedure area.  There were no immediate post procedure complications noted.  The patient was then discharged to home as per protocol.       LESTER Alvarez    Pain Medicine  Department of Anesthesiology  [facility]

## 2023-05-15 ENCOUNTER — VIRTUAL VISIT (OUTPATIENT)
Dept: PALLIATIVE MEDICINE | Facility: CLINIC | Age: 73
End: 2023-05-15
Payer: MEDICARE

## 2023-05-15 DIAGNOSIS — G58.8 NEURALGIA OF RIGHT PUDENDAL NERVE: ICD-10-CM

## 2023-05-15 DIAGNOSIS — G89.3 CANCER RELATED PAIN: ICD-10-CM

## 2023-05-15 PROCEDURE — 99215 OFFICE O/P EST HI 40 MIN: CPT | Mod: VID | Performed by: NURSE PRACTITIONER

## 2023-05-15 RX ORDER — OXYCODONE AND ACETAMINOPHEN 10; 325 MG/1; MG/1
1 TABLET ORAL EVERY 4 HOURS PRN
Qty: 125 TABLET | Refills: 0 | Status: SHIPPED | OUTPATIENT
Start: 2023-05-15 | End: 2023-06-03

## 2023-05-15 RX ORDER — GABAPENTIN 100 MG/1
600 CAPSULE ORAL 4 TIMES DAILY
Qty: 180 CAPSULE | Refills: 0 | COMMUNITY
Start: 2023-05-15

## 2023-05-15 NOTE — PROGRESS NOTES
Virtual Visit Details    Type of service:  Video Visit   Video Start Time: 10:20 AM  Video End Time: 10:40 AM    Originating Location (pt. Location): Home    Distant Location (provider location):  On-site  Platform used for Video Visit: [facility]       Palliative Care Outpatient Clinic      Patient ID/Chief Complaint: Sara Dominguez 72 year old female who is presenting to the palliative medicine clinic today at the request of CASSIE Denise for a palliative care follow-up secondary to assistance with pain management.   The patient's primary care provider is:  Nadya Reid       Impression & Recommendations:  72-year-old female with history of anal cancer in 2019 [id number] (anna-rectal nodes) in 2019. She completed definitive CRT and is currently TYLER.  Unfortunately she developed post CRT pelvic insufficiency fractures and sacrum and right pubic bone causing continuous right groin pain that extends into her buttock and down her leg.  Pain can be deep and severely aching, spiking to 10 out of 10 reported pain, with numbness/tingling/radiating pain outward.  She also has long-term chronic back pain.    Right pudendal nerve block completed by Dr. Reddy October 2022 with a decent amount of symptom relief.  Block repeated in 1/24/23 and 4/18/23 with limited effect so far.     She takes 10/325 Percocet approximately 4 times per day, gabapentin 600 mg 4 times a day, and Effexor  mg daily.    Denies issues with constipation.    Symptoms/recommendations/discussion:    Did not respond well to Butrans 10 mcg/h patch, reported nausea.  Continue Percocet 10/325--she takes 4 tablets/day.  Percocet refill sent.    Continue gabapentin as prescribed by PM&R physician 600 mg 4 times daily.      Continue Effexor XR    She continues to follow with interventional pain management, Dr. Reddy--status post additional right pudendal nerve block--not much improvement in pain.  She will follow-up with Dr Reddy for possible  additional procedures.  Gina does not want long-term dependence on opioids and has been hoping for improvement in pain with interventional approach.  May need to consider long-acting opioids in the future if ongoing inadequate response to interventional pain management.     Palliative care follow-up in approximately 8 weeks      How to get a hold of us:  For non-urgent matters, MyChart works best.    For more urgent matters, or if you prefer not to use MyChart, call our clinic nurse coordinator Fany Gutierrez RN at [phone] or [phone]    We have an on-call number for evenings and weekends. Please call this only if you are having uncontrolled symptoms or serious side effects from your medicines: [phone].     For refills, please give us a week (5 working days) notice. We don't always have providers available everyday to do refills. If you call the day you run out of your medicine, we may not be able to refill it in time, so call 5 days in advance!            History:  History gathered today from: patient, medical chart      PE: There were no vitals taken for this visit.   Wt Readings from Last 3 Encounters:   04/18/23 74.8 kg (165 lb)   02/15/23 77.4 kg (170 lb 9.6 oz)   10/13/22 80.3 kg (177 lb)       Gen alert, comfortable appearing, NAD.   Head NCAT.  Eyes anicteric without injection  Face symmetric, eyes conjugate  Lungs unlabored, no cough, speaking full sentences  Skin no rashes or lesions evident on face/neck  Neuro Face symmetric, eyes conjugate; speech fluent.  Neuropsych exam normal including affect, sensorium, gross memory, thought processes, and fund of knowledge.         Data reviewed:  I reviewed electrolytes, BUN/creatinine, liver profile, hemoglobin and hematocrit, platelet count, and most recent imaging  Recent interventional pain note         database reviewed: 5/15/2023        41 minutes spent on the date of the encounter doing chart review, history and exam, patient education &  counseling, documentation and other activities as noted above.        Thank you for involving us in the patient's care.   KARISSA Telles, [facility] [place] [facility]  Office [phone]

## 2023-05-15 NOTE — NURSING NOTE
Is the patient currently in the state of [place]? YES    Visit mode:VIDEO    If the visit is dropped, the patient can be reconnected by: VIDEO VISIT: Text to cell phone: [phone]    Will anyone else be joining the visit? NO      How would you like to obtain your AVS? MyChart    Are changes needed to the allergy or medication list? NO     Pt declined medication review.    Reason for visit: Video Visit      MELISSA Ríos

## 2023-06-04 ENCOUNTER — HEALTH MAINTENANCE LETTER (OUTPATIENT)
Age: 73
End: 2023-06-04

## 2023-07-12 ENCOUNTER — VIRTUAL VISIT (OUTPATIENT)
Dept: PALLIATIVE MEDICINE | Facility: CLINIC | Age: 73
End: 2023-07-12
Payer: MEDICARE

## 2023-07-12 VITALS — BODY MASS INDEX: 26.63 KG/M2 | HEIGHT: 66 IN

## 2023-07-12 DIAGNOSIS — C21.1 MALIGNANT NEOPLASM OF ANAL CANAL (H): ICD-10-CM

## 2023-07-12 DIAGNOSIS — R10.2 PELVIC PAIN IN FEMALE: Primary | ICD-10-CM

## 2023-07-12 PROCEDURE — 99214 OFFICE O/P EST MOD 30 MIN: CPT | Mod: VID | Performed by: NURSE PRACTITIONER

## 2023-07-12 ASSESSMENT — PAIN SCALES - GENERAL: PAINLEVEL: MODERATE PAIN (5)

## 2023-07-12 NOTE — PROGRESS NOTES
Virtual Visit Details    Type of service:  Video Visit   Video Start Time: 9 AM  Video End Time: 9:15 AM    Originating Location (pt. Location): Home    Distant Location (provider location):  On-site  Platform used for Video Visit: [facility]      Palliative Care Outpatient Clinic      Patient ID/Chief Complaint: Sara Dominguez 72 year old female who is presenting to the palliative medicine clinic today at the request of CASSIE Denise for a palliative care follow-up secondary to assistance with pain management.   The patient's primary care provider is:  Nadya Reid       Impression & Recommendations:  72-year-old female with history of anal cancer in 2019 [id number] (anna-rectal nodes) in 2019. She completed definitive CRT and is currently TYLER.  Unfortunately she developed post CRT pelvic insufficiency fractures and sacrum and right pubic bone causing continuous right groin pain that extends into her buttock and down her leg.  Pain can be deep and severely aching, spiking to 10 out of 10 reported pain, with numbness/tingling/radiating pain outward.  She also has long-term chronic back pain.    Right pudendal nerve block completed by Dr. Reddy October 2022 with a decent amount of symptom relief.  Block repeated in 1/24/23 and 4/18/23 with limited effect so far.     She takes 10/325 Percocet approximately 4 times per day, gabapentin 600 mg 4 times a day, and Effexor  mg daily.    Denies issues with constipation.    Symptoms/recommendations/discussion:  Did not respond well to Butrans 10 mcg/h patch, reported nausea.  Continue Percocet 10/325--she takes 4 tablets/day.    Continue gabapentin as prescribed by PM&R physician 600 mg 4 times daily.    Continue Effexor XR  She continues to follow with interventional pain management, Dr. Reddy--status post additional right pudendal nerve block--not much improvement in pain.  She will follow-up with Dr Reddy for possible additional procedures.  Gina does not  "want long-term dependence on opioids and has been hoping for improvement in pain with interventional approach.  May need to consider long-acting opioids in the future if ongoing inadequate response to interventional pain management.   Palliative care follow-up in approximately 10 weeks      How to get a hold of us:  For non-urgent matters, MyChart works best.    For more urgent matters, or if you prefer not to use MyChart, call our clinic nurse coordinator Fany Gutierrez RN at [phone] or [phone]    We have an on-call number for evenings and weekends. Please call this only if you are having uncontrolled symptoms or serious side effects from your medicines: [phone].     For refills, please give us a week (5 working days) notice. We don't always have providers available everyday to do refills. If you call the day you run out of your medicine, we may not be able to refill it in time, so call 5 days in advance!            History:  History gathered today from: patient, medical chart      PE: Ht 1.676 m (5' 6\")   BMI 26.63 kg/m     Wt Readings from Last 3 Encounters:   04/18/23 74.8 kg (165 lb)   02/15/23 77.4 kg (170 lb 9.6 oz)   10/13/22 80.3 kg (177 lb)       Gen alert, comfortable appearing, NAD.   Head NCAT.  Eyes anicteric without injection  Face symmetric, eyes conjugate  Lungs unlabored, no cough, speaking full sentences  Skin no rashes or lesions evident on face/neck  Neuro Face symmetric, eyes conjugate; speech fluent.  Neuropsych exam normal including affect, sensorium, gross memory, thought processes, and fund of knowledge.         Data reviewed:  I reviewed electrolytes, BUN/creatinine, liver profile, hemoglobin and hematocrit, platelet count, and most recent imaging  Recent interventional pain note         database reviewed: 7/8/23        32 minutes spent on the date of the encounter doing chart review, history and exam, patient education & counseling, documentation and other activities as " noted above.        Thank you for involving us in the patient's care.   KARISSA Telles, [facility] [place] [facility]iative [facility]  Office [phone]

## 2023-07-12 NOTE — NURSING NOTE
Is the patient currently in the state of [place]? YES    Visit mode:VIDEO    If the visit is dropped, the patient can be reconnected by: VIDEO VISIT: Text to cell phone: [phone]    Will anyone else be joining the visit? NO      How would you like to obtain your AVS? MyChart    Are changes needed to the allergy or medication list? NO     Pt states she has seasonal allergies.     Reason for visit: CHARLETTE Lopez, Virtual Facilitator       Home

## 2023-07-17 ENCOUNTER — VIRTUAL VISIT (OUTPATIENT)
Dept: ANESTHESIOLOGY | Facility: CLINIC | Age: 73
End: 2023-07-17
Payer: MEDICARE

## 2023-07-17 ENCOUNTER — TELEPHONE (OUTPATIENT)
Dept: ANESTHESIOLOGY | Facility: CLINIC | Age: 73
End: 2023-07-17

## 2023-07-17 DIAGNOSIS — G58.8 NEURALGIA OF RIGHT PUDENDAL NERVE: Primary | ICD-10-CM

## 2023-07-17 PROCEDURE — 99214 OFFICE O/P EST MOD 30 MIN: CPT | Mod: 95 | Performed by: ANESTHESIOLOGY

## 2023-07-17 ASSESSMENT — ENCOUNTER SYMPTOMS
DIFFICULTY URINATING: 0
NECK PAIN: 1
PANIC: 0
MUSCLE CRAMPS: 1
BACK PAIN: 1
NERVOUS/ANXIOUS: 1
STIFFNESS: 1
FLANK PAIN: 1
INSOMNIA: 0
HEMATURIA: 0
DYSURIA: 0
MYALGIAS: 1
MUSCLE WEAKNESS: 0
DECREASED CONCENTRATION: 0
ARTHRALGIAS: 1
DEPRESSION: 1
JOINT SWELLING: 0

## 2023-07-17 NOTE — LETTER
7/17/2023       RE: Sara Dominguez  [address]  [place] [address]       Dear Colleague,    Thank you for referring your patient, Sara Dominguez, to the [facility] CLINIC FOR COMPREHENSIVE PAIN MANAGEMENT [place] at [facility]. Please see a copy of my visit note below.    Note added by accident, please refer to note from 7/17/2023    Pain clinic follow up note    HPI:   Sara Dominguez is a 72 year old female who is presenting to the pain clinic for follow up s/p right pudendal nerve block completed by Dr. Reddy 4/18/23 with previous blocks performed on 10/2022 and 1/24/2023.     Interval Hx:  Sara describes receiving up to 3 months of pain relief from her blocks. She endorses difficulty sleeping still due to bladder irritation that she describes as a burning pain that requires her to get up and urinate. She tried a botox injection for this pain without relief. She also gets sharp pain near her vagina that can radiate down into her leg and thigh. Walking or moving around improves her pain. She gets great relief from her pudendal nerve blocks,stating that it gives her about 50% relief that lasts upwards of 3 months. She is here today to schedule another block.    Patient Supplied Answers To the [facility] Pain Questionnaire      7/17/2023     8:14 AM   [facility] Pain -  Patient Entered Questionnaire/Answers   What number best describes your pain right now:  0 = No pain  to  10 = Worst pain imaginable 5   How would you describe the pain burning    sharp   Which of the following worsen your pain lying down    standing   Which of the following improve or reduce your pain medication    relaxation   What number best describes your average pain for the past week:  0 = No pain  to  10 = Worst pain imaginable 5   What number best describes your LOWEST pain in past 24 hours:  0 = No pain  to  10 = Worst pain imaginable 5   What number best describes  your WORST pain in past 24 hours:  0 = No pain  to  10 = Worst pain imaginable 5   When is your pain worst Constant   What non-medicine treatments have you already had for your pain pain clinic    spine injections (shots)             Pain today is 7-8    Patient reports compared to the last visit their pain is improved by following percent:    Pain intensity: 50 % relief with blocks  Pain frequency: 100 %  Duration of pain episodes: constant  Falling asleep: 100 %  Staying asleep: 30 %  Ability to work: 100 %  Ability to exercise: n/a  Performing chores: 100 %      New imaging reviewed with the patient:        ROS:  Review of Systems   Genitourinary: Positive for flank pain. Negative for dysuria, hematuria and urgency.   Musculoskeletal: Positive for back pain, myalgias and neck pain.   Psychiatric/Behavioral: Positive for depression. The patient is nervous/anxious. The patient does not have insomnia.          Significant Medical History:   Past Medical History:   Diagnosis Date    Cancer (H)     anal cancer    Disorder of bone and cartilage     osteopenia    Diverticulosis of colon     2008,seen at colonoscopy    Major depressive disorder, recurrent episode, moderate (H)     No Comments Provided    Reflux esophagitis     No Comments Provided    Sciatica     right    Urge incontinence     No Comments Provided          Past Surgical History:  Past Surgical History:   Procedure Laterality Date    COLONOSCOPY      2008,due 2018    EXTRACTION(S) DENTAL      No Comments Provided    NERVE BLOCK PERIPHERAL Right 10/13/2022    Procedure: Right pudendal nerve block with ultrasound;  Surgeon: Marion Reddy MD;  Location: [facility] OR    NERVE BLOCK PERIPHERAL Right 1/24/2023    Procedure: Right pudendal nerve block with ultrasound;  Surgeon: Marion Reddy MD;  Location: [facility] OR    NERVE BLOCK PERIPHERAL Right 4/18/2023    Procedure: Right pudendal nerve block with ultrasound;  Surgeon: Marion Reddy MD;  Location: [facility] OR     OTHER SURGICAL HISTORY      [address],[place]  DELIVERY ONLY    OTHER SURGICAL HISTORY      ,ENDOSCOPY ESOPHAGUS          Family History:  Family History   Problem Relation Age of Onset    Other - See Comments Father         Stroke    Cancer Mother         Cancer,cervical-mets to brain    Osteoporosis Mother         Osteoporosis    Heart Disease Brother         Heart Disease,MI  at 59    Heart Disease Maternal Grandmother         Heart Disease,MI  at 58    Asthma Brother         Asthma    Asthma Brother         Asthma    Diabetes Paternal Grandmother         Diabetes          Social History:  Social History     Socioeconomic History    Marital status:      Spouse name: Not on file    Number of children: Not on file    Years of education: Not on file    Highest education level: Not on file   Occupational History    Not on file   Tobacco Use    Smoking status: Former     Years: 20.00     Types: Cigarettes     Quit date: 10/9/2008     Years since quittin.7    Smokeless tobacco: Never    Tobacco comments:     Quit smoking: passive exposure   Substance and Sexual Activity    Alcohol use: Yes     Comment: Alcoholic Drinks/day: occ    Drug use: Not Currently    Sexual activity: Yes     Partners: Male   Other Topics Concern    Parent/sibling w/ CABG, MI or angioplasty before 65F 55M? No   Social History Narrative    Preloaded 2013.     Social Determinants of Health     Financial Resource Strain: Not on file   Food Insecurity: Not on file   Transportation Needs: Not on file   Physical Activity: Not on file   Stress: Not on file   Social Connections: Not on file   Intimate Partner Violence: Not on file   Housing Stability: Not on file     Social History     Social History Narrative    Preloaded 2013.          Allergies:  No Known Allergies    Current Medications:   Current Outpatient Medications   Medication Sig Dispense Refill    alendronate (FOSAMAX) 70 MG tablet Take 70 mg by  mouth every 7 days      atorvastatin (LIPITOR) 10 MG tablet Take 10 mg by mouth daily  1    calcium carbonate 600 mg-vitamin D 400 units (CALTRATE) 600-400 MG-UNIT per tablet Take 1 tablet by mouth      Cholecalciferol (VITAMIN D3) 50 MCG (2000 UT) CAPS       gabapentin (NEURONTIN) 100 MG capsule Take 6 capsules (600 mg) by mouth 4 times daily 180 capsule 0    lisinopril (ZESTRIL) 20 MG tablet Take 20 mg by mouth daily      naloxone (NARCAN) 4 MG/0.1ML nasal spray Spray 1 spray (4 mg) into one nostril alternating nostrils once as needed for opioid reversal every 2-3 minutes until assistance arrives 0.2 mL 0    oxyCODONE-acetaminophen (PERCOCET)  MG per tablet Take 1 tablet by mouth every 4 hours as needed for severe pain 125 tablet 0    venlafaxine (EFFEXOR-XR) 150 MG 24 hr capsule Take 150 mg by mouth daily      vitamin E (TOCOPHEROL) 400 units (180 mg) capsule Take 1 capsule (400 Units) by mouth 3 times daily 90 capsule 3    pentoxifylline ER (TRENTAL) 400 MG CR tablet Take 1 tablet (400 mg) by mouth 3 times daily (with meals) 90 tablet 3             Current Pain Medications:    (PERCOCET)  MG per tablet q4hrs  gabapentin (NEURONTIN) 600 MG capsule 4x per day  Ibuprofen PRN    Physical Exam:     There were no vitals taken for this visit.    General Appearance: No distress, seated comfortably  Mood: Euthymic  HE ENT: Non constricted pupils  Respiratory: Non labored breathing  CVS: Regular rate and rhythm  GI: Soft, non distended, no TTP  Skin: No rashes over exposed skin  MS: Normal muscle bulk  Neuro: Cranial nerves 2-12 intact  Gait: deferred    ASSESSMENT AND PLAN:     Encounter Diagnosis:  Pudendal neuralgia     Sara Dominguez is a 72 year old female who is presenting to the pain clinic for follow up s/p right pudendal nerve block completed by Dr. Reddy 4/18/23 with previous blocks performed on 10/2022 and 1/24/2023    RECOMMENDATIONS:     1. Medications: Continue current pain medicine  regimen    2. Procedure: We are scheduling the patient for Right pudendal nerve block in the procedure suite. Risks/benefits/alternatives were discussed.     3. Education: Discussed Right L4 epidural steroid injection for radicular pain    Follow up: In person follow up appointment in 2 months              Answers for HPI/ROS submitted by the patient on 7/17/2023  General Symptoms: No  Skin Symptoms: No  HENT Symptoms: No  EYE SYMPTOMS: No  HEART SYMPTOMS: No  LUNG SYMPTOMS: No  INTESTINAL SYMPTOMS: No  URINARY SYMPTOMS: Yes  GYNECOLOGIC SYMPTOMS: No  BREAST SYMPTOMS: No  SKELETAL SYMPTOMS: Yes  BLOOD SYMPTOMS: No  NERVOUS SYSTEM SYMPTOMS: No  MENTAL HEALTH SYMPTOMS: Yes  Trouble holding urine or incontinence: Yes  Increased frequency of urination: No  Decreased frequency of urination: No  Frequent nighttime urination: Yes  Difficulty emptying bladder: No  Swollen joints: No  Joint pain: Yes  Bone pain: No  Muscle cramps: Yes  Muscle weakness: No  Joint stiffness: Yes  Bone fracture: Yes  Trouble thinking or concentrating: No  Mood changes: No  Panic attacks: No        Attestation signed by Marion Reddy MD at 7/24/2023  1:02 PM:  ATTENDING ATTESTATION  I saw the patient along with the pain medicine fellow Dr. Terrence Jimenez. Please see his note above for full details. I was involved in gathering history, physical examination and development of the plan of care.           Again, thank you for allowing me to participate in the care of your patient.      Sincerely,    Marion Reddy MD

## 2023-07-17 NOTE — TELEPHONE ENCOUNTER
Phoned the patient and left VM. Stated to call and schedule procedure with Dr. Reddy.            Mai Hidalgo MA on 7/17/2023 at 10:15 AM

## 2023-07-17 NOTE — PATIENT INSTRUCTIONS
Procedures:    Call to schedule your procedure: [phone] option #2  Right pudendal nerve block    Your pre-procedure instructions are below, please call our clinic if you have any questions.      Recommended Follow up:      Follow up in 2 months       To speak with a nurse, schedule/reschedule/cancel a clinic appointment, or request a medication refill call: [phone].    You can also reach us by [employer]: [email or web address]      Procedure Information related to COVID-19     Please call [phone] option #2 to schedule, reschedule, or cancel your procedure appointment.   Phones are answered Monday - Friday from 08:00 - 4:30pm.  Leave a voicemail with your name, birth date, and phone number if no one is available to take your call.        You no longer need to test for COVID- 19 prior to your procedure/surgery, unless your physician specifically requests that you test. If you experience COVID symptoms or have tested positive for COVID-19 within 14 days of your scheduled surgery or procedure, please update our office right away and your procedure may have to be postponed.       The procedure center staff will call you several days before the procedure to review important information that you will need to know for the day of the procedure.     Please contact the clinic if you have further questions about this information [phone].        Information related to Scheduling and Pre-Procedure Instructions:    If you must reschedule your procedure more than two times, you must follow up in clinic before rescheduling again.      Preparing for your procedure    CAUTION - FAILURE TO FOLLOW THESE PRE-PROCEDURE INSTRUCTIONS WILL RESULT IN YOUR PROCEDURE BEING RESCHEDULED.    Your Procedure: Right pudendal nerve block        You must have a  take you home after your procedure. Transportation by taxi or para-transit is okay as long as you have a responsible adult accompany you. You must  provide your 's full name and contact number at time of check in.     Fasting Protocol Please have nothing to eat or drink 1 hour prior to arrival.     Medications If you take any medications, DO NOT STOP. Take your medications as usual the day of your procedure with a sip of water AT LEAST 2 HOURS PRIOR TO ARRIVAL.    Antibiotics If you are currently taking antibiotics, you must complete the entire dose 7 days prior to your scheduled procedure. You must be clear of any signs or symptoms of infection. If you begin antibiotics, please contact our clinic for instructions.     Fever, Chills, or Rash If you experience a fever of higher than 100 degrees, chills, rash, or open wounds during the one week before your procedure, please call the clinic to see if you may proceed with your procedure.      Medication Hold List  **Patients under Cardiology/Neurology care should consult their provider prior to the pain procedure to verify pre-procedure medication instructions. The information below contains general guidelines.**        Blood Thinners If you are taking daily ASPIRIN, PLAVIX, OR OTHER BLOOD THINNERS SUCH AS COUMADIN/WARFARIN, we will need your prescribing doctor to sign a release permitting you to stop these medications. Once approved by your prescribing doctor - STOP ALL BLOOD THINNERS BASED ON THE TIME TABLE BELOW PRIOR TO YOUR PROCEDURE. If you have been instructed to stop WARFARIN(COUMADIN), you must have an INR lab drawn the day before your procedure. . Your INR must be within normal limits before we can perform your injection. MEDICATIONS CAN BE RESTARTED AFTER YOUR PROCEDURE.    14 DAY HOLD  Ticlid (ticlopidine)    10 DAY HOLD  Effient (Prasugel)    3 DAY HOLD  Xarelto (rivaroxaban) 7 DAY HOLD  Anacin, Bufferin, Ecotrin, Excedrin, Aggrenox (Aspirin)  Brilinta (ticagrelor)  Coumadin (Warfarin)  Pradexa (Dabigatran)  Elmiron (Pentosan)  Plavix (Clopidogrel Bisulfate)  Pletal (Cilostazol)    24 HOUR  HOLD  Lovenox (enoxaparin)  Agrylin (Anagrelide)        Non-steroidal Anti-inflammatories (NSAIDs) DO NOT TAKE any non-steroidal anti-inflammatory medications (NSAIDs) listed on the table below. MEDICATIONS CAN BE RESTARTED AFTER YOUR PROCEDURE. Celebrex is OK to take and does not need to be discontinued.     Medications to stop:  3 DAY HOLD  Advil, Motrin (Ibuprofen)  Arthrotec (diciofenac sodium/misoprostol)  Clinoril (Sulindac)  Indocin (Indomethacin)  Lodine (Etodolac)  Toradol (Ketorolac)  Vicoprofen (Hydrocodone and Ibuprofen)  Voltaren (Diclofenac)  Apixaban (Eliquis)    14 DAY HOLD  Daypro (Oxaprozin)  Feldene (Piroxicam)   7 DAY HOLD  Aleve (Naproxen sodium)  Darvon compound (contains aspirin)  Naprosyn (Naproxen)  Norgesic Forte (contains aspirin)  Mobic (Meloxicam)  Oruvall (Ketoprofen)  Percodan (contains aspirin)  Relafen (Nabumetone)  Salsalate  Trilisate  Vitamin E (more than 400 mg per day)  Any medication containing aspirin                To speak with a nurse, schedule/reschedule/cancel a clinic appointment, or request a medication refill call: [phone]    You can also reach us by [employer]: [email or web address]

## 2023-07-17 NOTE — PROGRESS NOTES
Type of service:  Video Visit    Video Start Time: 8:30 AM    Video End Time:9:03 AM    Originating Location (pt. Location): Home    Distant Location (provider location):  [facility] [place]     Platform used for Video Visit: [facility]     Pain clinic follow up note    HPI:   Sara Dominguez is a 72 year old female who is presenting to the pain clinic for follow up s/p right pudendal nerve block completed by Dr. Reddy 4/18/23 with previous blocks performed on 10/2022 and 1/24/2023.     Interval Hx:  Sara describes receiving up to 3 months of pain relief from her blocks. She endorses difficulty sleeping still due to bladder irritation that she describes as a burning pain that requires her to get up and urinate. She tried a botox injection for this pain without relief. She also gets sharp pain near her vagina that can radiate down into her leg and thigh. Walking or moving around improves her pain. She gets great relief from her pudendal nerve blocks,stating that it gives her about 50% relief that lasts upwards of 3 months. She is here today to schedule another block.    Patient Supplied Answers To the [facility] Pain Questionnaire      7/17/2023     8:14 AM   [facility] -  Patient Entered Questionnaire/Answers   What number best describes your pain right now:  0 = No pain  to  10 = Worst pain imaginable 5   How would you describe the pain burning    sharp   Which of the following worsen your pain lying down    standing   Which of the following improve or reduce your pain medication    relaxation   What number best describes your average pain for the past week:  0 = No pain  to  10 = Worst pain imaginable 5   What number best describes your LOWEST pain in past 24 hours:  0 = No pain  to  10 = Worst pain imaginable 5   What number best describes your WORST pain in past 24 hours:  0 = No pain  to  10 = Worst pain imaginable 5   When is your pain worst Constant   What  non-medicine treatments have you already had for your pain pain clinic    spine injections (shots)             Pain today is 7-8    Patient reports compared to the last visit their pain is improved by following percent:    Pain intensity: 50 % relief with blocks  Pain frequency: 100 %  Duration of pain episodes: constant  Falling asleep: 100 %  Staying asleep: 30 %  Ability to work: 100 %  Ability to exercise: n/a  Performing chores: 100 %      New imaging reviewed with the patient:        ROS:  Review of Systems   Genitourinary: Positive for flank pain. Negative for dysuria, hematuria and urgency.   Musculoskeletal: Positive for back pain, myalgias and neck pain.   Psychiatric/Behavioral: Positive for depression. The patient is nervous/anxious. The patient does not have insomnia.          Significant Medical History:   Past Medical History:   Diagnosis Date     Cancer (H)     anal cancer     Disorder of bone and cartilage     osteopenia     Diverticulosis of colon     ,seen at colonoscopy     Major depressive disorder, recurrent episode, moderate (H)     No Comments Provided     Reflux esophagitis     No Comments Provided     Sciatica     right     Urge incontinence     No Comments Provided          Past Surgical History:  Past Surgical History:   Procedure Laterality Date     COLONOSCOPY      ,due      EXTRACTION(S) DENTAL      No Comments Provided     NERVE BLOCK PERIPHERAL Right 10/13/2022    Procedure: Right pudendal nerve block with ultrasound;  Surgeon: Marion Reddy MD;  Location: [facility] OR     NERVE BLOCK PERIPHERAL Right 2023    Procedure: Right pudendal nerve block with ultrasound;  Surgeon: Marion Reddy MD;  Location: [facility] OR     NERVE BLOCK PERIPHERAL Right 2023    Procedure: Right pudendal nerve block with ultrasound;  Surgeon: Marion Reddy MD;  Location: [facility] OR     OTHER SURGICAL HISTORY      [address],[place]  DELIVERY ONLY     OTHER SURGICAL HISTORY       ,,ENDOSCOPY ESOPHAGUS          Family History:  Family History   Problem Relation Age of Onset     Other - See Comments Father         Stroke     Cancer Mother         Cancer,cervical-mets to brain     Osteoporosis Mother         Osteoporosis     Heart Disease Brother         Heart Disease,MI  at 59     Heart Disease Maternal Grandmother         Heart Disease,MI  at 58     Asthma Brother         Asthma     Asthma Brother         Asthma     Diabetes Paternal Grandmother         Diabetes          Social History:  Social History     Socioeconomic History     Marital status:      Spouse name: Not on file     Number of children: Not on file     Years of education: Not on file     Highest education level: Not on file   Occupational History     Not on file   Tobacco Use     Smoking status: Former     Years: 20.00     Types: Cigarettes     Quit date: 10/9/2008     Years since quittin.7     Smokeless tobacco: Never     Tobacco comments:     Quit smoking: passive exposure   Substance and Sexual Activity     Alcohol use: Yes     Comment: Alcoholic Drinks/day: occ     Drug use: Not Currently     Sexual activity: Yes     Partners: Male   Other Topics Concern     Parent/sibling w/ CABG, MI or angioplasty before 65F 55M? No   Social History Narrative    Preloaded 2013.     Social Determinants of Health     Financial Resource Strain: Not on file   Food Insecurity: Not on file   Transportation Needs: Not on file   Physical Activity: Not on file   Stress: Not on file   Social Connections: Not on file   Intimate Partner Violence: Not on file   Housing Stability: Not on file     Social History     Social History Narrative    Preloaded 2013.          Allergies:  No Known Allergies    Current Medications:   Current Outpatient Medications   Medication Sig Dispense Refill     alendronate (FOSAMAX) 70 MG tablet Take 70 mg by mouth every 7 days       atorvastatin (LIPITOR) 10 MG tablet Take 10 mg by  mouth daily  1     calcium carbonate 600 mg-vitamin D 400 units (CALTRATE) 600-400 MG-UNIT per tablet Take 1 tablet by mouth       Cholecalciferol (VITAMIN D3) 50 MCG (2000 UT) CAPS        gabapentin (NEURONTIN) 100 MG capsule Take 6 capsules (600 mg) by mouth 4 times daily 180 capsule 0     lisinopril (ZESTRIL) 20 MG tablet Take 20 mg by mouth daily       naloxone (NARCAN) 4 MG/0.1ML nasal spray Spray 1 spray (4 mg) into one nostril alternating nostrils once as needed for opioid reversal every 2-3 minutes until assistance arrives 0.2 mL 0     oxyCODONE-acetaminophen (PERCOCET)  MG per tablet Take 1 tablet by mouth every 4 hours as needed for severe pain 125 tablet 0     venlafaxine (EFFEXOR-XR) 150 MG 24 hr capsule Take 150 mg by mouth daily       vitamin E (TOCOPHEROL) 400 units (180 mg) capsule Take 1 capsule (400 Units) by mouth 3 times daily 90 capsule 3     pentoxifylline ER (TRENTAL) 400 MG CR tablet Take 1 tablet (400 mg) by mouth 3 times daily (with meals) 90 tablet 3             Current Pain Medications:    (PERCOCET)  MG per tablet q4hrs  gabapentin (NEURONTIN) 600 MG capsule 4x per day  Ibuprofen PRN    Physical Exam:     There were no vitals taken for this visit.    General Appearance: No distress, seated comfortably  Mood: Euthymic  HE ENT: Non constricted pupils  Respiratory: Non labored breathing  CVS: Regular rate and rhythm  GI: Soft, non distended, no TTP  Skin: No rashes over exposed skin  MS: Normal muscle bulk  Neuro: Cranial nerves 2-12 intact  Gait: deferred    ASSESSMENT AND PLAN:     Encounter Diagnosis:  Pudendal neuralgia     Sara Dominguez is a 72 year old female who is presenting to the pain clinic for follow up s/p right pudendal nerve block completed by Dr. Reddy 4/18/23 with previous blocks performed on 10/2022 and 1/24/2023    RECOMMENDATIONS:     1. Medications: Continue current pain medicine regimen    2. Procedure: We are scheduling the patient for Right pudendal  nerve block in the procedure suite. Risks/benefits/alternatives were discussed.     3. Education: Discussed Right L4 epidural steroid injection for radicular pain    Follow up: In person follow up appointment in 2 months              Answers for HPI/ROS submitted by the patient on 7/17/2023  General Symptoms: No  Skin Symptoms: No  HENT Symptoms: No  EYE SYMPTOMS: No  HEART SYMPTOMS: No  LUNG SYMPTOMS: No  INTESTINAL SYMPTOMS: No  URINARY SYMPTOMS: Yes  GYNECOLOGIC SYMPTOMS: No  BREAST SYMPTOMS: No  SKELETAL SYMPTOMS: Yes  BLOOD SYMPTOMS: No  NERVOUS SYSTEM SYMPTOMS: No  MENTAL HEALTH SYMPTOMS: Yes  Trouble holding urine or incontinence: Yes  Increased frequency of urination: No  Decreased frequency of urination: No  Frequent nighttime urination: Yes  Difficulty emptying bladder: No  Swollen joints: No  Joint pain: Yes  Bone pain: No  Muscle cramps: Yes  Muscle weakness: No  Joint stiffness: Yes  Bone fracture: Yes  Trouble thinking or concentrating: No  Mood changes: No  Panic attacks: No

## 2023-07-18 ENCOUNTER — VIRTUAL VISIT (OUTPATIENT)
Dept: ONCOLOGY | Facility: CLINIC | Age: 73
End: 2023-07-18
Attending: STUDENT IN AN ORGANIZED HEALTH CARE EDUCATION/TRAINING PROGRAM
Payer: MEDICARE

## 2023-07-18 VITALS — BODY MASS INDEX: 26.52 KG/M2 | WEIGHT: 165 LBS | HEIGHT: 66 IN

## 2023-07-18 DIAGNOSIS — G89.3 CANCER RELATED PAIN: ICD-10-CM

## 2023-07-18 DIAGNOSIS — S32.509S: ICD-10-CM

## 2023-07-18 DIAGNOSIS — R10.2 PELVIC PAIN IN FEMALE: ICD-10-CM

## 2023-07-18 DIAGNOSIS — C21.1 MALIGNANT NEOPLASM OF ANAL CANAL (H): Primary | ICD-10-CM

## 2023-07-18 PROCEDURE — 99214 OFFICE O/P EST MOD 30 MIN: CPT | Mod: VID | Performed by: STUDENT IN AN ORGANIZED HEALTH CARE EDUCATION/TRAINING PROGRAM

## 2023-07-18 ASSESSMENT — PAIN SCALES - GENERAL: PAINLEVEL: MODERATE PAIN (5)

## 2023-07-18 NOTE — LETTER
7/18/2023         RE: Sara Dominguez  [address]  [place] [address]        Dear Colleague,    Thank you for referring your patient, Sara Dominguez, to the [facility]. Please see a copy of my visit note below.    Virtual Visit Details    Type of service:  Video Visit   Video Start Time: 9:20 AM  Video End Time:9:40 AM    Originating Location (pt. Location): Home  Distant Location (provider location):  On-site  Platform used for Video Visit: [facility]   PM&R clinic note        Interval history:     Sara Dominguez presents to clinic today for follow up reg her rehab needs.   She has h/o squamous cell carcinoma of the anal canal, clinical [id number] (anna-rectal nodes) in 2019. She completed definitive CRT to a total dose of 54 Gy in 30 fractions on 10/9/19.   Was last seen in clinic on 8/22.  Recommendations included:  1.           Therapy/equipment/braces:  2. Continue Pelvic therapy  2.           Interventions:  2. Keep scheduled appointment with Dr. Reddy for consideration of pudendal nerve block. Will reach out to Dr. Reddy to see if procedure can be done at that visit since patient is coming in from 2.5 hours away.  3.           Referral / follow up with other providers:  2. Continue to follow with providers for pubic symphysis CS injections.  4.           Follow up: 3 months.      Symptoms,  Gina was seen for a return virtual visit today.  Overall, she continues to struggle with the same symptoms that we have been seeing her for.  She has continued to follow with Dr. Reddy in pain clinic for nerve blocks which have helped with her pelvic symptoms.  In addition, she has an ablation scheduled tomorrow to help with her back symptoms and lower extremity symptoms.  She continues with some occasional bladder incontinence, especially at night.  It is not inhibiting her daily activity or her work and she manages this  "the best that she can.      Therapies/HEP,  Continues to remain active, and continues to work.      Functionally,   No changes since her last visit.      Social history is unchanged.      Medications:  Current Outpatient Medications   Medication Sig Dispense Refill     alendronate (FOSAMAX) 70 MG tablet Take 70 mg by mouth every 7 days       atorvastatin (LIPITOR) 10 MG tablet Take 10 mg by mouth daily  1     calcium carbonate 600 mg-vitamin D 400 units (CALTRATE) 600-400 MG-UNIT per tablet Take 1 tablet by mouth       Cholecalciferol (VITAMIN D3) 50 MCG (2000 UT) CAPS        gabapentin (NEURONTIN) 100 MG capsule Take 6 capsules (600 mg) by mouth 4 times daily 180 capsule 0     lisinopril (ZESTRIL) 20 MG tablet Take 20 mg by mouth daily       naloxone (NARCAN) 4 MG/0.1ML nasal spray Spray 1 spray (4 mg) into one nostril alternating nostrils once as needed for opioid reversal every 2-3 minutes until assistance arrives 0.2 mL 0     oxyCODONE-acetaminophen (PERCOCET)  MG per tablet Take 1 tablet by mouth every 4 hours as needed for severe pain 125 tablet 0     venlafaxine (EFFEXOR-XR) 150 MG 24 hr capsule Take 150 mg by mouth daily       pentoxifylline ER (TRENTAL) 400 MG CR tablet Take 1 tablet (400 mg) by mouth 3 times daily (with meals) 90 tablet 3     vitamin E (TOCOPHEROL) 400 units (180 mg) capsule Take 1 capsule (400 Units) by mouth 3 times daily (Patient not taking: Reported on 7/18/2023) 90 capsule 3              Physical Exam:   Ht 1.676 m (5' 6\")   Wt 74.8 kg (165 lb)   BMI 26.63 kg/m    Gen: NAD, pleasant and cooperative   HEENT: Atraumatic, normocephalic, extraocular movements appear intact  Pulm: non-labored breathing in room air  Neuro/MSK:   Orientation: Oriented to person, time, place and situation, Exhibits good insight into her condition and ongoing treatment  Motor: Observed moving upper extremities actively against gravity    Labs/Imaging:  Lab Results   Component Value Date    WBC 5.6 " 02/02/2022    HGB 12.6 02/02/2022    HCT 37.7 02/02/2022    MCV 93 02/02/2022     02/02/2022     Lab Results   Component Value Date     02/02/2022    POTASSIUM 4.2 02/02/2022    CHLORIDE 103 02/02/2022    CO2 28 02/02/2022     (H) 02/02/2022     Lab Results   Component Value Date    GFRESTIMATED 73 02/02/2022    GFRESTBLACK 66 06/24/2021     Lab Results   Component Value Date    AST 24 02/02/2022    ALT 28 02/02/2022    ALKPHOS 119 02/02/2022    BILITOTAL 0.6 02/02/2022     Lab Results   Component Value Date    INR 1.05 09/15/2021     Lab Results   Component Value Date    BUN 18 02/02/2022    CR 0.84 02/02/2022              Assessment/Plan   Sara Dominguez presents to clinic today for follow up reg her rehab needs.   She has h/o squamous cell carcinoma of the anal canal, clinical [id number] (anna-rectal nodes) in 2019. She completed definitive CRT to a total dose of 54 Gy in 30 fractions on 10/9/19. Was last seen in clinic on 8/22.  Multiple rehabilitation considerations were discussed with Gina at today's visit.  Overall she appears to be stable with her functional needs at this time.  She should continue to work with the pain management team and proceed with ablation as scheduled tomorrow for her back and lower extremity symptoms.  She is not interested in pelvic floor therapy at this time, but should continue the home exercise regimen as previously taught.  We will plan a return visit in about 8 months or earlier if needed.  Gina is in agreement with this plan.      1. Therapy/equipment/braces:  1. Continue pelvic floor exercises as previously taught by therapy.  2. Referral / follow up with other providers:  1. Continue to work with pain management team for pelvic pain as well as planned spine team for low back/lower extremity symptoms.  3. Follow up: 8-month virtual visit.      Valery Chan MD  Physical Medicine & Rehabilitation      50 minutes spent on the date of the encounter  doing chart review, history and exam, documentation and further activities as noted above.              Again, thank you for allowing me to participate in the care of your patient.        Sincerely,        Valery Chan MD

## 2023-07-18 NOTE — NURSING NOTE
Is the patient currently in the state of [place]? YES    Visit mode:VIDEO    If the visit is dropped, the patient can be reconnected by: VIDEO VISIT: Text to cell phone: [phone]    Will anyone else be joining the visit? NO      How would you like to obtain your AVS? MyChart    Are changes needed to the allergy or medication list? NO    Reason for visit: CHARLETTE PURCELL, VF

## 2023-07-18 NOTE — LETTER
7/18/2023         RE: Sara Dominguez  [id number]  [facility] [address]        Dear Colleague,    Thank you for referring your patient, Sara Dominguez, to the [facility]. Please see a copy of my visit note below.    Virtual Visit Details    Type of service:  Video Visit   Video Start Time: 9:20 AM  Video End Time:9:40 AM    Originating Location (pt. Location): Home  Distant Location (provider location):  On-site  Platform used for Video Visit: [facility]   PM&R clinic note        Interval history:     Sara Dominguez presents to clinic today for follow up reg her rehab needs.   She has h/o squamous cell carcinoma of the anal canal, clinical [id number] (anna-rectal nodes) in 2019. She completed definitive CRT to a total dose of 54 Gy in 30 fractions on 10/9/19.   Was last seen in clinic on 8/22.  Recommendations included:  1.           Therapy/equipment/braces:  2. Continue Pelvic therapy  2.           Interventions:  2. Keep scheduled appointment with Dr. Reddy for consideration of pudendal nerve block. Will reach out to Dr. Reddy to see if procedure can be done at that visit since patient is coming in from 2.5 hours away.  3.           Referral / follow up with other providers:  2. Continue to follow with providers for pubic symphysis CS injections.  4.           Follow up: 3 months.      Symptoms,  Gina was seen for a return virtual visit today.  Overall, she continues to struggle with the same symptoms that we have been seeing her for.  She has continued to follow with Dr. Reddy in pain clinic for nerve blocks which have helped with her pelvic symptoms.  In addition, she has an ablation scheduled tomorrow to help with her back symptoms and lower extremity symptoms.  She continues with some occasional bladder incontinence, especially at night.  It is not inhibiting her daily activity or her work and she manages this  "the best that she can.      Therapies/HEP,  Continues to remain active, and continues to work.      Functionally,   No changes since her last visit.      Social history is unchanged.      Medications:  Current Outpatient Medications   Medication Sig Dispense Refill     alendronate (FOSAMAX) 70 MG tablet Take 70 mg by mouth every 7 days       atorvastatin (LIPITOR) 10 MG tablet Take 10 mg by mouth daily  1     calcium carbonate 600 mg-vitamin D 400 units (CALTRATE) 600-400 MG-UNIT per tablet Take 1 tablet by mouth       Cholecalciferol (VITAMIN D3) 50 MCG (2000 UT) CAPS        gabapentin (NEURONTIN) 100 MG capsule Take 6 capsules (600 mg) by mouth 4 times daily 180 capsule 0     lisinopril (ZESTRIL) 20 MG tablet Take 20 mg by mouth daily       naloxone (NARCAN) 4 MG/0.1ML nasal spray Spray 1 spray (4 mg) into one nostril alternating nostrils once as needed for opioid reversal every 2-3 minutes until assistance arrives 0.2 mL 0     oxyCODONE-acetaminophen (PERCOCET)  MG per tablet Take 1 tablet by mouth every 4 hours as needed for severe pain 125 tablet 0     venlafaxine (EFFEXOR-XR) 150 MG 24 hr capsule Take 150 mg by mouth daily       pentoxifylline ER (TRENTAL) 400 MG CR tablet Take 1 tablet (400 mg) by mouth 3 times daily (with meals) 90 tablet 3     vitamin E (TOCOPHEROL) 400 units (180 mg) capsule Take 1 capsule (400 Units) by mouth 3 times daily (Patient not taking: Reported on 7/18/2023) 90 capsule 3              Physical Exam:   Ht 1.676 m (5' 6\")   Wt 74.8 kg (165 lb)   BMI 26.63 kg/m    Gen: NAD, pleasant and cooperative   HEENT: Atraumatic, normocephalic, extraocular movements appear intact  Pulm: non-labored breathing in room air  Neuro/MSK:   Orientation: Oriented to person, time, place and situation, Exhibits good insight into her condition and ongoing treatment  Motor: Observed moving upper extremities actively against gravity    Labs/Imaging:  Lab Results   Component Value Date    WBC 5.6 " 02/02/2022    HGB 12.6 02/02/2022    HCT 37.7 02/02/2022    MCV 93 02/02/2022     02/02/2022     Lab Results   Component Value Date     02/02/2022    POTASSIUM 4.2 02/02/2022    CHLORIDE 103 02/02/2022    CO2 28 02/02/2022     (H) 02/02/2022     Lab Results   Component Value Date    GFRESTIMATED 73 02/02/2022    GFRESTBLACK 66 06/24/2021     Lab Results   Component Value Date    AST 24 02/02/2022    ALT 28 02/02/2022    ALKPHOS 119 02/02/2022    BILITOTAL 0.6 02/02/2022     Lab Results   Component Value Date    INR 1.05 09/15/2021     Lab Results   Component Value Date    BUN 18 02/02/2022    CR 0.84 02/02/2022              Assessment/Plan   Sara Dominguez presents to clinic today for follow up reg her rehab needs.   She has h/o squamous cell carcinoma of the anal canal, clinical [id number] (anna-rectal nodes) in 2019. She completed definitive CRT to a total dose of 54 Gy in 30 fractions on 10/9/19. Was last seen in clinic on 8/22.  Multiple rehabilitation considerations were discussed with Gina at today's visit.  Overall she appears to be stable with her functional needs at this time.  She should continue to work with the pain management team and proceed with ablation as scheduled tomorrow for her back and lower extremity symptoms.  She is not interested in pelvic floor therapy at this time, but should continue the home exercise regimen as previously taught.  We will plan a return visit in about 8 months or earlier if needed.  Gina is in agreement with this plan.      1. Therapy/equipment/braces:  1. Continue pelvic floor exercises as previously taught by therapy.  2. Referral / follow up with other providers:  1. Continue to work with pain management team for pelvic pain as well as planned spine team for low back/lower extremity symptoms.  3. Follow up: 8-month virtual visit.      Valery Chan MD  Physical Medicine & Rehabilitation      50 minutes spent on the date of the encounter  doing chart review, history and exam, documentation and further activities as noted above.              Again, thank you for allowing me to participate in the care of your patient.        Sincerely,        Valery Chan MD

## 2023-07-18 NOTE — PROGRESS NOTES
Virtual Visit Details    Type of service:  Video Visit   Video Start Time: 9:20 AM  Video End Time:9:40 AM    Originating Location (pt. Location): Home  Distant Location (provider location):  On-site  Platform used for Video Visit: [employer]

## 2023-07-18 NOTE — PROGRESS NOTES
[facility]   PM&R clinic note        Interval history:     Sara Dominguez presents to clinic today for follow up reg her rehab needs.   She has h/o squamous cell carcinoma of the anal canal, clinical [id number] (anna-rectal nodes) in 2019. She completed definitive CRT to a total dose of 54 Gy in 30 fractions on 10/9/19.   Was last seen in clinic on 8/22.  Recommendations included:  1.           Therapy/equipment/braces:  2. Continue Pelvic therapy  2.           Interventions:  2. Keep scheduled appointment with Dr. Reddy for consideration of pudendal nerve block. Will reach out to Dr. Reddy to see if procedure can be done at that visit since patient is coming in from 2.5 hours away.  3.           Referral / follow up with other providers:  2. Continue to follow with providers for pubic symphysis CS injections.  4.           Follow up: 3 months.      Symptoms,  Gina was seen for a return virtual visit today.  Overall, she continues to struggle with the same symptoms that we have been seeing her for.  She has continued to follow with Dr. Reddy in pain clinic for nerve blocks which have helped with her pelvic symptoms.  In addition, she has an ablation scheduled tomorrow to help with her back symptoms and lower extremity symptoms.  She continues with some occasional bladder incontinence, especially at night.  It is not inhibiting her daily activity or her work and she manages this the best that she can.      Therapies/HEP,  Continues to remain active, and continues to work.      Functionally,   No changes since her last visit.      Social history is unchanged.      Medications:  Current Outpatient Medications   Medication Sig Dispense Refill     alendronate (FOSAMAX) 70 MG tablet Take 70 mg by mouth every 7 days       atorvastatin (LIPITOR) 10 MG tablet Take 10 mg by mouth daily  1     calcium carbonate 600 mg-vitamin D 400 units (CALTRATE) 600-400 MG-UNIT per tablet Take 1 tablet  "by mouth       Cholecalciferol (VITAMIN D3) 50 MCG (2000 UT) CAPS        gabapentin (NEURONTIN) 100 MG capsule Take 6 capsules (600 mg) by mouth 4 times daily 180 capsule 0     lisinopril (ZESTRIL) 20 MG tablet Take 20 mg by mouth daily       naloxone (NARCAN) 4 MG/0.1ML nasal spray Spray 1 spray (4 mg) into one nostril alternating nostrils once as needed for opioid reversal every 2-3 minutes until assistance arrives 0.2 mL 0     oxyCODONE-acetaminophen (PERCOCET)  MG per tablet Take 1 tablet by mouth every 4 hours as needed for severe pain 125 tablet 0     venlafaxine (EFFEXOR-XR) 150 MG 24 hr capsule Take 150 mg by mouth daily       pentoxifylline ER (TRENTAL) 400 MG CR tablet Take 1 tablet (400 mg) by mouth 3 times daily (with meals) 90 tablet 3     vitamin E (TOCOPHEROL) 400 units (180 mg) capsule Take 1 capsule (400 Units) by mouth 3 times daily (Patient not taking: Reported on 7/18/2023) 90 capsule 3              Physical Exam:   Ht 1.676 m (5' 6\")   Wt 74.8 kg (165 lb)   BMI 26.63 kg/m    Gen: NAD, pleasant and cooperative   HEENT: Atraumatic, normocephalic, extraocular movements appear intact  Pulm: non-labored breathing in room air  Neuro/MSK:   Orientation: Oriented to person, time, place and situation, Exhibits good insight into her condition and ongoing treatment  Motor: Observed moving upper extremities actively against gravity    Labs/Imaging:  Lab Results   Component Value Date    WBC 5.6 02/02/2022    HGB 12.6 02/02/2022    HCT 37.7 02/02/2022    MCV 93 02/02/2022     02/02/2022     Lab Results   Component Value Date     02/02/2022    POTASSIUM 4.2 02/02/2022    CHLORIDE 103 02/02/2022    CO2 28 02/02/2022     (H) 02/02/2022     Lab Results   Component Value Date    GFRESTIMATED 73 02/02/2022    GFRESTBLACK 66 06/24/2021     Lab Results   Component Value Date    AST 24 02/02/2022    ALT 28 02/02/2022    ALKPHOS 119 02/02/2022    BILITOTAL 0.6 02/02/2022     Lab Results "   Component Value Date    INR 1.05 09/15/2021     Lab Results   Component Value Date    BUN 18 02/02/2022    CR 0.84 02/02/2022              Assessment/Plan   Sara Dominguez presents to clinic today for follow up reg her rehab needs.   She has h/o squamous cell carcinoma of the anal canal, clinical [id number] (anna-rectal nodes) in 2019. She completed definitive CRT to a total dose of 54 Gy in 30 fractions on 10/9/19. Was last seen in clinic on 8/22.  Multiple rehabilitation considerations were discussed with Gina at today's visit.  Overall she appears to be stable with her functional needs at this time.  She should continue to work with the pain management team and proceed with ablation as scheduled tomorrow for her back and lower extremity symptoms.  She is not interested in pelvic floor therapy at this time, but should continue the home exercise regimen as previously taught.  We will plan a return visit in about 8 months or earlier if needed.  Gina is in agreement with this plan.      1. Therapy/equipment/braces:  1. Continue pelvic floor exercises as previously taught by therapy.  2. Referral / follow up with other providers:  1. Continue to work with pain management team for pelvic pain as well as planned spine team for low back/lower extremity symptoms.  3. Follow up: 8-month virtual visit.      Valery Chan MD  Physical Medicine & Rehabilitation      50 minutes spent on the date of the encounter doing chart review, history and exam, documentation and further activities as noted above.

## 2023-07-19 ENCOUNTER — TELEPHONE (OUTPATIENT)
Dept: ANESTHESIOLOGY | Facility: CLINIC | Age: 73
End: 2023-07-19
Payer: MEDICARE

## 2023-07-20 ENCOUNTER — TELEPHONE (OUTPATIENT)
Dept: ANESTHESIOLOGY | Facility: CLINIC | Age: 73
End: 2023-07-20
Payer: MEDICARE

## 2023-07-20 NOTE — TELEPHONE ENCOUNTER
Patient is scheduled for surgery with Dr. Reddy    Spoke with: patient    Date of Surgery: 08/01/23    Location: [facility]    Informed patient they will need an adult  yes    Additional comments: Patient is aware of date and time of the procedure.        Mai Hidalgo MA on 7/20/2023 at 11:16 AM

## 2023-07-20 NOTE — TELEPHONE ENCOUNTER
RN reviewed patient chart. Pre procedure instructions were reviewed with patient.    Linn Santana RNCC

## 2023-07-27 ENCOUNTER — MYC REFILL (OUTPATIENT)
Dept: PALLIATIVE MEDICINE | Facility: CLINIC | Age: 73
End: 2023-07-27
Payer: MEDICARE

## 2023-07-27 DIAGNOSIS — G89.3 CANCER RELATED PAIN: ICD-10-CM

## 2023-07-27 RX ORDER — OXYCODONE AND ACETAMINOPHEN 10; 325 MG/1; MG/1
1 TABLET ORAL EVERY 4 HOURS PRN
Qty: 125 TABLET | Refills: 0 | Status: SHIPPED | OUTPATIENT
Start: 2023-07-27 | End: 2023-08-23

## 2023-07-27 NOTE — TELEPHONE ENCOUNTER
Received [employer] message from patient requesting refill of percocet.     Last refill: 6/28/23  Last office visit: 7/12/23  Scheduled for follow up 9/20/23     Will route request to NP for review.     Reviewed [place]  Report.

## 2023-08-01 ENCOUNTER — HOSPITAL ENCOUNTER (OUTPATIENT)
Facility: AMBULATORY SURGERY CENTER | Age: 73
Discharge: HOME | End: 2023-08-01
Attending: ANESTHESIOLOGY | Admitting: ANESTHESIOLOGY
Payer: MEDICARE

## 2023-08-01 ENCOUNTER — ANCILLARY PROCEDURE (OUTPATIENT)
Dept: RADIOLOGY | Facility: AMBULATORY SURGERY CENTER | Age: 73
End: 2023-08-01
Attending: ANESTHESIOLOGY
Payer: MEDICARE

## 2023-08-01 VITALS
HEART RATE: 83 BPM | OXYGEN SATURATION: 98 % | RESPIRATION RATE: 16 BRPM | DIASTOLIC BLOOD PRESSURE: 78 MMHG | SYSTOLIC BLOOD PRESSURE: 147 MMHG

## 2023-08-01 DIAGNOSIS — R52 PAIN: ICD-10-CM

## 2023-08-01 PROCEDURE — 76942 ECHO GUIDE FOR BIOPSY: CPT | Mod: 26 | Performed by: ANESTHESIOLOGY

## 2023-08-01 PROCEDURE — 64430 NJX AA&/STRD PUDENDAL NERVE: CPT | Mod: RT | Performed by: ANESTHESIOLOGY

## 2023-08-01 RX ORDER — LIDOCAINE HYDROCHLORIDE 10 MG/ML
INJECTION, SOLUTION EPIDURAL; INFILTRATION; INTRACAUDAL; PERINEURAL PRN
Status: DISCONTINUED | OUTPATIENT
Start: 2023-08-01 | End: 2023-08-01 | Stop reason: HOSPADM

## 2023-08-01 RX ORDER — METHYLPREDNISOLONE ACETATE 40 MG/ML
INJECTION, SUSPENSION INTRA-ARTICULAR; INTRALESIONAL; INTRAMUSCULAR; SOFT TISSUE PRN
Status: DISCONTINUED | OUTPATIENT
Start: 2023-08-01 | End: 2023-08-01 | Stop reason: HOSPADM

## 2023-08-01 RX ORDER — BUPIVACAINE HYDROCHLORIDE 7.5 MG/ML
INJECTION, SOLUTION EPIDURAL; RETROBULBAR PRN
Status: DISCONTINUED | OUTPATIENT
Start: 2023-08-01 | End: 2023-08-01 | Stop reason: HOSPADM

## 2023-08-01 NOTE — DISCHARGE INSTRUCTIONS
Home Care Instructions after a Pudendal Nerve Block      Pudendal nerve blocks can help in the diagnosis and treatment of pain located in the lower pelvic area.      Activity  -You may resume most normal activity levels with the exception of strenuous activity. It is important for us to know if your pain with normal activity is relieved after this injection.  -DO NOT shower for 24 hours  -DO NOT remove bandaid for 24 hours    Pain  -You may experience soreness at the injection site for one or two days  -You may use an ice pack for 20 minutes every 2 hours for the first 24 hours  -You may use a heating pad after the first 24 hours  -You may use Tylenol (acetaminophen) every 4 hours or other pain medicines as directed by your physician    You may experience numbness radiating into your legs or arms (depending on the procedure location). This numbness may last several hours. Until sensation returns to normal; please use caution in walking, climbing stairs, and stepping out of your vehicle, etc.    DID YOU RECEIVE STEROIDS TODAY?    Common side effects of steroids:  Not everyone will experience corticosteroid side effects. If side effects are experienced, they will gradually subside in the 7-10 day period following an injection. Most common side effects include:  -Flushed face and/or chest  -Feeling of warmth, particularly in the face but could be an overall feeling of warmth  -Increased blood sugar in diabetic patients  -Menstrual irregularities my occur. If taking hormone-based birth control an alternate method of birth control is recommended  -Sleep disturbances and/or mood swings are possible  -Leg cramps      PLEASE KEEP TRACK OF YOUR SYMPTOMS AND NOTE YOUR IMPROVEMENT FOR YOUR DOCTOR.     Please contact us if you have:  -Severe pain  -Fever more than 101.5 degrees Fahrenheit  -Signs of infection at the injection site (redness, swelling, or drainage)    FOR PAIN CENTER PATIENTS:  If you have questions, please  contact the Pain Clinic at [phone] Option #1 between the hours of 7:00 am and 3:00 pm Monday through Friday. After office hours you can contact the on call provider by dialing [phone]. If you need immediate attention, we recommend that you go to a hospital emergency room or dial [phone].

## 2023-08-01 NOTE — H&P
Sara LUIZ Solomondharmesh  [id number]  female  72 year old      Reason for procedure/surgery: right pudendal block    Patient Active Problem List   Diagnosis    Malignant neoplasm of anal canal (H)    Allergic rhinitis    Hypertension    Major depressive disorder, recurrent episode, moderate (H)    Migraine variant    Mixed hyperlipidemia    Need for prophylactic hormone replacement therapy (postmenopausal)    Reflux esophagitis    Sciatica    Squamous cell carcinoma of anus (H)    Urine, incontinence, stress female    Nausea    Hypotension, unspecified hypotension type    Pelvic pain in female    Neuralgia of right pudendal nerve       Past Surgical History:    Past Surgical History:   Procedure Laterality Date    COLONOSCOPY      ,due     EXTRACTION(S) DENTAL      No Comments Provided    NERVE BLOCK PERIPHERAL Right 10/13/2022    Procedure: Right pudendal nerve block with ultrasound;  Surgeon: Marion Reddy MD;  Location: [facility] OR    NERVE BLOCK PERIPHERAL Right 2023    Procedure: Right pudendal nerve block with ultrasound;  Surgeon: Marion Reddy MD;  Location: [facility] OR    NERVE BLOCK PERIPHERAL Right 2023    Procedure: Right pudendal nerve block with ultrasound;  Surgeon: Marion Reddy MD;  Location: [facility] OR    OTHER SURGICAL HISTORY      70413.0,ME  DELIVERY ONLY    OTHER SURGICAL HISTORY      ,ENDOSCOPY ESOPHAGUS       Past Medical History:   Past Medical History:   Diagnosis Date    Cancer (H)     anal cancer    Disorder of bone and cartilage     osteopenia    Diverticulosis of colon     ,seen at colonoscopy    Major depressive disorder, recurrent episode, moderate (H)     No Comments Provided    Reflux esophagitis     No Comments Provided    Sciatica     right    Urge incontinence     No Comments Provided       Social History:   Social History     Tobacco Use    Smoking status: Former     Years: 20.00     Types: Cigarettes     Quit date: 10/9/2008     Years since  quittin.8    Smokeless tobacco: Never    Tobacco comments:     Quit smoking: passive exposure   Substance Use Topics    Alcohol use: Yes     Comment: Alcoholic Drinks/day: occ       Family History:   Family History   Problem Relation Age of Onset    Other - See Comments Father         Stroke    Cancer Mother         Cancer,cervical-mets to brain    Osteoporosis Mother         Osteoporosis    Heart Disease Brother         Heart Disease,MI  at 59    Heart Disease Maternal Grandmother         Heart Disease,MI  at 58    Asthma Brother         Asthma    Asthma Brother         Asthma    Diabetes Paternal Grandmother         Diabetes       Allergies: No Known Allergies    Active Medications:   Current Outpatient Medications   Medication Sig Dispense Refill    alendronate (FOSAMAX) 70 MG tablet Take 70 mg by mouth every 7 days      atorvastatin (LIPITOR) 10 MG tablet Take 10 mg by mouth daily  1    calcium carbonate 600 mg-vitamin D 400 units (CALTRATE) 600-400 MG-UNIT per tablet Take 1 tablet by mouth      Cholecalciferol (VITAMIN D3) 50 MCG (2000 UT) CAPS       gabapentin (NEURONTIN) 100 MG capsule Take 6 capsules (600 mg) by mouth 4 times daily 180 capsule 0    lisinopril (ZESTRIL) 20 MG tablet Take 20 mg by mouth daily      oxyCODONE-acetaminophen (PERCOCET)  MG per tablet Take 1 tablet by mouth every 4 hours as needed for severe pain 125 tablet 0    venlafaxine (EFFEXOR-XR) 150 MG 24 hr capsule Take 150 mg by mouth daily      naloxone (NARCAN) 4 MG/0.1ML nasal spray Spray 1 spray (4 mg) into one nostril alternating nostrils once as needed for opioid reversal every 2-3 minutes until assistance arrives 0.2 mL 0    vitamin E (TOCOPHEROL) 400 units (180 mg) capsule Take 1 capsule (400 Units) by mouth 3 times daily (Patient not taking: Reported on 2023) 90 capsule 3       Systemic Review:   CONSTITUTIONAL: NEGATIVE for fever, chills, change in weight  ENT/MOUTH: NEGATIVE for ear, mouth and throat  problems  RESP: NEGATIVE for significant cough or SOB  CV: NEGATIVE for chest pain, palpitations or peripheral edema    Physical Examination:   Vital Signs: BP (!) 148/74   Pulse 83   Resp 16   SpO2 96%   GENERAL: healthy, alert and no distress  NECK: no adenopathy, no asymmetry, masses, or scars  RESP: lungs clear to auscultation - no rales, rhonchi or wheezes  CV: regular rate and rhythm, normal S1 S2, no S3 or S4, no murmur, click or rub, no peripheral edema and peripheral pulses strong  ABDOMEN: soft, nontender, no hepatosplenomegaly, no masses and bowel sounds normal  MS: no gross musculoskeletal defects noted, no edema    Plan: Appropriate to proceed as scheduled.      Marion Reddy MD  8/1/2023

## 2023-08-01 NOTE — OP NOTE
PAIN MEDICINE AMBULATORY SURGERY CENTER PROCEDURE NOTE    ATTENDING CLINICIAN:    Marion Reddy MD    PREPROCEDURE DIAGNOSES:  1.  Right pudendal neuralgia  2.  Neuropathic pain  3.  Chronic pain    POSTPROCEDURE DIAGNOSES:  1.  Right pudendal neuralgia  2.  Neuropathic pain  3.  Chronic pain    PROCEDURE(S) PERFORMED:  1.  Right pudendal nerve block  2.  Ultrasound guidance for needle guidance    ANESTHESIA:  Local    COMPLICATIONS:  None    INDICATIONS:  72 years old female history of chronic pelvic pain likely secondary to myofascial pain of the pudendal neuralgia.  During her last clinic visit we recommended a diagnostic and therapeutic injection of her right pudendal nerve block. The patient stated that she was in her usual state of health and denied recent anticoagulant use or recent infections.  Therefore, the plan is to perform above mentioned procedure.     Procedure Details:  Written informed consent was obtained and saved in the electronic medical record, after the risks, benefits, and alternatives were discussed with the patient.  All of the patient s questions were answered.  The patient was brought to the procedure room, where she was identified by name, medical record number and date of birth.       The patient was placed in the prone position on the procedure room table.  All pressure points were checked and comfortably padded.  Routine monitors were placed.  Vital signs were stable.  A formal time-out procedure was performed, as per protocol, including patient name, title of procedure, and site of procedure, and all in the room concurred.     A chlorhexidine prep was completed followed by sterile draping per standard procedure.     Curvilinear ultrasound guidance was used to identify the inferior border of the right SI joint. I scanned caudally and the pyriformis muscle was identified. Subsequently, the right ischial spine was identified including the sacrotuberous and sacrospinous ligaments. After  1% lidocaine infiltration using a 22 gauge 1.5 inch needle, a 22G was advanced at the target with ultrasound guidance until it was inbetween the sacrotuberous and sacrospinous ligaments. At this point, a mixture of 40 mg methylprednisolone mixed with 3 ml 0.75% bupivacaine was injected.  After negative aspiration, the above listed injection solution was injected, the needle was flushed with 1% lidocaine and withdrawn.  No paresthesias were noted throughout the duration of the procedure.     A contralateral procedure WAS NOT performed.     Light pressure was held at the puncture site(s) to prevent ecchymosis and oozing.  The patient's skin was cleansed, and hemostasis was confirmed.  Band-aids were applied to the needle injection site(s).       Condition:     The patient remained awake and alert throughout the procedure.  The patient tolerated the procedure well and was monitored for approximately 15 minutes afterward in the post procedure area.  There were no immediate post procedure complications noted.  The patient was then discharged to home as per protocol.        Marion Reddy MD    Department of Anesthesiology  Pain Management Division

## 2023-08-23 ENCOUNTER — MYC REFILL (OUTPATIENT)
Dept: PALLIATIVE MEDICINE | Facility: CLINIC | Age: 73
End: 2023-08-23
Payer: MEDICARE

## 2023-08-23 DIAGNOSIS — G89.3 CANCER RELATED PAIN: ICD-10-CM

## 2023-08-23 RX ORDER — OXYCODONE AND ACETAMINOPHEN 10; 325 MG/1; MG/1
1 TABLET ORAL EVERY 4 HOURS PRN
Qty: 125 TABLET | Refills: 0 | Status: SHIPPED | OUTPATIENT
Start: 2023-08-23 | End: 2023-09-20

## 2023-08-23 NOTE — TELEPHONE ENCOUNTER
Received Min[employer] message from patient requesting refill of percocet.     Last refill: 7/26/23  Last office visit: 7/12/23  Scheduled for follow up 9/20/23     Will route request to NP for review.     Reviewed [place]  Report.

## 2023-09-20 ENCOUNTER — MYC REFILL (OUTPATIENT)
Dept: PALLIATIVE MEDICINE | Facility: CLINIC | Age: 73
End: 2023-09-20

## 2023-09-20 ENCOUNTER — VIRTUAL VISIT (OUTPATIENT)
Dept: PALLIATIVE MEDICINE | Facility: CLINIC | Age: 73
End: 2023-09-20
Payer: MEDICARE

## 2023-09-20 VITALS — HEIGHT: 65 IN | WEIGHT: 160 LBS | BODY MASS INDEX: 26.66 KG/M2

## 2023-09-20 DIAGNOSIS — G89.3 CANCER RELATED PAIN: ICD-10-CM

## 2023-09-20 DIAGNOSIS — G58.8 NEURALGIA OF RIGHT PUDENDAL NERVE: Primary | ICD-10-CM

## 2023-09-20 DIAGNOSIS — C21.0 SQUAMOUS CELL CARCINOMA OF ANUS (H): ICD-10-CM

## 2023-09-20 PROCEDURE — 99215 OFFICE O/P EST HI 40 MIN: CPT | Mod: VID | Performed by: NURSE PRACTITIONER

## 2023-09-20 RX ORDER — OXYCODONE AND ACETAMINOPHEN 10; 325 MG/1; MG/1
1 TABLET ORAL EVERY 4 HOURS PRN
Qty: 125 TABLET | Refills: 0 | Status: SHIPPED | OUTPATIENT
Start: 2023-09-20 | End: 2023-10-23

## 2023-09-20 ASSESSMENT — PAIN SCALES - GENERAL: PAINLEVEL: NO PAIN (0)

## 2023-09-20 NOTE — TELEPHONE ENCOUNTER
Received Cellma[employer] message from patient requesting refill of percocet.     Last refill: 8/23/23  Last office visit: 9/20/23  Scheduled for follow up per check out request.     Will route request to NP for review.     Reviewed [place]  Report.

## 2023-09-20 NOTE — NURSING NOTE
Is the patient currently in the state of [place]? YES    Visit mode:VIDEO    If the visit is dropped, the patient can be reconnected by: VIDEO VISIT: Send to e-mail at: [email or web address]    Will anyone else be joining the visit? NO  (If patient encounters technical issues they should call [phone]    How would you like to obtain your AVS? MyChart    Are changes needed to the allergy or medication list? No    Reason for visit: Video Visit (Follow Up )    Charisma TRIPP

## 2023-09-20 NOTE — PATIENT INSTRUCTIONS
Thank you for meeting with us in the [facility].    How to get a hold of us:  For non-urgent matters, MyChart works best.    For more urgent matters, or if you prefer not to use MyChart, call our clinic nurse coordinator Fany Gutierrez RN at [phone] or [phone]    We have an on-call number for evenings and weekends. Please call this only if you are having uncontrolled symptoms or serious side effects from your medicines: [phone].     For refills, please give us a week (5 working days) notice. We don't always have providers available everyday to do refills. If you call the day you run out of your medicine, we may not be able to refill it in time, so call 5 days in advance!

## 2023-09-20 NOTE — PROGRESS NOTES
Virtual Visit Details    Type of service:  Video Visit   Video Start Time: 8:55 AM  Video End Time: 9:15 AM    Originating Location (pt. Location): Home    Distant Location (provider location):  On-site  Platform used for Video Visit: [facility]        Palliative Care Outpatient Clinic      Patient ID/Chief Complaint: Sara Dominguez 72 year old female who is presenting to the palliative medicine clinic today at the request of CASSIE Denise for a palliative care follow-up secondary to assistance with pain management.   The patient's primary care provider is:  Nadya Reid       Impression & Recommendations:  72-year-old female with history of anal cancer in 2019 [id number] (anna-rectal nodes) in 2019. She completed definitive CRT and is currently TYLER.  Unfortunately she developed post CRT pelvic insufficiency fractures and sacrum and right pubic bone causing continuous right groin pain that extends into her buttock and down her leg.  Pain can be deep and severely aching, spiking to 10 out of 10 reported pain, with numbness/tingling/radiating pain outward.  She also has long-term chronic back pain.    Right pudendal nerve block completed by Dr. Reddy October 2022 with a decent amount of symptom relief.  Block repeated in 1/24/23 and 4/18/23 with limited effect so far.     She takes 10/325 Percocet approximately 4 times per day, gabapentin 600 mg 4 times a day, and Effexor  mg daily.    Denies issues with constipation.    Symptoms/recommendations/discussion:  Did not respond well to Butrans 10 mcg/h patch, reported nausea.  Continue Percocet 10/325--she takes 4 tablets/day.  Refill sent today.  Continue gabapentin as prescribed by PM&R physician 600 mg 4 times daily.    Continue Effexor XR  She continues to follow with interventional pain management, Dr. Reddy--status post additional right pudendal nerve block--not much improvement in pain.  Recently had additional nerve block, monitor response.  She  "is moving to [place].  She is already looking for new providers there.  Palliative care follow-up in approximately 10 weeks      How to get a hold of us:  For non-urgent matters, MyChart works best.    For more urgent matters, or if you prefer not to use MyChart, call our clinic nurse coordinator Fany Gutierrez RN at [phone] or [phone]    We have an on-call number for evenings and weekends. Please call this only if you are having uncontrolled symptoms or serious side effects from your medicines: [phone].     For refills, please give us a week (5 working days) notice. We don't always have providers available everyday to do refills. If you call the day you run out of your medicine, we may not be able to refill it in time, so call 5 days in advance!            History:  History gathered today from: patient, medical chart      PE: Ht 1.651 m (5' 5\")   Wt 72.6 kg (160 lb)   BMI 26.63 kg/m     Wt Readings from Last 3 Encounters:   09/20/23 72.6 kg (160 lb)   07/18/23 74.8 kg (165 lb)   04/18/23 74.8 kg (165 lb)       Gen alert, comfortable appearing, NAD.   Head NCAT.  Eyes anicteric without injection  Face symmetric, eyes conjugate  Lungs unlabored, no cough, speaking full sentences  Skin no rashes or lesions evident on face/neck  Neuro Face symmetric, eyes conjugate; speech fluent.  Neuropsych exam normal including affect, sensorium, gross memory, thought processes, and fund of knowledge.         Data reviewed:  I reviewed electrolytes, BUN/creatinine, liver profile, hemoglobin and hematocrit, platelet count, and most recent imaging  Recent interventional pain note         database reviewed: 9/20/2023        41 minutes spent on the date of the encounter doing chart review, history and exam, patient education & counseling, documentation and other activities as noted above.        Thank you for involving us in the patient's care.   KARISSA Telles, [facility] " Service  Office [phone]

## 2023-10-23 ENCOUNTER — MYC REFILL (OUTPATIENT)
Dept: PALLIATIVE MEDICINE | Facility: CLINIC | Age: 73
End: 2023-10-23
Payer: MEDICARE

## 2023-10-23 DIAGNOSIS — G89.3 CANCER RELATED PAIN: ICD-10-CM

## 2023-10-23 RX ORDER — OXYCODONE AND ACETAMINOPHEN 10; 325 MG/1; MG/1
1 TABLET ORAL EVERY 4 HOURS PRN
Qty: 125 TABLET | Refills: 0 | Status: SHIPPED | OUTPATIENT
Start: 2023-10-23

## 2023-10-23 NOTE — TELEPHONE ENCOUNTER
Received [employer] message from patient requesting refill of percocet.     Last refill: 9/21/23  Last office visit: 9/20/23  Scheduled for follow up not scheduled, msg sent to scheduling.     Will route request to NP for review.     Reviewed [place]  Report.

## 2023-11-14 ENCOUNTER — TELEPHONE (OUTPATIENT)
Dept: ONCOLOGY | Facility: CLINIC | Age: 73
End: 2023-11-14
Payer: MEDICARE

## 2023-11-14 NOTE — TELEPHONE ENCOUNTER
----- Message from Sue Borrero sent at 11/14/2023  8:30 AM CST -----  Regarding: Patient moved but might come back in the spring/summer  Rik Parmar,    I called this patient to see if I could get her rescheduled from the Canceled 10/04/23 visit that was originally scheduled at [place] but sounds like she moved. She will be in [place] until spring and then it sounds like the plan is to come back to [place] during the spring/summer months so she will call us if she needs to schedule an appt over here. In the mean time she is establishing care in [place].           [place]

## 2024-07-13 ENCOUNTER — HEALTH MAINTENANCE LETTER (OUTPATIENT)
Age: 74
End: 2024-07-13

## 2024-07-31 RX ORDER — OXYCODONE AND ACETAMINOPHEN 10; 325 MG/1; MG/1
1 TABLET ORAL EVERY 6 HOURS PRN
Qty: 90 TABLET | Refills: 0 | OUTPATIENT
Start: 2024-07-31

## 2025-07-19 ENCOUNTER — HEALTH MAINTENANCE LETTER (OUTPATIENT)
Age: 75
End: 2025-07-19

## (undated) DEVICE — TRAY PAIN INJECTION 97A 640

## (undated) DEVICE — PREP CHLORAPREP W/ORANGE TINT 10.5ML 260715

## (undated) DEVICE — TUBING EXTENSION SET MICROBORE 6" LL 2N1194

## (undated) DEVICE — NDL 22GA 1.5"

## (undated) DEVICE — COVER ULTRASOUND PROBE W/GEL FLEXI-FEEL 6"X58" LF  25-FF658

## (undated) DEVICE — NDL SPINAL 22GA 3.5" QUINCKE 405181

## (undated) DEVICE — GLOVE PROTEXIS POWDER FREE SMT 6.5  2D72PT65X

## (undated) RX ORDER — HEPARIN SODIUM (PORCINE) LOCK FLUSH IV SOLN 100 UNIT/ML 100 UNIT/ML
SOLUTION INTRAVENOUS
Status: DISPENSED
Start: 2021-09-15

## (undated) RX ORDER — TRIAMCINOLONE ACETONIDE 40 MG/ML
INJECTION, SUSPENSION INTRA-ARTICULAR; INTRAMUSCULAR
Status: DISPENSED
Start: 2021-12-17

## (undated) RX ORDER — TRIAMCINOLONE ACETONIDE 40 MG/ML
INJECTION, SUSPENSION INTRA-ARTICULAR; INTRAMUSCULAR
Status: DISPENSED
Start: 2022-05-03

## (undated) RX ORDER — FENTANYL CITRATE 50 UG/ML
INJECTION, SOLUTION INTRAMUSCULAR; INTRAVENOUS
Status: DISPENSED
Start: 2021-09-15

## (undated) RX ORDER — TRIAMCINOLONE ACETONIDE 40 MG/ML
INJECTION, SUSPENSION INTRA-ARTICULAR; INTRAMUSCULAR
Status: DISPENSED
Start: 2022-02-28

## (undated) RX ORDER — BUPIVACAINE HYDROCHLORIDE 5 MG/ML
INJECTION, SOLUTION EPIDURAL; INTRACAUDAL
Status: DISPENSED
Start: 2022-02-28

## (undated) RX ORDER — LIDOCAINE HYDROCHLORIDE 10 MG/ML
INJECTION, SOLUTION EPIDURAL; INFILTRATION; INTRACAUDAL; PERINEURAL
Status: DISPENSED
Start: 2021-09-15

## (undated) RX ORDER — SODIUM CHLORIDE 9 MG/ML
INJECTION, SOLUTION INTRAVENOUS
Status: DISPENSED
Start: 2021-09-15

## (undated) RX ORDER — LIDOCAINE HYDROCHLORIDE 10 MG/ML
INJECTION, SOLUTION EPIDURAL; INFILTRATION; INTRACAUDAL; PERINEURAL
Status: DISPENSED
Start: 2022-02-28

## (undated) RX ORDER — BUPIVACAINE HYDROCHLORIDE 5 MG/ML
INJECTION, SOLUTION EPIDURAL; INTRACAUDAL
Status: DISPENSED
Start: 2021-12-17

## (undated) RX ORDER — LIDOCAINE HYDROCHLORIDE 10 MG/ML
INJECTION, SOLUTION EPIDURAL; INFILTRATION; INTRACAUDAL; PERINEURAL
Status: DISPENSED
Start: 2021-12-17